# Patient Record
Sex: FEMALE | Race: WHITE | Employment: OTHER | URBAN - METROPOLITAN AREA
[De-identification: names, ages, dates, MRNs, and addresses within clinical notes are randomized per-mention and may not be internally consistent; named-entity substitution may affect disease eponyms.]

---

## 2017-01-03 DIAGNOSIS — F11.90 CHRONIC NARCOTIC USE: Primary | ICD-10-CM

## 2017-01-03 DIAGNOSIS — M51.9 LUMBAR DISC DISEASE: ICD-10-CM

## 2017-01-03 RX ORDER — OXYCODONE AND ACETAMINOPHEN 10; 325 MG/1; MG/1
TABLET ORAL
Qty: 120 TAB | Refills: 0 | Status: SHIPPED | OUTPATIENT
Start: 2017-01-03 | End: 2017-01-27 | Stop reason: SDUPTHER

## 2017-01-03 NOTE — TELEPHONE ENCOUNTER
Requested Prescriptions     Pending Prescriptions Disp Refills    oxyCODONE-acetaminophen (PERCOCET 10)  mg per tablet 120 Tab 0     Sig: Take 1/2-1 tablet every 4-6 hours as needed for pain.      Last OV-9/13/16  Next OV-None scheduled   Med refilled-12/2/16

## 2017-01-03 NOTE — TELEPHONE ENCOUNTER
Advise patient RX is ready. Have ordered a random urine drug screen for when picks up medication, advise front office order in system.

## 2017-01-04 ENCOUNTER — LAB ONLY (OUTPATIENT)
Dept: INTERNAL MEDICINE CLINIC | Age: 62
End: 2017-01-04

## 2017-01-04 DIAGNOSIS — F11.90 CHRONIC NARCOTIC USE: ICD-10-CM

## 2017-01-12 LAB — DRUGS UR: NORMAL

## 2017-01-16 ENCOUNTER — TELEPHONE (OUTPATIENT)
Dept: INTERNAL MEDICINE CLINIC | Age: 62
End: 2017-01-16

## 2017-01-19 ENCOUNTER — HOSPITAL ENCOUNTER (OUTPATIENT)
Dept: CT IMAGING | Age: 62
Discharge: HOME | End: 2017-01-19
Attending: INTERNAL MEDICINE
Payer: COMMERCIAL

## 2017-01-19 DIAGNOSIS — R63.4 ABNORMAL WEIGHT LOSS: ICD-10-CM

## 2017-01-19 DIAGNOSIS — R10.13 PAIN, ABDOMINAL, EPIGASTRIC: ICD-10-CM

## 2017-01-19 LAB — CREAT BLD-MCNC: 0.9 MG/DL (ref 0.6–1.3)

## 2017-01-19 PROCEDURE — 74011636320 HC RX REV CODE- 636/320: Performed by: INTERNAL MEDICINE

## 2017-01-19 PROCEDURE — 82565 ASSAY OF CREATININE: CPT

## 2017-01-19 PROCEDURE — 74011000255 HC RX REV CODE- 255: Performed by: INTERNAL MEDICINE

## 2017-01-19 PROCEDURE — 74177 CT ABD & PELVIS W/CONTRAST: CPT

## 2017-01-19 PROCEDURE — 74011250636 HC RX REV CODE- 250/636: Performed by: INTERNAL MEDICINE

## 2017-01-19 RX ORDER — BARIUM SULFATE 20 MG/ML
900 SUSPENSION ORAL
Status: COMPLETED | OUTPATIENT
Start: 2017-01-19 | End: 2017-01-19

## 2017-01-19 RX ORDER — SODIUM CHLORIDE 0.9 % (FLUSH) 0.9 %
10 SYRINGE (ML) INJECTION
Status: COMPLETED | OUTPATIENT
Start: 2017-01-19 | End: 2017-01-19

## 2017-01-19 RX ORDER — SODIUM CHLORIDE 9 MG/ML
50 INJECTION, SOLUTION INTRAVENOUS
Status: COMPLETED | OUTPATIENT
Start: 2017-01-19 | End: 2017-01-19

## 2017-01-19 RX ADMIN — IOPAMIDOL 100 ML: 755 INJECTION, SOLUTION INTRAVENOUS at 14:31

## 2017-01-19 RX ADMIN — SODIUM CHLORIDE 50 ML/HR: 900 INJECTION, SOLUTION INTRAVENOUS at 14:31

## 2017-01-19 RX ADMIN — BARIUM SULFATE 900 ML: 21 SUSPENSION ORAL at 14:31

## 2017-01-19 RX ADMIN — Medication 10 ML: at 14:31

## 2017-01-24 ENCOUNTER — TELEPHONE (OUTPATIENT)
Dept: INTERNAL MEDICINE CLINIC | Age: 62
End: 2017-01-24

## 2017-01-24 NOTE — TELEPHONE ENCOUNTER
Please notify patient that she did not pass her urine drug test.  She was found to have hydrocodone in her system and that is not prescribed for her and is not a breakdown product of oxycodone. This is a violation of her agreement and I cannot prescribe controlled medications for her any more. She will be able to get one more prescription from me for oxycodone and is advised to wean off the medication. Do not stop abruptly. We can continue to see her as PCP, but cannot prescribe controlled medications.

## 2017-01-27 ENCOUNTER — OFFICE VISIT (OUTPATIENT)
Dept: INTERNAL MEDICINE CLINIC | Age: 62
End: 2017-01-27

## 2017-01-27 VITALS
DIASTOLIC BLOOD PRESSURE: 84 MMHG | WEIGHT: 134.4 LBS | HEIGHT: 65 IN | BODY MASS INDEX: 22.39 KG/M2 | OXYGEN SATURATION: 99 % | RESPIRATION RATE: 19 BRPM | SYSTOLIC BLOOD PRESSURE: 149 MMHG | HEART RATE: 80 BPM | TEMPERATURE: 98.3 F

## 2017-01-27 DIAGNOSIS — M51.9 LUMBAR DISC DISEASE: ICD-10-CM

## 2017-01-27 DIAGNOSIS — J06.9 UPPER RESPIRATORY TRACT INFECTION, UNSPECIFIED TYPE: Primary | ICD-10-CM

## 2017-01-27 RX ORDER — OXYCODONE AND ACETAMINOPHEN 10; 325 MG/1; MG/1
TABLET ORAL
Qty: 120 TAB | Refills: 0 | Status: SHIPPED | OUTPATIENT
Start: 2017-01-27 | End: 2017-10-11

## 2017-01-27 RX ORDER — AZITHROMYCIN 250 MG/1
250 TABLET, FILM COATED ORAL SEE ADMIN INSTRUCTIONS
Qty: 6 TAB | Refills: 0 | Status: SHIPPED | OUTPATIENT
Start: 2017-01-27 | End: 2017-02-01

## 2017-01-27 RX ORDER — DICYCLOMINE HYDROCHLORIDE 10 MG/1
CAPSULE ORAL
Refills: 0 | COMMUNITY
Start: 2017-01-25 | End: 2021-02-08 | Stop reason: CLARIF

## 2017-01-27 NOTE — MR AVS SNAPSHOT
Visit Information Date & Time Provider Department Dept. Phone Encounter #  
 1/27/2017  3:15 PM Finley Bosworth, [address] [address] [phone] Follow-up Instructions Return if symptoms worsen or fail to improve. Your Appointments 1/31/2017  2:40 PM  
ESTABLISHED PATIENT with Alfreda Haro MD  
Arthritis and [address] ([facility]) Appt Note: f/u labs and xrays [address] [facility] [address]  
[phone]  
  
   
 [address] [address] Upcoming Health Maintenance Date Due Hepatitis C Screening 1955 DTaP/Tdap/Td series (1 - Tdap) 2/20/1975 FOBT Q 1 YEAR AGE 50-75 2/20/2004 ZOSTER VACCINE AGE 60> 2/20/2014 INFLUENZA AGE 9 TO ADULT 8/1/2016 BREAST CANCER SCRN MAMMOGRAM 8/17/2017 PAP AKA CERVICAL CYTOLOGY 8/17/2018 Allergies as of 1/27/2017  Review Complete On: 1/27/2017 By: Finley Bosworth, MD  
  
 Severity Noted Reaction Type Reactions Coreg [Carvedilol] High 01/28/2016    Anaphylaxis Ace Inhibitors  01/19/2016    Swelling Arb-angiotensin Receptor Antagonist  07/21/2016   Side Effect Other (comments) Prior ACE inhibitor angioedema, cross reaction risk Codeine  10/13/2012    Nausea Only Keflex [Cephalexin]  10/13/2012    Hives Milk  12/23/2015    Other (comments) Lactose intolerant. Morphine  10/13/2012    Nausea and Vomiting Current Immunizations  Reviewed on 11/15/2015 Name Date Influenza Vaccine (Quad) PF 11/15/2015  8:22 AM  
 Influenza Vaccine Split 10/14/2012  1:24 PM  
 Pneumococcal Polysaccharide (PPSV-23) 8/25/2015 Not reviewed this visit You Were Diagnosed With   
  
 Codes Comments Upper respiratory tract infection, unspecified type    -  Primary ICD-10-CM: J06.9 ICD-9-CM: 465.9 Lumbar disc disease     ICD-10-CM: M51.9 ICD-9-CM: 722.93 Vitals BP Pulse Temp Resp Height(growth percentile) Weight(growth percentile) 149/84 (BP 1 Location: Left arm, BP Patient Position: Sitting) 80 98.3 °F (36.8 °C) (Oral) 19 5' 5\" (1.651 m) 134 lb 6.4 oz (61 kg) SpO2 BMI OB Status Smoking Status 99% 22.37 kg/m2 Postmenopausal Current Every Day Smoker Vitals History BMI and BSA Data Body Mass Index Body Surface Area  
 22.37 kg/m 2 1.67 m 2 Preferred Pharmacy Pharmacy Name Phone [id number] [phone] Your Updated Medication List  
  
   
This list is accurate as of: 1/27/17  4:09 PM.  Always use your most recent med list.  
  
  
  
  
 * albuterol 90 mcg/actuation inhaler Commonly known as:  PROVENTIL HFA, VENTOLIN HFA, PROAIR HFA Take 2 Puffs by inhalation every six (6) hours as needed for Wheezing. * albuterol 2.5 mg /3 mL (0.083 %) nebulizer solution Commonly known as:  PROVENTIL VENTOLIN  
inhale contents of 1 vial in nebulizer every 4 hours if needed  
  
 amLODIPine 2.5 mg tablet Commonly known as:  Elsmadan Bakes Take 1 Tab by mouth daily. aspirin 81 mg chewable tablet Take 1 Tab by mouth daily. atorvastatin 40 mg tablet Commonly known as:  LIPITOR  
take 1 tablet by mouth NIGHTLY  
  
 azithromycin 250 mg tablet Commonly known as:  Valentina Valentino Take 1 Tab by mouth See Admin Instructions for 5 days. carvedilol 12.5 mg tablet Commonly known as:  COREG  
take 1 tablet by mouth twice a day with meals  
  
 desoximetasone 0.25 % topical cream  
Commonly known as:  TOPICORT  
  
 dicyclomine 10 mg capsule Commonly known as:  BENTYL TAKE 1 CAPSULE BY MOUTH BEFORE MEALS AND AT BEDTIME AS NEEDED. DULoxetine 60 mg capsule Commonly known as:  CYMBALTA Take  by mouth daily. EEMT 1.25-2.5 mg per tablet Generic drug:  estrogens (conjugated)-methylTESTOSTERone Take 1 Tab by mouth daily. fluticasone-salmeterol 250-50 mcg/dose diskus inhaler Commonly known as:  ADVAIR DISKUS Take 1 Puff by inhalation two (2) times a day.  
  
 gabapentin 300 mg capsule Commonly known as:  NEURONTIN  
TAKE 1 CAPSULE IN THE MORNING, 1 CAPSULE IN THE AFTERNOON, AD 2 CAPSULES AT BEDTIME  
  
 lidocaine 5 % Commonly known as:  LIDODERM  
2 Patches by TransDERmal route as needed. Nebulizer & Compressor machine 1 Each by Does Not Apply route every four (4) hours as needed. As directed  
  
 nicotine 21 mg/24 hr  
Commonly known as:  NICODERM CQ  
1 Patch by TransDERmal route every twenty-four (24) hours. oxyCODONE-acetaminophen  mg per tablet Commonly known as:  PERCOCET 10 Take 1/2-1 tablet every 4-6 hours as needed for pain. PREMARIN 0.45 mg tablet Generic drug:  estrogens (conjugated) Take  by mouth.  
  
 promethazine 25 mg tablet Commonly known as:  PHENERGAN Take 1 Tab by mouth every six (6) hours as needed for Nausea. * Notice: This list has 2 medication(s) that are the same as other medications prescribed for you. Read the directions carefully, and ask your doctor or other care provider to review them with you. Prescriptions Printed Refills  
 oxyCODONE-acetaminophen (PERCOCET 10)  mg per tablet 0 Sig: Take 1/2-1 tablet every 4-6 hours as needed for pain. Class: Print Prescriptions Sent to Pharmacy Refills  
 azithromycin (ZITHROMAX) 250 mg tablet 0 Sig: Take 1 Tab by mouth See Admin Instructions for 5 days. Class: Normal  
 [facility] [place] [address] Ph #: [phone] Route: Oral  
  
Follow-up Instructions Return if symptoms worsen or fail to improve. Patient Instructions MUCINEX PLAIN FORMULA AND INCREASE WATER INTAKE. Introducing [facility]!    
 Sravani Brewer introduces [employer] patient portal. Now you can access parts of your medical record, email your doctor's office, and request medication refills online. 1. In your internet browser, go to [email or web address] 2. Click on the First Time User? Click Here link in the Sign In box. You will see the New Member Sign Up page. 3. Enter your [employer] Access Code exactly as it appears below. You will not need to use this code after youve completed the sign-up process. If you do not sign up before the expiration date, you must request a new code. · [employer] Access Code: [email or web address] Expires: 2/6/2017  5:15 PM 
 
4. Enter the last four digits of your Social Security Number (xxxx) and Date of Birth (mm/dd/yyyy) as indicated and click Submit. You will be taken to the next sign-up page. 5. Create a [employer] ID. This will be your [employer] login ID and cannot be changed, so think of one that is secure and easy to remember. 6. Create a [employer] password. You can change your password at any time. 7. Enter your Password Reset Question and Answer. This can be used at a later time if you forget your password. 8. Enter your e-mail address. You will receive e-mail notification when new information is available in [address] 9. Click Sign Up. You can now view and download portions of your medical record. 10. Click the Download Summary menu link to download a portable copy of your medical information. If you have questions, please visit the Frequently Asked Questions section of the [employer] website. Remember, [employer] is NOT to be used for urgent needs. For medical emergencies, dial 911. Now available from your iPhone and Android! Please provide this summary of care documentation to your next provider. Your primary care clinician is listed as Gillian Hernandez. If you have any questions after today's visit, please call [phone].

## 2017-01-27 NOTE — PROGRESS NOTES
Reviewed record in preparation for visit and have obtained necessary documentation. Identified pt with two pt identifiers(name and ).       Coordination of Care Questionnaire:  :     1) Have you been to an emergency room, urgent care clinic since your last visit? no   Hospitalized since your last visit? no             2) Have you seen or consulted any other health care providers outside of [facility] since your last visit? no  (Include any pap smears or colon screenings in this section.)

## 2017-01-27 NOTE — PROGRESS NOTES
Shannon Guerrero is a 57 y.o. female who complains of URI symptoms for 3 days. She reports chest congestion and cough, productive of discolored mucous. She is a smoker. Denies sinus or ear pain. No fever. She also comes in to discuss her UDS. The patient has been under controlled substance agreement. She tested positive for hydrocodone and oxycodone. She is not prescribed oxycodone. According to the , she has not filled any hydrocodone rx. She now reports that she found 3 pills in the bottom of her bag and though they were oxycodone tablets and took them. She states that she did not intend to violate her agreement. She is requesting pain medication. Past Medical History   Diagnosis Date    Arthritis     Chronic pain     Heart attack ([address]     [facility] failure [facility] [address]     Hypertension     Squamous cell carcinoma of skin of right elbow 5/2016    Takotsubo cardiomyopathy 11/16/2015    Tobacco abuse 11/16/2015         Review of Systems  Pertinent items are noted in HPI. Objective:     Visit Vitals    /84 (BP 1 Location: Left arm, BP Patient Position: Sitting)    Pulse 80    Temp 98.3 °F (36.8 °C) (Oral)    Resp 19    Ht 5' 5\" (1.651 m)    Wt 134 lb 6.4 oz (61 kg)    SpO2 99%    BMI 22.37 kg/m2     Gen: well appearing female  HEENT:   PERRL,normal conjunctiva. External ear and canals normal, TMs no opacification or erythema,  Swollen nasal turbinates, no sinus pain on palpation,  OP no erythema, no exudates, MMM  Neck:  Supple. Thyroid normal size, nontender, without nodules. No masses or LAD  Resp:  No wheezing, no rhonchi, no rales. CV:  RRR, normal S1S2, no murmur. Assessment/Plan:     1. Lumbar disc disease-patient is advised that the level of hydrocodone in her UDS would not be obtained from 3 tablets and that she has obviously ingested a nonprescribed narcotic. Her controlled substance agreement has been violated. She has been on chronic oxycodone.   She is given one last prescription, so that she can wean to off since I am concerned she will have withdrawal symptoms if she stops abruptly. I will no longer prescribe controlled medications for her.     - oxyCODONE-acetaminophen (PERCOCET 10)  mg per tablet; Take 1/2-1 tablet every 4-6 hours as needed for pain. Dispense: 120 Tab; Refill: 0    2. Upper respiratory tract infection, unspecified type    - azithromycin (ZITHROMAX) 250 mg tablet; Take 1 Tab by mouth See Admin Instructions for 5 days. Dispense: 6 Tab; Refill: 0    Follow-up Disposition:  Return if symptoms worsen or fail to improve.     Jose Rader MD

## 2017-02-13 ENCOUNTER — OFFICE VISIT (OUTPATIENT)
Dept: INTERNAL MEDICINE CLINIC | Age: 62
End: 2017-02-13

## 2017-02-13 VITALS
TEMPERATURE: 97.8 F | DIASTOLIC BLOOD PRESSURE: 74 MMHG | SYSTOLIC BLOOD PRESSURE: 164 MMHG | HEART RATE: 63 BPM | WEIGHT: 136 LBS | HEIGHT: 65 IN | BODY MASS INDEX: 22.66 KG/M2 | OXYGEN SATURATION: 96 % | RESPIRATION RATE: 19 BRPM

## 2017-02-13 DIAGNOSIS — K29.60 OTHER GASTRITIS WITHOUT HEMORRHAGE, UNSPECIFIED CHRONICITY: Primary | ICD-10-CM

## 2017-02-13 DIAGNOSIS — R11.0 NAUSEA: ICD-10-CM

## 2017-02-13 RX ORDER — PROMETHAZINE HYDROCHLORIDE 25 MG/1
25 TABLET ORAL
Qty: 30 TAB | Refills: 0 | Status: SHIPPED | OUTPATIENT
Start: 2017-02-13 | End: 2018-03-14

## 2017-02-13 RX ORDER — OMEPRAZOLE 40 MG/1
40 CAPSULE, DELAYED RELEASE ORAL DAILY
Qty: 30 CAP | Refills: 5 | Status: SHIPPED | OUTPATIENT
Start: 2017-02-13 | End: 2017-07-28 | Stop reason: SDUPTHER

## 2017-02-13 NOTE — PROGRESS NOTES
Nahomi Black is a 57 y.o. female who complains of nausea and vomiting ongoing. She reports that she will awaken with nausea. Saw GI doctor and reports a negative workup. On review of EGD erythema and erosions seen. Was to start nexium, she states that she was unaware of this information and is not taking PPI. Ashly Sanches Taking NSAIDS since narcotics discontinued  . Diagnosed with IBS. Reports increased abdominal gas. Will get diarrhea at times. Sometimes constipation. Past Medical History   Diagnosis Date    Arthritis     Chronic pain     Heart attack ([address]     [facility] failure ([facility]     Hypertension     Squamous cell carcinoma of skin of right elbow 5/2016    Takotsubo cardiomyopathy 11/16/2015    Tobacco abuse 11/16/2015         Review of Systems  A comprehensive review of systems was negative except for that written in the HPI. Objective:     Visit Vitals    /74 (BP 1 Location: Left arm, BP Patient Position: Sitting)    Pulse 63    Temp 97.8 °F (36.6 °C) (Oral)    Resp 19    Ht 5' 5\" (1.651 m)    Wt 136 lb (61.7 kg)    SpO2 96%    BMI 22.63 kg/m2     Gen: well appearing female  Resp:  No wheezing, no rhonchi, no rales. CV:  RRR, normal S1S2, no murmur. GI: soft, nontender, without masses. Assessment/Plan:       ICD-10-CM ICD-9-CM    1. Other gastritis without hemorrhage, unspecified chronicity K29.60 535.40 omeprazole (PRILOSEC) 40 mg capsule   2. Nausea R11.0 787.02 promethazine (PHENERGAN) 25 mg tablet   advised to avoid NSAIDS>     Follow-up Disposition:  Return if symptoms worsen or fail to improve.     Naima Lundberg MD

## 2017-02-13 NOTE — PROGRESS NOTES
Reviewed record in preparation for visit and have obtained necessary documentation. Identified pt with two pt identifiers(name and ).     Chief Complaint   Patient presents with    Vomiting     pt states \" I have been throwing up for over a year\"            Coordination of Care Questionnaire:  :     1) Have you been to an emergency room, urgent care clinic since your last visit? no   Hospitalized since your last visit? no             2) Have you seen or consulted any other health care providers outside of [address] [place] [address] since your last visit? no  (Include any pap smears or colon screenings in this section.)

## 2017-02-13 NOTE — PATIENT INSTRUCTIONS
Start prilosec 40mg once a day and after 1 week you are getting only partial relief, add pepcid complete or zantac 150mg in the evening.

## 2017-02-13 NOTE — MR AVS SNAPSHOT
Visit Information Date & Time Provider Department Dept. Phone Encounter #  
 2/13/2017  3:45 PM Dave Craig, [address] [id number] Follow-up Instructions Return if symptoms worsen or fail to improve. Your Appointments 2/22/2017  1:30 PM  
ESTABLISHED PATIENT with Leandro Fleming MD  
Arthritis and [address] ([facility]) Appt Note: f/u labs and xrays; f/u labs and xrays ok per dr Araceli Olmedo for 30min [address] [facility] [address]  
[phone]  
  
   
 [address] [address] Upcoming Health Maintenance Date Due Hepatitis C Screening 1955 DTaP/Tdap/Td series (1 - Tdap) 2/20/1975 FOBT Q 1 YEAR AGE 50-75 2/20/2004 ZOSTER VACCINE AGE 60> 2/20/2014 INFLUENZA AGE 9 TO ADULT 8/1/2016 BREAST CANCER SCRN MAMMOGRAM 8/17/2017 PAP AKA CERVICAL CYTOLOGY 8/17/2018 Allergies as of 2/13/2017  Review Complete On: 2/13/2017 By: readeo Brochure Severity Noted Reaction Type Reactions Coreg [Carvedilol] High 01/28/2016    Anaphylaxis Ace Inhibitors  01/19/2016    Swelling Arb-angiotensin Receptor Antagonist  07/21/2016   Side Effect Other (comments) Prior ACE inhibitor angioedema, cross reaction risk Codeine  10/13/2012    Nausea Only Keflex [Cephalexin]  10/13/2012    Hives Milk  12/23/2015    Other (comments) Lactose intolerant. Morphine  10/13/2012    Nausea and Vomiting Current Immunizations  Reviewed on 11/15/2015 Name Date Influenza Vaccine (Quad) PF 11/15/2015  8:22 AM  
 Influenza Vaccine Split 10/14/2012  1:24 PM  
 Pneumococcal Polysaccharide (PPSV-23) 8/25/2015 Not reviewed this visit You Were Diagnosed With   
  
 Codes Comments Other gastritis without hemorrhage, unspecified chronicity    -  Primary ICD-10-CM: K29.60 ICD-9-CM: 535.40 Nausea     ICD-10-CM: R11.0 ICD-9-CM: 787.02 Vitals BP Pulse Temp Resp Height(growth percentile) Weight(growth percentile) 164/74 (BP 1 Location: Left arm, BP Patient Position: Sitting) 63 97.8 °F (36.6 °C) (Oral) 19 5' 5\" (1.651 m) 136 lb (61.7 kg) SpO2 BMI OB Status Smoking Status 96% 22.63 kg/m2 Postmenopausal Current Every Day Smoker BMI and BSA Data Body Mass Index Body Surface Area  
 22.63 kg/m 2 1.68 m 2 Preferred Pharmacy Pharmacy Name Phone [phone] [phone] Your Updated Medication List  
  
   
This list is accurate as of: 2/13/17  5:22 PM.  Always use your most recent med list.  
  
  
  
  
 * albuterol 90 mcg/actuation inhaler Commonly known as:  PROVENTIL HFA, VENTOLIN HFA, PROAIR HFA Take 2 Puffs by inhalation every six (6) hours as needed for Wheezing. * albuterol 2.5 mg /3 mL (0.083 %) nebulizer solution Commonly known as:  PROVENTIL VENTOLIN  
inhale contents of 1 vial in nebulizer every 4 hours if needed  
  
 amLODIPine 2.5 mg tablet Commonly known as:  Mckayla Fraction Take 1 Tab by mouth daily. aspirin 81 mg chewable tablet Take 1 Tab by mouth daily. atorvastatin 40 mg tablet Commonly known as:  LIPITOR  
take 1 tablet by mouth NIGHTLY  
  
 carvedilol 12.5 mg tablet Commonly known as:  COREG  
take 1 tablet by mouth twice a day with meals  
  
 desoximetasone 0.25 % topical cream  
Commonly known as:  TOPICORT  
  
 dicyclomine 10 mg capsule Commonly known as:  BENTYL TAKE 1 CAPSULE BY MOUTH BEFORE MEALS AND AT BEDTIME AS NEEDED. DULoxetine 60 mg capsule Commonly known as:  CYMBALTA Take  by mouth daily. EEMT 1.25-2.5 mg per tablet Generic drug:  estrogens (conjugated)-methylTESTOSTERone Take 1 Tab by mouth daily. fluticasone-salmeterol 250-50 mcg/dose diskus inhaler Commonly known as:  ADVAIR DISKUS Take 1 Puff by inhalation two (2) times a day.  
  
 gabapentin 300 mg capsule Commonly known as:  NEURONTIN  
TAKE 1 CAPSULE IN THE MORNING, 1 CAPSULE IN THE AFTERNOON, AD 2 CAPSULES AT BEDTIME  
  
 lidocaine 5 % Commonly known as:  LIDODERM  
2 Patches by TransDERmal route as needed. Nebulizer & Compressor machine 1 Each by Does Not Apply route every four (4) hours as needed. As directed  
  
 nicotine 21 mg/24 hr  
Commonly known as:  NICODERM CQ  
1 Patch by TransDERmal route every twenty-four (24) hours. omeprazole 40 mg capsule Commonly known as:  PRILOSEC Take 1 Cap by mouth daily. On empty stomach  
  
 oxyCODONE-acetaminophen  mg per tablet Commonly known as:  PERCOCET 10 Take 1/2-1 tablet every 4-6 hours as needed for pain. PREMARIN 0.45 mg tablet Generic drug:  estrogens (conjugated) Take  by mouth.  
  
 promethazine 25 mg tablet Commonly known as:  PHENERGAN Take 1 Tab by mouth every six (6) hours as needed for Nausea. * Notice: This list has 2 medication(s) that are the same as other medications prescribed for you. Read the directions carefully, and ask your doctor or other care provider to review them with you. Prescriptions Sent to Pharmacy Refills  
 omeprazole (PRILOSEC) 40 mg capsule 5 Sig: Take 1 Cap by mouth daily. On empty stomach Class: Normal  
 Pharmacy: [address] Ph #: [phone] Route: Oral  
 promethazine (PHENERGAN) 25 mg tablet 0 Sig: Take 1 Tab by mouth every six (6) hours as needed for Nausea. Class: Normal  
 Pharmacy: [address] Ph #: [phone] Route: Oral  
  
Follow-up Instructions Return if symptoms worsen or fail to improve. Patient Instructions Start prilosec 40mg once a day and after 1 week you are getting only partial relief, add pepcid complete or zantac 150mg in the evening. Introducing [facility] & HEALTH SERVICES! Noa Rubio introduces [employer] patient portal. Now you can access parts of your medical record, email your doctor's office, and request medication refills online. 1. In your internet browser, go to [email or web address] 2. Click on the First Time User? Click Here link in the Sign In box. You will see the New Member Sign Up page. 3. Enter your [employer] Access Code exactly as it appears below. You will not need to use this code after youve completed the sign-up process. If you do not sign up before the expiration date, you must request a new code. · [employer] Access Code: [email or web address] Expires: 5/14/2017  5:22 PM 
 
4. Enter the last four digits of your Social Security Number (xxxx) and Date of Birth (mm/dd/yyyy) as indicated and click Submit. You will be taken to the next sign-up page. 5. Create a [employer] ID. This will be your [employer] login ID and cannot be changed, so think of one that is secure and easy to remember. 6. Create a [employer] password. You can change your password at any time. 7. Enter your Password Reset Question and Answer. This can be used at a later time if you forget your password. 8. Enter your e-mail address. You will receive e-mail notification when new information is available in [address] 9. Click Sign Up. You can now view and download portions of your medical record. 10. Click the Download Summary menu link to download a portable copy of your medical information. If you have questions, please visit the Frequently Asked Questions section of the [employer] website. Remember, [employer] is NOT to be used for urgent needs. For medical emergencies, dial 911. Now available from your iPhone and Android! Please provide this summary of care documentation to your next provider. Your primary care clinician is listed as Chai Carney. If you have any questions after today's visit, please call [phone].

## 2017-02-22 ENCOUNTER — OFFICE VISIT (OUTPATIENT)
Dept: RHEUMATOLOGY | Age: 62
End: 2017-02-22

## 2017-02-22 VITALS
HEART RATE: 68 BPM | DIASTOLIC BLOOD PRESSURE: 84 MMHG | HEIGHT: 65 IN | SYSTOLIC BLOOD PRESSURE: 160 MMHG | WEIGHT: 129.8 LBS | TEMPERATURE: 97.4 F | RESPIRATION RATE: 16 BRPM | BODY MASS INDEX: 21.63 KG/M2 | OXYGEN SATURATION: 100 %

## 2017-02-22 DIAGNOSIS — M19.90 INFLAMMATORY ARTHRITIS: Primary | ICD-10-CM

## 2017-02-22 RX ORDER — HYDROCODONE BITARTRATE AND ACETAMINOPHEN 5; 325 MG/1; MG/1
TABLET ORAL
Refills: 0 | COMMUNITY
Start: 2017-02-14 | End: 2017-10-11

## 2017-02-22 NOTE — PROGRESS NOTES
RHEUMATOLOGY PROBLEM LIST AND CHIEF COMPLAINT  1. Possible inflammatory arthritis - morning stiffness, arthritis on the 2nd and 3rd MCPs bilaterally. 2. Osteoarthritis - hands, knees, feet, hips  3. Fibromyalgia    INTERVAL HISTORY  This is a 60 y.o.  female. Today, the patient complains of pain in the joints. Location: hand, hip, knee, foot, lumbar spine  Severity:  7 on a scale of 0-10  Timing: intermittent   Duration: many years  Modifying factors: Steroid injections  Context/Associated signs and symptoms: The patient states that she has pain in her hands on occasion, but she admits to taking pain medication and believes this could be relieving some of her pain. She admits to having stiffness in her hands that lasts for about one hour and improves with movement. She believes her right hand is worse than her left hand. Her x-rays showed joint space narrowing of right 2nd and 3rd MCPs and possible erosions of her left 2nd and 3rd MCPs. She complains most of pain in her hips and back. RHEUMATOLOGY REVIEW OF SYSTEMS   Positives as per history  Negatives as follows:  My [place]:  Denies unexplained persistent fevers or weight change  RESPIRATORY:  No pleuritic pain, exertional dyspnea  CARDIOVASCULAR:  Denies chest pain  GASTRO:   Denies heartburn, abdominal pain, nausea, vomiting, diarrhea  SKIN:    Denies rash   MSK:    No morning stiffness >1 hour, persistent joint swelling    PAST MEDICAL HISTORY  Reviewed with patient, significant changes in medical history - no    FAMILY HISTORY   No autoimmune disease in the family    PHYSICAL EXAM  Blood pressure 160/84, pulse 68, temperature 97.4 °F (36.3 °C), temperature source Oral, resp. rate 16, height 5' 5\" (1.651 m), weight 129 lb 12.8 oz (58.9 kg), SpO2 100 %. GENERAL APPEARANCE: Well-nourished, no acute distress  NECK: No adenopathy  ENT: No oral ulcers  CARDIOVASCULAR: Heart rhythm is regular.  No murmur, rub, gallop  CHEST: Normal vesicular breath sounds. No wheezes, rales, pleural friction rubs  ABDOMINAL: The abdomen is soft and nontender. Bowel sounds are normal  SKIN: No rash, palpable purpura, digital ulcer, abnormal thickening   MUSCULOSKELETAL: Lumbosacral pain  Upper extremities - Heberden's and Hang's nodes bilaterally. Synovial thickening and fullness of 2nd and 3rd MCP's bilaterally. Lower extremities - full range of motion, no tenderness, no swelling, no synovial thickening and no deformity of joints. LABS, RADIOLOGY AND PROCEDURES  Previous labs reviewed -Yes  Previous radiology reviewed -Yes    12/20/2016 Bilateral hand x-rays  IMPRESSION: Erosive disease of the right second and third metacarpal phalangeal  joints as described, concerning for rheumatoid arthritis    12/20/2016 Bilateral foot x-rays  IMPRESSION:   Bilateral hallux valgus deformity, more pronounced on the left  Possible posttraumatic osteoarthritis of the right second metatarsal phalangeal  joint. Incidental small bilateral plantar spurs. 12/20/2016 Bilateral hip x-rays  IMPRESSION: No acute abnormality    ASSESSMENT  1. Possible inflammatory arthritis - morning stiffness, arthritis of 2nd and 3rd MCPs bilaterally with possible erosions on the right - The patient's x-rays showed possible changes that could be consistent with an inflammatory arthritis. She has complained of pain and morning stiffness in her hands, and there is synovial thickening and fullness of the 2nd and 3rd MCPs bilaterally. I suspect that she may have RA or gout, so I will check labs today including a CCP and uric acid. After checking her labs I will consider starting her on low dose steroids to see if her inflammatory symptoms improve. She should return in 2 months for a follow up. 2. Osteoarthritis (hands, knees) - The patient's x-rays showed changes consistent with OA in her hands, feet, and hips. She should continue physical therapy and NSAIDs as needed.    3. Pain syndrome (fibromyalgia) - Continue physical therapy. PLAN  1. Check CBC, CMP, markers of inflammation (ESR, CRP), CCP, uric acid  2. Return in 2 months  3. Will call patient to discuss results      Anish Mcgregor MD  Adult and Pediatric Rheumatology     [facility], [address] [place] [address], Phone [phone], Fax [phone]     Visiting  of Pediatrics    Department of Pediatrics, [facility] of [address] [place] [address], Phone [phone], Fax [phone]    There are no Patient Instructions on file for this visit. cc:  Brian Spangler MD    Written by beatriz Rahman, as dictated by Anish Mcgregor M.D. Total face-to face time was 25 minutes, greater than 50% of which was spent in counseling and coordination of care. The diagnosis, treatment and various other items were discussed in detail: Test results, medication options, possible side effects, lifestyle changes.

## 2017-02-22 NOTE — MR AVS SNAPSHOT
Visit Information Date & Time Provider Department Dept. Phone Encounter #  
 2/22/2017  1:30 PM Macho Wright MD [facility] [id number] Follow-up Instructions Return in about 2 months (around 4/22/2017), or if symptoms worsen or fail to improve. Upcoming Health Maintenance Date Due Hepatitis C Screening 1955 DTaP/Tdap/Td series (1 - Tdap) 2/20/1975 FOBT Q 1 YEAR AGE 50-75 2/20/2004 ZOSTER VACCINE AGE 60> 2/20/2014 INFLUENZA AGE 9 TO ADULT 8/1/2016 BREAST CANCER SCRN MAMMOGRAM 8/17/2017 PAP AKA CERVICAL CYTOLOGY 8/17/2018 Allergies as of 2/22/2017  Review Complete On: 2/18/2017 By: Brian Spangler MD  
  
 Severity Noted Reaction Type Reactions Coreg [Carvedilol] High 01/28/2016    Anaphylaxis Ace Inhibitors  01/19/2016    Swelling Arb-angiotensin Receptor Antagonist  07/21/2016   Side Effect Other (comments) Prior ACE inhibitor angioedema, cross reaction risk Codeine  10/13/2012    Nausea Only Keflex [Cephalexin]  10/13/2012    Hives Milk  12/23/2015    Other (comments) Lactose intolerant. Morphine  10/13/2012    Nausea and Vomiting Current Immunizations  Reviewed on 11/15/2015 Name Date Influenza Vaccine (Quad) PF 11/15/2015  8:22 AM  
 Influenza Vaccine Split 10/14/2012  1:24 PM  
 Pneumococcal Polysaccharide (PPSV-23) 8/25/2015 Not reviewed this visit You Were Diagnosed With   
  
 Codes Comments Inflammatory arthritis    -  Primary ICD-10-CM: M19.90 ICD-9-CM: 714.9 Vitals BP  
  
  
  
  
  
 160/84 (BP 1 Location: Right arm, BP Patient Position: Sitting) Vitals History BMI and BSA Data Body Mass Index Body Surface Area  
 21.6 kg/m 2 1.64 m 2 Preferred Pharmacy Pharmacy Name Phone [id number], [address] [phone] Your Updated Medication List  
  
   
 This list is accurate as of: 2/22/17  2:14 PM.  Always use your most recent med list.  
  
  
  
  
 * albuterol 90 mcg/actuation inhaler Commonly known as:  PROVENTIL HFA, VENTOLIN HFA, PROAIR HFA Take 2 Puffs by inhalation every six (6) hours as needed for Wheezing. * albuterol 2.5 mg /3 mL (0.083 %) nebulizer solution Commonly known as:  PROVENTIL VENTOLIN  
inhale contents of 1 vial in nebulizer every 4 hours if needed  
  
 amLODIPine 2.5 mg tablet Commonly known as:  Schofield Daniel Take 1 Tab by mouth daily. aspirin 81 mg chewable tablet Take 1 Tab by mouth daily. atorvastatin 40 mg tablet Commonly known as:  LIPITOR  
take 1 tablet by mouth NIGHTLY  
  
 carvedilol 12.5 mg tablet Commonly known as:  COREG  
take 1 tablet by mouth twice a day with meals  
  
 desoximetasone 0.25 % topical cream  
Commonly known as:  TOPICORT  
  
 dicyclomine 10 mg capsule Commonly known as:  BENTYL TAKE 1 CAPSULE BY MOUTH BEFORE MEALS AND AT BEDTIME AS NEEDED. DULoxetine 60 mg capsule Commonly known as:  CYMBALTA Take  by mouth daily. EEMT 1.25-2.5 mg per tablet Generic drug:  estrogens (conjugated)-methylTESTOSTERone Take 1 Tab by mouth daily. fluticasone-salmeterol 250-50 mcg/dose diskus inhaler Commonly known as:  ADVAIR DISKUS Take 1 Puff by inhalation two (2) times a day.  
  
 gabapentin 300 mg capsule Commonly known as:  NEURONTIN  
TAKE 1 CAPSULE IN THE MORNING, 1 CAPSULE IN THE AFTERNOON, AD 2 CAPSULES AT BEDTIME HYDROcodone-acetaminophen 5-325 mg per tablet Commonly known as:  Abiola Fothergill  
take 1 tablet by mouth not more than six times a day  
  
 lidocaine 5 % Commonly known as:  LIDODERM  
2 Patches by TransDERmal route as needed. Nebulizer & Compressor machine 1 Each by Does Not Apply route every four (4) hours as needed.  As directed  
  
 nicotine 21 mg/24 hr  
Commonly known as:  Derek Service  
 1 Patch by TransDERmal route every twenty-four (24) hours. omeprazole 40 mg capsule Commonly known as:  PRILOSEC Take 1 Cap by mouth daily. On empty stomach  
  
 oxyCODONE-acetaminophen  mg per tablet Commonly known as:  PERCOCET 10 Take 1/2-1 tablet every 4-6 hours as needed for pain. PREMARIN 0.45 mg tablet Generic drug:  estrogens (conjugated) Take  by mouth.  
  
 promethazine 25 mg tablet Commonly known as:  PHENERGAN Take 1 Tab by mouth every six (6) hours as needed for Nausea. * Notice: This list has 2 medication(s) that are the same as other medications prescribed for you. Read the directions carefully, and ask your doctor or other care provider to review them with you. We Performed the Following C REACTIVE PROTEIN, QT [24367 CPT(R)] CBC+PLATELET+HEM REVIEW [82837 CPT(R)] [address], IGG W840781 CPT(R)] METABOLIC PANEL, COMPREHENSIVE [11862 CPT(R)] SED RATE (ESR) [id number] CPT(R)] URIC ACID [id number] CPT(R)] Follow-up Instructions Return in about 2 months (around 4/22/2017), or if symptoms worsen or fail to improve. To-Do List   
 02/23/2017 2:45 PM  
  Appointment with Aaron Head MD; [facility] [place] XR IP 1 at [address] [facility] ([phone]) 1. Blood thinners and platelet inhibitors must be stopped 3-5 days prior to procedure. Consult your ordering physician prior to stopping them. Awaiting Radiologist to answer 2. Arrive 30 minutes prior to schedued exam time. Introducing [facility]! Tomás Rizvi introduces [employer] patient portal. Now you can access parts of your medical record, email your doctor's office, and request medication refills online. 1. In your internet browser, go to [email or web address] 2. Click on the First Time User? Click Here link in the Sign In box. You will see the New Member Sign Up page. 3. Enter your Q.ME Access Code exactly as it appears below. You will not need to use this code after youve completed the sign-up process. If you do not sign up before the expiration date, you must request a new code. · Q.ME Access Code: [email or web address] Expires: 5/14/2017  5:22 PM 
 
4. Enter the last four digits of your Social Security Number (xxxx) and Date of Birth (mm/dd/yyyy) as indicated and click Submit. You will be taken to the next sign-up page. 5. Create a Q.ME ID. This will be your Q.ME login ID and cannot be changed, so think of one that is secure and easy to remember. 6. Create a Q[employer]ME password. You can change your password at any time. 7. Enter your Password Reset Question and Answer. This can be used at a later time if you forget your password. 8. Enter your e-mail address. You will receive e-mail notification when new information is available in [address] 9. Click Sign Up. You can now view and download portions of your medical record. 10. Click the Download Summary menu link to download a portable copy of your medical information. If you have questions, please visit the Frequently Asked Questions section of the [employer] website. Remember, Q.ME is NOT to be used for urgent needs. For medical emergencies, dial 911. Now available from your iPhone and Android! Please provide this summary of care documentation to your next provider. Your primary care clinician is listed as Gillian Hernandez. If you have any questions after today's visit, please call [phone].

## 2017-02-23 ENCOUNTER — HOSPITAL ENCOUNTER (OUTPATIENT)
Dept: GENERAL RADIOLOGY | Age: 62
Discharge: HOME | End: 2017-02-23
Attending: ORTHOPAEDIC SURGERY
Payer: COMMERCIAL

## 2017-02-23 DIAGNOSIS — M25.559 ACUTE HIP PAIN: ICD-10-CM

## 2017-02-23 PROCEDURE — 74011000250 HC RX REV CODE- 250: Performed by: RADIOLOGY

## 2017-02-23 PROCEDURE — 74011636320 HC RX REV CODE- 636/320: Performed by: RADIOLOGY

## 2017-02-23 PROCEDURE — 20610 DRAIN/INJ JOINT/BURSA W/O US: CPT

## 2017-02-23 PROCEDURE — 74011250636 HC RX REV CODE- 250/636: Performed by: RADIOLOGY

## 2017-02-23 RX ORDER — LIDOCAINE HYDROCHLORIDE 10 MG/ML
1 INJECTION INFILTRATION; PERINEURAL
Status: COMPLETED | OUTPATIENT
Start: 2017-02-23 | End: 2017-02-23

## 2017-02-23 RX ORDER — TRIAMCINOLONE ACETONIDE 40 MG/ML
40 INJECTION, SUSPENSION INTRA-ARTICULAR; INTRAMUSCULAR
Status: COMPLETED | OUTPATIENT
Start: 2017-02-23 | End: 2017-02-23

## 2017-02-23 RX ORDER — BUPIVACAINE HYDROCHLORIDE 5 MG/ML
5 INJECTION, SOLUTION EPIDURAL; INTRACAUDAL
Status: COMPLETED | OUTPATIENT
Start: 2017-02-23 | End: 2017-02-23

## 2017-02-23 RX ADMIN — IOHEXOL 20 ML: 180 INJECTION INTRAVENOUS at 15:04

## 2017-02-23 RX ADMIN — BUPIVACAINE HYDROCHLORIDE 25 MG: 5 INJECTION, SOLUTION EPIDURAL; INTRACAUDAL at 15:04

## 2017-02-23 RX ADMIN — TRIAMCINOLONE ACETONIDE 40 MG: 40 INJECTION, SUSPENSION INTRA-ARTICULAR; INTRAMUSCULAR at 15:05

## 2017-02-23 RX ADMIN — SODIUM BICARBONATE 5 ML: 0.2 INJECTION, SOLUTION INTRAVENOUS at 15:04

## 2017-02-23 RX ADMIN — LIDOCAINE HYDROCHLORIDE 0.1 ML: 10 INJECTION, SOLUTION INFILTRATION; PERINEURAL at 15:04

## 2017-02-24 LAB
ALBUMIN SERPL-MCNC: 4.8 G/DL (ref 3.6–4.8)
ALBUMIN/GLOB SERPL: 1.9 {RATIO} (ref 1.1–2.5)
ALP SERPL-CCNC: 91 IU/L (ref 39–117)
ALT SERPL-CCNC: 24 IU/L (ref 0–32)
AST SERPL-CCNC: 26 IU/L (ref 0–40)
BASOPHILS # BLD MANUAL: 0.1 X10E3/UL (ref 0–0.2)
BASOPHILS NFR BLD MANUAL: 1 %
BILIRUB SERPL-MCNC: 0.5 MG/DL (ref 0–1.2)
BUN SERPL-MCNC: 12 MG/DL (ref 8–27)
BUN/CREAT SERPL: 14 (ref 11–26)
CALCIUM SERPL-MCNC: 10 MG/DL (ref 8.7–10.3)
CCP IGA+IGG SERPL IA-ACNC: 4 UNITS (ref 0–19)
CHLORIDE SERPL-SCNC: 98 MMOL/L (ref 96–106)
CO2 SERPL-SCNC: 25 MMOL/L (ref 18–29)
CREAT SERPL-MCNC: 0.87 MG/DL (ref 0.57–1)
CRP SERPL-MCNC: 2.1 MG/L (ref 0–4.9)
DIFFERENTIAL COMMENT, 115260: ABNORMAL
EOSINOPHIL # BLD MANUAL: 0.2 X10E3/UL (ref 0–0.4)
EOSINOPHIL NFR BLD MANUAL: 4 %
ERYTHROCYTE [DISTWIDTH] IN BLOOD BY AUTOMATED COUNT: 13 % (ref 12.3–15.4)
ERYTHROCYTE [SEDIMENTATION RATE] IN BLOOD BY WESTERGREN METHOD: 2 MM/HR (ref 0–40)
GLOBULIN SER CALC-MCNC: 2.5 G/DL (ref 1.5–4.5)
GLUCOSE SERPL-MCNC: 112 MG/DL (ref 65–99)
HCT VFR BLD AUTO: 44.1 % (ref 34–46.6)
HGB BLD-MCNC: 14.4 G/DL (ref 11.1–15.9)
LYMPHOCYTES # BLD MANUAL: 1.7 X10E3/UL (ref 0.7–3.1)
LYMPHOCYTES NFR BLD MANUAL: 29 %
MCH RBC QN AUTO: 31.9 PG (ref 26.6–33)
MCHC RBC AUTO-ENTMCNC: 32.7 G/DL (ref 31.5–35.7)
MCV RBC AUTO: 98 FL (ref 79–97)
MONOCYTES # BLD MANUAL: 0.5 X10E3/UL (ref 0.1–0.9)
MONOCYTES NFR BLD MANUAL: 9 %
NEUTROPHILS # BLD MANUAL: 3.3 X10E3/UL (ref 1.4–7)
NEUTROPHILS NFR BLD MANUAL: 57 %
PLATELET # BLD AUTO: 352 X10E3/UL (ref 150–379)
PLATELET BLD QL SMEAR: ADEQUATE
POTASSIUM SERPL-SCNC: 4.8 MMOL/L (ref 3.5–5.2)
PROT SERPL-MCNC: 7.3 G/DL (ref 6–8.5)
RBC # BLD AUTO: 4.51 X10E6/UL (ref 3.77–5.28)
RBC MORPH BLD: ABNORMAL
SODIUM SERPL-SCNC: 140 MMOL/L (ref 134–144)
URATE SERPL-MCNC: 5.8 MG/DL (ref 2.5–7.1)
WBC # BLD AUTO: 5.8 X10E3/UL (ref 3.4–10.8)

## 2017-03-15 RX ORDER — ATORVASTATIN CALCIUM 40 MG/1
TABLET, FILM COATED ORAL
Qty: 30 TAB | Refills: 12 | Status: SHIPPED | OUTPATIENT
Start: 2017-03-15 | End: 2018-04-05 | Stop reason: SDUPTHER

## 2017-03-31 RX ORDER — AMLODIPINE BESYLATE 2.5 MG/1
2.5 TABLET ORAL DAILY
Qty: 30 TAB | Refills: 6 | Status: SHIPPED | OUTPATIENT
Start: 2017-03-31 | End: 2017-10-30 | Stop reason: SDUPTHER

## 2017-04-17 ENCOUNTER — TELEPHONE (OUTPATIENT)
Dept: RHEUMATOLOGY | Age: 62
End: 2017-04-17

## 2017-04-17 RX ORDER — CARVEDILOL 12.5 MG/1
TABLET ORAL
Qty: 60 TAB | Refills: 6 | Status: SHIPPED | OUTPATIENT
Start: 2017-04-17 | End: 2017-12-10 | Stop reason: SDUPTHER

## 2017-04-17 NOTE — TELEPHONE ENCOUNTER
Patient is asked if she review her blood work over the phone or if she needs to come in for a F/up on 4/19/2017. Her phone is [phone].

## 2017-04-19 ENCOUNTER — OFFICE VISIT (OUTPATIENT)
Dept: RHEUMATOLOGY | Age: 62
End: 2017-04-19

## 2017-04-19 VITALS
DIASTOLIC BLOOD PRESSURE: 74 MMHG | RESPIRATION RATE: 18 BRPM | SYSTOLIC BLOOD PRESSURE: 160 MMHG | HEART RATE: 80 BPM | OXYGEN SATURATION: 99 % | HEIGHT: 65 IN | TEMPERATURE: 98.4 F | BODY MASS INDEX: 22.49 KG/M2 | WEIGHT: 135 LBS

## 2017-04-19 DIAGNOSIS — M70.62 TROCHANTERIC BURSITIS, LEFT HIP: Primary | ICD-10-CM

## 2017-04-19 DIAGNOSIS — M05.79 SEROPOSITIVE RHEUMATOID ARTHRITIS OF MULTIPLE SITES (HCC): ICD-10-CM

## 2017-04-19 RX ORDER — LANOLIN ALCOHOL/MO/W.PET/CERES
CREAM (GRAM) TOPICAL
COMMUNITY
End: 2017-10-11

## 2017-04-19 RX ORDER — HYDROXYCHLOROQUINE SULFATE 200 MG/1
300 TABLET, FILM COATED ORAL DAILY
Qty: 45 TAB | Refills: 6 | Status: SHIPPED | OUTPATIENT
Start: 2017-04-19 | End: 2018-01-08 | Stop reason: SDUPTHER

## 2017-04-19 RX ORDER — CETIRIZINE HYDROCHLORIDE 10 MG/1
CAPSULE, LIQUID FILLED ORAL
COMMUNITY
End: 2021-02-08 | Stop reason: CLARIF

## 2017-04-19 NOTE — PROGRESS NOTES
RHEUMATOLOGY PROBLEM LIST AND CHIEF COMPLAINT  1. Inflammatory arthritis - morning stiffness, arthritis on the 2nd and 3rd MCPs bilaterally. 2. Osteoarthritis - hands, knees, feet, hips  3. Fibromyalgia    INTERVAL HISTORY  This is a 60 y.o.  female. Today, the patient complains of pain in the joints. Location: hip, lumbar spine  Severity:  NA  Timing: all day   Duration:  a few months  Modifying factors: Hydrocodone  Context/Associated signs and symptoms: The patient states that her arthritis is \"the least of her worries. \" She complains of pain in her hips and lower back. She reports that she had a steroid injection about 1 month ago that is still providing relief. She also reports that she has pain over her left lateral thigh that hurts when she sleeps on her left side. She admits to fatigue and  morning stiffness in her hands that lasts for over an hour. She reports that her oxycodone was switched to hydrocodone. She notes that her dose of hydrocodone was reduced and she has been without hydrocodone for 4 days without a withdrawal.     RHEUMATOLOGY REVIEW OF SYSTEMS   Positives as per history  Negatives as follows:  CONSTITUTlONAL:  Denies unexplained persistent fevers or weight change  RESPIRATORY:  No pleuritic pain, exertional dyspnea  CARDIOVASCULAR:  Denies chest pain  GASTRO:   Denies heartburn, abdominal pain, nausea, vomiting, diarrhea  SKIN:    Denies rash   MSK:    No morning stiffness >1 hour, persistent joint swelling    PAST MEDICAL HISTORY  Reviewed with patient, significant changes in medical history - no    FAMILY HISTORY   No autoimmune disease in the family    PHYSICAL EXAM  Blood pressure 160/74, pulse 80, temperature 98.4 °F (36.9 °C), resp. rate 18, height 5' 5\" (1.651 m), weight 135 lb (61.2 kg), SpO2 99 %. GENERAL APPEARANCE: Well-nourished, no acute distress  NECK: No adenopathy  ENT: No oral ulcers  CARDIOVASCULAR: Heart rhythm is regular.  No murmur, rub, gallop  CHEST: Normal vesicular breath sounds. No wheezes, rales, pleural friction rubs  ABDOMINAL: The abdomen is soft and nontender. Bowel sounds are normal  SKIN: No rash, palpable purpura, digital ulcer, abnormal thickening   MUSCULOSKELETAL: Lumbosacral pain  Upper extremities - Heberden's and Hang's nodes bilaterally. Synovial thickening and fullness of 2nd and 3rd MCP's bilaterally. Lower extremities - full range of motion, no tenderness, no swelling, no synovial thickening and no deformity of joints. LABS, RADIOLOGY AND PROCEDURES  Previous labs reviewed -Yes    ASSESSMENT  1. Inflammatory arthritis - (Established problem -  Progressive disease) - The patient continues to have morning stiffness in her hands and significant fatigue. I explained that these symptoms could improve with treatment, so I will start her on plaquenil 200 mg daily. She should titrate this to 300 mg daily as directed. 2. Osteoarthritis (hands, knees) - The patient should continue physical therapy and NSAIDs as needed. 3. Pain syndrome (fibromyalgia) - Continue physical therapy. 4. Trochanteric bursitis -  The pain and discomfort over the left lateral thigh is most likely coming from inflammation of the bursa. There is pain on the outside of the hip and thigh, pain with lying on the affected side, pain with pressure on the lateral thigh, pain with activity and pain with walking up stairs. These are all classic for this diagnosis. We reviewed rehabilitation exercises with the patient (piriformas stretch, iliotibial band stretch, straight leg stretch, wall squat with ball and gluteal strengthening). There is also relief from bursa injection with corticosteroids and lidocaine, so I will inject her left trochanteric bursa with depo-medrol 80 mg today. 5. New medication - Plaquenil - A written summary, as prepared by the [employer] of Rheumatology was provided.   The patient was given the opportunity to ask questions, and verbalized understanding of the following: The medication may not improve symptoms for 1-2 months, but it may take up to 6 months before full benefits are experienced. It is very well tolerated, and serious side effects are rare. Common side effects were discussed; nausea and diarrhea, which can improve by taking with food. Less common side effects that were discussed; skin rashes, changes in skin pigment, hair changes, anemia and weakness. Visual changes or loss of vision are also rare; we have dosed accordingly and she will have yearly eye exams. PLAN  1. Start plaquenil 200 mg daily, titrate to 300 mg daily  2. Left trochanteric bursa injection    Lotus Whitley MD  Adult and Pediatric Rheumatology     [address] [place] [address], [address] [facility], [address] [place] [address], Phone [phone], Fax [phone]     Visiting  of Pediatrics    Department of Pediatrics, [facility] of [address] [place] [address], [address] [place] [address], Phone [phone], Fax [phone]    Patient Instructions   Plaquenil 200mg (1 tablet) x 1 week then  Plaquenil 300mg (1 1/2 tablets)       cc:  Mendez Gomez MD    Written by beatriz Herring, as dictated by Sander Chandler. Aidee Whitley M.D.     [facility]IS [facility]STEOPOROSIS CENTER [facility]  OFFICE PROCEDURE PROGRESS NOTE        Chart reviewed for the following:   Maisha MEJIA, have reviewed the History, Physical and updated the Allergic reactions for Yasmine Meléndez     TIME OUT performed immediately prior to start of procedure:   Maisha MEJIA, have performed the following reviews on [address] [place] [address] prior to the start of the procedure:            * Patient was identified by name and date of birth   * Agreement on procedure being performed was verified  * Risks and Benefits explained to the patient  * Procedure site verified and marked as necessary  * Patient was positioned for comfort  * Consent was signed and verified     Time: 12:55 pm      Date of procedure: 4/19/2017    Procedure performed by: Aneudy Heller MD    Provider assisted by: none    Patient assisted by: self    How tolerated by patient: tolerated the procedure well with no complications    Post Procedural Pain Scale: 0 - No Hurt    Comments: none      PROCEDURE  After consent was obtained, using sterile technique the left trochanteric bursa was prepped and Depo-medrol 80 mg and 1ml of lidocaine was then injected and the needle withdrawn. The procedure was well tolerated. The patient is asked to continue to rest for 24 hours before resuming regular activities. It may be more painful for the first 1-2 days. Watch for fever, or increased swelling or persistent pain. Call or return to clinic prn if such symptoms occur.

## 2017-04-20 RX ORDER — LIDOCAINE HYDROCHLORIDE 10 MG/ML
1 INJECTION INFILTRATION; PERINEURAL ONCE
Qty: 1 VIAL | Refills: 0
Start: 2017-04-20 | End: 2017-04-20

## 2017-07-25 ENCOUNTER — OFFICE VISIT (OUTPATIENT)
Dept: INTERNAL MEDICINE CLINIC | Age: 62
End: 2017-07-25

## 2017-07-25 VITALS
WEIGHT: 133 LBS | RESPIRATION RATE: 18 BRPM | HEART RATE: 68 BPM | DIASTOLIC BLOOD PRESSURE: 68 MMHG | HEIGHT: 65 IN | OXYGEN SATURATION: 98 % | BODY MASS INDEX: 22.16 KG/M2 | SYSTOLIC BLOOD PRESSURE: 131 MMHG | TEMPERATURE: 99.1 F

## 2017-07-25 DIAGNOSIS — J06.9 ACUTE URI: Primary | ICD-10-CM

## 2017-07-25 RX ORDER — BENZONATATE 100 MG/1
100 CAPSULE ORAL
Qty: 30 CAP | Refills: 1 | Status: SHIPPED | OUTPATIENT
Start: 2017-07-25 | End: 2017-12-02 | Stop reason: SDUPTHER

## 2017-07-25 RX ORDER — AZITHROMYCIN 250 MG/1
TABLET, FILM COATED ORAL
Qty: 6 TAB | Refills: 0 | Status: SHIPPED | OUTPATIENT
Start: 2017-07-25 | End: 2017-08-28 | Stop reason: ALTCHOICE

## 2017-07-25 RX ORDER — DESOXIMETASONE 2.5 MG/G
CREAM TOPICAL
Qty: 15 G | Refills: 0 | Status: SHIPPED | OUTPATIENT
Start: 2017-07-25

## 2017-07-25 NOTE — PROGRESS NOTES
1. Have you been to the ER, urgent care clinic since your last visit? Hospitalized since your last visit?no    2. Have you seen or consulted any other health care providers outside of the [address] [place] [address] since your last visit? Include any pap smears or colon screening.  no

## 2017-07-25 NOTE — MR AVS SNAPSHOT
Visit Information Date & Time Provider Department Dept. Phone Encounter #  
 7/25/2017  2:45 PM Baron Henning, [address] [place] [address],[address] [phone] [id number] Follow-up Instructions Return if symptoms worsen or fail to improve. Follow-up and Disposition History Your Appointments 8/28/2017  2:00 PM  
ESTABLISHED PATIENT with Nelly Stewart MD  
[address] ([address]) Appt Note: 3 month fu; . ..; 3 month fu . ..  
 [address] [facility] [address]  
[phone]  
  
   
 [address] [place] [id number]  
  
    
 10/11/2017  2:30 PM  
PHYSICAL PRE OP with Krishna Buck MD  
[facility] [facility]) Appt Note: CPE  
 [address] [address]  
[address] [facility] [address] [place] [address] Upcoming Health Maintenance Date Due Hepatitis C Screening 1955 DTaP/Tdap/Td series (1 - Tdap) 2/20/1975 FOBT Q 1 YEAR AGE 50-75 2/20/2004 ZOSTER VACCINE AGE 60> 12/20/2013 BREAST CANCER SCRN MAMMOGRAM 8/17/2017 INFLUENZA AGE 9 TO ADULT 8/1/2017 PAP AKA CERVICAL CYTOLOGY 8/17/2018 Allergies as of 7/25/2017  Review Complete On: 7/25/2017 By: Baron Milady MD  
  
 Severity Noted Reaction Type Reactions Coreg [Carvedilol] High 01/28/2016    Anaphylaxis Ace Inhibitors  01/19/2016    Swelling Arb-angiotensin Receptor Antagonist  07/21/2016   Side Effect Other (comments) Prior ACE inhibitor angioedema, cross reaction risk Codeine  10/13/2012    Nausea Only Keflex [Cephalexin]  10/13/2012    Hives Milk  12/23/2015    Other (comments) Lactose intolerant. Morphine  10/13/2012    Nausea and Vomiting Current Immunizations  Reviewed on 11/15/2015 Name Date  Influenza Vaccine (Quad) PF 11/15/2015  8:22 AM  
 Influenza Vaccine Split 10/14/2012  1:24 PM  
 Pneumococcal Polysaccharide (PPSV-23) 8/25/2015 Not reviewed this visit You Were Diagnosed With   
  
 Codes Comments Acute URI    -  Primary ICD-10-CM: J06.9 ICD-9-CM: 465.9 Vitals BP Pulse Temp Resp Height(growth percentile) Weight(growth percentile) 131/68 (BP 1 Location: Left arm, BP Patient Position: Sitting) 68 99.1 °F (37.3 °C) (Oral) 18 5' 5\" (1.651 m) 133 lb (60.3 kg) SpO2 BMI OB Status Smoking Status 98% 22.13 kg/m2 Postmenopausal Current Every Day Smoker Vitals History BMI and BSA Data Body Mass Index Body Surface Area  
 22.13 kg/m 2 1.66 m 2 Preferred Pharmacy Pharmacy Name Phone [phone], [address] [phone] Your Updated Medication List  
  
   
This list is accurate as of: 7/25/17  3:32 PM.  Always use your most recent med list.  
  
  
  
  
 * albuterol 90 mcg/actuation inhaler Commonly known as:  PROVENTIL HFA, VENTOLIN HFA, PROAIR HFA Take 2 Puffs by inhalation every six (6) hours as needed for Wheezing. * albuterol 2.5 mg /3 mL (0.083 %) nebulizer solution Commonly known as:  PROVENTIL VENTOLIN  
inhale contents of 1 vial in nebulizer every 4 hours if needed  
  
 amLODIPine 2.5 mg tablet Commonly known as:  Kadi Sebas Take 1 Tab by mouth daily. aspirin 81 mg chewable tablet Take 1 Tab by mouth daily. atorvastatin 40 mg tablet Commonly known as:  LIPITOR  
take 1 tablet by mouth NIGHTLY  
  
 azithromycin 250 mg tablet Commonly known as:  Saralee Anis Use as directed  
  
 benzonatate 100 mg capsule Commonly known as:  TESSALON Take 1 Cap by mouth three (3) times daily as needed for Cough for up to 7 days. carvedilol 12.5 mg tablet Commonly known as:  COREG  
take 1 tablet by mouth twice a day with meals  
  
 desoximetasone 0.25 % topical cream  
Commonly known as:  TOPICORT  
 Apply  to affected area two (2) times daily as needed for Skin Irritation. dicyclomine 10 mg capsule Commonly known as:  BENTYL TAKE 1 CAPSULE BY MOUTH BEFORE MEALS AND AT BEDTIME AS NEEDED. DULoxetine 60 mg capsule Commonly known as:  CYMBALTA Take  by mouth daily. EEMT 1.25-2.5 mg per tablet Generic drug:  estrogens (conjugated)-methylTESTOSTERone Take 1 Tab by mouth daily. fluticasone-salmeterol 250-50 mcg/dose diskus inhaler Commonly known as:  ADVAIR DISKUS Take 1 Puff by inhalation two (2) times a day.  
  
 gabapentin 300 mg capsule Commonly known as:  NEURONTIN  
TAKE 1 CAPSULE IN THE MORNING, 1 CAPSULE IN THE AFTERNOON, AD 2 CAPSULES AT BEDTIME HYDROcodone-acetaminophen 5-325 mg per tablet Commonly known as:  NORCO  
take 1 tablet by mouth not more than six times a day Iron 325 mg (65 mg iron) tablet Generic drug:  ferrous sulfate Take  by mouth Daily (before breakfast). lidocaine 5 % Commonly known as:  LIDODERM  
2 Patches by TransDERmal route as needed. Nebulizer & Compressor machine 1 Each by Does Not Apply route every four (4) hours as needed. As directed  
  
 nicotine 21 mg/24 hr  
Commonly known as:  NICODERM CQ  
1 Patch by TransDERmal route every twenty-four (24) hours. omeprazole 40 mg capsule Commonly known as:  PRILOSEC Take 1 Cap by mouth daily. On empty stomach  
  
 oxyCODONE-acetaminophen  mg per tablet Commonly known as:  PERCOCET 10 Take 1/2-1 tablet every 4-6 hours as needed for pain. PREMARIN 0.45 mg tablet Generic drug:  estrogens (conjugated) Take  by mouth.  
  
 promethazine 25 mg tablet Commonly known as:  PHENERGAN Take 1 Tab by mouth every six (6) hours as needed for Nausea. ZyrTEC 10 mg Cap Generic drug:  Cetirizine Take  by mouth. * Notice:   This list has 2 medication(s) that are the same as other medications prescribed for you. Read the directions carefully, and ask your doctor or other care provider to review them with you. Prescriptions Sent to Pharmacy Refills  
 desoximetasone (TOPICORT) 0.25 % topical cream 0 Sig: Apply  to affected area two (2) times daily as needed for Skin Irritation. Class: Normal  
 Pharmacy: [address] Ph #: [phone] Route: Topical  
 benzonatate (TESSALON) 100 mg capsule 1 Sig: Take 1 Cap by mouth three (3) times daily as needed for Cough for up to 7 days. Class: Normal  
 Pharmacy: [address] Ph #: [phone] Route: Oral  
 azithromycin (ZITHROMAX) 250 mg tablet 0 Sig: Use as directed Class: Normal  
 Pharmacy: [address] Ph #: [phone] Follow-up Instructions Return if symptoms worsen or fail to improve. Introducing [facility]! Katia Jernigan introduces [employer] patient portal. Now you can access parts of your medical record, email your doctor's office, and request medication refills online. 1. In your internet browser, go to [email or web address] 2. Click on the First Time User? Click Here link in the Sign In box. You will see the New Member Sign Up page. 3. Enter your [employer] Access Code exactly as it appears below. You will not need to use this code after youve completed the sign-up process. If you do not sign up before the expiration date, you must request a new code. · [employer] Access Code: [email or web address] Expires: 10/23/2017  3:32 PM 
 
4. Enter the last four digits of your Social Security Number (xxxx) and Date of Birth (mm/dd/yyyy) as indicated and click Submit. You will be taken to the next sign-up page. 5. Create a [employer] ID.  This will be your [employer] login ID and cannot be changed, so think of one that is secure and easy to remember. 6. Create a [employer] password. You can change your password at any time. 7. Enter your Password Reset Question and Answer. This can be used at a later time if you forget your password. 8. Enter your e-mail address. You will receive e-mail notification when new information is available in [address] 9. Click Sign Up. You can now view and download portions of your medical record. 10. Click the Download Summary menu link to download a portable copy of your medical information. If you have questions, please visit the Frequently Asked Questions section of the [employer] website. Remember, [employer] is NOT to be used for urgent needs. For medical emergencies, dial 911. Now available from your iPhone and Android! Please provide this summary of care documentation to your next provider. Your primary care clinician is listed as Armaan Dutta. If you have any questions after today's visit, please call [phone].

## 2017-07-25 NOTE — PROGRESS NOTES
SUBJECTIVE  Ms. Charles Thibodeaux presents today acutely for     Chief Complaint   Patient presents with    URI     pt here today c.o coughing up yellow mucous and nasal congestion       Coughing up green and yellow sputum. \"I'm really tired all the time. \"    No subjective F / C. Just diagnosed with IBS, so \"I can't tell what's what. \"     OBJECTIVE  Visit Vitals    /68 (BP 1 Location: Left arm, BP Patient Position: Sitting)    Pulse 68    Temp 99.1 °F (37.3 °C) (Oral)    Resp 18    Ht 5' 5\" (1.651 m)    Wt 133 lb (60.3 kg)    SpO2 98%    BMI 22.13 kg/m2     Gen: Pleasant 60 y.o.  female in NAD.   HEENT: PERRLA. EOMI. OP moist and pink.  Neck: Supple.  No LAD.  HEART: RRR, No M/G/R.   LUNGS: CTAB No W/R.   ABDOMEN: S, NT, ND, BS+.   EXTREMITIES: Warm. SKIN: Warm. Dry. No rashes or other lesions noted. ASSESSMENT / PLAN    ICD-10-CM ICD-9-CM    1. Acute URI J06.9 465.9 benzonatate (TESSALON) 100 mg capsule      azithromycin (ZITHROMAX) 250 mg tablet       I have reviewed with the patient details of the assessment and plan and all questions were answered. Relevant patient education was performed. Follow-up Disposition:  Return if symptoms worsen or fail to improve.

## 2017-07-28 DIAGNOSIS — K29.60 OTHER GASTRITIS WITHOUT HEMORRHAGE, UNSPECIFIED CHRONICITY: ICD-10-CM

## 2017-07-28 RX ORDER — OMEPRAZOLE 40 MG/1
CAPSULE, DELAYED RELEASE ORAL
Qty: 30 CAP | Refills: 5 | Status: SHIPPED | OUTPATIENT
Start: 2017-07-28 | End: 2018-01-11 | Stop reason: SDUPTHER

## 2017-08-28 ENCOUNTER — OFFICE VISIT (OUTPATIENT)
Dept: RHEUMATOLOGY | Age: 62
End: 2017-08-28

## 2017-08-28 VITALS
BODY MASS INDEX: 21.99 KG/M2 | TEMPERATURE: 98.6 F | RESPIRATION RATE: 16 BRPM | WEIGHT: 132 LBS | HEART RATE: 67 BPM | DIASTOLIC BLOOD PRESSURE: 69 MMHG | SYSTOLIC BLOOD PRESSURE: 135 MMHG | HEIGHT: 65 IN | OXYGEN SATURATION: 95 %

## 2017-08-28 DIAGNOSIS — M70.62 TROCHANTERIC BURSITIS OF LEFT HIP: Primary | ICD-10-CM

## 2017-08-28 DIAGNOSIS — M19.90 INFLAMMATORY ARTHRITIS: ICD-10-CM

## 2017-08-28 DIAGNOSIS — M19.90 OSTEOARTH NOS-UNSPEC: ICD-10-CM

## 2017-08-28 RX ORDER — HYDROXYCHLOROQUINE SULFATE 200 MG/1
200 TABLET, FILM COATED ORAL DAILY
Refills: 0 | COMMUNITY
Start: 2017-08-20 | End: 2018-03-14

## 2017-08-28 RX ORDER — HYDROCODONE BITARTRATE AND ACETAMINOPHEN 10; 325 MG/1; MG/1
TABLET ORAL
Refills: 0 | COMMUNITY
Start: 2017-08-15 | End: 2021-02-12

## 2017-08-28 NOTE — PROGRESS NOTES
RHEUMATOLOGY PROBLEM LIST AND CHIEF COMPLAINT  1. Inflammatory arthritis - morning stiffness, arthritis on the 2nd and 3rd MCPs bilaterally. 2. Osteoarthritis - hands, knees, feet, hips  3. Fibromyalgia    Therapy History:  Current DMARDs: Plaquenil (4/2017 - current)    INTERVAL HISTORY  This is a 60 y.o.  female. Today, the patient complains of pain in the joints. Location: hip  Severity:  7 on a scale of 0-10  Timing: all day   Duration:  4 months  Modifying factors: Left trochanteric bursitis  Context/Associated signs and symptoms: The patient states that the previous steroid injection helped with her bursitis but did not completely resolve it. She states that it has been hard to perform the exercises due to pain. She is requesting another steroid injection. She states that she is unable to take Plaquenil 300 mg daily due to the taste. She continues on plaquenil 200 mg daily instead. She is unsure if it is helping with her symptoms because she did not have many symptoms in the hands to being with. There have been no adverse effects to the current medication. RHEUMATOLOGY REVIEW OF SYSTEMS   Positives as per history  Negatives as follows:  Cam Bel:  Denies unexplained persistent fevers or weight change  RESPIRATORY:  No pleuritic pain, exertional dyspnea  CARDIOVASCULAR:  Denies chest pain  GASTRO:   Denies heartburn, abdominal pain, nausea, vomiting, diarrhea  SKIN:    Denies rash   MSK:    No morning stiffness >1 hour, persistent joint swelling    PAST MEDICAL HISTORY  Reviewed with patient, significant changes in medical history - no    FAMILY HISTORY   No autoimmune disease in the family    PHYSICAL EXAM  Blood pressure 135/69, pulse 67, temperature 98.6 °F (37 °C), temperature source Oral, resp. rate 16, height 5' 5\" (1.651 m), weight 132 lb (59.9 kg), SpO2 95 %.   GENERAL APPEARANCE: Well-nourished, no acute distress  NECK: No adenopathy  ENT: No oral ulcers  CARDIOVASCULAR: Heart rhythm is regular. No murmur, rub, gallop  CHEST: Normal vesicular breath sounds. No wheezes, rales, pleural friction rubs  ABDOMINAL: The abdomen is soft and nontender. Bowel sounds are normal  SKIN: No rash, palpable purpura, digital ulcer, abnormal thickening   MUSCULOSKELETAL: Lumbosacral pain  Upper extremities - Heberden's and Hang's nodes bilaterally. Synovial thickening and fullness of 2nd and 3rd MCP's bilaterally. Lower extremities - full range of motion, no tenderness, no swelling, no synovial thickening and no deformity of joints except for left lateral thigh pain    LABS, RADIOLOGY AND PROCEDURES  Previous labs reviewed -Yes    ASSESSMENT  1. Inflammatory arthritis - (Established problem -  Progressive disease) - The patient continues to have morning stiffness in her hands and significant fatigue. She should continue with plaquenil 200 mg daily. She should return in 4 months for a follow up. 2. Osteoarthritis (hands, knees) - The patient should continue physical therapy and NSAIDs as needed. 3. Pain syndrome (fibromyalgia) - Continue physical therapy. 4. Trochanteric bursitis -  The pain and discomfort over the left lateral thigh is most likely coming from inflammation of the bursa. There is pain on the outside of the hip and thigh, pain with lying on the affected side, pain with pressure on the lateral thigh, pain with activity and pain with walking up stairs. These are all classic for this diagnosis. We reviewed rehabilitation exercises with the patient (piriformas stretch, iliotibial band stretch, straight leg stretch, wall squat with ball and gluteal strengthening). There is also relief from bursa injection with corticosteroids and lidocaine, so I will inject her left trochanteric bursa with depo-medrol 80 mg today. 5. Drug therapy monitoring for toxicity (plaquenil ) - CBC, BUN, Cr, AST, ALT and albumin every 3 months; eye exams every 6-12 months for retinal toxicity. PLAN  1. Plaquenil 200 mg daily  2. Left trochanteric bursa injection  3. Return in 4 months. Lotus León MD  Adult and Pediatric Rheumatology     [facility], [address] [facility], Phone [phone], Fax [phone]     Visiting  of Pediatrics    Department of Pediatrics, [facility] of [address] [place] [address], [address] [place] [address], Phone [phone], Fax [phone]    There are no Patient Instructions on file for this visit. cc:  Maria Isabel Monroe MD    Written by beatriz Baldwin, as dictated by Luisa Ann. Robert León M.D.    [facility] NOTE        Chart reviewed for the following:   Piero MEJIA, have reviewed the History, Physical and updated the Allergic reactions for Yasmine E [address] [facility], [facility]. performed immediately prior to start of procedure:   Piero MEJIA, have performed the following reviews on [address] [place] [address] prior to the start of the procedure:            * Patient was identified by name and date of birth   * Agreement on procedure being performed was verified  * Risks and Benefits explained to the patient  * Procedure site verified and marked as necessary  * Patient was positioned for comfort  * Consent was signed and verified     Time: 2:30    Date of procedure: 8/28/2017    Procedure performed by: Piero Palma MD    Provider assisted by: none      Patient assisted by: self    How tolerated by patient: tolerated the procedure well with no complications    Post Procedural Pain Scale: 0 - No Hurt    Comments: none     PROCEDURE  After consent was obtained, using sterile technique the left trochanteric bursitis was prepped and Depo-medrol 80 mg and 1ml of lidocaine was then injected and the needle withdrawn. The procedure was well tolerated.   The patient is asked to continue to rest for 24 hours before resuming regular activities. It may be more painful for the first 1-2 days. Watch for fever, or increased swelling or persistent pain. Call or return to clinic prn if such symptoms occur.

## 2017-08-30 RX ORDER — LIDOCAINE HYDROCHLORIDE 10 MG/ML
1 INJECTION INFILTRATION; PERINEURAL ONCE
Qty: 1 VIAL | Refills: 0
Start: 2017-08-30 | End: 2017-08-30

## 2017-10-11 ENCOUNTER — OFFICE VISIT (OUTPATIENT)
Dept: INTERNAL MEDICINE CLINIC | Age: 62
End: 2017-10-11

## 2017-10-11 VITALS
DIASTOLIC BLOOD PRESSURE: 75 MMHG | RESPIRATION RATE: 18 BRPM | TEMPERATURE: 98 F | WEIGHT: 134.2 LBS | SYSTOLIC BLOOD PRESSURE: 145 MMHG | HEIGHT: 65 IN | OXYGEN SATURATION: 98 % | HEART RATE: 82 BPM | BODY MASS INDEX: 22.36 KG/M2

## 2017-10-11 DIAGNOSIS — Z00.00 ROUTINE MEDICAL EXAM: Primary | ICD-10-CM

## 2017-10-11 DIAGNOSIS — Z23 ENCOUNTER FOR IMMUNIZATION: ICD-10-CM

## 2017-10-11 DIAGNOSIS — Z11.59 NEED FOR HEPATITIS C SCREENING TEST: ICD-10-CM

## 2017-10-11 DIAGNOSIS — E55.9 VITAMIN D DEFICIENCY: ICD-10-CM

## 2017-10-11 DIAGNOSIS — R73.03 PREDIABETES: ICD-10-CM

## 2017-10-11 DIAGNOSIS — M51.9 LUMBAR DISC DISEASE: ICD-10-CM

## 2017-10-11 RX ORDER — VARENICLINE TARTRATE 25 MG
KIT ORAL
Qty: 1 DOSE PACK | Refills: 0 | Status: SHIPPED | OUTPATIENT
Start: 2017-10-11 | End: 2018-03-14

## 2017-10-11 RX ORDER — AZITHROMYCIN 250 MG/1
250 TABLET, FILM COATED ORAL SEE ADMIN INSTRUCTIONS
Qty: 6 TAB | Refills: 0 | Status: SHIPPED | OUTPATIENT
Start: 2017-10-11 | End: 2017-10-16

## 2017-10-11 NOTE — PROGRESS NOTES
Marni Banda is a 60 y.o. female who presents for annual exam.     Seen for bronchitis 7/25 by Dr. Jose Brown. Took antibiotic. Initially better. Is coughing, occasional yellow. No inhaler use. No wheezing. No fever. Sees Dr. Pete Pacheco, pain management. Seeing NP. Is under a controlled substance agreement. Sees Dr. Juwan Sanchez for history of NSTEMI with Takotsubo cardiomyopathy. Last seen by cardiology in Sept 2016. Has been in cardiac rehab. No chest pain, no COOMBS, no palpitation. Saw Dr. Arnold Ernst, GI, in October 2012 with rectal bleeding. Colonoscopy showed a rectal ulcer, injected. Has IBS. Has nausea and reflux. Up to date on mammogram, due now, sees gyn. Dr. Jacqueline Cota. Saw derm, Dr. Magalie Dunn, skin cancer right arm-squamous cell. Every 6 months. Reviewed immunizations. Due for prevnar and Tdap. 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 Normal DEXA in 2016. Interested in takng Chantix to quit smoking. 1/2 ppd. Tried nicoderm. 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      Social History Main Topics    Smoking status: Current Every Day Smoker     Packs/day: 0.50    Smokeless tobacco: Never Used      Comment: trying to quit 4-5 cigarettes daily    Alcohol use 5.0 oz/week     10 Glasses of wine per week    Drug use: No    Sexual activity: Not Currently     Partners: Male     Birth control/ protection: None     Other Topics Concern    Not on file     Social History Narrative       Current Outpatient Prescriptions on File Prior to Visit   Medication Sig Dispense Refill    FLUARIX QUAD 4306-9150, PF, syrg injection inject 0.5 milliliter intramuscularly  0    HYDROcodone-acetaminophen (NORCO)  mg tablet TAKE 1 TABLET BY MOUTH 3 TIMES DAILY AS NEEDED FOR PAIN  0    hydroxychloroquine (PLAQUENIL) 200 mg tablet Take 200 mg by mouth daily. 0    omeprazole (PRILOSEC) 40 mg capsule take 1 capsule by mouth once daily ON AN EMPTY STOMACH 30 Cap 5    desoximetasone (TOPICORT) 0.25 % topical cream Apply  to affected area two (2) times daily as needed for Skin Irritation. 15 g 0    Cetirizine (ZYRTEC) 10 mg cap Take  by mouth.  carvedilol (COREG) 12.5 mg tablet take 1 tablet by mouth twice a day with meals 60 Tab 6    amLODIPine (NORVASC) 2.5 mg tablet Take 1 Tab by mouth daily. 30 Tab 6    atorvastatin (LIPITOR) 40 mg tablet take 1 tablet by mouth NIGHTLY 30 Tab 12    promethazine (PHENERGAN) 25 mg tablet Take 1 Tab by mouth every six (6) hours as needed for Nausea. 30 Tab 0    dicyclomine (BENTYL) 10 mg capsule TAKE 1 CAPSULE BY MOUTH BEFORE MEALS AND AT BEDTIME AS NEEDED.  0    gabapentin (NEURONTIN) 300 mg capsule TAKE 1 CAPSULE IN THE MORNING, 1 CAPSULE IN THE AFTERNOON, AD 2 CAPSULES AT BEDTIME 120 Cap 3    lidocaine (LIDODERM) 5 % 2 Patches by TransDERmal route as needed. 0    EEMT 1.25-2.5 mg per tablet Take 1 Tab by mouth daily. 0    estrogens, conjugated, (PREMARIN) 0.45 mg tablet Take  by mouth.  aspirin 81 mg chewable tablet Take 1 Tab by mouth daily. 30 Tab 0    DULoxetine (CYMBALTA) 60 mg capsule Take  by mouth daily. 0    fluticasone-salmeterol (ADVAIR DISKUS) 250-50 mcg/dose diskus inhaler Take 1 Puff by inhalation two (2) times a day.  (Patient taking differently: Take 1 Puff by inhalation as needed.) 1 Inhaler 3    albuterol (PROVENTIL VENTOLIN) 2.5 mg /3 mL (0.083 %) nebulizer solution inhale contents of 1 vial in nebulizer every 4 hours if needed 25 Each 2    albuterol (PROVENTIL HFA, VENTOLIN HFA, PROAIR HFA) 90 mcg/actuation inhaler Take 2 Puffs by inhalation every six (6) hours as needed for Wheezing. 1 Inhaler 0    Nebulizer & Compressor machine 1 Each by Does Not Apply route every four (4) hours as needed. As directed 1 Each 0     No current facility-administered medications on file prior to visit. Review of Systems  Pertinent items are noted in HPI. Objective:     Visit Vitals    /75 (BP 1 Location: Left arm, BP Patient Position: Sitting)    Pulse 82    Temp 98 °F (36.7 °C) (Oral)    Resp 18    Ht 5' 5\" (1.651 m)    Wt 134 lb 3.2 oz (60.9 kg)    SpO2 98%    BMI 22.33 kg/m2     Gen: well appearing female  HEENT:   PERRL,normal conjunctiva. External ear and canals normal, TMs no opacification or erythema,  OP no erythema, no exudates, MMM  Neck:  Supple. Thyroid normal size, nontender, without nodules. No masses or LAD  Resp:  No wheezing, no rhonchi, no rales. CV:  RRR, normal S1S2, no murmur. GI: soft, nontender, without masses. No hepatosplenomegaly. Extrem:  +2 pulses, no edema, warm distally      Assessment/Plan:     1. Encounter for immunization    - diph,Pertuss,Acell,,Tet Vac-PF (ADACEL) 2 Lf-(2.5-5-3-5 mcg)-5Lf/0.5 mL susp; 0.5 mL by IntraMUSCular route once for 1 dose. Dispense: 1 Syringe; Refill: 0  - Pneumococcal conjugate 13 valent, IM (PREVNAR-13)    2. Routine medical exam- Recommend heart healthy diet and regular cardiovascular exercise. - METABOLIC PANEL, COMPREHENSIVE  - CBC W/O DIFF  - LIPID PANEL  - URINALYSIS W/ RFLX MICROSCOPIC  - TSH 3RD GENERATION    3. Lumbar disc disease-followed by pain management. 4. Vitamin D deficiency    - VITAMIN D, 25 HYDROXY    5. Need for hepatitis C screening test    - HCV AB W/RFLX TO KIAN    6.  Prediabetes- Recommend following diabetic diet, exercise and maintain a healthy weight.      - HEMOGLOBIN A1C W/O EAG    Follow-up Disposition:  Return for follow up pending labs and annual.  .    Jozef Madrid MD

## 2017-10-11 NOTE — LETTER
10/19/2017 2:45 PM 
 
Ms. Debi Vasquez [address] [facility] Dear Debi Vasquez: 
 
Please find your most recent results below. Resulted Orders METABOLIC PANEL, COMPREHENSIVE Result Value Ref Range Glucose 93 65 - 99 mg/dL BUN 8 8 - 27 mg/dL Creatinine 0.72 0.57 - 1.00 mg/dL GFR est non-AA 89 >59 mL/min/1.73 GFR est  >59 mL/min/1.73  
 BUN/Creatinine ratio 11 (L) 12 - 28 Sodium 142 134 - 144 mmol/L Potassium 4.5 3.5 - 5.2 mmol/L Chloride 99 96 - 106 mmol/L  
 CO2 25 18 - 29 mmol/L Calcium 9.2 8.7 - 10.3 mg/dL Protein, total 6.7 6.0 - 8.5 g/dL Albumin 4.3 3.6 - 4.8 g/dL GLOBULIN, TOTAL 2.4 1.5 - 4.5 g/dL A-G Ratio 1.8 1.2 - 2.2 Bilirubin, total <0.2 0.0 - 1.2 mg/dL Alk. phosphatase 106 39 - 117 IU/L  
 AST (SGOT) 27 0 - 40 IU/L  
 ALT (SGPT) 21 0 - 32 IU/L Narrative Performed at:  [address]  [id number] : Josseline Delatorre MD, Phone:  [phone] CBC W/O DIFF Result Value Ref Range WBC 7.5 3.4 - 10.8 x10E3/uL  
 RBC 3.78 3.77 - 5.28 x10E6/uL HGB 12.2 11.1 - 15.9 g/dL HCT 36.5 34.0 - 46.6 % MCV 97 79 - 97 fL  
 MCH 32.3 26.6 - 33.0 pg  
 MCHC 33.4 31.5 - 35.7 g/dL  
 RDW 13.0 12.3 - 15.4 % PLATELET 783 605 - 898 x10E3/uL Narrative Performed at:  [address]  [id number] : Josseline Delatorre MD, Phone:  [phone] HCV AB W/RFLX TO KIAN Result Value Ref Range HCV Ab <0.1 0.0 - 0.9 s/co ratio Narrative Performed at:  [address]  [id number] : Josseline Delatorre MD, Phone:  [phone] LIPID PANEL Result Value Ref Range Cholesterol, total 116 100 - 199 mg/dL Triglyceride 55 0 - 149 mg/dL HDL Cholesterol 57 >39 mg/dL VLDL, calculated 11 5 - 40 mg/dL LDL, calculated 48 0 - 99 mg/dL Narrative Performed at:  [address]  [id number] : Madison Palomino MD, Phone:  [phone] VITAMIN D, 25 HYDROXY Result Value Ref Range VITAMIN D, 25-HYDROXY 44.6 30.0 - 100.0 ng/mL Comment:  
   Vitamin D deficiency has been defined by the [facility] practice guideline as a 
level of serum 25-OH vitamin D less than 20 ng/mL (1,2). The Endocrine Society went on to further define vitamin D 
insufficiency as a level between 21 and 29 ng/mL (2). 1. IOM ([place] of [employer]). 2010. Dietary reference 
   intakes for calcium and D. [facility]: The 
   [employer]. 2. Conchita MF, Octavoi PENALOZA, Clarice CERVANTES, et al. 
   Evaluation, treatment, and prevention of vitamin D 
   deficiency: an Endocrine Society clinical practice 
   guideline. JCEM. 2011 Jul; 96(7):1911-30. Narrative Performed at:  [address]  [id number] : Madison Palomino MD, Phone:  [phone] URINALYSIS W/ RFLX MICROSCOPIC Result Value Ref Range Specific Gravity 1.026 1.005 - 1.030  
 pH (UA) 6.5 5.0 - 7.5 Color Yellow Yellow Appearance Cloudy (A) Clear Leukocyte Esterase Trace (A) Negative Protein Trace Negative/Trace Glucose Negative Negative Ketone Negative Negative Blood Negative Negative Bilirubin Negative Negative Urobilinogen 0.2 0.2 - 1.0 mg/dL Nitrites Negative Negative Microscopic Examination See additional order Comment:  
   Microscopic was indicated and was performed. Narrative Performed at:  [address]  [id number] : Madison Palomino MD, Phone:  [phone] TSH 3RD GENERATION Result Value Ref Range TSH 1.080 0.450 - 4.500 uIU/mL Narrative Performed at:  [address]  [id number] : Anu Torres MD, Phone:  [phone] HEMOGLOBIN A1C W/O EAG Result Value Ref Range Hemoglobin A1c 5.6 4.8 - 5.6 % Comment:  
            Pre-diabetes: 5.7 - 6.4 Diabetes: >6.4 Glycemic control for adults with diabetes: <7.0 Narrative Performed at:  [address]  [id number] : Anu Torres MD, Phone:  [phone] MICROSCOPIC EXAMINATION Result Value Ref Range WBC 0-5 0 - 5 /hpf  
 RBC 0-2 0 - 2 /hpf Epithelial cells 0-10 0 - 10 /hpf Casts Present (A) None seen /lpf Cast type Hyaline casts N/A Mucus Present Not Estab. Bacteria Few None seen/Few Narrative Performed at:  [address]  [id number] : Anu Torres MD, Phone:  [phone] INTERPRETATION Result Value Ref Range HCV Interpretation Comment Comment:  
   Negative Not infected with HCV, unless recent infection is suspected or other 
evidence exists to indicate HCV infection. Narrative Performed at:  [address]  [id number] : Anu Torres MD, Phone:  [phone] RECOMMENDATIONS: 
Labs are normal. No longer prediabetic.  Cholesterol controlled.  No change in medications. Follow up in 6 months. Please call me if you have any questions: [phone] Sincerely, 
 
 
Reina Hayward MD

## 2017-10-11 NOTE — PROGRESS NOTES
Chief Complaint   Patient presents with    Complete Physical    Hypertension    Cholesterol Problem     Visit Vitals    /75 (BP 1 Location: Left arm, BP Patient Position: Sitting)    Pulse 82    Temp 98 °F (36.7 °C) (Oral)    Resp 18    Ht 5' 5\" (1.651 m)    Wt 134 lb 3.2 oz (60.9 kg)    SpO2 98%    BMI 22.33 kg/m2     Reviewed record In preparation for visit and have obtained necessary documentation    1. Have you been to the ER, urgent care clinic since your last visit? Hospitalized since your last visit? NO    2. Have you seen or consulted any other health care providers outside of the [address] [place] [address] since your last visit? Include any pap smears or colon screening. NO    Patient does not have advance directive on file and has received paperwork.

## 2017-10-11 NOTE — MR AVS SNAPSHOT
Visit Information Date & Time Provider Department Dept. Phone Encounter #  
 10/11/2017  2:30 PM Tani Celaya, [address] [place] [address],[address] [phone] [id number] Follow-up Instructions Return for follow up pending labs and annual.  . Upcoming Health Maintenance Date Due Hepatitis C Screening 1955 DTaP/Tdap/Td series (1 - Tdap) 2/20/1975 FOBT Q 1 YEAR AGE 50-75 2/20/2004 ZOSTER VACCINE AGE 60> 12/20/2013 BREAST CANCER SCRN MAMMOGRAM 8/17/2017 PAP AKA CERVICAL CYTOLOGY 8/17/2018 Allergies as of 10/11/2017  Review Complete On: 10/11/2017 By: Tani Celaya MD  
  
 Severity Noted Reaction Type Reactions Coreg [Carvedilol] High 01/28/2016    Anaphylaxis Ace Inhibitors  01/19/2016    Swelling Arb-angiotensin Receptor Antagonist  07/21/2016   Side Effect Other (comments) Prior ACE inhibitor angioedema, cross reaction risk Codeine  10/13/2012    Nausea Only Keflex [Cephalexin]  10/13/2012    Hives Milk  12/23/2015    Other (comments) Lactose intolerant. Morphine  10/13/2012    Nausea and Vomiting Current Immunizations  Reviewed on 10/11/2017 Name Date Influenza Vaccine 8/23/2017 Influenza Vaccine (Quad) PF 11/15/2015  8:22 AM  
 Influenza Vaccine Split 10/14/2012  1:24 PM  
 Pneumococcal Conjugate (PCV-13)  Incomplete Pneumococcal Polysaccharide (PPSV-23) 8/25/2015 Reviewed by Tani Celaya MD on 10/11/2017 at  3:41 PM  
You Were Diagnosed With   
  
 Codes Comments Routine medical exam    -  Primary ICD-10-CM: Z00.00 ICD-9-CM: V70.0 Encounter for immunization     ICD-10-CM: D96 ICD-9-CM: V03.89 Lumbar disc disease     ICD-10-CM: M51.9 ICD-9-CM: 722.93 Vitamin D deficiency     ICD-10-CM: E55.9 ICD-9-CM: 268.9 Need for hepatitis C screening test     ICD-10-CM: Z11.59 
ICD-9-CM: V73.89 Prediabetes     ICD-10-CM: R73.03 
ICD-9-CM: 790.29 Vitals BP Pulse Temp Resp Height(growth percentile) Weight(growth percentile) 145/75 (BP 1 Location: Left arm, BP Patient Position: Sitting) 82 98 °F (36.7 °C) (Oral) 18 5' 5\" (1.651 m) 134 lb 3.2 oz (60.9 kg) SpO2 BMI OB Status Smoking Status 98% 22.33 kg/m2 Postmenopausal Current Every Day Smoker BMI and BSA Data Body Mass Index Body Surface Area  
 22.33 kg/m 2 1.67 m 2 Preferred Pharmacy Pharmacy Name Phone [id number] [address] [phone] Your Updated Medication List  
  
   
This list is accurate as of: 10/11/17  3:42 PM.  Always use your most recent med list.  
  
  
  
  
 * albuterol 90 mcg/actuation inhaler Commonly known as:  PROVENTIL HFA, VENTOLIN HFA, PROAIR HFA Take 2 Puffs by inhalation every six (6) hours as needed for Wheezing. * albuterol 2.5 mg /3 mL (0.083 %) nebulizer solution Commonly known as:  PROVENTIL VENTOLIN  
inhale contents of 1 vial in nebulizer every 4 hours if needed  
  
 amLODIPine 2.5 mg tablet Commonly known as:  Wilfredo Ruths Take 1 Tab by mouth daily. aspirin 81 mg chewable tablet Take 1 Tab by mouth daily. atorvastatin 40 mg tablet Commonly known as:  LIPITOR  
take 1 tablet by mouth NIGHTLY  
  
 azithromycin 250 mg tablet Commonly known as:  Leonor Pickup Take 1 Tab by mouth See Admin Instructions for 5 days. carvedilol 12.5 mg tablet Commonly known as:  COREG  
take 1 tablet by mouth twice a day with meals  
  
 desoximetasone 0.25 % topical cream  
Commonly known as:  TOPICORT Apply  to affected area two (2) times daily as needed for Skin Irritation. dicyclomine 10 mg capsule Commonly known as:  BENTYL TAKE 1 CAPSULE BY MOUTH BEFORE MEALS AND AT BEDTIME AS NEEDED. diph,Pertuss(Acell),Tet Vac-PF 2 Lf-(2.5-5-3-5 mcg)-5Lf/0.5 mL susp Commonly known as:  ADACEL  
0.5 mL by IntraMUSCular route once for 1 dose. DULoxetine 60 mg capsule Commonly known as:  CYMBALTA Take  by mouth daily. EEMT 1.25-2.5 mg per tablet Generic drug:  estrogens (conjugated)-methylTESTOSTERone Take 1 Tab by mouth daily. FLUARIX QUAD 0619-6896 (PF) Syrg injection Generic drug:  influenza vaccine 2017-18 (3 yrs+)(PF)  
inject 0.5 milliliter intramuscularly  
  
 fluticasone-salmeterol 250-50 mcg/dose diskus inhaler Commonly known as:  ADVAIR DISKUS Take 1 Puff by inhalation two (2) times a day.  
  
 gabapentin 300 mg capsule Commonly known as:  NEURONTIN  
TAKE 1 CAPSULE IN THE MORNING, 1 CAPSULE IN THE AFTERNOON, AD 2 CAPSULES AT BEDTIME HYDROcodone-acetaminophen  mg tablet Commonly known as:  NORCO  
TAKE 1 TABLET BY MOUTH 3 TIMES DAILY AS NEEDED FOR PAIN  
  
 hydroxychloroquine 200 mg tablet Commonly known as:  PLAQUENIL Take 200 mg by mouth daily. lidocaine 5 % Commonly known as:  LIDODERM  
2 Patches by TransDERmal route as needed. Nebulizer & Compressor machine 1 Each by Does Not Apply route every four (4) hours as needed. As directed  
  
 omeprazole 40 mg capsule Commonly known as:  PRILOSEC  
take 1 capsule by mouth once daily ON AN EMPTY STOMACH  
  
 PREMARIN 0.45 mg tablet Generic drug:  estrogens (conjugated) Take  by mouth.  
  
 promethazine 25 mg tablet Commonly known as:  PHENERGAN Take 1 Tab by mouth every six (6) hours as needed for Nausea. varenicline 0.5 mg (11)- 1 mg (42) Dspk Commonly known as:  CHANTIX STARTER MARIVEL Take as directed per starter pack. ZyrTEC 10 mg Cap Generic drug:  Cetirizine Take  by mouth. * Notice: This list has 2 medication(s) that are the same as other medications prescribed for you. Read the directions carefully, and ask your doctor or other care provider to review them with you. Prescriptions Printed Refills  diph,Pertuss,Acell,,Tet Vac-PF (ADACEL) 2 Lf-(2.5-5-3-5 mcg)-5Lf/0.5 mL susp 0 Si.5 mL by IntraMUSCular route once for 1 dose. Class: Print Route: IntraMUSCular  
 azithromycin (ZITHROMAX) 250 mg tablet 0 Sig: Take 1 Tab by mouth See Admin Instructions for 5 days. Class: Print Route: Oral  
  
Prescriptions Sent to Pharmacy Refills  
 varenicline (CHANTIX STARTER MARIVEL) 0.5 mg (11)- 1 mg (42) DsPk 0 Sig: Take as directed per starter pack. Class: Normal  
 [facility]: [facility] [address] Ph #: [phone] We Performed the Following CBC W/O DIFF [42836 CPT(R)] HCV AB W/RFLX TO KIAN [57727 CPT(R)] HEMOGLOBIN A1C W/O EAG [29987 CPT(R)] LIPID PANEL [77479 CPT(R)] METABOLIC PANEL, COMPREHENSIVE [59986 CPT(R)] PNEUMOCOCCAL CONJ VACCINE 13 VALENT IM [id number] CPT(R)] TSH 3RD GENERATION [23645 CPT(R)] URINALYSIS W/ RFLX MICROSCOPIC [19733 CPT(R)] VITAMIN D, 25 HYDROXY [id number] CPT(R)] Follow-up Instructions Return for follow up pending labs and annual.  .  
  
  
Patient Instructions Pneumococcal 13-Valent Vaccine, Diphtheria Conjugate (By injection) Pneumococcal 13-Valent Vaccine, Diphtheria Conjugate (HRU-ezg-ONZ-al 13-VAY-lent VAX-een, dif-THEER-ee-a NHR-ijy-psvd) Prevents infections, such as pneumonia and meningitis. Brand Name(s): Prevnar 13 There may be other brand names for this medicine. When This Medicine Should Not Be Used: This vaccine is not right for everyone. You should not receive it if you had an allergic reaction to pneumococcal or diphtheria vaccine. How to Use This Medicine:  
Injectable · A nurse or other health provider will give you this medicine. This vaccine is usually given as a shot into a muscle in the thigh or upper arm. · The vaccine schedule is different for different people. ¨ Tell your doctor if your child was born prematurely. Children who were premature may need to follow a different schedule. ¨ Children younger than 10years of age: This vaccine is usually given as 3 or 4 separate shots over several months. Your child's doctor will tell you how many shots are needed and when to come back for the next one. ¨ Children older than 10years of age: This vaccine is given as a single shot. If your child recently received another pneumonia vaccine, this one should be given at least 8 weeks later. ¨ Adults older than 25years of age: This vaccine is given as a single dose. · It is very important for your child to receive all of the shots for the vaccine. · Missed dose: This vaccine must be given on a fixed schedule. If your child misses a dose, call your child's doctor for another appointment. Drugs and Foods to Avoid: Ask your doctor or pharmacist before using any other medicine, including over-the-counter medicines, vitamins, and herbal products. · Some foods and medicines can affect how this vaccine works. Tell your doctor if you are receiving a treatment or medicine that causes a weak immune system. This includes radiation treatment, steroid medicine (including hydrocortisone, methylprednisolone, prednisolone, prednisone), or cancer medicine. Warnings While Using This Medicine: · Tell your doctor if you are pregnant or breastfeeding. · Tell your doctor if you have a weak immune system. You may not be fully protected by this vaccine. Possible Side Effects While Using This Medicine:  
Call your doctor right away if you notice any of these side effects: · Allergic reaction: Itching or hives, swelling in your face or hands, swelling or tingling in your mouth or throat, chest tightness, trouble breathing · High fever If you notice these less serious side effects, talk with your doctor: · Crying, irritability, or fussiness · Joint or muscle pain · Mild skin rash · Pain, burning, redness, or swelling where the shot was given · Poor appetite · Sleep changes If you notice other side effects that you think are caused by this medicine, tell your doctor. Call your doctor for medical advice about side effects. You may report side effects to FDA at 8-309-BMK-6267 © 2017Sonny [employer] Information is for End User's use only and may not be sold, redistributed or otherwise used for commercial purposes. The above information is an  only. It is not intended as medical advice for individual conditions or treatments. Talk to your doctor, nurse or pharmacist before following any medical regimen to see if it is safe and effective for you. Tdap (Tetanus, Diphtheria, Pertussis) Vaccine: What You Need to Know Why get vaccinated? Tetanus, diphtheria, and pertussis are very serious diseases. Tdap vaccine can protect us from these diseases. And Tdap vaccine given to pregnant women can protect  babies against pertussis. Tetanus (lockjaw) is rare in the [facility] today. It causes painful muscle tightening and stiffness, usually all over the body. · It can lead to tightening of muscles in the head and neck so you can't open your mouth, swallow, or sometimes even breathe. Tetanus kills about 1 out of 10 people who are infected even after receiving the best medical care. Diphtheria is also rare in the [place] today. It can cause a thick coating to form in the back of the throat. · It can lead to breathing problems, heart failure, paralysis, and death. Pertussis (whooping cough) causes severe coughing spells, which can cause difficulty breathing, vomiting, and disturbed sleep. · It can also lead to weight loss, incontinence, and rib fractures. Up to 2 in 100 adolescents and 5 in 100 adults with pertussis are hospitalized or have complications, which could include pneumonia or death. These diseases are caused by bacteria. Diphtheria and pertussis are spread from person to person through secretions from coughing or sneezing. Tetanus enters the body through cuts, scratches, or wounds. Before vaccines, as many as 200,000 cases of diphtheria, 200,000 cases of pertussis, and hundreds of cases of tetanus were reported in the [place] each year. Since vaccination began, reports of cases for tetanus and diphtheria have dropped by about 99% and for pertussis by about 80%. Tdap vaccine The Tdap vaccine can protect adolescents and adults from tetanus, diphtheria, and pertussis. One dose of Tdap is routinely given at age 6 or 15. People who did not get Tdap at that age should get it as soon as possible. Tdap is especially important for health care professionals and anyone having close contact with a baby younger than 12 months. Pregnant women should get a dose of Tdap during every pregnancy, to protect the  from pertussis. Infants are most at risk for severe, life-threatening complications from pertussis. Another vaccine, called Td, protects against tetanus and diphtheria, but not pertussis. A Td booster should be given every 10 years. Tdap may be given as one of these boosters if you have never gotten Tdap before. Tdap may also be given after a severe cut or burn to prevent tetanus infection. Your doctor or the person giving you the vaccine can give you more information. Tdap may safely be given at the same time as other vaccines. Some people should not get this vaccine · A person who has ever had a life-threatening allergic reaction after a previous dose of any diphtheria-, tetanus-, or pertussis-containing vaccine, OR has a severe allergy to any part of this vaccine, should not get Tdap vaccine. Tell the person giving the vaccine about any severe allergies. · Anyone who had coma or long repeated seizures within 7 days after a childhood dose of DTP or DTaP, or a previous dose of Tdap, should not get Tdap, unless a cause other than the vaccine was found. They can still get Td. · Talk to your doctor if you: ¨ Have seizures or another nervous system problem. ¨ Had severe pain or swelling after any vaccine containing diphtheria, tetanus, or pertussis. ¨ Ever had a condition called Guillain-Barré Syndrome (GBS). ¨ Aren't feeling well on the day the shot is scheduled. Risks With any medicine, including vaccines, there is a chance of side effects. These are usually mild and go away on their own. Serious reactions are also possible but are rare. Most people who get Tdap vaccine do not have any problems with it. Mild problems following Tdap 
(Did not interfere with activities) · Pain where the shot was given (about 3 in 4 adolescents or 2 in 3 adults) · Redness or swelling where the shot was given (about 1 person in 5) · Mild fever of at least 100.4°F (up to about 1 in 25 adolescents or 1 in 100 adults) · Headache (about 3 or 4 people in 10) · Tiredness (about 1 person in 3 or 4) · Nausea, vomiting, diarrhea, stomachache (up to 1 in 4 adolescents or 1 in 10 adults) · Chills, sore joints (about 1 person in 10) · Body aches (about 1 person in 3 or 4) · Rash, swollen glands (uncommon) Moderate problems following Tdap (Interfered with activities, but did not require medical attention) · Pain where the shot was given (up to 1 in 5 or 6) · Redness or swelling where the shot was given (up to about 1 in 16 adolescents or 1 in 12 adults) · Fever over 102°F (about 1 in 100 adolescents or 1 in 250 adults) · Headache (about 1 in 7 adolescents or 1 in 10 adults) · Nausea, vomiting, diarrhea, stomachache (up to 1 to 3 people in 100) · Swelling of the entire arm where the shot was given (up to about 1 in 500) Severe problems following Tdap 
(Unable to perform usual activities; required medical attention) · Swelling, severe pain, bleeding and redness in the arm where the shot was given (rare) Problems that could happen after any vaccine: · People sometimes faint after a medical procedure, including vaccination. Sitting or lying down for about 15 minutes can help prevent fainting, and injuries caused by a fall. Tell your doctor if you feel dizzy or have vision changes or ringing in the ears. · Some people get severe pain in the shoulder and have difficulty moving the arm where a shot was given. This happens very rarely. · Any medication can cause a severe allergic reaction. Such reactions from a vaccine are very rare, estimated at fewer than 1 in a million doses, and would happen within a few minutes to a few hours after the vaccination. As with any medicine, there is a very remote chance of a vaccine causing a serious injury or death. The safety of vaccines is always being monitored. For more information, visit: www.cdc.gov/vaccinesafety. What if there is a serious problem? What should I look for? · Look for anything that concerns you, such as signs of a severe allergic reaction, very high fever, or unusual behavior. Signs of a severe allergic reaction can include hives, swelling of the face and throat, difficulty breathing, a fast heartbeat, dizziness, and weakness. These would usually start a few minutes to a few hours after the vaccination. What should I do? · If you think it is a severe allergic reaction or other emergency that can't wait, call 9-1-1 or get the person to the nearest hospital. Otherwise, call your doctor. · Afterward, the reaction should be reported to the Vaccine Adverse Event Reporting System (VAERS). Your doctor might file this report, or you can do it yourself through the VAERS web site at www.vaers. hhs.gov, or by calling 9-568.357.5628. VAERS does not give medical advice. The National Vaccine Injury Compensation Program 
The National Vaccine Injury Compensation Program (VICP) is a federal program that was created to compensate people who may have been injured by certain vaccines.  
Persons who believe they may have been injured by a vaccine can learn about the program and about filing a claim by calling 9-209.946.9036 or visiting the [employer] website at www.[facility].gov/vaccinecompensation. There is a time limit to file a claim for compensation. How can I learn more? · Ask your doctor. He or she can give you the vaccine package insert or suggest other sources of information. · Call your local or state health department. · Contact the Centers for Disease Control and Prevention (CDC): 
¨ Call 2-228.292.3643 (1-800-CDC-INFO) or ¨ Visit CDC's website at www.cdc.gov/vaccines Vaccine Information Statement (Interim) Tdap Vaccine 
(2/24/15) 42 Leonel Hernandez [facility] [id number] [facility] and [employer] Centers for Disease Control and Prevention Many Vaccine Information Statements are available in [place] and other languages. See www.immunize.org/vis. Muchas hojas de información sobre vacunas están disponibles en español y en otros idiomas. Visite www.immunize.org/vis. Care instructions adapted under license by [employer] (which disclaims liability or warranty for this information). If you have questions about a medical condition or this instruction, always ask your healthcare professional. [address] any warranty or liability for your use of this information. RECOMMEND THE TDAP WHOOPING COUGH TETANUS SHOT. IF YOU HAVE BREAKTHROUGH REFLUX SYMPTOMS, YOU CAN HAVE PEPCID COMPLETE OR ZANTAC AS NEEDED. Introducing [facility]! [employer] introduces [employer] patient portal. Now you can access parts of your medical record, email your doctor's office, and request medication refills online. 1. In your internet browser, go to [email or web address] 2. Click on the First Time User? Click Here link in the Sign In box. You will see the New Member Sign Up page. 3. Enter your [employer] Access Code exactly as it appears below. You will not need to use this code after youve completed the sign-up process.  If you do not sign up before the expiration date, you must request a new code. · [employer] Access Code: [email or web address] Expires: 10/23/2017  3:32 PM 
 
4. Enter the last four digits of your Social Security Number (xxxx) and Date of Birth (mm/dd/yyyy) as indicated and click Submit. You will be taken to the next sign-up page. 5. Create a [employer] ID. This will be your [employer] login ID and cannot be changed, so think of one that is secure and easy to remember. 6. Create a [employer] password. You can change your password at any time. 7. Enter your Password Reset Question and Answer. This can be used at a later time if you forget your password. 8. Enter your e-mail address. You will receive e-mail notification when new information is available in [address] 9. Click Sign Up. You can now view and download portions of your medical record. 10. Click the Download Summary menu link to download a portable copy of your medical information. If you have questions, please visit the Frequently Asked Questions section of the [employer] website. Remember, [employer] is NOT to be used for urgent needs. For medical emergencies, dial 911. Now available from your iPhone and Android! Please provide this summary of care documentation to your next provider. Your primary care clinician is listed as Andie Wayne. If you have any questions after today's visit, please call [phone].

## 2017-10-11 NOTE — PATIENT INSTRUCTIONS
Pneumococcal 13-Valent Vaccine, Diphtheria Conjugate (By injection)   Pneumococcal 13-Valent Vaccine, Diphtheria Conjugate (VDP-ynp-DCA-al 13-VAY-lent VAX-een, dif-THEER-ee-a CIQ-zau-zmvu)  Prevents infections, such as pneumonia and meningitis. Brand Name(s): Prevnar 13   There may be other brand names for this medicine. When This Medicine Should Not Be Used: This vaccine is not right for everyone. You should not receive it if you had an allergic reaction to pneumococcal or diphtheria vaccine. How to Use This Medicine:   Injectable  · A nurse or other health provider will give you this medicine. This vaccine is usually given as a shot into a muscle in the thigh or upper arm. · The vaccine schedule is different for different people. ¨ Tell your doctor if your child was born prematurely. Children who were premature may need to follow a different schedule. ¨ Children younger than 10years of age: This vaccine is usually given as 3 or 4 separate shots over several months. Your child's doctor will tell you how many shots are needed and when to come back for the next one. ¨ Children older than 10years of age: This vaccine is given as a single shot. If your child recently received another pneumonia vaccine, this one should be given at least 8 weeks later. ¨ Adults older than 25years of age: This vaccine is given as a single dose. · It is very important for your child to receive all of the shots for the vaccine. · Missed dose: This vaccine must be given on a fixed schedule. If your child misses a dose, call your child's doctor for another appointment. Drugs and Foods to Avoid:   Ask your doctor or pharmacist before using any other medicine, including over-the-counter medicines, vitamins, and herbal products. · Some foods and medicines can affect how this vaccine works. Tell your doctor if you are receiving a treatment or medicine that causes a weak immune system.  This includes radiation treatment, steroid medicine (including hydrocortisone, methylprednisolone, prednisolone, prednisone), or cancer medicine. Warnings While Using This Medicine:   · Tell your doctor if you are pregnant or breastfeeding. · Tell your doctor if you have a weak immune system. You may not be fully protected by this vaccine. Possible Side Effects While Using This Medicine:   Call your doctor right away if you notice any of these side effects:  · Allergic reaction: Itching or hives, swelling in your face or hands, swelling or tingling in your mouth or throat, chest tightness, trouble breathing  · High fever  If you notice these less serious side effects, talk with your doctor:   · Crying, irritability, or fussiness  · Joint or muscle pain  · Mild skin rash  · Pain, burning, redness, or swelling where the shot was given  · Poor appetite  · Sleep changes  If you notice other side effects that you think are caused by this medicine, tell your doctor. Call your doctor for medical advice about side effects. You may report side effects to FDA at 9-691-FDA-3490  ©  [facility] [place] Information is for End User's use only and may not be sold, redistributed or otherwise used for commercial purposes. The above information is an  only. It is not intended as medical advice for individual conditions or treatments. Talk to your doctor, nurse or pharmacist before following any medical regimen to see if it is safe and effective for you. Tdap (Tetanus, Diphtheria, Pertussis) Vaccine: What You Need to Know  Why get vaccinated? Tetanus, diphtheria, and pertussis are very serious diseases. Tdap vaccine can protect us from these diseases. And Tdap vaccine given to pregnant women can protect  babies against pertussis. Tetanus (lockjaw) is rare in the [facility] today. It causes painful muscle tightening and stiffness, usually all over the body.   · It can lead to tightening of muscles in the head and neck so you can't open your mouth, swallow, or sometimes even breathe. Tetanus kills about 1 out of 10 people who are infected even after receiving the best medical care. Diphtheria is also rare in the [place] today. It can cause a thick coating to form in the back of the throat. · It can lead to breathing problems, heart failure, paralysis, and death. Pertussis (whooping cough) causes severe coughing spells, which can cause difficulty breathing, vomiting, and disturbed sleep. · It can also lead to weight loss, incontinence, and rib fractures. Up to 2 in 100 adolescents and 5 in 100 adults with pertussis are hospitalized or have complications, which could include pneumonia or death. These diseases are caused by bacteria. Diphtheria and pertussis are spread from person to person through secretions from coughing or sneezing. Tetanus enters the body through cuts, scratches, or wounds. Before vaccines, as many as 200,000 cases of diphtheria, 200,000 cases of pertussis, and hundreds of cases of tetanus were reported in the [place] each year. Since vaccination began, reports of cases for tetanus and diphtheria have dropped by about 99% and for pertussis by about 80%. Tdap vaccine  The Tdap vaccine can protect adolescents and adults from tetanus, diphtheria, and pertussis. One dose of Tdap is routinely given at age 6 or 15. People who did not get Tdap at that age should get it as soon as possible. Tdap is especially important for health care professionals and anyone having close contact with a baby younger than 12 months. Pregnant women should get a dose of Tdap during every pregnancy, to protect the  from pertussis. Infants are most at risk for severe, life-threatening complications from pertussis. Another vaccine, called Td, protects against tetanus and diphtheria, but not pertussis. A Td booster should be given every 10 years.  Tdap may be given as one of these boosters if you have never gotten Tdap before. Tdap may also be given after a severe cut or burn to prevent tetanus infection. Your doctor or the person giving you the vaccine can give you more information. Tdap may safely be given at the same time as other vaccines. Some people should not get this vaccine  · A person who has ever had a life-threatening allergic reaction after a previous dose of any diphtheria-, tetanus-, or pertussis-containing vaccine, OR has a severe allergy to any part of this vaccine, should not get Tdap vaccine. Tell the person giving the vaccine about any severe allergies. · Anyone who had coma or long repeated seizures within 7 days after a childhood dose of DTP or DTaP, or a previous dose of Tdap, should not get Tdap, unless a cause other than the vaccine was found. They can still get Td. · Talk to your doctor if you:  ¨ Have seizures or another nervous system problem. ¨ Had severe pain or swelling after any vaccine containing diphtheria, tetanus, or pertussis. ¨ Ever had a condition called Guillain-Barré Syndrome (GBS). ¨ Aren't feeling well on the day the shot is scheduled. Risks  With any medicine, including vaccines, there is a chance of side effects. These are usually mild and go away on their own. Serious reactions are also possible but are rare. Most people who get Tdap vaccine do not have any problems with it.   Mild problems following Tdap  (Did not interfere with activities)  · Pain where the shot was given (about 3 in 4 adolescents or 2 in 3 adults)  · Redness or swelling where the shot was given (about 1 person in 5)  · Mild fever of at least 100.4°F (up to about 1 in 25 adolescents or 1 in 100 adults)  · Headache (about 3 or 4 people in 10)  · Tiredness (about 1 person in 3 or 4)  · Nausea, vomiting, diarrhea, stomachache (up to 1 in 4 adolescents or 1 in 10 adults)  · Chills, sore joints (about 1 person in 10)  · Body aches (about 1 person in 3 or 4)  · Rash, swollen glands (uncommon)  Moderate problems following Tdap  (Interfered with activities, but did not require medical attention)  · Pain where the shot was given (up to 1 in 5 or 6)  · Redness or swelling where the shot was given (up to about 1 in 16 adolescents or 1 in 12 adults)  · Fever over 102°F (about 1 in 100 adolescents or 1 in 250 adults)  · Headache (about 1 in 7 adolescents or 1 in 10 adults)  · Nausea, vomiting, diarrhea, stomachache (up to 1 to 3 people in 100)  · Swelling of the entire arm where the shot was given (up to about 1 in 500)  Severe problems following Tdap  (Unable to perform usual activities; required medical attention)  · Swelling, severe pain, bleeding and redness in the arm where the shot was given (rare)  Problems that could happen after any vaccine:  · People sometimes faint after a medical procedure, including vaccination. Sitting or lying down for about 15 minutes can help prevent fainting, and injuries caused by a fall. Tell your doctor if you feel dizzy or have vision changes or ringing in the ears. · Some people get severe pain in the shoulder and have difficulty moving the arm where a shot was given. This happens very rarely. · Any medication can cause a severe allergic reaction. Such reactions from a vaccine are very rare, estimated at fewer than 1 in a million doses, and would happen within a few minutes to a few hours after the vaccination. As with any medicine, there is a very remote chance of a vaccine causing a serious injury or death. The safety of vaccines is always being monitored. For more information, visit: www.cdc.gov/vaccinesafety. What if there is a serious problem? What should I look for? · Look for anything that concerns you, such as signs of a severe allergic reaction, very high fever, or unusual behavior. Signs of a severe allergic reaction can include hives, swelling of the face and throat, difficulty breathing, a fast heartbeat, dizziness, and weakness.  These would usually start a few minutes to a few hours after the vaccination. What should I do? · If you think it is a severe allergic reaction or other emergency that can't wait, call 9-1-1 or get the person to the nearest hospital. Otherwise, call your doctor. · Afterward, the reaction should be reported to the Vaccine Adverse Event Reporting System (VAERS). Your doctor might file this report, or you can do it yourself through the VAERS web site at www.vaers. [facility].gov, or by calling 1-433.946.9728. VAERS does not give medical advice. The National Vaccine Injury Compensation Program  The National Vaccine Injury Compensation Program (VICP) is a federal program that was created to compensate people who may have been injured by certain vaccines. Persons who believe they may have been injured by a vaccine can learn about the program and about filing a claim by calling 4-567.866.4504 or visiting the [employer] website at www.[facility].gov/vaccinecompensation. There is a time limit to file a claim for compensation. How can I learn more? · Ask your doctor. He or she can give you the vaccine package insert or suggest other sources of information. · Call your local or state health department. · Contact the Centers for Disease Control and Prevention (CDC):  ¨ Call 0-326.569.2395 (1-800-CDC-INFO) or  ¨ Visit CDC's website at www.cdc.gov/vaccines  Vaccine Information Statement (Interim)  Tdap Vaccine  (2/24/15)  [address] [facility] [id number]  Department of Health and Human Services  Centers for Disease Control and Prevention  Many Vaccine Information Statements are available in American and other languages. See www.immunize.org/vis. Muchas hojas de información sobre vacunas están disponibles en español y en otros idiomas. Visite www.immunize.org/vis. Care instructions adapted under license by [employer] (which disclaims liability or warranty for this information).  If you have questions about a medical condition or this instruction, always ask your healthcare professional. [address] any warranty or liability for your use of this information. RECOMMEND THE TDAP WHOOPING COUGH TETANUS SHOT. IF YOU HAVE BREAKTHROUGH REFLUX SYMPTOMS, YOU CAN HAVE PEPCID COMPLETE OR ZANTAC AS NEEDED.

## 2017-10-12 LAB
25(OH)D3+25(OH)D2 SERPL-MCNC: 44.6 NG/ML (ref 30–100)
ALBUMIN SERPL-MCNC: 4.3 G/DL (ref 3.6–4.8)
ALBUMIN/GLOB SERPL: 1.8 {RATIO} (ref 1.2–2.2)
ALP SERPL-CCNC: 106 IU/L (ref 39–117)
ALT SERPL-CCNC: 21 IU/L (ref 0–32)
APPEARANCE UR: ABNORMAL
AST SERPL-CCNC: 27 IU/L (ref 0–40)
BACTERIA #/AREA URNS HPF: ABNORMAL /[HPF]
BILIRUB SERPL-MCNC: <0.2 MG/DL (ref 0–1.2)
BILIRUB UR QL STRIP: NEGATIVE
BUN SERPL-MCNC: 8 MG/DL (ref 8–27)
BUN/CREAT SERPL: 11 (ref 12–28)
CALCIUM SERPL-MCNC: 9.2 MG/DL (ref 8.7–10.3)
CASTS URNS MICRO: ABNORMAL
CASTS URNS QL MICRO: PRESENT /LPF
CHLORIDE SERPL-SCNC: 99 MMOL/L (ref 96–106)
CHOLEST SERPL-MCNC: 116 MG/DL (ref 100–199)
CO2 SERPL-SCNC: 25 MMOL/L (ref 18–29)
COLOR UR: YELLOW
CREAT SERPL-MCNC: 0.72 MG/DL (ref 0.57–1)
EPI CELLS #/AREA URNS HPF: ABNORMAL /HPF
ERYTHROCYTE [DISTWIDTH] IN BLOOD BY AUTOMATED COUNT: 13 % (ref 12.3–15.4)
GLOBULIN SER CALC-MCNC: 2.4 G/DL (ref 1.5–4.5)
GLUCOSE SERPL-MCNC: 93 MG/DL (ref 65–99)
GLUCOSE UR QL: NEGATIVE
HBA1C MFR BLD: 5.6 % (ref 4.8–5.6)
HCT VFR BLD AUTO: 36.5 % (ref 34–46.6)
HCV AB S/CO SERPL IA: <0.1 S/CO RATIO (ref 0–0.9)
HCV AB SERPL QL IA: NORMAL
HDLC SERPL-MCNC: 57 MG/DL
HGB BLD-MCNC: 12.2 G/DL (ref 11.1–15.9)
HGB UR QL STRIP: NEGATIVE
KETONES UR QL STRIP: NEGATIVE
LDLC SERPL CALC-MCNC: 48 MG/DL (ref 0–99)
LEUKOCYTE ESTERASE UR QL STRIP: ABNORMAL
MCH RBC QN AUTO: 32.3 PG (ref 26.6–33)
MCHC RBC AUTO-ENTMCNC: 33.4 G/DL (ref 31.5–35.7)
MCV RBC AUTO: 97 FL (ref 79–97)
MICRO URNS: ABNORMAL
MUCOUS THREADS URNS QL MICRO: PRESENT
NITRITE UR QL STRIP: NEGATIVE
PH UR STRIP: 6.5 [PH] (ref 5–7.5)
PLATELET # BLD AUTO: 297 X10E3/UL (ref 150–379)
POTASSIUM SERPL-SCNC: 4.5 MMOL/L (ref 3.5–5.2)
PROT SERPL-MCNC: 6.7 G/DL (ref 6–8.5)
PROT UR QL STRIP: ABNORMAL
RBC # BLD AUTO: 3.78 X10E6/UL (ref 3.77–5.28)
RBC #/AREA URNS HPF: ABNORMAL /HPF
SODIUM SERPL-SCNC: 142 MMOL/L (ref 134–144)
SP GR UR: 1.03 (ref 1–1.03)
TRIGL SERPL-MCNC: 55 MG/DL (ref 0–149)
TSH SERPL DL<=0.005 MIU/L-ACNC: 1.08 UIU/ML (ref 0.45–4.5)
UROBILINOGEN UR STRIP-MCNC: 0.2 MG/DL (ref 0.2–1)
VLDLC SERPL CALC-MCNC: 11 MG/DL (ref 5–40)
WBC # BLD AUTO: 7.5 X10E3/UL (ref 3.4–10.8)
WBC #/AREA URNS HPF: ABNORMAL /HPF

## 2017-11-14 ENCOUNTER — TELEPHONE (OUTPATIENT)
Dept: INTERNAL MEDICINE CLINIC | Age: 62
End: 2017-11-14

## 2017-12-02 DIAGNOSIS — J06.9 ACUTE URI: ICD-10-CM

## 2017-12-04 RX ORDER — BENZONATATE 100 MG/1
CAPSULE ORAL
Qty: 30 CAP | Refills: 1 | Status: SHIPPED | OUTPATIENT
Start: 2017-12-04 | End: 2017-12-28

## 2017-12-12 NOTE — TELEPHONE ENCOUNTER
Ml Montero, MIROSLAVA Kennedy LPN        Caller: Unspecified (4 weeks ago)                     Yes and needs follow up. Thanks          Pt has f/u scheduled 1/8/2018 at 2 pm with Dr Clarke Barrera.

## 2017-12-12 NOTE — TELEPHONE ENCOUNTER
----- Message from Felicita Zendejas, ANP sent at 12/12/2017 11:36 AM EST -----  Can you check with patient on her coreg? I refilled it but it is also listed as a serious allergy? I'd like to know that she has been taking it and feeling well. Has appt next month with GENI Cortez.

## 2017-12-12 NOTE — TELEPHONE ENCOUNTER
Spoke with patient  Verified patient with 2 patient identifiers    Pt confirmed has been taking Coreg, says was told in the past she was allergic to Beta Blocker. Pt now question if should continue medication?

## 2017-12-28 ENCOUNTER — HOSPITAL ENCOUNTER (INPATIENT)
Age: 62
LOS: 4 days | Discharge: HOME | DRG: 392 | End: 2018-01-01
Attending: EMERGENCY MEDICINE | Admitting: INTERNAL MEDICINE
Payer: COMMERCIAL

## 2017-12-28 ENCOUNTER — APPOINTMENT (OUTPATIENT)
Dept: CT IMAGING | Age: 62
DRG: 392 | End: 2017-12-28
Attending: EMERGENCY MEDICINE
Payer: COMMERCIAL

## 2017-12-28 DIAGNOSIS — R11.2 INTRACTABLE VOMITING WITH NAUSEA, UNSPECIFIED VOMITING TYPE: Primary | ICD-10-CM

## 2017-12-28 PROBLEM — R11.11 INTRACTABLE VOMITING WITHOUT NAUSEA: Status: ACTIVE | Noted: 2017-12-28

## 2017-12-28 LAB
ALBUMIN SERPL-MCNC: 3.9 G/DL (ref 3.5–5)
ALBUMIN/GLOB SERPL: 1.1 {RATIO} (ref 1.1–2.2)
ALP SERPL-CCNC: 134 U/L (ref 45–117)
ALT SERPL-CCNC: 30 U/L (ref 12–78)
ANION GAP SERPL CALC-SCNC: 10 MMOL/L (ref 5–15)
APPEARANCE UR: CLEAR
AST SERPL-CCNC: 36 U/L (ref 15–37)
BACTERIA URNS QL MICRO: NEGATIVE /HPF
BASOPHILS # BLD: 0 K/UL
BASOPHILS NFR BLD: 0 %
BILIRUB SERPL-MCNC: 0.7 MG/DL (ref 0.2–1)
BILIRUB UR QL: NEGATIVE
BUN SERPL-MCNC: 12 MG/DL (ref 6–20)
BUN/CREAT SERPL: 14 (ref 12–20)
CALCIUM SERPL-MCNC: 8.4 MG/DL (ref 8.5–10.1)
CHLORIDE SERPL-SCNC: 100 MMOL/L (ref 97–108)
CK SERPL-CCNC: 62 U/L (ref 26–192)
CO2 SERPL-SCNC: 26 MMOL/L (ref 21–32)
COLOR UR: ABNORMAL
CREAT SERPL-MCNC: 0.83 MG/DL (ref 0.55–1.02)
DIFFERENTIAL METHOD BLD: ABNORMAL
EOSINOPHIL # BLD: 0.1 K/UL
EOSINOPHIL NFR BLD: 1 %
EPITH CASTS URNS QL MICRO: ABNORMAL /LPF
ERYTHROCYTE [DISTWIDTH] IN BLOOD BY AUTOMATED COUNT: 13.4 % (ref 11.5–14.5)
GLOBULIN SER CALC-MCNC: 3.4 G/DL (ref 2–4)
GLUCOSE SERPL-MCNC: 121 MG/DL (ref 65–100)
GLUCOSE UR STRIP.AUTO-MCNC: NEGATIVE MG/DL
HCT VFR BLD AUTO: 39.1 % (ref 35–47)
HGB BLD-MCNC: 13.2 G/DL (ref 11.5–16)
HGB UR QL STRIP: NEGATIVE
HYALINE CASTS URNS QL MICRO: ABNORMAL /LPF (ref 0–5)
KETONES UR QL STRIP.AUTO: 15 MG/DL
LEUKOCYTE ESTERASE UR QL STRIP.AUTO: NEGATIVE
LIPASE SERPL-CCNC: 119 U/L (ref 73–393)
LYMPHOCYTES # BLD: 0.5 K/UL
LYMPHOCYTES NFR BLD: 4 %
MCH RBC QN AUTO: 31.8 PG (ref 26–34)
MCHC RBC AUTO-ENTMCNC: 33.8 G/DL (ref 30–36.5)
MCV RBC AUTO: 94.2 FL (ref 80–99)
MONOCYTES # BLD: 0.5 K/UL
MONOCYTES NFR BLD: 4 %
NEUTS SEG # BLD: 11.2 K/UL
NEUTS SEG NFR BLD: 91 %
NITRITE UR QL STRIP.AUTO: NEGATIVE
PH UR STRIP: 6.5 [PH] (ref 5–8)
PLATELET # BLD AUTO: 294 K/UL (ref 150–400)
POTASSIUM SERPL-SCNC: 3.5 MMOL/L (ref 3.5–5.1)
PROT SERPL-MCNC: 7.3 G/DL (ref 6.4–8.2)
PROT UR STRIP-MCNC: ABNORMAL MG/DL
RBC # BLD AUTO: 4.15 M/UL (ref 3.8–5.2)
RBC #/AREA URNS HPF: ABNORMAL /HPF (ref 0–5)
RBC MORPH BLD: ABNORMAL
SODIUM SERPL-SCNC: 136 MMOL/L (ref 136–145)
SP GR UR REFRACTOMETRY: 1.01 (ref 1–1.03)
TROPONIN I BLD-MCNC: <0.04 NG/ML (ref 0–0.08)
TROPONIN I SERPL-MCNC: <0.04 NG/ML
UA: UC IF INDICATED,UAUC: ABNORMAL
UROBILINOGEN UR QL STRIP.AUTO: 0.2 EU/DL (ref 0.2–1)
WBC # BLD AUTO: 12.3 K/UL (ref 3.6–11)
WBC URNS QL MICRO: ABNORMAL /HPF (ref 0–4)

## 2017-12-28 PROCEDURE — 83690 ASSAY OF LIPASE: CPT | Performed by: EMERGENCY MEDICINE

## 2017-12-28 PROCEDURE — 99285 EMERGENCY DEPT VISIT HI MDM: CPT

## 2017-12-28 PROCEDURE — 74011250636 HC RX REV CODE- 250/636: Performed by: EMERGENCY MEDICINE

## 2017-12-28 PROCEDURE — 85025 COMPLETE CBC W/AUTO DIFF WBC: CPT | Performed by: EMERGENCY MEDICINE

## 2017-12-28 PROCEDURE — 96375 TX/PRO/DX INJ NEW DRUG ADDON: CPT

## 2017-12-28 PROCEDURE — 74011250637 HC RX REV CODE- 250/637: Performed by: EMERGENCY MEDICINE

## 2017-12-28 PROCEDURE — 74011250636 HC RX REV CODE- 250/636

## 2017-12-28 PROCEDURE — 84484 ASSAY OF TROPONIN QUANT: CPT | Performed by: EMERGENCY MEDICINE

## 2017-12-28 PROCEDURE — 96376 TX/PRO/DX INJ SAME DRUG ADON: CPT

## 2017-12-28 PROCEDURE — 36415 COLL VENOUS BLD VENIPUNCTURE: CPT | Performed by: EMERGENCY MEDICINE

## 2017-12-28 PROCEDURE — 65660000000 HC RM CCU STEPDOWN

## 2017-12-28 PROCEDURE — 93005 ELECTROCARDIOGRAM TRACING: CPT

## 2017-12-28 PROCEDURE — 80053 COMPREHEN METABOLIC PANEL: CPT | Performed by: EMERGENCY MEDICINE

## 2017-12-28 PROCEDURE — 74011000258 HC RX REV CODE- 258: Performed by: EMERGENCY MEDICINE

## 2017-12-28 PROCEDURE — 74011636320 HC RX REV CODE- 636/320: Performed by: EMERGENCY MEDICINE

## 2017-12-28 PROCEDURE — 82550 ASSAY OF CK (CPK): CPT | Performed by: EMERGENCY MEDICINE

## 2017-12-28 PROCEDURE — 81001 URINALYSIS AUTO W/SCOPE: CPT | Performed by: EMERGENCY MEDICINE

## 2017-12-28 PROCEDURE — 96374 THER/PROPH/DIAG INJ IV PUSH: CPT

## 2017-12-28 PROCEDURE — 96361 HYDRATE IV INFUSION ADD-ON: CPT

## 2017-12-28 PROCEDURE — 74177 CT ABD & PELVIS W/CONTRAST: CPT

## 2017-12-28 PROCEDURE — 74011250636 HC RX REV CODE- 250/636: Performed by: INTERNAL MEDICINE

## 2017-12-28 PROCEDURE — 74011000250 HC RX REV CODE- 250: Performed by: EMERGENCY MEDICINE

## 2017-12-28 RX ORDER — ENOXAPARIN SODIUM 100 MG/ML
30 INJECTION SUBCUTANEOUS
Status: DISCONTINUED | OUTPATIENT
Start: 2017-12-29 | End: 2018-01-01 | Stop reason: HOSPADM

## 2017-12-28 RX ORDER — ONDANSETRON 2 MG/ML
INJECTION INTRAMUSCULAR; INTRAVENOUS
Status: COMPLETED
Start: 2017-12-28 | End: 2017-12-28

## 2017-12-28 RX ORDER — SODIUM CHLORIDE 0.9 % (FLUSH) 0.9 %
10 SYRINGE (ML) INJECTION
Status: COMPLETED | OUTPATIENT
Start: 2017-12-28 | End: 2017-12-28

## 2017-12-28 RX ORDER — HYDROMORPHONE HYDROCHLORIDE 2 MG/ML
0.5 INJECTION, SOLUTION INTRAMUSCULAR; INTRAVENOUS; SUBCUTANEOUS
Status: DISCONTINUED | OUTPATIENT
Start: 2017-12-28 | End: 2017-12-31

## 2017-12-28 RX ORDER — HYDROXYCHLOROQUINE SULFATE 200 MG/1
200 TABLET, FILM COATED ORAL DAILY
Status: DISCONTINUED | OUTPATIENT
Start: 2017-12-29 | End: 2018-01-01 | Stop reason: HOSPADM

## 2017-12-28 RX ORDER — AMLODIPINE BESYLATE 2.5 MG/1
2.5 TABLET ORAL DAILY
Status: DISCONTINUED | OUTPATIENT
Start: 2017-12-29 | End: 2018-01-01 | Stop reason: HOSPADM

## 2017-12-28 RX ORDER — ONDANSETRON 2 MG/ML
4 INJECTION INTRAMUSCULAR; INTRAVENOUS
Status: COMPLETED | OUTPATIENT
Start: 2017-12-28 | End: 2017-12-28

## 2017-12-28 RX ORDER — LORAZEPAM 2 MG/ML
1 INJECTION INTRAMUSCULAR
Status: COMPLETED | OUTPATIENT
Start: 2017-12-28 | End: 2017-12-28

## 2017-12-28 RX ORDER — IBUPROFEN 200 MG
1 TABLET ORAL DAILY
Status: DISCONTINUED | OUTPATIENT
Start: 2017-12-28 | End: 2017-12-29

## 2017-12-28 RX ORDER — CARVEDILOL 12.5 MG/1
12.5 TABLET ORAL 2 TIMES DAILY WITH MEALS
Status: DISCONTINUED | OUTPATIENT
Start: 2017-12-29 | End: 2018-01-01 | Stop reason: HOSPADM

## 2017-12-28 RX ORDER — DULOXETIN HYDROCHLORIDE 30 MG/1
60 CAPSULE, DELAYED RELEASE ORAL DAILY
Status: DISCONTINUED | OUTPATIENT
Start: 2017-12-29 | End: 2018-01-01 | Stop reason: HOSPADM

## 2017-12-28 RX ORDER — CARVEDILOL 12.5 MG/1
12.5 TABLET ORAL 2 TIMES DAILY WITH MEALS
Status: DISCONTINUED | OUTPATIENT
Start: 2017-12-28 | End: 2017-12-29

## 2017-12-28 RX ORDER — SODIUM CHLORIDE 9 MG/ML
50 INJECTION, SOLUTION INTRAVENOUS
Status: COMPLETED | OUTPATIENT
Start: 2017-12-28 | End: 2017-12-28

## 2017-12-28 RX ORDER — PROCHLORPERAZINE EDISYLATE 5 MG/ML
10 INJECTION INTRAMUSCULAR; INTRAVENOUS
Status: COMPLETED | OUTPATIENT
Start: 2017-12-28 | End: 2017-12-28

## 2017-12-28 RX ORDER — IBUPROFEN 200 MG
1 TABLET ORAL DAILY
Status: DISCONTINUED | OUTPATIENT
Start: 2017-12-29 | End: 2017-12-28

## 2017-12-28 RX ORDER — HALOPERIDOL 5 MG/ML
5 INJECTION INTRAMUSCULAR
Status: DISCONTINUED | OUTPATIENT
Start: 2017-12-28 | End: 2017-12-28

## 2017-12-28 RX ORDER — CARVEDILOL 12.5 MG/1
12.5 TABLET ORAL 2 TIMES DAILY WITH MEALS
Status: DISCONTINUED | OUTPATIENT
Start: 2017-12-29 | End: 2017-12-28

## 2017-12-28 RX ORDER — LORAZEPAM 2 MG/ML
1 INJECTION INTRAMUSCULAR
Status: DISCONTINUED | OUTPATIENT
Start: 2017-12-28 | End: 2018-01-01 | Stop reason: HOSPADM

## 2017-12-28 RX ORDER — PANTOPRAZOLE SODIUM 40 MG/1
40 TABLET, DELAYED RELEASE ORAL
Status: DISCONTINUED | OUTPATIENT
Start: 2017-12-29 | End: 2018-01-01 | Stop reason: HOSPADM

## 2017-12-28 RX ORDER — GUAIFENESIN 100 MG/5ML
81 LIQUID (ML) ORAL DAILY
Status: DISCONTINUED | OUTPATIENT
Start: 2017-12-29 | End: 2018-01-01 | Stop reason: HOSPADM

## 2017-12-28 RX ORDER — HYDRALAZINE HYDROCHLORIDE 20 MG/ML
10 INJECTION INTRAMUSCULAR; INTRAVENOUS
Status: DISCONTINUED | OUTPATIENT
Start: 2017-12-28 | End: 2018-01-01 | Stop reason: HOSPADM

## 2017-12-28 RX ORDER — ENOXAPARIN SODIUM 100 MG/ML
30 INJECTION SUBCUTANEOUS
Status: DISCONTINUED | OUTPATIENT
Start: 2017-12-29 | End: 2017-12-28 | Stop reason: SDUPTHER

## 2017-12-28 RX ORDER — HYDROCODONE BITARTRATE AND ACETAMINOPHEN 10; 325 MG/1; MG/1
2 TABLET ORAL
Status: DISCONTINUED | OUTPATIENT
Start: 2017-12-28 | End: 2018-01-01 | Stop reason: HOSPADM

## 2017-12-28 RX ORDER — AMLODIPINE BESYLATE 5 MG/1
2.5 TABLET ORAL
Status: COMPLETED | OUTPATIENT
Start: 2017-12-28 | End: 2017-12-28

## 2017-12-28 RX ORDER — ATORVASTATIN CALCIUM 40 MG/1
40 TABLET, FILM COATED ORAL DAILY
Status: DISCONTINUED | OUTPATIENT
Start: 2017-12-29 | End: 2018-01-01 | Stop reason: HOSPADM

## 2017-12-28 RX ADMIN — SODIUM CHLORIDE 1000 ML: 900 INJECTION, SOLUTION INTRAVENOUS at 11:45

## 2017-12-28 RX ADMIN — PROMETHAZINE HYDROCHLORIDE 25 MG: 25 INJECTION, SOLUTION INTRAMUSCULAR; INTRAVENOUS at 12:30

## 2017-12-28 RX ADMIN — PROMETHAZINE HYDROCHLORIDE 25 MG: 25 INJECTION, SOLUTION INTRAMUSCULAR; INTRAVENOUS at 18:05

## 2017-12-28 RX ADMIN — AMLODIPINE BESYLATE 2.5 MG: 5 TABLET ORAL at 20:30

## 2017-12-28 RX ADMIN — ONDANSETRON 4 MG: 2 INJECTION INTRAMUSCULAR; INTRAVENOUS at 11:40

## 2017-12-28 RX ADMIN — LORAZEPAM 1 MG: 2 INJECTION INTRAMUSCULAR; INTRAVENOUS at 20:30

## 2017-12-28 RX ADMIN — PROCHLORPERAZINE EDISYLATE 10 MG: 5 INJECTION INTRAMUSCULAR; INTRAVENOUS at 15:41

## 2017-12-28 RX ADMIN — Medication 10 ML: at 16:36

## 2017-12-28 RX ADMIN — LIDOCAINE HYDROCHLORIDE 40 ML: 20 SOLUTION ORAL; TOPICAL at 14:15

## 2017-12-28 RX ADMIN — LORAZEPAM 1 MG: 2 INJECTION INTRAMUSCULAR; INTRAVENOUS at 22:22

## 2017-12-28 RX ADMIN — IOPAMIDOL 100 ML: 612 INJECTION, SOLUTION INTRAVENOUS at 16:36

## 2017-12-28 RX ADMIN — SODIUM CHLORIDE 50 ML/HR: 900 INJECTION, SOLUTION INTRAVENOUS at 16:36

## 2017-12-28 RX ADMIN — HYDROMORPHONE HYDROCHLORIDE 0.5 MG: 2 INJECTION INTRAMUSCULAR; INTRAVENOUS; SUBCUTANEOUS at 22:22

## 2017-12-28 RX ADMIN — CARVEDILOL 12.5 MG: 12.5 TABLET, FILM COATED ORAL at 21:10

## 2017-12-28 RX ADMIN — ONDANSETRON 4 MG: 2 INJECTION INTRAMUSCULAR; INTRAVENOUS at 14:55

## 2017-12-28 NOTE — ED PROVIDER NOTES
EMERGENCY DEPARTMENT HISTORY AND PHYSICAL EXAM      Date: 12/28/2017  Patient Name: Marco Antonio Perera    History of Presenting Illness     Chief Complaint   Patient presents with    Nausea     per EMS x 2 days    Vomiting     x 2 days, pt given 4mg zofran IV en routa    Diarrhea     x 2 days     History Provided By: Patient    HPI: Marco Antonio Perera, 60 y.o. female with PMHx significant for HTN, MI, IBS, presents via EMS to the ED with cc of sudden onset nausea/vomiting and dizziness that began at 3:00 AM this morning. Pt states that she has been unable to tolerate PO since onset of her sxs and is feeling \"hungry\". Pt also complains of trouble breathing. She notes that she had similar sxs two years ago as the result of an MI. She takes 81mg ASA daily. Pt specifically denies difficulty urinating, dysuria, hematuria, chest pain, blood in stool, or fevers. PCP: Lindsey Rhodes MD    Social Hx: + (1 ppd) Tobacco, + (wine nightly) EtOH, - Illicit Drugs    There are no other complaints, changes, or physical findings at this time. Current Outpatient Prescriptions   Medication Sig Dispense Refill    carvedilol (COREG) 12.5 mg tablet take 1 tablet by mouth twice a day with meals 60 Tab 0    amLODIPine (NORVASC) 2.5 mg tablet take 1 tablet by mouth once daily 30 Tab 1    varenicline (CHANTIX STARTER MARIVEL) 0.5 mg (11)- 1 mg (42) DsPk Take as directed per starter pack. 1 Dose Pack 0    FLUARIX QUAD 8766-6911, PF, syrg injection inject 0.5 milliliter intramuscularly  0    HYDROcodone-acetaminophen (NORCO)  mg tablet TAKE 1 TABLET BY MOUTH 3 TIMES DAILY AS NEEDED FOR PAIN  0    hydroxychloroquine (PLAQUENIL) 200 mg tablet Take 200 mg by mouth daily. 0    omeprazole (PRILOSEC) 40 mg capsule take 1 capsule by mouth once daily ON AN EMPTY STOMACH 30 Cap 5    desoximetasone (TOPICORT) 0.25 % topical cream Apply  to affected area two (2) times daily as needed for Skin Irritation.  15 g 0    Cetirizine (ZYRTEC) 10 mg cap Take  by mouth.  atorvastatin (LIPITOR) 40 mg tablet take 1 tablet by mouth NIGHTLY 30 Tab 12    promethazine (PHENERGAN) 25 mg tablet Take 1 Tab by mouth every six (6) hours as needed for Nausea. 30 Tab 0    dicyclomine (BENTYL) 10 mg capsule TAKE 1 CAPSULE BY MOUTH BEFORE MEALS AND AT BEDTIME AS NEEDED.  0    gabapentin (NEURONTIN) 300 mg capsule TAKE 1 CAPSULE IN THE MORNING, 1 CAPSULE IN THE AFTERNOON, AD 2 CAPSULES AT BEDTIME 120 Cap 3    lidocaine (LIDODERM) 5 % 2 Patches by TransDERmal route as needed. 0    EEMT 1.25-2.5 mg per tablet Take 1 Tab by mouth daily. 0    aspirin 81 mg chewable tablet Take 1 Tab by mouth daily. 30 Tab 0    albuterol (PROVENTIL VENTOLIN) 2.5 mg /3 mL (0.083 %) nebulizer solution inhale contents of 1 vial in nebulizer every 4 hours if needed 25 Each 2    DULoxetine (CYMBALTA) 60 mg capsule Take  by mouth daily.   0     Past History     Past Medical History:  Past Medical History:   Diagnosis Date    Acid reflux     Acid reflux     Arthritis     Chronic pain     Heart attack     Heart failure (HCC)     Hypertension     IBS (irritable bowel syndrome)     Squamous cell carcinoma of skin of right elbow 5/2016    Takotsubo cardiomyopathy 11/16/2015    Tobacco abuse 11/16/2015     Past Surgical History:  Past Surgical History:   Procedure Laterality Date    ABDOMEN SURGERY PROC UNLISTED      adhesion removal    BREAST SURGERY PROCEDURE UNLISTED      lumpectomy    HX GYN      oophorectomy    HX ORTHOPAEDIC      right knee arthroscopy    HX ORTHOPAEDIC      right thumb    HX ORTHOPAEDIC      back surgeries    HX OTHER SURGICAL      8 route canals    [place] COLONOSCOPY FLX DX W/COLLJ SPEC WHEN PFRMD  10/15/2012          Family History:  Family History   Problem Relation Age of Onset    Cancer Father      prostate    Heart Disease Mother     Heart Disease Maternal Grandmother      Social History:  Social History   Substance Use Topics    Smoking status: Current Every Day Smoker     Packs/day: 1.00    Smokeless tobacco: Never Used      Comment: trying to quit 4-5 cigarettes daily    Alcohol use 5.0 oz/week     10 Glasses of wine per week      Comment: 4 times weekly     Allergies: Allergies   Allergen Reactions    Coreg [Carvedilol] Anaphylaxis    Ace Inhibitors Swelling    Arb-Angiotensin Receptor Antagonist Other (comments)     Prior ACE inhibitor angioedema, cross reaction risk    Codeine Nausea Only    Keflex [Cephalexin] Hives    Milk Other (comments)     Lactose intolerant.  Morphine Nausea and Vomiting     Review of Systems   Review of Systems   Constitutional: Positive for appetite change (decreased). Negative for fatigue and fever. HENT: Negative. Eyes: Negative. Respiratory: Positive for shortness of breath. Negative for wheezing. Cardiovascular: Negative for chest pain and leg swelling. Gastrointestinal: Positive for diarrhea, nausea and vomiting. Negative for blood in stool and constipation. Endocrine: Negative. Genitourinary: Negative for difficulty urinating and dysuria. Musculoskeletal: Negative. Skin: Negative for rash. Allergic/Immunologic: Negative. Neurological: Positive for dizziness. Negative for weakness and numbness. Hematological: Negative. Psychiatric/Behavioral: Negative. Physical Exam   Physical Exam   Constitutional: She is oriented to person, place, and time. She appears well-developed and well-nourished. HENT:   Head: Normocephalic and atraumatic. Mouth/Throat: Mucous membranes are normal.   Eyes: EOM are normal. Pupils are equal, round, and reactive to light. Neck: Normal range of motion. No JVD present. No tracheal deviation present. Cardiovascular: Normal rate, regular rhythm, normal heart sounds and intact distal pulses. Exam reveals no gallop and no friction rub. No murmur heard. Pulmonary/Chest: Effort normal and breath sounds normal. No stridor.  She has no wheezes. She has no rales. Lung sounds equal and clear   Abdominal: Soft. Bowel sounds are normal. She exhibits no distension and no mass. There is tenderness (to palpation) in the epigastric area. There is no rebound and no guarding. Musculoskeletal: Normal range of motion. She exhibits no edema or tenderness. No peripheral edema   Neurological: She is alert and oriented to person, place, and time. Skin: Skin is warm and dry. No rash noted. Psychiatric: She has a normal mood and affect.  Her behavior is normal. Judgment and thought content normal.     Diagnostic Study Results     Labs -     Recent Results (from the past 12 hour(s))   EKG, 12 LEAD, INITIAL    Collection Time: 12/28/17 11:27 AM   Result Value Ref Range    Ventricular Rate 84 BPM    Atrial Rate 84 BPM    P-R Interval 166 ms    QRS Duration 92 ms    Q-T Interval 408 ms    QTC Calculation (Bezet) 482 ms    Calculated P Axis 63 degrees    Calculated R Axis 22 degrees    Calculated T Axis 42 degrees    Diagnosis       Normal sinus rhythm  Possible Left atrial enlargement  Septal infarct (cited on or before 05-AUG-2015)  Abnormal ECG  When compared with ECG of 15-NOV-2015 07:03,  Questionable change in initial forces of Anterolateral leads  T wave inversion no longer evident in Inferior leads  T wave inversion no longer evident in Anterolateral leads  QT has shortened     METABOLIC PANEL, COMPREHENSIVE    Collection Time: 12/28/17 11:48 AM   Result Value Ref Range    Sodium 136 136 - 145 mmol/L    Potassium 3.5 3.5 - 5.1 mmol/L    Chloride 100 97 - 108 mmol/L    CO2 26 21 - 32 mmol/L    Anion gap 10 5 - 15 mmol/L    Glucose 121 (H) 65 - 100 mg/dL    BUN 12 6 - 20 MG/DL    Creatinine 0.83 0.55 - 1.02 MG/DL    BUN/Creatinine ratio 14 12 - 20      GFR est AA >60 >60 ml/min/1.73m2    GFR est non-AA >60 >60 ml/min/1.73m2    Calcium 8.4 (L) 8.5 - 10.1 MG/DL    Bilirubin, total 0.7 0.2 - 1.0 MG/DL    ALT (SGPT) 30 12 - 78 U/L    AST (SGOT) 36 15 - 37 U/L    Alk. phosphatase 134 (H) 45 - 117 U/L    Protein, total 7.3 6.4 - 8.2 g/dL    Albumin 3.9 3.5 - 5.0 g/dL    Globulin 3.4 2.0 - 4.0 g/dL    A-G Ratio 1.1 1.1 - 2.2     CBC WITH AUTOMATED DIFF    Collection Time: 12/28/17 11:48 AM   Result Value Ref Range    WBC 12.3 (H) 3.6 - 11.0 K/uL    RBC 4.15 3.80 - 5.20 M/uL    HGB 13.2 11.5 - 16.0 g/dL    HCT 39.1 35.0 - 47.0 %    MCV 94.2 80.0 - 99.0 FL    MCH 31.8 26.0 - 34.0 PG    MCHC 33.8 30.0 - 36.5 g/dL    RDW 13.4 11.5 - 14.5 %    PLATELET 554 258 - 028 K/uL    NEUTROPHILS 91 %    LYMPHOCYTES 4 %    MONOCYTES 4 %    EOSINOPHILS 1 %    BASOPHILS 0 %    ABS. NEUTROPHILS 11.2 K/UL    ABS. LYMPHOCYTES 0.5 K/UL    ABS. MONOCYTES 0.5 K/UL    ABS. EOSINOPHILS 0.1 K/UL    ABS.  BASOPHILS 0.0 K/UL    RBC COMMENTS NORMOCYTIC, NORMOCHROMIC      DF MANUAL     CK W/ REFLX CKMB    Collection Time: 12/28/17 11:48 AM   Result Value Ref Range    CK 62 26 - 192 U/L   TROPONIN I    Collection Time: 12/28/17 11:48 AM   Result Value Ref Range    Troponin-I, Qt. <0.04 <0.05 ng/mL   LIPASE    Collection Time: 12/28/17 12:08 PM   Result Value Ref Range    Lipase 119 73 - 393 U/L   URINALYSIS W/ REFLEX CULTURE    Collection Time: 12/28/17  3:45 PM   Result Value Ref Range    Color YELLOW/STRAW      Appearance CLEAR CLEAR      Specific gravity 1.012 1.003 - 1.030      pH (UA) 6.5 5.0 - 8.0      Protein TRACE (A) NEG mg/dL    Glucose NEGATIVE  NEG mg/dL    Ketone 15 (A) NEG mg/dL    Bilirubin NEGATIVE  NEG      Blood NEGATIVE  NEG      Urobilinogen 0.2 0.2 - 1.0 EU/dL    Nitrites NEGATIVE  NEG      Leukocyte Esterase NEGATIVE  NEG      WBC 0-4 0 - 4 /hpf    RBC 0-5 0 - 5 /hpf    Epithelial cells FEW FEW /lpf    Bacteria NEGATIVE  NEG /hpf    UA:UC IF INDICATED CULTURE NOT INDICATED BY UA RESULT CNI      Hyaline cast 0-2 0 - 5 /lpf   POC TROPONIN-I    Collection Time: 12/28/17  5:00 PM   Result Value Ref Range    Troponin-I (POC) <0.04 0.00 - 0.08 ng/mL     Radiologic Studies -   CT ABD PELV W CONT   Final Result     EXAM: CT ABDOMEN PELVIS WITH CONTRAST  INDICATION:  Abdominal pain with nausea, vomiting and diarrhea for 2 days. COMPARISON: 1/19/2017. CONTRAST: 100 mL of Isovue-370.     TECHNIQUE:   Multislice helical CT was performed from the diaphragm to the symphysis pubis  during uneventful rapid bolus intravenous contrast administration. Oral contrast  was not administered. Contiguous 5 mm axial images were reconstructed and lung  and soft tissue windows were generated. Coronal and sagittal reformations were  generated. CT dose reduction was achieved through use of a standardized protocol  tailored for this examination and automatic exposure control for dose  modulation.     FINDINGS:  LOWER CHEST: The visualized portions of the lung bases are clear. There is  coronary artery calcification. ABDOMEN:  Liver: The liver is normal in size and contour with no focal abnormality. Gallbladder and bile ducts: There is no calcified gallstone or biliary  dilatation. Spleen: No abnormality. Pancreas: No abnormality. Adrenal glands: No abnormality. Kidneys: No abnormality. There is contrast material in the renal collecting  systems and ureters. PELVIS:  Reproductive organs: The uterus is normal.   Bladder: No abnormality. BOWEL AND MESENTERY: The small bowel is normal.  There is no mesenteric mass or  adenopathy. The appendix is not identified. PERITONEUM: There is no ascites or free intraperitoneal air. RETROPERITONEUM: The aorta is atherosclerotic and tapers without aneurysm. There  is no retroperitoneal adenopathy or mass. There is no pelvic mass or adenopathy. BONES AND SOFT TISSUES: There is mild dextroconvex scoliosis and multilevel  degenerative disc disease of the lumbar spine.     IMPRESSION:   1. No acute abdominal or pelvic abnormality. 2. Atherosclerotic abdominal aorta without aneurysm. 3. Lumbar spondylosis.          Medical Decision Making   I am the first provider for this patient. I reviewed the vital signs, available nursing notes, past medical history, past surgical history, family history and social history. Vital Signs-Reviewed the patient's vital signs. Patient Vitals for the past 12 hrs:   Temp Pulse Resp BP SpO2   12/28/17 1704 97.5 °F (36.4 °C) (!) 104 20 (!) 190/98 98 %   12/28/17 1457 - (!) 107 24 (!) 182/113 100 %   12/28/17 1300 - 92 24 (!) 173/95 100 %   12/28/17 1113 98 °F (36.7 °C) 90 16 181/76 100 %     Pulse Oximetry Analysis - 100% on RA    EKG interpretation: 11:27  Rhythm: normal sinus rhythm; and regular . Rate (approx.): 84; Axis: normal; NM interval: normal; QRS interval: normal ; ST/T wave: No ST changes. Written by Brendon Carter ED Scribe, as dictated by Dominga Seals DO. Records Reviewed: Nursing Notes and Old Medical Records    Provider Notes (Medical Decision Making):   Pt presenting with n/v/d. She has mild tenderness in her epigastric area, but abdominal exam is otherwise benign. DDx includes pancreatitis, GERD, PUD, gastritis, atypical presentation of ACS, gastroenteritis. ED Course:   Initial assessment performed. The patients presenting problems have been discussed, and they are in agreement with the care plan formulated and outlined with them. I have encouraged them to ask questions as they arise throughout their visit. 2:20 PM  Pt still complains of nausea. Will order CT abdomen pelvis. SIGN OUT:  4:56 PM  Patient's presentation, labs/imaging and plan of care was reviewed with Aryan Cardona MD as part of sign out. They will await CT and dispo as part of the plan discussed with the patient. Aryan Cardona MD's assistance in completion of this plan is greatly appreciated but it should be noted that I will be the provider of record for this patient. Dominga Seals DO    Attestation:   This note is prepared by Brendon Carter, acting as Scribe for DO Dominga Chacon DO: The scribe's documentation has been prepared under my direction and personally reviewed by me in its entirety. I confirm that the note above accurately reflects all work, treatment, procedures, and medical decision making performed by me.    5:50 PM  Pt still complains of nausea. IV phenergan ordered. Pt cannot PO challenge. Will consult with hospitalist.    CONSULT NOTE:   5:48 Reed Medina M.D spoke with Fely Mac MD,   Specialty: Hospitalist  Discussed pt's hx, disposition, and available diagnostic and imaging results. Reviewed care plans. Consultant will evaluate pt for admission. Written by Lauraine Schirmer, ED Scribe, as dictated by Epi Scherer M.D. Disposition:  Admission Note:  5:50 PM  Patient is being admitted to the hospital by Dr. Juan Clemente. The results of their tests and reasons for their admission have been discussed with them and/or available family. They convey agreement and understanding for the need to be admitted and for their admission diagnosis. Written by Lauraine Schirmer, ED Scribe, as dictated by Epi Scherer M.D. PLAN:  1. Admit to hospitalist     Diagnosis     Clinical Impression:   1. Intractable vomiting with nausea, unspecified vomiting type      Attestation: This note is prepared by Lauraine Schirmer, acting as Scribe for Epi Scherer M.D. Epi Scherer M.D: The scribe's documentation has been prepared under my direction and personally reviewed by me in its entirety. I confirm that the note above accurately reflects all work, treatment, procedures, and medical decision making performed by me.

## 2017-12-28 NOTE — ED NOTES
Pt with continuous vomiting since last night.   She had these same sx two years ago, which resulted in an MI.

## 2017-12-28 NOTE — IP AVS SNAPSHOT
[address] [facility] 
[phone] Patient: Ketan Salter MRN: [id number] JNE:[id number] About your hospitalization You were admitted on:  December 28, 2017 You last received care in the:  [facility] 3 NEUROSCIENCE TELEMETRY You were discharged on:  January 1, 2018 Why you were hospitalized Your primary diagnosis was:  Not on File Your diagnoses also included:  Intractable Vomiting Without Nausea Things You Need To Do (next 8 weeks) Schedule an appointment with Aicha Fernandez MD as soon as possible for a visit in 1 week(s) Phone:  [phone] Where:  [address] [facility], [address] [facility] [address], [facility], [address] [address] Schedule an appointment with Lary Benitez MD as soon as possible for a visit in 1 week(s) Phone:  [phone] Where:  [address] [facility], [address] [facility], [address] [address] Monday Jan 08, 2018 ANNUAL with Shaheed Sawyer MD at  2:00 PM  
Where:  [facility]) Discharge Orders None A check erickson indicates which time of day the medication should be taken. My Medications TAKE these medications as instructed Instructions Each Dose to Equal  
 Morning Noon Evening Bedtime  
 albuterol 2.5 mg /3 mL (0.083 %) nebulizer solution Commonly known as:  PROVENTIL VENTOLIN Your last dose was: Your next dose is:    
   
   
 inhale contents of 1 vial in nebulizer every 4 hours if needed  
     
   
   
   
  
 amLODIPine 2.5 mg tablet Commonly known as:  Debliyaa Joseph Your last dose was: Your next dose is:    
   
   
 take 1 tablet by mouth once daily  
     
   
   
   
  
 aspirin 81 mg chewable tablet Your last dose was: Your next dose is: Take 1 Tab by mouth daily. 81 mg  
    
   
   
   
  
 atorvastatin 40 mg tablet Commonly known as:  LIPITOR Your last dose was: Your next dose is:    
   
   
 take 1 tablet by mouth NIGHTLY  
     
   
   
   
  
 carvedilol 12.5 mg tablet Commonly known as:  Janay Srinivasan Your last dose was: Your next dose is:    
   
   
 take 1 tablet by mouth twice a day with meals  
     
   
   
   
  
 desoximetasone 0.25 % topical cream  
Commonly known as:  TOPICORT Your last dose was: Your next dose is:    
   
   
 Apply  to affected area two (2) times daily as needed for Skin Irritation. dicyclomine 10 mg capsule Commonly known as:  BENTYL Your last dose was: Your next dose is: TAKE 1 CAPSULE BY MOUTH BEFORE MEALS AND AT BEDTIME AS NEEDED. DULoxetine 60 mg capsule Commonly known as:  CYMBALTA Your last dose was: Your next dose is: Take  by mouth daily. EEMT 1.25-2.5 mg per tablet Generic drug:  estrogens (conjugated)-methylTESTOSTERone Your last dose was: Your next dose is: Take 1 Tab by mouth daily. 1 Tab FLUARIX QUAD 7467-7661 (PF) Syrg injection Generic drug:  influenza vaccine 2017-18 (3 yrs+)(PF)  
   
Your last dose was: Your next dose is:    
   
   
 inject 0.5 milliliter intramuscularly  
     
   
   
   
  
 gabapentin 300 mg capsule Commonly known as:  NEURONTIN Your last dose was: Your next dose is: TAKE 1 CAPSULE IN THE MORNING, 1 CAPSULE IN THE AFTERNOON, AD 2 CAPSULES AT BEDTIME HYDROcodone-acetaminophen  mg tablet Commonly known as:  James Lama Your last dose was: Your next dose is: TAKE 1 TABLET BY MOUTH 3 TIMES DAILY AS NEEDED FOR PAIN  
     
   
   
   
  
 hydroxychloroquine 200 mg tablet Commonly known as:  PLAQUENIL Your last dose was: Your next dose is: Take 200 mg by mouth daily. 200 mg  
    
   
   
   
  
 lidocaine 5 % Commonly known as:  Benjiman Stacks Your last dose was: Your next dose is:    
   
   
 2 Patches by TransDERmal route as needed. 2 Patch  
    
   
   
   
  
 omeprazole 40 mg capsule Commonly known as:  PRILOSEC Your last dose was: Your next dose is:    
   
   
 take 1 capsule by mouth once daily ON AN EMPTY STOMACH  
     
   
   
   
  
 promethazine 25 mg tablet Commonly known as:  PHENERGAN Your last dose was: Your next dose is: Take 1 Tab by mouth every six (6) hours as needed for Nausea. 25 mg  
    
   
   
   
  
 varenicline 0.5 mg (11)- 1 mg (42) Dspk Commonly known as:  CHANTIX STARTER MARIVEL Your last dose was: Your next dose is: Take as directed per starter pack. ZyrTEC 10 mg Cap Generic drug:  Cetirizine Your last dose was: Your next dose is: Take  by mouth. Discharge Instructions Diet: GI lite diet for next 3 days and then advance Activity : as tolerated Bmp in 1 week. Introducing [facility]! [facility] introduces [employer] patient portal. Now you can access parts of your medical record, email your doctor's office, and request medication refills online. 1. In your internet browser, go to [email or web address] 2. Click on the First Time User? Click Here link in the Sign In box. You will see the New Member Sign Up page. 3. Enter your [employer] Access Code exactly as it appears below. You will not need to use this code after youve completed the sign-up process. If you do not sign up before the expiration date, you must request a new code. · [employer] Access Code: [email or web address] Expires: 3/28/2018 11:45 AM 
 
 4. Enter the last four digits of your Social Security Number (xxxx) and Date of Birth (mm/dd/yyyy) as indicated and click Submit. You will be taken to the next sign-up page. 5. Create a [employer] ID. This will be your [employer] login ID and cannot be changed, so think of one that is secure and easy to remember. 6. Create a [employer] password. You can change your password at any time. 7. Enter your Password Reset Question and Answer. This can be used at a later time if you forget your password. 8. Enter your e-mail address. You will receive e-mail notification when new information is available in [address] 9. Click Sign Up. You can now view and download portions of your medical record. 10. Click the Download Summary menu link to download a portable copy of your medical information. If you have questions, please visit the Frequently Asked Questions section of the [employer] website. Remember, [employer] is NOT to be used for urgent needs. For medical emergencies, dial 911. Now available from your iPhone and Android! Providers Seen During Your Hospitalization Provider Specialty Primary office phone Cristy Mills DO Emergency Medicine [phone] Renetta Persaud MD Hospitalist [phone] Rey Denny MD Internal Medicine [phone] Your Primary Care Physician (PCP) Primary Care Physician Office Phone Office Fax Kyaw Ruth [phone] You are allergic to the following Allergen Reactions Ace Inhibitors Swelling Arb-Angiotensin Receptor Antagonist Other (comments) Prior ACE inhibitor angioedema, cross reaction risk Codeine Nausea Only Keflex (Cephalexin) Hives Milk Other (comments) Lactose intolerant. Morphine Nausea and Vomiting Recent Documentation Height Weight Breastfeeding? BMI OB Status Smoking Status 1.702 m 59.9 kg No 20.67 kg/m2 Postmenopausal Current Every Day Smoker Emergency Contacts Name Discharge Info Relation Home Work Mobile Tyshawn Meléndez DISCHARGE CAREGIVER [3] Spouse [3] [phone] Patient Belongings The following personal items are in your possession at time of discharge: 
  Dental Appliances: None  Visual Aid: Glasses      Home Medications: None   Jewelry: Bracelet, Ring  Clothing: Pajamas, Slippers, Undergarments    Other Valuables: Cell Phone  Personal Items Sent to Safe: none Please provide this summary of care documentation to your next provider. Signatures-by signing, you are acknowledging that this After Visit Summary has been reviewed with you and you have received a copy. Patient Signature:  ____________________________________________________________ Date:  ____________________________________________________________  
  
[place] Favorite Provider Signature:  ____________________________________________________________ Date:  ____________________________________________________________

## 2017-12-28 NOTE — ED NOTES
Pt has had to have monitoring equipment removed several times so she could use the restroom. Pt will be spot monitored.

## 2017-12-29 LAB
ALBUMIN SERPL-MCNC: 4.3 G/DL (ref 3.5–5)
ALBUMIN/GLOB SERPL: 1 {RATIO} (ref 1.1–2.2)
ALP SERPL-CCNC: 151 U/L (ref 45–117)
ALT SERPL-CCNC: 32 U/L (ref 12–78)
ANION GAP SERPL CALC-SCNC: 8 MMOL/L (ref 5–15)
AST SERPL-CCNC: 38 U/L (ref 15–37)
ATRIAL RATE: 84 BPM
BASOPHILS # BLD: 0 K/UL (ref 0–0.1)
BASOPHILS NFR BLD: 0 % (ref 0–1)
BILIRUB SERPL-MCNC: 0.5 MG/DL (ref 0.2–1)
BUN SERPL-MCNC: 9 MG/DL (ref 6–20)
BUN/CREAT SERPL: 7 (ref 12–20)
CALCIUM SERPL-MCNC: 9.3 MG/DL (ref 8.5–10.1)
CALCULATED P AXIS, ECG09: 63 DEGREES
CALCULATED R AXIS, ECG10: 22 DEGREES
CALCULATED T AXIS, ECG11: 42 DEGREES
CHLORIDE SERPL-SCNC: 98 MMOL/L (ref 97–108)
CO2 SERPL-SCNC: 29 MMOL/L (ref 21–32)
CREAT SERPL-MCNC: 1.33 MG/DL (ref 0.55–1.02)
DIAGNOSIS, 93000: NORMAL
DIFFERENTIAL METHOD BLD: ABNORMAL
EOSINOPHIL # BLD: 0 K/UL (ref 0–0.4)
EOSINOPHIL NFR BLD: 0 % (ref 0–7)
ERYTHROCYTE [DISTWIDTH] IN BLOOD BY AUTOMATED COUNT: 13.6 % (ref 11.5–14.5)
GLOBULIN SER CALC-MCNC: 4.1 G/DL (ref 2–4)
GLUCOSE SERPL-MCNC: 93 MG/DL (ref 65–100)
HCT VFR BLD AUTO: 46.8 % (ref 35–47)
HGB BLD-MCNC: 15.9 G/DL (ref 11.5–16)
LYMPHOCYTES # BLD: 0.6 K/UL (ref 0.8–3.5)
LYMPHOCYTES NFR BLD: 8 % (ref 12–49)
MCH RBC QN AUTO: 31.9 PG (ref 26–34)
MCHC RBC AUTO-ENTMCNC: 34 G/DL (ref 30–36.5)
MCV RBC AUTO: 94 FL (ref 80–99)
MONOCYTES # BLD: 0.6 K/UL (ref 0–1)
MONOCYTES NFR BLD: 8 % (ref 5–13)
NEUTS SEG # BLD: 6.8 K/UL (ref 1.8–8)
NEUTS SEG NFR BLD: 84 % (ref 32–75)
P-R INTERVAL, ECG05: 166 MS
PLATELET # BLD AUTO: 292 K/UL (ref 150–400)
POTASSIUM SERPL-SCNC: 3.1 MMOL/L (ref 3.5–5.1)
PROT SERPL-MCNC: 8.4 G/DL (ref 6.4–8.2)
Q-T INTERVAL, ECG07: 408 MS
QRS DURATION, ECG06: 92 MS
QTC CALCULATION (BEZET), ECG08: 482 MS
RBC # BLD AUTO: 4.98 M/UL (ref 3.8–5.2)
RBC MORPH BLD: ABNORMAL
SODIUM SERPL-SCNC: 135 MMOL/L (ref 136–145)
VENTRICULAR RATE, ECG03: 84 BPM
WBC # BLD AUTO: 8 K/UL (ref 3.6–11)

## 2017-12-29 PROCEDURE — 74011250637 HC RX REV CODE- 250/637: Performed by: INTERNAL MEDICINE

## 2017-12-29 PROCEDURE — 74011250636 HC RX REV CODE- 250/636: Performed by: INTERNAL MEDICINE

## 2017-12-29 PROCEDURE — 85025 COMPLETE CBC W/AUTO DIFF WBC: CPT | Performed by: INTERNAL MEDICINE

## 2017-12-29 PROCEDURE — 65660000000 HC RM CCU STEPDOWN

## 2017-12-29 PROCEDURE — 80053 COMPREHEN METABOLIC PANEL: CPT | Performed by: INTERNAL MEDICINE

## 2017-12-29 PROCEDURE — 74011250637 HC RX REV CODE- 250/637: Performed by: EMERGENCY MEDICINE

## 2017-12-29 PROCEDURE — 36415 COLL VENOUS BLD VENIPUNCTURE: CPT | Performed by: INTERNAL MEDICINE

## 2017-12-29 RX ORDER — METOCLOPRAMIDE HYDROCHLORIDE 5 MG/ML
5 INJECTION INTRAMUSCULAR; INTRAVENOUS
Status: DISCONTINUED | OUTPATIENT
Start: 2017-12-29 | End: 2018-01-01 | Stop reason: HOSPADM

## 2017-12-29 RX ORDER — POTASSIUM CHLORIDE AND SODIUM CHLORIDE 900; 300 MG/100ML; MG/100ML
INJECTION, SOLUTION INTRAVENOUS CONTINUOUS
Status: DISCONTINUED | OUTPATIENT
Start: 2017-12-29 | End: 2017-12-30

## 2017-12-29 RX ORDER — ONDANSETRON 2 MG/ML
4 INJECTION INTRAMUSCULAR; INTRAVENOUS
Status: DISCONTINUED | OUTPATIENT
Start: 2017-12-29 | End: 2018-01-01 | Stop reason: HOSPADM

## 2017-12-29 RX ORDER — IBUPROFEN 200 MG
1 TABLET ORAL DAILY
Status: DISCONTINUED | OUTPATIENT
Start: 2017-12-29 | End: 2018-01-01 | Stop reason: HOSPADM

## 2017-12-29 RX ADMIN — CARVEDILOL 12.5 MG: 12.5 TABLET, FILM COATED ORAL at 17:32

## 2017-12-29 RX ADMIN — HYDRALAZINE HYDROCHLORIDE 10 MG: 20 INJECTION INTRAMUSCULAR; INTRAVENOUS at 04:35

## 2017-12-29 RX ADMIN — CARVEDILOL 12.5 MG: 12.5 TABLET, FILM COATED ORAL at 09:42

## 2017-12-29 RX ADMIN — AMLODIPINE BESYLATE 2.5 MG: 2.5 TABLET ORAL at 09:42

## 2017-12-29 RX ADMIN — ONDANSETRON 4 MG: 2 INJECTION INTRAMUSCULAR; INTRAVENOUS at 17:31

## 2017-12-29 RX ADMIN — ONDANSETRON 4 MG: 2 INJECTION INTRAMUSCULAR; INTRAVENOUS at 09:42

## 2017-12-29 RX ADMIN — ASPIRIN 81 MG 81 MG: 81 TABLET ORAL at 09:42

## 2017-12-29 RX ADMIN — METOCLOPRAMIDE 5 MG: 5 INJECTION, SOLUTION INTRAMUSCULAR; INTRAVENOUS at 12:58

## 2017-12-29 RX ADMIN — LORAZEPAM 1 MG: 2 INJECTION INTRAMUSCULAR; INTRAVENOUS at 14:30

## 2017-12-29 RX ADMIN — LORAZEPAM 1 MG: 2 INJECTION INTRAMUSCULAR; INTRAVENOUS at 22:42

## 2017-12-29 RX ADMIN — HYDROCODONE BITARTRATE AND ACETAMINOPHEN 2 TABLET: 10; 325 TABLET ORAL at 09:42

## 2017-12-29 RX ADMIN — ATORVASTATIN CALCIUM 40 MG: 40 TABLET, FILM COATED ORAL at 09:42

## 2017-12-29 RX ADMIN — PANTOPRAZOLE SODIUM 40 MG: 40 TABLET, DELAYED RELEASE ORAL at 09:42

## 2017-12-29 RX ADMIN — LORAZEPAM 1 MG: 2 INJECTION INTRAMUSCULAR; INTRAVENOUS at 18:44

## 2017-12-29 RX ADMIN — LORAZEPAM 1 MG: 2 INJECTION INTRAMUSCULAR; INTRAVENOUS at 09:54

## 2017-12-29 RX ADMIN — ENOXAPARIN SODIUM 30 MG: 30 INJECTION SUBCUTANEOUS at 06:46

## 2017-12-29 RX ADMIN — HYDROXYCHLOROQUINE SULFATE 200 MG: 200 TABLET, FILM COATED ORAL at 09:42

## 2017-12-29 RX ADMIN — SODIUM CHLORIDE AND POTASSIUM CHLORIDE: 9; 2.98 INJECTION, SOLUTION INTRAVENOUS at 23:18

## 2017-12-29 RX ADMIN — SODIUM CHLORIDE AND POTASSIUM CHLORIDE: 9; 2.98 INJECTION, SOLUTION INTRAVENOUS at 09:54

## 2017-12-29 RX ADMIN — METOCLOPRAMIDE 5 MG: 5 INJECTION, SOLUTION INTRAMUSCULAR; INTRAVENOUS at 20:12

## 2017-12-29 RX ADMIN — HYDRALAZINE HYDROCHLORIDE 10 MG: 20 INJECTION INTRAMUSCULAR; INTRAVENOUS at 15:48

## 2017-12-29 RX ADMIN — DULOXETINE HYDROCHLORIDE 60 MG: 30 CAPSULE, DELAYED RELEASE ORAL at 09:42

## 2017-12-29 NOTE — H&P
Hospitalist Admission Note    NAME: Lolita Iniguez   :  1955   MRN:  [id number]     Date/Time:  2017 9:55 PM    Patient PCP: Martin Crawley MD  ________________________________________________________________________    My assessment of this patient's clinical condition and my plan of care is as follows. Assessment / Plan:  Intractable nausea and vomiting from uncertain cause marisol POA   Suspected acute gastroenteritis   restless and ,unconfortable. patient is exteremely anxious   Admit her to tele because of tachycardia   Suspected withdrawal from narcotics    CT abdomen pelvis done in er negative   Hx of IBS   Sinus tachycardia and elevated BP from above. ativan prn  Mild reactive leukocytosis. hypertension   Continue home medications   Added hydralazin prn for elevated BP     Hyperlipidemia   continiue statins     Chronic back pain , on chronic pain medication percocet 10 mg   Resumed   Continue gabapentin as well.    active tobacco use. nicotine patch applied , patient tried chant ex before. Hx of MI in the past   Denied any chest pain now     Code Status: full   Surrogate Decision Maker:self and  at the bed side     DVT Prophylaxis: lovenox   GI Prophylaxis: already on ppi     Baseline: alert oriented times 3         Subjective:   CHIEF COMPLAINT:persistent nausea and vomiting     HISTORY OF PRESENT ILLNESS:     Liliana King is a 60 y.o.  female who presents with one days history of nausia  and vomiting and persistent. Patient also has diarrhea along with N and V. She denied any abdominal pain. She said that she has these symptoms once in a while and no body rely know what is causing it. She thinks it comes when she is stressed. she said that her grand child just diagnosed to have diabetes and she is worried   patient known to have IBS . patient also added that she is uncomfortable denied any pain pt admitted taking percocet 40 mg daily for a chronic pain and last time she took the dose was yesterday. Denied any urinary symptoms. she takes medication for an acid reflex. We were asked to admit for work up and evaluation of the above problems. Past Medical History:   Diagnosis Date    Acid reflux     Acid reflux     Arthritis     Chronic pain     Heart attack     Heart failure (HCC)     Hypertension     IBS (irritable bowel syndrome)     Squamous cell carcinoma of skin of right elbow 5/2016    Takotsubo cardiomyopathy 11/16/2015    Tobacco abuse 11/16/2015        Past Surgical History:   Procedure Laterality Date    ABDOMEN SURGERY PROC UNLISTED      adhesion removal    BREAST SURGERY PROCEDURE UNLISTED      lumpectomy    HX GYN      oophorectomy    HX ORTHOPAEDIC      right knee arthroscopy    HX ORTHOPAEDIC      right thumb    HX ORTHOPAEDIC      back surgeries    HX OTHER SURGICAL      8 route canals    AK COLONOSCOPY FLX DX W/COLLJ SPEC WHEN PFRMD  10/15/2012            Social History   Substance Use Topics    Smoking status: Current Every Day Smoker     Packs/day: 1.00    Smokeless tobacco: Never Used      Comment: trying to quit 4-5 cigarettes daily    Alcohol use 5.0 oz/week     10 Glasses of wine per week      Comment: 4 times weekly        Family History   Problem Relation Age of Onset    Cancer Father      prostate    Heart Disease Mother     Heart Disease Maternal Grandmother    n/a  Allergies   Allergen Reactions    Ace Inhibitors Swelling    Arb-Angiotensin Receptor Antagonist Other (comments)     Prior ACE inhibitor angioedema, cross reaction risk    Codeine Nausea Only    Keflex [Cephalexin] Hives    Milk Other (comments)     Lactose intolerant.  Morphine Nausea and Vomiting        Prior to Admission medications    Medication Sig Start Date End Date Taking?  Authorizing Provider   carvedilol (COREG) 12.5 mg tablet take 1 tablet by mouth twice a day with meals 12/12/17  Yes MIROSLAVA Mendez   amLODIPine (NORVASC) 2.5 mg tablet take 1 tablet by mouth once daily 10/30/17  Yes MIROSLAVA Mendez   varenicline (CHANTIX STARTER MARIVEL) 0.5 mg (11)- 1 mg (42) DsPk Take as directed per starter pack. 10/11/17  Yes Pat Ansari MD   FLUARIX QUAD 6789-4855, PF, syrg injection inject 0.5 milliliter intramuscularly 8/23/17  Yes Historical Provider   HYDROcodone-acetaminophen (NORCO)  mg tablet TAKE 1 TABLET BY MOUTH 3 TIMES DAILY AS NEEDED FOR PAIN 8/15/17  Yes Historical Provider   hydroxychloroquine (PLAQUENIL) 200 mg tablet Take 200 mg by mouth daily. 8/20/17  Yes Historical Provider   omeprazole (PRILOSEC) 40 mg capsule take 1 capsule by mouth once daily ON AN EMPTY STOMACH 7/28/17  Yes Pat Ansari MD   desoximetasone (TOPICORT) 0.25 % topical cream Apply  to affected area two (2) times daily as needed for Skin Irritation. 7/25/17  Yes Daryn Osei MD   Cetirizine (ZYRTEC) 10 mg cap Take  by mouth. Yes Historical Provider   atorvastatin (LIPITOR) 40 mg tablet take 1 tablet by mouth NIGHTLY 3/15/17  Yes MIROSLAVA Mendez   promethazine (PHENERGAN) 25 mg tablet Take 1 Tab by mouth every six (6) hours as needed for Nausea. 2/13/17  Yes Pat Ansari MD   dicyclomine (BENTYL) 10 mg capsule TAKE 1 CAPSULE BY MOUTH BEFORE MEALS AND AT BEDTIME AS NEEDED. 1/25/17  Yes Historical Provider   gabapentin (NEURONTIN) 300 mg capsule TAKE 1 CAPSULE IN THE MORNING, 1 CAPSULE IN THE AFTERNOON, AD 2 CAPSULES AT BEDTIME 9/8/16  Yes Pat Ansari MD   lidocaine (LIDODERM) 5 % 2 Patches by TransDERmal route as needed. 2/7/16  Yes Historical Provider   EEMT 1.25-2.5 mg per tablet Take 1 Tab by mouth daily. 1/14/16  Yes Historical Provider   aspirin 81 mg chewable tablet Take 1 Tab by mouth daily.  11/18/15  Yes Daquan Nicholas MD   albuterol (PROVENTIL VENTOLIN) 2.5 mg /3 mL (0.083 %) nebulizer solution inhale contents of 1 vial in nebulizer every 4 hours if needed 10/14/15  Yes Pat Ansari MD   DULoxetine (CYMBALTA) 60 mg capsule Take  by mouth daily. 8/11/15  Yes Historical Provider       REVIEW OF SYSTEMS:     I am not able to complete the review of systems because:    The patient is intubated and sedated    The patient has altered mental status due to his acute medical problems    The patient has baseline aphasia from prior stroke(s)    The patient has baseline dementia and is not reliable historian    The patient is in acute medical distress and unable to provide information           Total of 12 systems reviewed as follows:       POSITIVE= underlined text  Negative = text not underlined  General:  fever, chills, sweats, generalized weakness, weight loss/gain,      loss of appetite   Eyes:    blurred vision, eye pain, loss of vision, double vision  ENT:    rhinorrhea, pharyngitis   Respiratory:   cough, sputum production, SOB, COOMBS, wheezing, pleuritic pain   Cardiology:   chest pain, palpitations, orthopnea, PND, edema, syncope   Gastrointestinal:  abdominal pain , N/V, diarrhea, dysphagia, constipation, bleeding   Genitourinary:  frequency, urgency, dysuria, hematuria, incontinence   Muskuloskeletal :  arthralgia, myalgia, back pain  Hematology:  easy bruising, nose or gum bleeding, lymphadenopathy   Dermatological: rash, ulceration, pruritis, color change / jaundice  Endocrine:   hot flashes or polydipsia   Neurological:  headache, dizziness, confusion, focal weakness, paresthesia,     Speech difficulties, memory loss, gait difficulty  Psychological: Feelings of anxiety, depression, agitation    Objective:   VITALS:    Visit Vitals    /81    Pulse (!) 135    Temp 97.5 °F (36.4 °C)    Resp 22    Ht 5' 7\" (1.702 m)    Wt 59.9 kg (132 lb)    SpO2 100%    BMI 20.67 kg/m2       PHYSICAL EXAM:    General:    Alert, cooperative, she looks very restless , uncomfortable      HEENT: Atraumatic, anicteric sclerae, pink conjunctivae     No oral ulcers, mucosa moist, throat clear, dentition fair  Neck:  Supple, symmetrical,  thyroid: non tender  Lungs:   Clear to auscultation bilaterally. No Wheezing or Rhonchi. No rales. Chest wall:  No tenderness  No Accessory muscle use. Heart:   Regular  rhythm,  No  murmur   No edema  Abdomen:   Soft, non-tender. Not distended. Bowel sounds normal  Extremities: No cyanosis. No clubbing,      Skin turgor normal, Capillary refill normal, Radial dial pulse 2+  Skin:     Not pale. Not Jaundiced  No rashes   Psych:  Good insight. Not depressed. Not anxious or agitated. Neurologic: EOMs intact. No facial asymmetry. No aphasia or slurred speech. Symmetrical strength, Sensation grossly intact. Alert and oriented X 4.     _______________________________________________________________________  Care Plan discussed with:    Comments   Patient y    Family  y    RN y    Care Manager                    Consultant:      _______________________________________________________________________  Expected  Disposition:   Home with Family y   HH/PT/OT/RN    SNF/LTC    ROBIN    ________________________________________________________________________  TOTAL TIME:  61  Minutes    Critical Care Provided     Minutes non procedure based      Comments    y Reviewed previous records   >50% of visit spent in counseling and coordination of care y Discussion with patient and/or family and questions answered       ________________________________________________________________________  Signed: Jean [place], MD    Procedures: see electronic medical records for all procedures/Xrays and details which were not copied into this note but were reviewed prior to creation of Plan.     LAB DATA REVIEWED:    Recent Results (from the past 24 hour(s))   EKG, 12 LEAD, INITIAL    Collection Time: 12/28/17 11:27 AM   Result Value Ref Range    Ventricular Rate 84 BPM    Atrial Rate 84 BPM    P-R Interval 166 ms    QRS Duration 92 ms    Q-T Interval 408 ms    QTC Calculation (Bezet) 482 ms    Calculated P Axis 63 degrees    Calculated R Axis 22 degrees    Calculated T Axis 42 degrees    Diagnosis       Normal sinus rhythm  Possible Left atrial enlargement  Septal infarct (cited on or before 05-AUG-2015)  Abnormal ECG  When compared with ECG of 15-NOV-2015 07:03,  Questionable change in initial forces of Anterolateral leads  T wave inversion no longer evident in Inferior leads  T wave inversion no longer evident in Anterolateral leads  QT has shortened     METABOLIC PANEL, COMPREHENSIVE    Collection Time: 12/28/17 11:48 AM   Result Value Ref Range    Sodium 136 136 - 145 mmol/L    Potassium 3.5 3.5 - 5.1 mmol/L    Chloride 100 97 - 108 mmol/L    CO2 26 21 - 32 mmol/L    Anion gap 10 5 - 15 mmol/L    Glucose 121 (H) 65 - 100 mg/dL    BUN 12 6 - 20 MG/DL    Creatinine 0.83 0.55 - 1.02 MG/DL    BUN/Creatinine ratio 14 12 - 20      GFR est AA >60 >60 ml/min/1.73m2    GFR est non-AA >60 >60 ml/min/1.73m2    Calcium 8.4 (L) 8.5 - 10.1 MG/DL    Bilirubin, total 0.7 0.2 - 1.0 MG/DL    ALT (SGPT) 30 12 - 78 U/L    AST (SGOT) 36 15 - 37 U/L    Alk. phosphatase 134 (H) 45 - 117 U/L    Protein, total 7.3 6.4 - 8.2 g/dL    Albumin 3.9 3.5 - 5.0 g/dL    Globulin 3.4 2.0 - 4.0 g/dL    A-G Ratio 1.1 1.1 - 2.2     CBC WITH AUTOMATED DIFF    Collection Time: 12/28/17 11:48 AM   Result Value Ref Range    WBC 12.3 (H) 3.6 - 11.0 K/uL    RBC 4.15 3.80 - 5.20 M/uL    HGB 13.2 11.5 - 16.0 g/dL    HCT 39.1 35.0 - 47.0 %    MCV 94.2 80.0 - 99.0 FL    MCH 31.8 26.0 - 34.0 PG    MCHC 33.8 30.0 - 36.5 g/dL    RDW 13.4 11.5 - 14.5 %    PLATELET 752 532 - 292 K/uL    NEUTROPHILS 91 %    LYMPHOCYTES 4 %    MONOCYTES 4 %    EOSINOPHILS 1 %    BASOPHILS 0 %    ABS. NEUTROPHILS 11.2 K/UL    ABS. LYMPHOCYTES 0.5 K/UL    ABS. MONOCYTES 0.5 K/UL    ABS. EOSINOPHILS 0.1 K/UL    ABS.  BASOPHILS 0.0 K/UL    RBC COMMENTS NORMOCYTIC, NORMOCHROMIC      DF MANUAL     CK W/ REFLX CKMB    Collection Time: 12/28/17 11:48 AM   Result Value Ref Range    CK 62 26 - 192 U/L TROPONIN I    Collection Time: 12/28/17 11:48 AM   Result Value Ref Range    Troponin-I, Qt. <0.04 <0.05 ng/mL   LIPASE    Collection Time: 12/28/17 12:08 PM   Result Value Ref Range    Lipase 119 73 - 393 U/L   URINALYSIS W/ REFLEX CULTURE    Collection Time: 12/28/17  3:45 PM   Result Value Ref Range    Color YELLOW/STRAW      Appearance CLEAR CLEAR      Specific gravity 1.012 1.003 - 1.030      pH (UA) 6.5 5.0 - 8.0      Protein TRACE (A) NEG mg/dL    Glucose NEGATIVE  NEG mg/dL    Ketone 15 (A) NEG mg/dL    Bilirubin NEGATIVE  NEG      Blood NEGATIVE  NEG      Urobilinogen 0.2 0.2 - 1.0 EU/dL    Nitrites NEGATIVE  NEG      Leukocyte Esterase NEGATIVE  NEG      WBC 0-4 0 - 4 /hpf    RBC 0-5 0 - 5 /hpf    Epithelial cells FEW FEW /lpf    Bacteria NEGATIVE  NEG /hpf    UA:UC IF INDICATED CULTURE NOT INDICATED BY UA RESULT CNI      Hyaline cast 0-2 0 - 5 /lpf   POC TROPONIN-I    Collection Time: 12/28/17  5:00 PM   Result Value Ref Range    Troponin-I (POC) <0.04 0.00 - 0.08 ng/mL

## 2017-12-29 NOTE — PROGRESS NOTES
Lovenox 40 mg sc daily ordered  Will change to lovenox 30 mg sc daily for body weight of 59.9 kg    Moreno Staley

## 2017-12-29 NOTE — ROUTINE PROCESS
TRANSFER - IN REPORT:    Verbal report received from Brenda Nugent on [facility]  being received from ED for routine progression of care      Report consisted of patients Situation, Background, Assessment and   Recommendations(SBAR). Information from the following report(s) SBAR, Kardex, ED Summary, MAR, Recent Results and Cardiac Rhythm ST was reviewed with the receiving nurse. Opportunity for questions and clarification was provided. Assessment completed upon patients arrival to unit and care assumed.

## 2017-12-29 NOTE — ROUTINE PROCESS
Bedside and Verbal shift change report given to Milli and Marquis Holt RN (oncoming nurse) by Ethan Locketead nurse).  Report included the following information SBAR, Kardex, Recent Results, Med Rec Status and Cardiac Rhythm SR.      Zone Phone:   [phone]          Significant changes during shift:  None    Patient Information      60 yr old, admitted on 12/28/17, patient of Dr. Mirtha Cruz, admitted from home.      Problem List          Past Medical History:   Diagnosis Date    Acid reflux      Acid reflux      Arthritis      Chronic pain      Heart attack      Heart failure ([address]      Hypertension      IBS (irritable bowel syndrome)      Squamous cell carcinoma of skin of right elbow 5/2016    Takotsubo cardiomyopathy 11/16/2015    Tobacco abuse 11/16/2015               Past Surgical History:   Procedure Laterality Date    ABDOMEN SURGERY PROC UNLISTED         adhesion removal    BREAST SURGERY PROCEDURE UNLISTED         lumpectomy    HX GYN         oophorectomy    HX ORTHOPAEDIC         right knee arthroscopy    HX ORTHOPAEDIC         right thumb    HX ORTHOPAEDIC         back surgeries    HX OTHER SURGICAL         8 route canals    AK COLONOSCOPY FLX DX W/COLLJ SPEC WHEN PFRMD   10/15/2012                        Social History   Substance Use Topics    Smoking status: Current Every Day Smoker       Packs/day: 1.00    Smokeless tobacco: Never Used         Comment: trying to quit 4-5 cigarettes daily    Alcohol use 5.0 oz/week        10 Glasses of wine per week          Comment: 4 times weekly           Core Measures:      CVA: No  CHF: no  PNA: no        Activity Status:      OOB to Chair:  No  Ambulated this shift Yes  Bed Rest No      Supplemental X6: (IR Applicable)      Room Air          LINES AND DRAINS:      PIV      DVT prophylaxis:      DVT prophylaxis Med- Lovenox  DVT prophylaxis SCD or LATRICE- no       Wounds: (If Applicable)      Wounds- No    Location      Patient Safety:      Falls Score Total Score:  1  Safety Level_______  Bed Alarm On? yes  Sitter?  no      Plan for upcoming shift: No Nausea, hydrate, Keep BP <170      Discharge Plan: Pending      Active Consults: None

## 2017-12-29 NOTE — PROGRESS NOTES
Hospitalist Progress Note    NAME: Parris Holland   :  1955   MRN:  [id number]       Assessment / Plan:  Intractable nausea and vomiting   POA   Suspected acute gastroenteritis   Hx of IBS   Suspected withdrawal from narcotics  but less likely as she took narcotics yesterday  CT abdomen pelvis shows no acute process. CMP is normal.  She does have diarrhea so could be gastroenteritis so will check stool studies. Start iv fluids with NS. Start Zofran and Reglan for nausea. BRIANNA likely prerenal due to N/V  Start NS and will check bmp in am.      Hypertensive urgency  On norvasc  On  hydralazin prn for elevated BP      Hyperlipidemia   continiue statins      Chronic back pain , on chronic pain medication percocet 10 mg   On norco and dilaudid for breakthrough pain       Active tobacco use. nicotine patch     Hx of MI in the past   On asa and statin    Hx of RA per pt and on plaquenil     Code Status: full   Surrogate Decision Maker:self and  at the bed side      DVT Prophylaxis: lovenox   GI Prophylaxis: already on ppi      Baseline: alert oriented times 3       Body mass index is 20.67 kg/(m^2). Recommended Disposition: Home w/Family     Subjective:     Chief Complaint / Reason for Physician Visit  Has continued nausea along with vomiting's which are clear. Also reports loose stools which are watery. Not much abdominal pain. Review of Systems:  Symptom Y/N Comments  Symptom Y/N Comments   Fever/Chills n   Chest Pain n    Poor Appetite    Edema     Cough n   Abdominal Pain n    Sputum    Joint Pain     SOB/COOMBS n   Pruritis/Rash     Nausea/vomit y   Tolerating PT/OT     Diarrhea y   Tolerating Diet     Constipation    Other       Could NOT obtain due to:      Objective:     VITALS:   Last 24hrs VS reviewed since prior progress note.  Most recent are:  Patient Vitals for the past 24 hrs:   Temp Pulse Resp BP SpO2   17 1134 98 °F (36.7 °C) 80 22 166/87 97 %   17 0742 98 °F (36.7 °C) 100 20 154/86 97 %   12/29/17 0229 98.2 °F (36.8 °C) 92 18 (!) 194/97 99 %   12/28/17 2238 99.7 °F (37.6 °C) (!) 113 20 (!) 157/93 98 %   12/28/17 2222 - (!) 112 28 139/62 97 %   12/28/17 2112 - (!) 135 22 184/81 100 %   12/28/17 2011 - (!) 123 19 191/84 100 %   12/28/17 2005 - - - 198/79 -   12/28/17 1704 97.5 °F (36.4 °C) (!) 104 20 (!) 190/98 98 %   12/28/17 1457 - (!) 107 24 (!) 182/113 100 %     No intake or output data in the 24 hours ending 12/29/17 1435     PHYSICAL EXAM:  General:  cooperative, no acute distress    EENT:  EOMI. Anicteric sclerae. MMM  Resp:  CTA bilaterally, no wheezing or rales. No accessory muscle use  CV:  Regular  rhythm,  No edema  GI:  Soft, Non distended, Non tender.  +Bowel sounds  Neurologic:  Alert and oriented X 3, normal speech,   Psych:   Good insight. Not anxious nor agitated  Skin:  No rashes.   No jaundice    Reviewed most current lab test results and cultures  YES  Reviewed most current radiology test results   YES  Review and summation of old records today    NO  Reviewed patient's current orders and MAR    YES  PMH/SH reviewed - no change compared to H&P          Current Facility-Administered Medications:     0.9% sodium chloride with KCl 40 mEq/L infusion, , IntraVENous, CONTINUOUS, Donavan Montneegro MD, Last Rate: 100 mL/hr at 12/29/17 0954    ondansetron (ZOFRAN) injection 4 mg, 4 mg, IntraVENous, Q6H PRN, Donavan Montenegro MD, 4 mg at 12/29/17 0942    metoclopramide HCl (REGLAN) injection 5 mg, 5 mg, IntraVENous, Q6H PRN, Donavan Montenegro MD, 5 mg at 12/29/17 1258    nicotine (NICODERM CQ) 14 mg/24 hr patch 1 Patch, 1 Patch, TransDERmal, DAILY, Genesis Arevalo DO, 1 Patch at 12/28/17 2110    amLODIPine (NORVASC) tablet 2.5 mg, 2.5 mg, Oral, DAILY, Raghu Anderson MD, 2.5 mg at 12/29/17 2933    aspirin chewable tablet 81 mg, 81 mg, Oral, DAILY, Raghu Anderson MD, 81 mg at 12/29/17 0965    atorvastatin (LIPITOR) tablet 40 mg, 40 mg, Oral, DAILY, Cindy Nuñez MD, 40 mg at 12/29/17 1420    carvedilol (COREG) tablet 12.5 mg, 12.5 mg, Oral, BID WITH MEALS, Cindy Nuñez MD, 12.5 mg at 12/29/17 1742    DULoxetine (CYMBALTA) capsule 60 mg, 60 mg, Oral, DAILY, Cindy Nuñez MD, 60 mg at 12/29/17 0942    HYDROcodone-acetaminophen (NORCO)  mg tablet 2 Tab, 2 Tab, Oral, Q6H PRN, Cindy Nuñez MD, 2 Tab at 12/29/17 1345    hydroxychloroquine (PLAQUENIL) tablet 200 mg, 200 mg, Oral, DAILY, Cindy Nuñez MD, 200 mg at 12/29/17 0942    pantoprazole (PROTONIX) tablet 40 mg, 40 mg, Oral, ACB, Cindy Nuñez MD, 40 mg at 12/29/17 2299    hydrALAZINE (APRESOLINE) 20 mg/mL injection 10 mg, 10 mg, IntraVENous, Q2H PRN, Cindy Nuñez MD, 10 mg at 12/29/17 0435    LORazepam (ATIVAN) injection 1 mg, 1 mg, IntraVENous, Q4H PRN, Cindy Nuñez MD, 1 mg at 12/29/17 1430    HYDROmorphone (DILAUDID) injection 0.5 mg, 0.5 mg, IntraVENous, Q4H PRN, Cindy Nuñez MD, 0.5 mg at 12/28/17 2222    enoxaparin (LOVENOX) injection 30 mg, 30 mg, SubCUTAneous, 7am, Cindy Nuñez MD, 30 mg at 12/29/17 3726  ________________________________________________________________________  Care Plan discussed with:    Comments   Patient x    Family      RN x    Care Manager     Consultant                        Multidiciplinary team rounds were held today with , nursing, pharmacist and clinical coordinator. Patient's plan of care was discussed; medications were reviewed and discharge planning was addressed.      ________________________________________________________________________  Total NON critical care TIME:  35   Minutes    Total CRITICAL CARE TIME Spent:   Minutes non procedure based      Comments   >50% of visit spent in counseling and coordination of care     ________________________________________________________________________  Brian Quintero MD     Procedures: see electronic medical records for all procedures/Xrays and details which were not copied into this note but were reviewed prior to creation of Plan. LABS:  I reviewed today's most current labs and imaging studies.   Pertinent labs include:  Recent Labs      12/29/17   0245  12/28/17   1148   WBC  8.0  12.3*   HGB  15.9  13.2   HCT  46.8  39.1   PLT  292  294     Recent Labs      12/29/17   0245  12/28/17   1148   NA  135*  136   K  3.1*  3.5   CL  98  100   CO2  29  26   GLU  93  121*   BUN  9  12   CREA  1.33*  0.83   CA  9.3  8.4*   ALB  4.3  3.9   TBILI  0.5  0.7   SGOT  38*  36   ALT  32  30       Signed: Ravi Douglas MD

## 2017-12-29 NOTE — ED NOTES
Bedside shift change report given to Geovanna MEDELLIN RN (oncoming nurse) by Breann Gil RN (offgoing nurse). Report included the following information SBAR, ED Summary, MAR and Recent Results.

## 2017-12-29 NOTE — PROGRESS NOTES
Problem: Falls - Risk of  Goal: *Absence of Falls  Document Madelin Fall Risk and appropriate interventions in the flowsheet.    Outcome: Progressing Towards Goal  Fall Risk Interventions:            Medication Interventions: Bed/chair exit alarm, Teach patient to arise slowly, Patient to call before getting OOB

## 2017-12-30 LAB
AMPHET UR QL SCN: NEGATIVE
ANION GAP SERPL CALC-SCNC: 8 MMOL/L (ref 5–15)
BARBITURATES UR QL SCN: NEGATIVE
BENZODIAZ UR QL: NEGATIVE
BUN SERPL-MCNC: 8 MG/DL (ref 6–20)
BUN/CREAT SERPL: 11 (ref 12–20)
CALCIUM SERPL-MCNC: 8.7 MG/DL (ref 8.5–10.1)
CANNABINOIDS UR QL SCN: NEGATIVE
CHLORIDE SERPL-SCNC: 99 MMOL/L (ref 97–108)
CO2 SERPL-SCNC: 26 MMOL/L (ref 21–32)
COCAINE UR QL SCN: NEGATIVE
CREAT SERPL-MCNC: 0.7 MG/DL (ref 0.55–1.02)
DRUG SCRN COMMENT,DRGCM: ABNORMAL
ERYTHROCYTE [DISTWIDTH] IN BLOOD BY AUTOMATED COUNT: 13.6 % (ref 11.5–14.5)
GLUCOSE SERPL-MCNC: 131 MG/DL (ref 65–100)
HCT VFR BLD AUTO: 40.4 % (ref 35–47)
HGB BLD-MCNC: 13.8 G/DL (ref 11.5–16)
MCH RBC QN AUTO: 31.9 PG (ref 26–34)
MCHC RBC AUTO-ENTMCNC: 34.2 G/DL (ref 30–36.5)
MCV RBC AUTO: 93.5 FL (ref 80–99)
METHADONE UR QL: NEGATIVE
OPIATES UR QL: POSITIVE
PCP UR QL: NEGATIVE
PLATELET # BLD AUTO: 264 K/UL (ref 150–400)
POTASSIUM SERPL-SCNC: 3.7 MMOL/L (ref 3.5–5.1)
RBC # BLD AUTO: 4.32 M/UL (ref 3.8–5.2)
SODIUM SERPL-SCNC: 133 MMOL/L (ref 136–145)
WBC # BLD AUTO: 8.3 K/UL (ref 3.6–11)

## 2017-12-30 PROCEDURE — 80048 BASIC METABOLIC PNL TOTAL CA: CPT | Performed by: INTERNAL MEDICINE

## 2017-12-30 PROCEDURE — 85027 COMPLETE CBC AUTOMATED: CPT | Performed by: INTERNAL MEDICINE

## 2017-12-30 PROCEDURE — 65660000000 HC RM CCU STEPDOWN

## 2017-12-30 PROCEDURE — 74011250637 HC RX REV CODE- 250/637: Performed by: EMERGENCY MEDICINE

## 2017-12-30 PROCEDURE — 36415 COLL VENOUS BLD VENIPUNCTURE: CPT | Performed by: INTERNAL MEDICINE

## 2017-12-30 PROCEDURE — 74011250637 HC RX REV CODE- 250/637: Performed by: INTERNAL MEDICINE

## 2017-12-30 PROCEDURE — 80307 DRUG TEST PRSMV CHEM ANLYZR: CPT | Performed by: INTERNAL MEDICINE

## 2017-12-30 PROCEDURE — 74011250636 HC RX REV CODE- 250/636: Performed by: INTERNAL MEDICINE

## 2017-12-30 RX ORDER — DIPHENHYDRAMINE HCL 12.5MG/5ML
25 LIQUID (ML) ORAL
Status: DISCONTINUED | OUTPATIENT
Start: 2017-12-30 | End: 2018-01-01 | Stop reason: HOSPADM

## 2017-12-30 RX ORDER — SODIUM CHLORIDE 9 MG/ML
100 INJECTION, SOLUTION INTRAVENOUS CONTINUOUS
Status: DISCONTINUED | OUTPATIENT
Start: 2017-12-30 | End: 2017-12-31

## 2017-12-30 RX ADMIN — METOCLOPRAMIDE 5 MG: 5 INJECTION, SOLUTION INTRAMUSCULAR; INTRAVENOUS at 04:49

## 2017-12-30 RX ADMIN — LORAZEPAM 1 MG: 2 INJECTION INTRAMUSCULAR; INTRAVENOUS at 06:53

## 2017-12-30 RX ADMIN — SODIUM CHLORIDE 100 ML/HR: 900 INJECTION, SOLUTION INTRAVENOUS at 09:12

## 2017-12-30 RX ADMIN — ONDANSETRON 4 MG: 2 INJECTION INTRAMUSCULAR; INTRAVENOUS at 22:00

## 2017-12-30 RX ADMIN — HYDROXYCHLOROQUINE SULFATE 200 MG: 200 TABLET, FILM COATED ORAL at 09:17

## 2017-12-30 RX ADMIN — ASPIRIN 81 MG 81 MG: 81 TABLET ORAL at 09:17

## 2017-12-30 RX ADMIN — CARVEDILOL 12.5 MG: 12.5 TABLET, FILM COATED ORAL at 09:17

## 2017-12-30 RX ADMIN — ATORVASTATIN CALCIUM 40 MG: 40 TABLET, FILM COATED ORAL at 09:17

## 2017-12-30 RX ADMIN — SODIUM CHLORIDE 500 ML: 900 INJECTION, SOLUTION INTRAVENOUS at 04:44

## 2017-12-30 RX ADMIN — HYDROCODONE BITARTRATE AND ACETAMINOPHEN 2 TABLET: 10; 325 TABLET ORAL at 09:20

## 2017-12-30 RX ADMIN — METOCLOPRAMIDE 5 MG: 5 INJECTION, SOLUTION INTRAMUSCULAR; INTRAVENOUS at 19:08

## 2017-12-30 RX ADMIN — SODIUM CHLORIDE 100 ML/HR: 900 INJECTION, SOLUTION INTRAVENOUS at 19:37

## 2017-12-30 RX ADMIN — ONDANSETRON 4 MG: 2 INJECTION INTRAMUSCULAR; INTRAVENOUS at 09:17

## 2017-12-30 RX ADMIN — DULOXETINE HYDROCHLORIDE 60 MG: 30 CAPSULE, DELAYED RELEASE ORAL at 09:17

## 2017-12-30 RX ADMIN — AMLODIPINE BESYLATE 2.5 MG: 2.5 TABLET ORAL at 09:17

## 2017-12-30 RX ADMIN — DIPHENHYDRAMINE HYDROCHLORIDE 25 MG: 12.5 LIQUID ORAL at 18:22

## 2017-12-30 RX ADMIN — PANTOPRAZOLE SODIUM 40 MG: 40 TABLET, DELAYED RELEASE ORAL at 06:55

## 2017-12-30 RX ADMIN — LORAZEPAM 1 MG: 2 INJECTION INTRAMUSCULAR; INTRAVENOUS at 20:54

## 2017-12-30 RX ADMIN — HYDROCODONE BITARTRATE AND ACETAMINOPHEN 2 TABLET: 10; 325 TABLET ORAL at 22:50

## 2017-12-30 RX ADMIN — CARVEDILOL 12.5 MG: 12.5 TABLET, FILM COATED ORAL at 17:03

## 2017-12-30 RX ADMIN — ONDANSETRON 4 MG: 2 INJECTION INTRAMUSCULAR; INTRAVENOUS at 15:43

## 2017-12-30 RX ADMIN — LORAZEPAM 1 MG: 2 INJECTION INTRAMUSCULAR; INTRAVENOUS at 02:14

## 2017-12-30 RX ADMIN — ENOXAPARIN SODIUM 30 MG: 30 INJECTION SUBCUTANEOUS at 06:54

## 2017-12-30 RX ADMIN — ONDANSETRON 4 MG: 2 INJECTION INTRAMUSCULAR; INTRAVENOUS at 00:46

## 2017-12-30 NOTE — ROUTINE PROCESS
Bedside and Verbal shift change report given to [address] nurse) by Chris Bowman and Milli DICKEY (offgoing nurse).  Report included the following information SBAR, Kardex, Recent Results, Med Rec Status and Cardiac Rhythm SR.      Zone Phone:   [phone]          Significant changes during shift:  Cardiac monitoring     Patient Information      60 yr old, admitted on 12/28/17, patient of Dr. Jordon Rice, admitted from home.      Problem List          Past Medical History:   Diagnosis Date    Acid reflux       Acid reflux       Arthritis       Chronic pain       Heart attack       Heart failure ([address]       Hypertension       IBS (irritable bowel syndrome)       Squamous cell carcinoma of skin of right elbow 5/2016    Takotsubo cardiomyopathy 11/16/2015    Tobacco abuse 11/16/2015                    Past Surgical History:   Procedure Laterality Date    ABDOMEN SURGERY PROC UNLISTED            adhesion removal    BREAST SURGERY PROCEDURE UNLISTED            lumpectomy    HX GYN            oophorectomy    HX ORTHOPAEDIC            right knee arthroscopy    HX ORTHOPAEDIC            right thumb    HX ORTHOPAEDIC            back surgeries    HX OTHER SURGICAL            8 route canals    GA COLONOSCOPY FLX DX W/COLLJ SPEC WHEN PFRMD    10/15/2012                                 Social History   Substance Use Topics    Smoking status: Current Every Day Smoker         Packs/day: 1.00    Smokeless tobacco: Never Used            Comment: trying to quit 4-5 cigarettes daily    Alcohol use 5.0 oz/week           10 Glasses of wine per week              Comment: 4 times weekly             Core Measures:      CVA: No  CHF: no  PNA: no        Activity Status:      OOB to Chair:  No  Ambulated this shift Yes  Bed Rest No      Supplemental V8: (GH Applicable)      Room Air          LINES AND DRAINS:      PIV      DVT prophylaxis:      DVT prophylaxis Med- Lovenox  DVT prophylaxis SCD or LATRICE- no       Wounds: (If Applicable)      Wounds- No    Location      Patient Safety:      Falls Score Total Score:  1  Safety Level_______  Bed Alarm On? yes  Sitter?  no      Plan for upcoming shift: No Nausea, hydrate, Keep BP <170      Discharge Plan: Pending      Active Consults: None

## 2017-12-30 NOTE — PROGRESS NOTES
Pt experienced tachycardia 120s-130s. MD paged. Bolus of 500ml NS over 30 min ordered via telephone with readback. Pt's HR in lower 100s after bolus.

## 2017-12-30 NOTE — ROUTINE PROCESS
Patient had several episodes of dry heaving during the shift and as needed nausea medication was administered. Patient stated that she was medication to be administered to make her sleep so she can forget about feeling nauseous.

## 2017-12-30 NOTE — PROGRESS NOTES
Hospitalist Progress Note    NAME: Abelardo Kim   :  1955   MRN:  [id number]       Assessment / Plan:  Intractable nausea and vomiting   POA   Suspected acute gastroenteritis   Hx of IBS   Suspected withdrawal from narcotics  but less likely as she took narcotics yesterday  CT abdomen pelvis shows no acute process. CMP is normal.  She does have diarrhea so could be gastroenteritis vs related to IBS. Pending  stool studies. Cont iv fluids and symptomatic mgmt. Will need f/u with Dr Eri Urena upon discharge. BRIANNA likely prerenal due to N/V  Cr is improved and back to base line. Sinus tachycardia may be related to dehydration  -on iv fluids and received fluid bolus this am. Recent TSH was normal. Check urine drug screen.      Hypertensive urgency  On norvasc  On  hydralazin prn for elevated BP      Hyperlipidemia   continiue statins      Chronic back pain on narcotics at home  On norco and dilaudid for breakthrough pain. Will wean dilaudid as tolerated. Active tobacco use. nicotine patch     Hx of MI in the past   Hx of Takustubo cardiomyopathy  On asa and statin    Hx of RA per pt and on plaquenil     Code Status: full   Surrogate Decision Maker:self and  at the bed side      DVT Prophylaxis: lovenox   GI Prophylaxis: already on ppi      Baseline: alert oriented times 3       Body mass index is 20.67 kg/(m^2). Recommended Disposition: Home w/Family     Subjective:     Chief Complaint / Reason for Physician Visit  She feels better today. Still nausea and only one episode of vomiting. Still having loose stools. No abdominal pain. Reports insomnia.     Review of Systems:  Symptom Y/N Comments  Symptom Y/N Comments   Fever/Chills n   Chest Pain n    Poor Appetite    Edema     Cough n   Abdominal Pain n    Sputum    Joint Pain     SOB/COOMBS n   Pruritis/Rash     Nausea/vomit y   Tolerating PT/OT     Diarrhea y   Tolerating Diet     Constipation    Other       Could NOT obtain due to: Objective:     VITALS:   Last 24hrs VS reviewed since prior progress note. Most recent are:  Patient Vitals for the past 24 hrs:   Temp Pulse Resp BP SpO2   12/30/17 1138 98.4 °F (36.9 °C) 87 20 122/66 96 %   12/30/17 0755 99.9 °F (37.7 °C) (!) 118 20 169/77 -   12/30/17 0346 98.9 °F (37.2 °C) (!) 120 20 141/90 98 %   12/29/17 2251 98.5 °F (36.9 °C) 96 20 177/85 98 %   12/29/17 1944 98.5 °F (36.9 °C) 89 20 159/70 99 %   12/29/17 1709 - 90 - 166/72 -   12/29/17 1548 - 91 - (!) 182/93 -   12/29/17 1534 98.9 °F (37.2 °C) 91 18 (!) 182/93 99 %       Intake/Output Summary (Last 24 hours) at 12/30/17 1254  Last data filed at 12/30/17 0354   Gross per 24 hour   Intake                0 ml   Output              801 ml   Net             -801 ml        PHYSICAL EXAM:  General:  cooperative, no acute distress    EENT:  EOMI. Anicteric sclerae. MMM  Resp:  CTA bilaterally, no wheezing or rales. No accessory muscle use  CV:  Regular  rhythm,  No edema  GI:  Soft, Non distended, Non tender.  +Bowel sounds  Neurologic:  Alert and oriented X 3, normal speech,   Psych:   Good insight. Not anxious nor agitated  Skin:  No rashes.   No jaundice    Reviewed most current lab test results and cultures  YES  Reviewed most current radiology test results   YES  Review and summation of old records today    NO  Reviewed patient's current orders and MAR    YES  PMH/ reviewed - no change compared to H&P          Current Facility-Administered Medications:     0.9% sodium chloride infusion, 100 mL/hr, IntraVENous, CONTINUOUS, Tremayne Gil MD, Last Rate: 100 mL/hr at 12/30/17 0912, 100 mL/hr at 12/30/17 0912    diphenhydrAMINE (BENADRYL) 12.5 mg/5 mL syrup 25 mg, 25 mg, Oral, Q6H PRN, Tremayne Gil MD    [facility] injection 4 mg, 4 mg, IntraVENous, Q6H PRN, Tremayne Gil MD, 4 mg at 12/30/17 0917    metoclopramide HCl (REGLAN) injection 5 mg, 5 mg, IntraVENous, Q6H PRN, Tremayne Gil MD, 5 mg at 12/30/17 1322    nicotine (NICODERM CQ) 14 mg/24 hr patch 1 Patch, 1 Patch, TransDERmal, DAILY, Ardamarely Barillas, DO, 1 Patch at 12/29/17 2240    amLODIPine (NORVASC) tablet 2.5 mg, 2.5 mg, Oral, DAILY, Scotty Mendoza MD, 2.5 mg at 12/30/17 2305    aspirin chewable tablet 81 mg, 81 mg, Oral, DAILY, Scotty Mendoza MD, 81 mg at 12/30/17 4787    atorvastatin (LIPITOR) tablet 40 mg, 40 mg, Oral, DAILY, Scotty Mendoza MD, 40 mg at 12/30/17 3873    carvedilol (COREG) tablet 12.5 mg, 12.5 mg, Oral, BID WITH MEALS, Scotty Mendoza MD, 12.5 mg at 12/30/17 5774    DULoxetine (CYMBALTA) capsule 60 mg, 60 mg, Oral, DAILY, Scotty Mendoza MD, 60 mg at 12/30/17 0917    HYDROcodone-acetaminophen (NORCO)  mg tablet 2 Tab, 2 Tab, Oral, Q6H PRN, Scotty Mendoza MD, 2 Tab at 12/30/17 0920    hydroxychloroquine (PLAQUENIL) tablet 200 mg, 200 mg, Oral, DAILY, Scotty Mendoza MD, 200 mg at 12/30/17 1011    pantoprazole (PROTONIX) tablet 40 mg, 40 mg, Oral, ACB, Scotty Mendoza MD, 40 mg at 12/30/17 4836    hydrALAZINE (APRESOLINE) 20 mg/mL injection 10 mg, 10 mg, IntraVENous, Q2H PRN, Scotty Mendoza MD, 10 mg at 12/29/17 1548    LORazepam (ATIVAN) injection 1 mg, 1 mg, IntraVENous, Q4H PRN, Scotty Mendoza MD, 1 mg at 12/30/17 7623    HYDROmorphone (DILAUDID) injection 0.5 mg, 0.5 mg, IntraVENous, Q4H PRN, Scotty Mendoza MD, 0.5 mg at 12/28/17 2222    enoxaparin (LOVENOX) injection 30 mg, 30 mg, SubCUTAneous, 7am, Scotty Mendoza MD, 30 mg at 12/30/17 1566  ________________________________________________________________________  Care Plan discussed with:    Comments   Patient x    Family  x    RN x    Care Manager     Consultant                        Multidiciplinary team rounds were held today with , nursing, pharmacist and clinical coordinator.   Patient's plan of care was discussed; medications were reviewed and discharge planning was addressed. ________________________________________________________________________  Total NON critical care TIME:  25   Minutes    Total CRITICAL CARE TIME Spent:   Minutes non procedure based      Comments   >50% of visit spent in counseling and coordination of care     ________________________________________________________________________  Wilder Nuñez MD     Procedures: see electronic medical records for all procedures/Xrays and details which were not copied into this note but were reviewed prior to creation of Plan. LABS:  I reviewed today's most current labs and imaging studies.   Pertinent labs include:  Recent Labs      12/30/17   0035  12/29/17   0245  12/28/17   1148   WBC  8.3  8.0  12.3*   HGB  13.8  15.9  13.2   HCT  40.4  46.8  39.1   PLT  264  292  294     Recent Labs      12/30/17   0035  12/29/17   0245  12/28/17   1148   NA  133*  135*  136   K  3.7  3.1*  3.5   CL  99  98  100   CO2  26  29  26   GLU  131*  93  121*   BUN  8  9  12   CREA  0.70  1.33*  0.83   CA  8.7  9.3  8.4*   ALB   --   4.3  3.9   TBILI   --   0.5  0.7   SGOT   --   38*  36   ALT   --   32  30       Signed: Wilder Nuñez MD

## 2017-12-30 NOTE — PROGRESS NOTES
Pt ate saltine crackers and drank ginger ale. Did not vomit afterwards, but continues to feel nauseous and ask for nausea medications.

## 2017-12-30 NOTE — ROUTINE PROCESS
Bedside and Verbal shift change report given to Kota Wagner , [address] [facility] nurse) by Ron Mullen and Neena Crawford RN (offgoing nurse).  Report included the following information SBAR, Kardex, Recent Results, Med Rec Status and Cardiac Rhythm SR.      [facility] Phone:   [phone]          Significant changes during shift:  Patient had zero episodes of vomiting      Patient Information  Grayson Tubbs old, admitted on 12/28/17, patient of Dr. Kolton Manjarrez, admitted from home.      Problem List               Past Medical History:   Diagnosis Date    Acid reflux       Acid reflux       Arthritis       Chronic pain       Heart attack       Heart failure ([address]       Hypertension       IBS (irritable bowel syndrome)       Squamous cell carcinoma of skin of right elbow 5/2016    Takotsubo cardiomyopathy 11/16/2015    Tobacco abuse 11/16/2015                         Past Surgical History:   Procedure Laterality Date    ABDOMEN SURGERY PROC UNLISTED            adhesion removal    BREAST SURGERY PROCEDURE UNLISTED            lumpectomy    HX GYN            oophorectomy    HX ORTHOPAEDIC            right knee arthroscopy    HX ORTHOPAEDIC            right thumb    HX ORTHOPAEDIC            back surgeries    HX OTHER SURGICAL            8 route canals    SD COLONOSCOPY FLX DX W/COLLJ SPEC WHEN PFRMD    10/15/2012                                       Social History   Substance Use Topics    Smoking status: Current Every Day Smoker         Packs/day: 1.00    Smokeless tobacco: Never Used            Comment: trying to quit 4-5 cigarettes daily    Alcohol use 5.0 oz/week            10 Glasses of wine per week               Comment: 4 times weekly               Core Measures:      CVA: No  CHF: no  PNA: no        Activity Status:      OOB to Chair:  yes  Ambulated this shift Yes  Bed Rest No      Supplemental G6: (OI Applicable)      Room Air          LINES AND DRAINS:      PIV      DVT prophylaxis:      DVT prophylaxis Med- Lovenox  DVT prophylaxis SCD or LATRICE- no       Wounds: (If Applicable)      Wounds- No    Location      Patient Safety:      Falls Score Total Score:  1  Safety Level_______  Bed Alarm On? yes  Sitter?  no      Plan for upcoming shift: No Nausea, fluids, Keep BP <170      Discharge Plan: Pending      Active Consults: None

## 2017-12-30 NOTE — ROUTINE PROCESS
Bedside and Verbal shift change report given to Shiela Gutiérrez and Gill Arellano, RN (oncoming nurse) by Pavan wilhelm. Report included the following information SBAR, Kardex, Recent Results, Med Rec Status and Cardiac Rhythm SR.      [facility] Phone:   [phone]          Significant changes during shift:  Pt ate saltine crackers and drank ginger ale without vomiting, but still complaining of nausea.  Had tachycardia 120s-130s 500ml ns bolus given, hr in lower 100s     Patient Information      60 yr old, admitted on 12/28/17, patient of Dr. Mirtha Cruz, admitted from home.      Problem List          Past Medical History:   Diagnosis Date    Acid reflux       Acid reflux       Arthritis       Chronic pain       Heart attack       [address] failure ([facility] [address]       Hypertension       IBS (irritable bowel syndrome)       Squamous cell carcinoma of skin of right elbow 5/2016    Takotsubo cardiomyopathy 11/16/2015    Tobacco abuse 11/16/2015                    Past Surgical History:   Procedure Laterality Date    ABDOMEN SURGERY PROC UNLISTED            adhesion removal    BREAST SURGERY PROCEDURE UNLISTED            lumpectomy    HX GYN            oophorectomy    HX ORTHOPAEDIC            right knee arthroscopy    HX ORTHOPAEDIC            right thumb    HX ORTHOPAEDIC            back surgeries    HX OTHER SURGICAL            8 route canals    SD COLONOSCOPY FLX DX W/COLLJ SPEC WHEN PFRMD    10/15/2012                                 Social History   Substance Use Topics    Smoking status: Current Every Day Smoker         Packs/day: 1.00    Smokeless tobacco: Never Used            Comment: trying to quit 4-5 cigarettes daily    Alcohol use 5.0 oz/week           10 Glasses of wine per week              Comment: 4 times weekly             Core Measures:      CVA: No  CHF: no  PNA: no        Activity Status:      OOB to Chair:  No  Ambulated this shift Yes  Bed Rest No      Supplemental B1: (IO Applicable)      Room Air          LINES AND DRAINS:      PIV      DVT prophylaxis:      DVT prophylaxis Med- Lovenox  DVT prophylaxis SCD or LATRICE- no       Wounds: (If Applicable)      Wounds- No    Location      Patient Safety:      Falls Score Total Score:  1  Safety Level_______  Bed Alarm On? yes  Sitter?  no      Plan for upcoming shift: No Nausea, fluids, Keep BP <170      Discharge Plan: Pending      Active Consults: None

## 2017-12-31 PROCEDURE — 74011250637 HC RX REV CODE- 250/637: Performed by: INTERNAL MEDICINE

## 2017-12-31 PROCEDURE — 65660000000 HC RM CCU STEPDOWN

## 2017-12-31 PROCEDURE — 74011250636 HC RX REV CODE- 250/636: Performed by: INTERNAL MEDICINE

## 2017-12-31 PROCEDURE — 74011250637 HC RX REV CODE- 250/637: Performed by: EMERGENCY MEDICINE

## 2017-12-31 RX ORDER — SODIUM CHLORIDE 9 MG/ML
3 INJECTION INTRAMUSCULAR; INTRAVENOUS; SUBCUTANEOUS EVERY 8 HOURS
Status: DISCONTINUED | OUTPATIENT
Start: 2017-12-31 | End: 2017-12-31

## 2017-12-31 RX ORDER — SODIUM CHLORIDE 0.9 % (FLUSH) 0.9 %
5 SYRINGE (ML) INJECTION AS NEEDED
Status: DISCONTINUED | OUTPATIENT
Start: 2017-12-31 | End: 2018-01-01 | Stop reason: HOSPADM

## 2017-12-31 RX ADMIN — SODIUM CHLORIDE 100 ML/HR: 900 INJECTION, SOLUTION INTRAVENOUS at 05:13

## 2017-12-31 RX ADMIN — HYDROXYCHLOROQUINE SULFATE 200 MG: 200 TABLET, FILM COATED ORAL at 09:49

## 2017-12-31 RX ADMIN — LORAZEPAM 1 MG: 2 INJECTION INTRAMUSCULAR; INTRAVENOUS at 03:28

## 2017-12-31 RX ADMIN — ATORVASTATIN CALCIUM 40 MG: 40 TABLET, FILM COATED ORAL at 09:49

## 2017-12-31 RX ADMIN — ONDANSETRON 4 MG: 2 INJECTION INTRAMUSCULAR; INTRAVENOUS at 04:37

## 2017-12-31 RX ADMIN — ASPIRIN 81 MG 81 MG: 81 TABLET ORAL at 09:49

## 2017-12-31 RX ADMIN — PANTOPRAZOLE SODIUM 40 MG: 40 TABLET, DELAYED RELEASE ORAL at 08:50

## 2017-12-31 RX ADMIN — HYDROCODONE BITARTRATE AND ACETAMINOPHEN 2 TABLET: 10; 325 TABLET ORAL at 20:02

## 2017-12-31 RX ADMIN — METOCLOPRAMIDE 5 MG: 5 INJECTION, SOLUTION INTRAMUSCULAR; INTRAVENOUS at 01:56

## 2017-12-31 RX ADMIN — CARVEDILOL 12.5 MG: 12.5 TABLET, FILM COATED ORAL at 09:49

## 2017-12-31 RX ADMIN — ONDANSETRON 4 MG: 2 INJECTION INTRAMUSCULAR; INTRAVENOUS at 15:51

## 2017-12-31 RX ADMIN — HYDROCODONE BITARTRATE AND ACETAMINOPHEN 2 TABLET: 10; 325 TABLET ORAL at 14:14

## 2017-12-31 RX ADMIN — CARVEDILOL 12.5 MG: 12.5 TABLET, FILM COATED ORAL at 18:14

## 2017-12-31 RX ADMIN — HYDRALAZINE HYDROCHLORIDE 10 MG: 20 INJECTION INTRAMUSCULAR; INTRAVENOUS at 03:50

## 2017-12-31 RX ADMIN — HYDROCODONE BITARTRATE AND ACETAMINOPHEN 2 TABLET: 10; 325 TABLET ORAL at 05:13

## 2017-12-31 RX ADMIN — DULOXETINE HYDROCHLORIDE 60 MG: 30 CAPSULE, DELAYED RELEASE ORAL at 09:49

## 2017-12-31 RX ADMIN — LORAZEPAM 1 MG: 2 INJECTION INTRAMUSCULAR; INTRAVENOUS at 21:34

## 2017-12-31 RX ADMIN — HYDROCODONE BITARTRATE AND ACETAMINOPHEN 2 TABLET: 10; 325 TABLET ORAL at 09:01

## 2017-12-31 RX ADMIN — LORAZEPAM 1 MG: 2 INJECTION INTRAMUSCULAR; INTRAVENOUS at 08:50

## 2017-12-31 RX ADMIN — AMLODIPINE BESYLATE 2.5 MG: 2.5 TABLET ORAL at 09:49

## 2017-12-31 RX ADMIN — ENOXAPARIN SODIUM 30 MG: 30 INJECTION SUBCUTANEOUS at 08:50

## 2017-12-31 NOTE — PROGRESS NOTES
Hospitalist Progress Note    NAME: Qian Aleman   :  1955   MRN:  [id number]       Assessment / Plan:  Intractable nausea and vomiting   POA   Suspected acute gastroenteritis   Hx of IBS   Suspected withdrawal from narcotics  but less likely as she took narcotics yesterday  CT abdomen pelvis shows no acute process. CMP is normal.  Diarrhea is resolved so stool studies could not be sent. Symptoms improving and will cont GI lite diet. Cont zofran. Will need f/u with Dr Brian Rocha upon discharge due to IBS history. HTN urgency  liley due to iv fluids and head ache could be related to HTN urgency  Will stop iv fluids, cont norvasc and coreg. BRIANNA likely prerenal due to N/V  Cr is improved and back to base line. Sinus tachycardia may be related to dehydration  -on iv fluids and received fluid bolus this am. Recent TSH was normal.  urine drug screen positive for opiates only.      Hypertensive urgency  On norvasc  On  hydralazin prn for elevated BP      Hyperlipidemia   continiue statins      Chronic back pain on narcotics at home  On norco and will stop diaudid    Active tobacco use. nicotine patch     Hx of MI in the past   Hx of Takustubo cardiomyopathy  On asa and statin    Hx of RA per pt and on plaquenil     Code Status: full   Surrogate Decision Maker:self and  at the bed side      DVT Prophylaxis: lovenox   GI Prophylaxis: already on ppi      Baseline: alert oriented times 3       Body mass index is 20.67 kg/(m^2). Recommended Disposition: Home w/Family     Subjective:     Chief Complaint / Reason for Physician Visit  Reports dry heaving but no vomiting per RN. No diarrhea. Able to  tolerate diet.   reports headache    Review of Systems:  Symptom Y/N Comments  Symptom Y/N Comments   Fever/Chills n   Chest Pain n    Poor Appetite    Edema     Cough n   Abdominal Pain n    Sputum    Joint Pain     SOB/COOMBS n   Pruritis/Rash     Nausea/vomit y   Tolerating PT/OT     Diarrhea n Tolerating Diet     Constipation    Other       Could NOT obtain due to:      Objective:     VITALS:   Last 24hrs VS reviewed since prior progress note. Most recent are:  Patient Vitals for the past 24 hrs:   Temp Pulse Resp BP SpO2   12/31/17 1211 98.2 °F (36.8 °C) 94 18 90/60 100 %   12/31/17 0752 98.3 °F (36.8 °C) 99 18 (!) 175/91 100 %   12/31/17 0326 98.2 °F (36.8 °C) 99 18 (!) 187/92 100 %   12/30/17 2310 98.9 °F (37.2 °C) 87 18 160/72 96 %   12/30/17 1914 98.6 °F (37 °C) 75 18 149/69 100 %   12/30/17 1515 97.6 °F (36.4 °C) 86 18 117/62 100 %     No intake or output data in the 24 hours ending 12/31/17 1250     PHYSICAL EXAM:  General:  cooperative, no acute distress    EENT:  EOMI. Anicteric sclerae. MMM  Resp:  CTA bilaterally, no wheezing or rales. No accessory muscle use  CV:  Regular  rhythm,  No edema  GI:  Soft, Non distended, Non tender.  +Bowel sounds  Neurologic:  Alert and oriented X 3, normal speech,   Psych:   Good insight. Not anxious nor agitated  Skin:  No rashes.   No jaundice    Reviewed most current lab test results and cultures  YES  Reviewed most current radiology test results   YES  Review and summation of old records today    NO  Reviewed patient's current orders and MAR    YES  PMH/SH reviewed - no change compared to H&P          Current Facility-Administered Medications:     diphenhydrAMINE (BENADRYL) 12.5 mg/5 mL syrup 25 mg, 25 mg, Oral, Q6H PRN, Segundo Pappas MD, 25 mg at 12/30/17 1822    [facility]) injection 4 mg, 4 mg, IntraVENous, Q6H PRN, Segundo Pappas MD, 4 mg at 12/31/17 0437    metoclopramide HCl (REGLAN) injection 5 mg, 5 mg, IntraVENous, Q6H PRN, Segundo Pappas MD, 5 mg at 12/31/17 0156    nicotine (NICODERM CQ) 14 mg/24 hr patch 1 Patch, 1 Patch, TransDERmal, DAILY, Mallory Haywood DO, 1 Patch at 12/30/17 2100    amLODIPine (NORVASC) tablet 2.5 mg, 2.5 mg, Oral, DAILY, Damian Foss MD, 2.5 mg at 12/31/17 0949    aspirin chewable tablet 81 mg, 81 mg, Oral, DAILY, Steph Baldwin MD, 81 mg at 12/31/17 0949    atorvastatin (LIPITOR) tablet 40 mg, 40 mg, Oral, DAILY, Steph Baldwin MD, 40 mg at 12/31/17 0949    carvedilol (COREG) tablet 12.5 mg, 12.5 mg, Oral, BID WITH MEALS, Steph Baldwin MD, 12.5 mg at 12/31/17 0949    DULoxetine (CYMBALTA) capsule 60 mg, 60 mg, Oral, DAILY, Steph Baldwin MD, 60 mg at 12/31/17 0949    HYDROcodone-acetaminophen (NORCO)  mg tablet 2 Tab, 2 Tab, Oral, Q6H PRN, Steph Baldwin MD, 2 Tab at 12/31/17 0901    hydroxychloroquine (PLAQUENIL) tablet 200 mg, 200 mg, Oral, DAILY, Steph Baldwin MD, 200 mg at 12/31/17 0949    pantoprazole (PROTONIX) tablet 40 mg, 40 mg, Oral, ACB, Steph Baldwin MD, 40 mg at 12/31/17 0850    hydrALAZINE (APRESOLINE) 20 mg/mL injection 10 mg, 10 mg, IntraVENous, Q2H PRN, Steph Baldwin MD, 10 mg at 12/31/17 0350    LORazepam (ATIVAN) injection 1 mg, 1 mg, IntraVENous, Q4H PRN, Steph Baldwin MD, 1 mg at 12/31/17 0850    enoxaparin (LOVENOX) injection 30 mg, 30 mg, SubCUTAneous, 7am, Steph Baldwin MD, 30 mg at 12/31/17 5372  ________________________________________________________________________  Care Plan discussed with:    Comments   Patient x    Family      RN x    Care Manager     Consultant                        Multidiciplinary team rounds were held today with , nursing, pharmacist and clinical coordinator. Patient's plan of care was discussed; medications were reviewed and discharge planning was addressed.      ________________________________________________________________________  Total NON critical care TIME:  25   Minutes    Total CRITICAL CARE TIME Spent:   Minutes non procedure based      Comments   >50% of visit spent in counseling and coordination of care     ________________________________________________________________________  Wilder Nuñez MD     Procedures: see electronic medical records for all procedures/Xrays and details which were not copied into this note but were reviewed prior to creation of Plan. LABS:  I reviewed today's most current labs and imaging studies.   Pertinent labs include:  Recent Labs      12/30/17 0035 12/29/17 0245   WBC  8.3  8.0   HGB  13.8  15.9   HCT  40.4  46.8   PLT  264  292     Recent Labs      12/30/17 0035 12/29/17 0245   NA  133*  135*   K  3.7  3.1*   CL  99  98   CO2  26  29   GLU  131*  93   BUN  8  9   CREA  0.70  1.33*   CA  8.7  9.3   ALB   --   4.3   TBILI   --   0.5   SGOT   --   38*   ALT   --   32       Signed: Byron Euceda MD

## 2017-12-31 NOTE — PROGRESS NOTES
Bedside and Verbal shift change report given to NOBLE Garcia (oncoming nurse) by Elisabeth Dior nurseVale. Report included the following information SBAR, Kardex, Recent Results, Med Rec Status and Cardiac Rhythm SR.      [facility] Phone:   [phone]          Significant changes during shift:  constat agitation throughout the night.  Multiple rounds of nausea medicine and pain medicine given and requested      Patient Information      62 yr old, admitted on 12/28/17, patient of Dr. Adryan Awad, admitted from home.      Problem List                  Past Medical History:   Diagnosis Date    Acid reflux       Acid reflux       Arthritis       Chronic pain       Heart attack       Heart failure ([address]       Hypertension       IBS (irritable bowel syndrome)       Squamous cell carcinoma of skin of right elbow 5/2016    Takotsubo cardiomyopathy 11/16/2015    Tobacco abuse 11/16/2015                         Past Surgical History:   Procedure Laterality Date    ABDOMEN SURGERY PROC UNLISTED            adhesion removal    BREAST SURGERY PROCEDURE UNLISTED            lumpectomy    HX GYN            oophorectomy    HX ORTHOPAEDIC            right knee arthroscopy    HX ORTHOPAEDIC            right thumb    HX ORTHOPAEDIC            back surgeries    HX OTHER SURGICAL            8 route canals    CT COLONOSCOPY FLX DX W/COLLJ SPEC WHEN PFRMD    10/15/2012                                       Social History   Substance Use Topics    Smoking status: Current Every Day Smoker         Packs/day: 1.00    Smokeless tobacco: Never Used            Comment: trying to quit 4-5 cigarettes daily    Alcohol use 5.0 oz/week            10 Glasses of wine per week               Comment: 4 times weekly               Core Measures:      CVA: No  CHF: no  PNA: no        Activity Status:      OOB to Chair:  yes  Ambulated this shift Yes  Bed Rest No      Supplemental T8: (LD Applicable)      Room Air          LINES AND DRAINS:      PIV      DVT prophylaxis:      DVT prophylaxis Med- Lovenox  DVT prophylaxis SCD or LATRICE- no       Wounds: (If Applicable)      Wounds- No    Location      Patient Safety:      Falls Score Total Score:  1  Safety Level_______  Bed Alarm On? yes  Sitter?  no      Plan for upcoming shift: No Nausea, fluids, Keep BP <170      Discharge Plan: Pending      Active Consults: None

## 2018-01-01 VITALS
WEIGHT: 132 LBS | DIASTOLIC BLOOD PRESSURE: 45 MMHG | HEIGHT: 67 IN | TEMPERATURE: 99 F | SYSTOLIC BLOOD PRESSURE: 108 MMHG | OXYGEN SATURATION: 100 % | RESPIRATION RATE: 18 BRPM | BODY MASS INDEX: 20.72 KG/M2 | HEART RATE: 86 BPM

## 2018-01-01 PROCEDURE — 74011250637 HC RX REV CODE- 250/637: Performed by: INTERNAL MEDICINE

## 2018-01-01 PROCEDURE — 74011250636 HC RX REV CODE- 250/636: Performed by: INTERNAL MEDICINE

## 2018-01-01 RX ADMIN — HYDROXYCHLOROQUINE SULFATE 200 MG: 200 TABLET, FILM COATED ORAL at 08:38

## 2018-01-01 RX ADMIN — ATORVASTATIN CALCIUM 40 MG: 40 TABLET, FILM COATED ORAL at 08:39

## 2018-01-01 RX ADMIN — HYDROCODONE BITARTRATE AND ACETAMINOPHEN 2 TABLET: 10; 325 TABLET ORAL at 06:47

## 2018-01-01 RX ADMIN — ASPIRIN 81 MG 81 MG: 81 TABLET ORAL at 08:39

## 2018-01-01 RX ADMIN — AMLODIPINE BESYLATE 2.5 MG: 2.5 TABLET ORAL at 08:38

## 2018-01-01 RX ADMIN — ENOXAPARIN SODIUM 30 MG: 30 INJECTION SUBCUTANEOUS at 06:41

## 2018-01-01 RX ADMIN — DULOXETINE HYDROCHLORIDE 60 MG: 30 CAPSULE, DELAYED RELEASE ORAL at 08:46

## 2018-01-01 RX ADMIN — PANTOPRAZOLE SODIUM 40 MG: 40 TABLET, DELAYED RELEASE ORAL at 06:40

## 2018-01-01 RX ADMIN — LORAZEPAM 1 MG: 2 INJECTION INTRAMUSCULAR; INTRAVENOUS at 01:03

## 2018-01-01 RX ADMIN — CARVEDILOL 12.5 MG: 12.5 TABLET, FILM COATED ORAL at 08:39

## 2018-01-01 RX ADMIN — HYDROCODONE BITARTRATE AND ACETAMINOPHEN 2 TABLET: 10; 325 TABLET ORAL at 12:53

## 2018-01-01 NOTE — PROGRESS NOTES
Bedside and Verbal shift change report given to Shanice Louis RN (oncoming nurse) by [facility] nurse).  Report included the following information SBAR, Kardex, Recent Results, Med Rec Status and Cardiac Rhythm SR.      Zone Phone:   [phone]      Significant changes during shift:   None   Patient Information  Justine Allison old, admitted on 12/28/17, patient of Dr. Shanthi Dietrich, admitted from home.      Problem List                  Past Medical History:   Diagnosis Date    Acid reflux       Acid reflux       Arthritis       Chronic pain       Heart attack       Heart failure ([address]       Hypertension       IBS (irritable bowel syndrome)       Squamous cell carcinoma of skin of right elbow 5/2016    Takotsubo cardiomyopathy 11/16/2015    Tobacco abuse 11/16/2015                         Past Surgical History:   Procedure Laterality Date    ABDOMEN SURGERY PROC UNLISTED            adhesion removal    BREAST SURGERY PROCEDURE UNLISTED            lumpectomy    HX GYN            oophorectomy    HX ORTHOPAEDIC            right knee arthroscopy    HX ORTHOPAEDIC            right thumb    HX ORTHOPAEDIC            back surgeries    HX OTHER SURGICAL            8 route canals    [place] COLONOSCOPY FLX DX W/COLLJ SPEC WHEN PFRMD    10/15/2012                                       Social History   Substance Use Topics    Smoking status: Current Every Day Smoker         Packs/day: 1.00    Smokeless tobacco: Never Used            Comment: trying to quit 4-5 cigarettes daily    Alcohol use 5.0 oz/week            10 Glasses of wine per week               Comment: 4 times weekly               Core Measures:      CVA: No  CHF: no  PNA: no        Activity Status:      OOB to Chair:  yes  Ambulated this shift Yes  Bed Rest No      Supplemental R6: (ZK Applicable)      Room Air          LINES AND DRAINS:      PIV      DVT prophylaxis:      DVT prophylaxis Med- Lovenox  DVT prophylaxis SCD or LATRICE- no       Wounds: (If Applicable)      Wounds- No    Location      Patient Safety:      Falls Score Total Score:  1  Safety Level_______  Bed Alarm On? yes  Sitter?  no      Plan for upcoming shift:  Pain medications, nausea medications    Discharge Plan: yes TBD    Active Consults: None

## 2018-01-01 NOTE — PROGRESS NOTES
Patient discharge instructions provided to patient and family member. All questions answered. Instructions include AVS and follow up orders.

## 2018-01-01 NOTE — DISCHARGE SUMMARY
Hospitalist Discharge Summary     Patient ID:  Abram Pretty  [id number]  88 y.o.  1955    PCP on record: David Orellana MD    Admit date: 12/28/2017  Discharge date and time: 1/1/2018      DISCHARGE DIAGNOSIS:    Intractable nausea and vomiting  Due to Suspected acute gastroenteritis resolved   Hx of IBS   HTN urgency resolved   BRIANNA likely prerenal due to N/V resolved   Sinus tachycardia may be related to dehydration improved   Hyperlipidemia continiue statins   Chronic back pain on narcotics at home  Active tobacco use. Hx of MI in the past   Hx of Takustubo cardiomyopathy On asa and statin  Hx of RA per pt and on plaquenil          CONSULTATIONS:  None    Excerpted HPI from H&P of Greyson Kim MD:  Paul Downs is a 60 y.o.  female who presents with one days history of nausia  and vomiting and persistent. Patient also has diarrhea along with N and V. She denied any abdominal pain. She said that she has these symptoms once in a while and no body rely know what is causing it. She thinks it comes when she is stressed. she said that her grand child just diagnosed to have diabetes and she is worried   patient known to have IBS . patient also added that she is uncomfortable denied any pain pt admitted taking percocet 40 mg daily for a chronic pain and last time she took the dose was yesterday. Denied any urinary symptoms. she takes medication for an acid reflex. ______________________________________________________________________  DISCHARGE SUMMARY/HOSPITAL COURSE:  for full details see H&P, daily progress notes, labs, consult notes. Pt was treaeted for following medical problems. Intractable nausea and vomiting   POA   Suspected acute gastroenteritis   Hx of IBS   Suspected withdrawal from narcotics  but less likely as she took narcotics before coming to the hospital. CT abdomen pelvis shows no acute process.  CMP is normal. Diarrhea is resolved so stool studies could not be sent. Symptoms improving and will cont GI lite diet. Cont zofran. Will need f/u with Dr Avila Gallegos upon discharge due to IBS history and for medication adjustment. She was treated with iv fluids, Zofran with improvement of her symptoms. Today her vitals are stable, lab work is at [facility] and she is being discharged for out pt follow up.      HTN urgency  liley due to iv fluids and head ache could be related to HTN urgency  Will stop iv fluids, cont norvasc and coreg. Her blood pressure improved and it  is back to her base line today     BRIANNA likely prerenal due to N/V  Cr is improved and back to base line.     Sinus tachycardia may be related to dehydration  -on iv fluids and received fluid bolus this am. Recent TSH was normal.  urine drug screen positive for opiates only. Her HR improved with iv fluids and was back to normal.          Hyperlipidemia   continiue statins            Active tobacco use. nicotine patch      Hx of MI in the past   Hx of Takustubo cardiomyopathy  On asa and statin     Hx of RA per pt and on plaquenil            _______________________________________________________________________  Patient seen and examined by me on discharge day. Pertinent Findings:  Gen:    Not in distress  Chest: Clear lungs  CVS:   Regular rhythm.   No edema  Abd:  Soft, not distended, not tender  Neuro:  Alert, awake  _______________________________________________________________________  DISCHARGE MEDICATIONS:   Discharge Medication List as of 1/1/2018  1:08 PM      CONTINUE these medications which have NOT CHANGED    Details   carvedilol (COREG) 12.5 mg tablet take 1 tablet by mouth twice a day with meals, Normal, Disp-60 Tab, R-0      amLODIPine (NORVASC) 2.5 mg tablet take 1 tablet by mouth once daily, Normal, Disp-30 Tab, R-1      HYDROcodone-acetaminophen (NORCO)  mg tablet TAKE 1 TABLET BY MOUTH 3 TIMES DAILY AS NEEDED FOR PAIN, Historical Med, R-0      omeprazole (PRILOSEC) 40 mg capsule take 1 capsule by mouth once daily ON AN EMPTY STOMACH, Normal, Disp-30 Cap, R-5      desoximetasone (TOPICORT) 0.25 % topical cream Apply  to affected area two (2) times daily as needed for Skin Irritation. , Normal, Disp-15 g, R-0      Cetirizine (ZYRTEC) 10 mg cap Take  by mouth., Historical Med      atorvastatin (LIPITOR) 40 mg tablet take 1 tablet by mouth NIGHTLY, Normal, Disp-30 Tab, R-12      promethazine (PHENERGAN) 25 mg tablet Take 1 Tab by mouth every six (6) hours as needed for Nausea., Normal, Disp-30 Tab, R-0      dicyclomine (BENTYL) 10 mg capsule TAKE 1 CAPSULE BY MOUTH BEFORE MEALS AND AT BEDTIME AS NEEDED., Historical Med, R-0      gabapentin (NEURONTIN) 300 mg capsule TAKE 1 CAPSULE IN THE MORNING, 1 CAPSULE IN THE AFTERNOON, AD 2 CAPSULES AT BEDTIME, Normal, Disp-120 Cap, R-3      lidocaine (LIDODERM) 5 % 2 Patches by TransDERmal route as needed., Historical Med, R-0      EEMT 1.25-2.5 mg per tablet Take 1 Tab by mouth daily. , Historical Med, R-0, REINALDO      aspirin 81 mg chewable tablet Take 1 Tab by mouth daily. , Print, Disp-30 Tab, R-0      DULoxetine (CYMBALTA) 60 mg capsule Take  by mouth daily. , Historical Med, R-0      varenicline (CHANTIX STARTER MARIVEL) 0.5 mg (11)- 1 mg (42) DsPk Take as directed per starter pack., Normal, Disp-1 Dose Pack, R-0      FLUARIX QUAD 1278-1567, PF, syrg injection inject 0.5 milliliter intramuscularly, Historical Med, R-0, REINALDO      hydroxychloroquine (PLAQUENIL) 200 mg tablet Take 200 mg by mouth daily. , Historical Med, R-0      albuterol (PROVENTIL VENTOLIN) 2.5 mg /3 mL (0.083 %) nebulizer solution inhale contents of 1 vial in nebulizer every 4 hours if needed, Normal, Disp-25 Each, R-2             My Recommended Diet, Activity, Wound Care, and follow-up labs are listed in the patient's Discharge Insturctions which I have personally completed and reviewed.     _______________________________________________________________________  DISPOSITION:    Home with Family: y   Home with HH/PT/OT/RN:    SNF/LTC:    ROBIN:    OTHER:        Condition at Discharge:  Stable  _______________________________________________________________________  Follow up with:   PCP : Giuseppe Mobley MD  Follow-up Information     Follow up With Details Comments [id number] Jan Dhillon MD Schedule an appointment as soon as possible for a visit in 1 week  [facility]  [address] [facility]      Kris Cortes MD Schedule an appointment as soon as possible for a visit in 1 week  [address]  [phone]                Total time in minutes spent coordinating this discharge (includes going over instructions, follow-up, prescriptions, and preparing report for sign off to her PCP) :  35  minutes    Signed:  Marnie Livingstno MD

## 2018-01-01 NOTE — ROUTINE PROCESS
Bedside and Verbal shift change report given to Fede Shukla RN (oncoming nurse) by Crispin DICKEY (offgoing nurse).  Report included the following information SBAR, Kardex, Recent Results, Med Rec Status and Cardiac Rhythm SR.      Zone Phone:   [phone]      Significant changes during shift:   None   Patient Information  Flores Arnold old, admitted on 12/28/17, patient of Dr. Dasha Antonio, admitted from home.      Problem List                     Past Medical History:   Diagnosis Date    Acid reflux       Acid reflux       Arthritis       Chronic pain       Heart attack       Heart failure ([address]       Hypertension       IBS (irritable bowel syndrome)       Squamous cell carcinoma of skin of right elbow 5/2016    Takotsubo cardiomyopathy 11/16/2015    Tobacco abuse 11/16/2015                         Past Surgical History:   Procedure Laterality Date    ABDOMEN SURGERY PROC UNLISTED            adhesion removal    BREAST SURGERY PROCEDURE UNLISTED            lumpectomy    HX GYN            oophorectomy    HX ORTHOPAEDIC            right knee arthroscopy    HX ORTHOPAEDIC            right thumb    HX ORTHOPAEDIC            back surgeries    HX OTHER SURGICAL            8 route canals    [place] COLONOSCOPY FLX DX W/COLLJ SPEC WHEN PFRMD    10/15/2012                                       Social History   Substance Use Topics    Smoking status: Current Every Day Smoker         Packs/day: 1.00    Smokeless tobacco: Never Used            Comment: trying to quit 4-5 cigarettes daily    Alcohol use 5.0 oz/week            10 Glasses of wine per week               Comment: 4 times weekly               Core Measures:      CVA: No  CHF: no  PNA: no        Activity Status:      OOB to Chair:  yes  Ambulated this shift Yes  Bed Rest No      Supplemental C5: (QV Applicable)      Room Air          LINES AND DRAINS:      PIV      DVT prophylaxis:      DVT prophylaxis Med- Lovenox  DVT prophylaxis SCD or LATRICE- no       Wounds: (If Applicable)      Wounds- No    Location      Patient Safety:      Falls Score Total Score:  1  Safety Level_______  Bed Alarm On? yes  Sitter?  no      Plan for upcoming shift:  Pain medications, nausea medications    Discharge Plan: yes TBD    Active Consults: None

## 2018-01-01 NOTE — PROGRESS NOTES
Problem: Falls - Risk of  Goal: *Absence of Falls  Document Madelin Fall Risk and appropriate interventions in the flowsheet. Outcome: Progressing Towards Goal  Fall Risk Interventions:            Medication Interventions: Bed/chair exit alarm         History of Falls Interventions: Door open when patient unattended     Pt up ad primo to BR. Denies dizziness, lightheadedness when up. No complaints of any nausea at present.

## 2018-01-01 NOTE — PROGRESS NOTES
Pt is a 59 y/o  female who was admitted with a diagnosis of intractable nausea and vomiting. CM met with pt re: discharge planning. CM introduced self, yakove. Pt verbalized understanding. Pt verified demographic information on chart. Pt's PCP is Dr. Anup Matute who the pt reports she saw a few months ago. Pt lives with her  and reports she is independent in ADL/IADL needs, to include driving. Pt's family to assist with transportation at discharge. Pt has utilized home health prior to admission, but cannot recall agency utilized. She has not utilized SNF services. Pt does not have access to DME. Pt reports she has no discharge needs at this time. CM to send pt information to CM Specialist re: follow up appointments. CM to continue to offer support, discharge planning as needed. Care Management Interventions  PCP Verified by CM: Yes  Last Visit to PCP: 11/08/17  Palliative Care Criteria Met (RRAT>21 & CHF Dx)?: No  Mode of Transport at Discharge: Other (see comment) (Pt  available to transport at discharge)  Transition of Care Consult (CM Consult): Discharge Planning (Pt is independent in ADL/IADL needs. She has utilized home health, but cannot recall agency used.  Pt has not utilized SNF)  Discharge Durable Medical Equipment: No (Pt does not have access to DME)  Physical Therapy Consult: No  Occupational Therapy Consult: No  Speech Therapy Consult: No  Current Support Network: Lives with Spouse, Own Home  Confirm Follow Up Transport: Family (Pt does drive, but has supportive family to assist with transportation as needed)  Plan discussed with Pt/Family/Caregiver: Yes  Discharge Location  Discharge Placement: [address] [place] [address]  [address]  [phone]

## 2018-01-03 ENCOUNTER — OFFICE VISIT (OUTPATIENT)
Dept: INTERNAL MEDICINE CLINIC | Age: 63
End: 2018-01-03

## 2018-01-03 VITALS
DIASTOLIC BLOOD PRESSURE: 75 MMHG | SYSTOLIC BLOOD PRESSURE: 119 MMHG | OXYGEN SATURATION: 99 % | HEIGHT: 67 IN | TEMPERATURE: 97.8 F | RESPIRATION RATE: 18 BRPM | WEIGHT: 124.7 LBS | BODY MASS INDEX: 19.57 KG/M2 | HEART RATE: 91 BPM

## 2018-01-03 DIAGNOSIS — R11.0 NAUSEA: Primary | ICD-10-CM

## 2018-01-03 DIAGNOSIS — K58.8 OTHER IRRITABLE BOWEL SYNDROME: ICD-10-CM

## 2018-01-03 RX ORDER — ONDANSETRON 4 MG/1
4 TABLET, ORALLY DISINTEGRATING ORAL
Qty: 30 TAB | Refills: 1 | Status: SHIPPED | OUTPATIENT
Start: 2018-01-03

## 2018-01-03 RX ORDER — BENZONATATE 100 MG/1
CAPSULE ORAL
Refills: 0 | COMMUNITY
Start: 2017-12-04 | End: 2018-05-23 | Stop reason: ALTCHOICE

## 2018-01-03 NOTE — PROGRESS NOTES
Chief Complaint   Patient presents with   [facility] Follow Up     12/28/17 Intractable nausea and vomiting     1. Have you been to the ER, urgent care clinic since your last visit? Hospitalized since your last visit? Yes When: 12/28/17 [facility] Reason for visit: intractable nausea and vomiting    2. Have you seen or consulted any other health care providers outside of the [address] [place] [address] since your last visit? Include any pap smears or colon screening.  No

## 2018-01-03 NOTE — MR AVS SNAPSHOT
Visit Information Date & Time Provider Department Dept. Phone Encounter #  
 1/3/2018  9:00 AM Gio Zamora, [address] [place] [address],[address] [phone] [id number] Follow-up Instructions Return if symptoms worsen or fail to improve. Your Appointments 1/8/2018  2:00 PM  
ANNUAL with Kayren Bosworth, MD  
[facility] [address]) Appt Note: Per Dr. Treasure Bishop; r/s from 12/1/17  Dr. Ang Domingo [address]  
[phone] [address] Upcoming Health Maintenance Date Due DTaP/Tdap/Td series (1 - Tdap) 2/20/1975 FOBT Q 1 YEAR AGE 50-75 2/20/2004 ZOSTER VACCINE AGE 60> 12/20/2013 BREAST CANCER SCRN MAMMOGRAM 8/17/2017 PAP AKA CERVICAL CYTOLOGY 8/17/2018 Allergies as of 1/3/2018  Review Complete On: 1/3/2018 By: Gio Zamora MD  
  
 Severity Noted Reaction Type Reactions Ace Inhibitors  01/19/2016    Swelling Arb-angiotensin Receptor Antagonist  07/21/2016   Side Effect Other (comments) Prior ACE inhibitor angioedema, cross reaction risk Codeine  10/13/2012    Nausea Only Keflex [Cephalexin]  10/13/2012    Hives Milk  12/23/2015    Other (comments) Lactose intolerant. Morphine  10/13/2012    Nausea and Vomiting Current Immunizations  Reviewed on 12/28/2017 Name Date Influenza Vaccine 11/7/2017, 8/23/2017 Influenza Vaccine (Quad) PF 11/15/2015  8:22 AM  
 Influenza Vaccine Split 10/14/2012  1:24 PM  
 Pneumococcal Conjugate (PCV-13) 10/11/2017 Pneumococcal Polysaccharide (PPSV-23) 8/25/2015 Not reviewed this visit You Were Diagnosed With   
  
 Codes Comments Nausea    -  Primary ICD-10-CM: R11.0 ICD-9-CM: 787.02 Other irritable bowel syndrome     ICD-10-CM: K58.8 ICD-9-CM: 423.0 Vitals BP Pulse Temp Resp Height(growth percentile) Weight(growth percentile) 119/75 (BP 1 Location: Right arm, BP Patient Position: Sitting) 91 97.8 °F (36.6 °C) (Oral) 18 5' 7\" (1.702 m) 124 lb 11.2 oz (56.6 kg) SpO2 BMI OB Status Smoking Status 99% 19.53 kg/m2 Postmenopausal Current Every Day Smoker Vitals History BMI and BSA Data Body Mass Index Body Surface Area  
 19.53 kg/m 2 1.64 m 2 Preferred Pharmacy Pharmacy Name Phone [id number] [phone] Your Updated Medication List  
  
   
This list is accurate as of: 1/3/18 10:00 AM.  Always use your most recent med list.  
  
  
  
  
 albuterol 2.5 mg /3 mL (0.083 %) nebulizer solution Commonly known as:  PROVENTIL VENTOLIN  
inhale contents of 1 vial in nebulizer every 4 hours if needed  
  
 amLODIPine 2.5 mg tablet Commonly known as:  NORVASC  
take 1 tablet by mouth once daily  
  
 aspirin 81 mg chewable tablet Take 1 Tab by mouth daily. atorvastatin 40 mg tablet Commonly known as:  LIPITOR  
take 1 tablet by mouth NIGHTLY  
  
 benzonatate 100 mg capsule Commonly known as:  TESSALON  
TAKE 1 CAPSULE BY MOUTH THREE TIMES A DAY AS NEEDED FOR COUGH  
  
 carvedilol 12.5 mg tablet Commonly known as:  COREG  
take 1 tablet by mouth twice a day with meals  
  
 desoximetasone 0.25 % topical cream  
Commonly known as:  TOPICORT Apply  to affected area two (2) times daily as needed for Skin Irritation. dicyclomine 10 mg capsule Commonly known as:  BENTYL TAKE 1 CAPSULE BY MOUTH BEFORE MEALS AND AT BEDTIME AS NEEDED. DULoxetine 60 mg capsule Commonly known as:  CYMBALTA Take  by mouth daily. EEMT 1.25-2.5 mg per tablet Generic drug:  estrogens (conjugated)-methylTESTOSTERone Take 1 Tab by mouth daily. FLUARIX QUAD 2508-6270 () Syrg injection Generic drug:  influenza vaccine 2017-18 (3 yrs+)(PF)  
inject 0.5 milliliter intramuscularly  
  
 gabapentin 300 mg capsule Commonly known as:  NEURONTIN  
TAKE 1 CAPSULE IN THE MORNING, 1 CAPSULE IN THE AFTERNOON, AD 2 CAPSULES AT BEDTIME HYDROcodone-acetaminophen  mg tablet Commonly known as:  NORCO  
TAKE 1 TABLET BY MOUTH 3 TIMES DAILY AS NEEDED FOR PAIN  
  
 hydroxychloroquine 200 mg tablet Commonly known as:  PLAQUENIL Take 200 mg by mouth daily. lidocaine 5 % Commonly known as:  LIDODERM  
2 Patches by TransDERmal route as needed. omeprazole 40 mg capsule Commonly known as:  PRILOSEC  
take 1 capsule by mouth once daily ON AN EMPTY STOMACH  
  
 ondansetron 4 mg disintegrating tablet Commonly known as:  ZOFRAN ODT Take 1 Tab by mouth every eight (8) hours as needed for Nausea. promethazine 25 mg tablet Commonly known as:  PHENERGAN Take 1 Tab by mouth every six (6) hours as needed for Nausea. varenicline 0.5 mg (11)- 1 mg (42) Dspk Commonly known as:  CHANTIX STARTER MARIVEL Take as directed per starter pack. ZyrTEC 10 mg Cap Generic drug:  Cetirizine Take  by mouth. Prescriptions Sent to Pharmacy Refills  
 ondansetron (ZOFRAN ODT) 4 mg disintegrating tablet 1 Sig: Take 1 Tab by mouth every eight (8) hours as needed for Nausea. Class: Normal  
 [facility]: [facility] [place] [address]  #: [phone] Route: Oral  
  
Follow-up Instructions Return if symptoms worsen or fail to improve. Introducing [facility]! [employer] introduces [employer] patient portal. Now you can access parts of your medical record, email your doctor's office, and request medication refills online. 1. In your internet browser, go to [email or web address] Care 2. Click on the First Time User? Click Here link in the Sign In box. You will see the New Member Sign Up page. 3. Enter your [employer] Access Code exactly as it appears below.  You will not need to use this code after youve completed the sign-up process. If you do not sign up before the expiration date, you must request a new code. · [employer] Access Code: [email or web address] Expires: 3/28/2018 11:45 AM 
 
4. Enter the last four digits of your Social Security Number (xxxx) and Date of Birth (mm/dd/yyyy) as indicated and click Submit. You will be taken to the next sign-up page. 5. Create a [employer] ID. This will be your [employer] login ID and cannot be changed, so think of one that is secure and easy to remember. 6. Create a [employer] password. You can change your password at any time. 7. Enter your Password Reset Question and Answer. This can be used at a later time if you forget your password. 8. Enter your e-mail address. You will receive e-mail notification when new information is available in [address] 9. Click Sign Up. You can now view and download portions of your medical record. 10. Click the Download Summary menu link to download a portable copy of your medical information. If you have questions, please visit the Frequently Asked Questions section of the [employer] website. Remember, [employer] is NOT to be used for urgent needs. For medical emergencies, dial 911. Now available from your iPhone and Android! Please provide this summary of care documentation to your next provider. Your primary care clinician is listed as Julieta Crimes. If you have any questions after today's visit, please call [phone].

## 2018-01-03 NOTE — PROGRESS NOTES
Ángel Haque is a 60 y.o. female who presents for follow up after hospitalization for gastroenteritis. Nausea, no emesis since hospitalization. No fever. No abdominal pain. Poor appetite but is eating. No BM since hospitalization. To see DR. Vandana Geronimo, today. Unremarkable CT. WBC returned to normal.  Was dehydrated. Labs normal at discharge. She reports that she was having digestive problems prior to hospitalization. History of IBS. On bentyl four times day regularly and omeprazole 40mg once a day. She reports that the nausea persists. Taking phenergan, not helpful. Prior EGD this year with Dr Ema Muller. 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      Social History Main Topics    Smoking status: Current Every Day Smoker     Packs/day: 1.00    Smokeless tobacco: Never Used      Comment: trying to quit 4-5 cigarettes daily    Alcohol use 5.0 oz/week     10 Glasses of wine per week      Comment: 4 times weekly    Drug use: No    Sexual activity: Yes     Partners: Male     Birth control/ protection: None     Other Topics Concern    Not on file     Social History Narrative       Current Outpatient Prescriptions on File Prior to Visit   Medication Sig Dispense Refill    carvedilol (COREG) 12.5 mg tablet take 1 tablet by mouth twice a day with meals 60 Tab 0    amLODIPine (NORVASC) 2.5 mg tablet take 1 tablet by mouth once daily 30 Tab 1    varenicline (CHANTIX STARTER MARIVEL) 0.5 mg (11)- 1 mg (42) DsPk Take as directed per starter pack. 1 Dose Pack 0    FLUARIX QUAD 6829-8526, PF, syrg injection inject 0.5 milliliter intramuscularly  0    HYDROcodone-acetaminophen (NORCO)  mg tablet TAKE 1 TABLET BY MOUTH 3 TIMES DAILY AS NEEDED FOR PAIN  0    hydroxychloroquine (PLAQUENIL) 200 mg tablet Take 200 mg by mouth daily. 0    omeprazole (PRILOSEC) 40 mg capsule take 1 capsule by mouth once daily ON AN EMPTY STOMACH 30 Cap 5    desoximetasone (TOPICORT) 0.25 % topical cream Apply  to affected area two (2) times daily as needed for Skin Irritation. 15 g 0    Cetirizine (ZYRTEC) 10 mg cap Take  by mouth.  atorvastatin (LIPITOR) 40 mg tablet take 1 tablet by mouth NIGHTLY 30 Tab 12    dicyclomine (BENTYL) 10 mg capsule TAKE 1 CAPSULE BY MOUTH BEFORE MEALS AND AT BEDTIME AS NEEDED.  0    gabapentin (NEURONTIN) 300 mg capsule TAKE 1 CAPSULE IN THE MORNING, 1 CAPSULE IN THE AFTERNOON, AD 2 CAPSULES AT BEDTIME 120 Cap 3    lidocaine (LIDODERM) 5 % 2 Patches by TransDERmal route as needed. 0    EEMT 1.25-2.5 mg per tablet Take 1 Tab by mouth daily. 0    aspirin 81 mg chewable tablet Take 1 Tab by mouth daily. 30 Tab 0    albuterol (PROVENTIL VENTOLIN) 2.5 mg /3 mL (0.083 %) nebulizer solution inhale contents of 1 vial in nebulizer every 4 hours if needed 25 Each 2    DULoxetine (CYMBALTA) 60 mg capsule Take  by mouth daily. 0    promethazine (PHENERGAN) 25 mg tablet Take 1 Tab by mouth every six (6) hours as needed for Nausea. 30 Tab 0     No current facility-administered medications on file prior to visit. Review of Systems  Pertinent items are noted in HPI.     Objective:     Visit Vitals    /75 (BP 1 Location: Right arm, BP Patient Position: Sitting)    Pulse 91    Temp 97.8 °F (36.6 °C) (Oral)    Resp 18    Ht 5' 7\" (1.702 m)    Wt 124 lb 11.2 oz (56.6 kg)  SpO2 99%    BMI 19.53 kg/m2     Gen: well appearing female  HEENT:   PERRL,normal conjunctiva. OP no erythema, no exudates, MMM  Neck: No masses or LAD  Resp:  No wheezing, no rhonchi, no rales. CV:  RRR, normal S1S2, no murmur. GI: soft, tender to palpation, no rebound, without masses. No hepatosplenomegaly. Assessment/Plan:       ICD-10-CM ICD-9-CM    1. Nausea R11.0 787.02 ondansetron (ZOFRAN ODT) 4 mg disintegrating tablet   2. Other irritable bowel syndrome K58.8 564.1        Follow-up Disposition:  Return if symptoms worsen or fail to improve.     Murali Patel MD

## 2018-01-08 ENCOUNTER — OFFICE VISIT (OUTPATIENT)
Dept: CARDIOLOGY CLINIC | Age: 63
End: 2018-01-08

## 2018-01-08 VITALS
WEIGHT: 132.6 LBS | RESPIRATION RATE: 18 BRPM | OXYGEN SATURATION: 96 % | HEART RATE: 73 BPM | BODY MASS INDEX: 20.81 KG/M2 | HEIGHT: 67 IN | DIASTOLIC BLOOD PRESSURE: 70 MMHG | SYSTOLIC BLOOD PRESSURE: 126 MMHG

## 2018-01-08 DIAGNOSIS — I51.81 TAKOTSUBO CARDIOMYOPATHY: ICD-10-CM

## 2018-01-08 DIAGNOSIS — I10 ESSENTIAL HYPERTENSION: Primary | ICD-10-CM

## 2018-01-08 RX ORDER — AMLODIPINE BESYLATE 2.5 MG/1
TABLET ORAL
Qty: 30 TAB | Refills: 1 | Status: SHIPPED | OUTPATIENT
Start: 2018-01-08 | End: 2018-03-07 | Stop reason: SDUPTHER

## 2018-01-08 NOTE — PROGRESS NOTES
1. Have you been to the ER, urgent care clinic since your last visit? Hospitalized since your last visit? YES, [facility]. 2. Have you seen or consulted any other health care providers outside of the [address] [place] [address] since your last visit? Include any pap smears or colon screening. NO    ANNUAL C/O ROOM SPINNING OCCASIONALLY, SOB.

## 2018-01-08 NOTE — PROGRESS NOTES
Lucas Mullen MD          NAME:  Unknown Mary   :   1955   MRN:   [id number]   PCP:  Farida Hoffman MD           Subjective: The patient is a 60y.o. year old female  who returns for a routine follow-up. Since the last visit, patient reports no change in exercise tolerance, chest pain, edema, medication intolerance, palpitations, shortness of breath, PND/orthopnea wheezing, sputum, syncope, dizziness or light headedness. Doing well. Past Medical History:   Diagnosis Date    Acid reflux     Acid reflux     Arthritis     Chronic pain     Heart attack     Heart failure (HCC)     Hypertension     IBS (irritable bowel syndrome)     Squamous cell carcinoma of skin of right elbow 2016    Takotsubo cardiomyopathy 2015    Tobacco abuse 2015        ICD-10-CM ICD-9-CM    1. Essential hypertension I10 401.9 AMB POC EKG ROUTINE W/ 12 LEADS, INTER & REP   2. Takotsubo cardiomyopathy I51.81 429.83       Social History   Substance Use Topics    Smoking status: Current Every Day Smoker     Packs/day: 1.00    Smokeless tobacco: Never Used      Comment: trying to quit 4-5 cigarettes daily    Alcohol use 5.0 oz/week     10 Glasses of wine per week      Comment: 4 times weekly      Family History   Problem Relation Age of Onset    Cancer Father      prostate    Heart Disease Mother     Heart Disease Maternal Grandmother         Review of Systems  Cardiovascular: Negative except as noted in HPI      Objective:       Vitals:    18 1403 18 1414   BP: 120/70 126/70   Pulse: 73    Resp: 18    SpO2: 96%    Weight: 132 lb 9.6 oz (60.1 kg)    Height: 5' 7\" (1.702 m)     Body mass index is 20.77 kg/(m^2). General PE  Mental Status - Alert. General Appearance - Not in acute distress. Chest and Lung Exam   Inspection: Accessory muscles - No use of accessory muscles in breathing.   Auscultation:   Breath sounds: - Normal.    Cardiovascular   Inspection: Jugular vein - Bilateral - Inspection Normal.  Palpation/Percussion:   Apical Impulse: - Normal.  Auscultation: Rhythm - Regular. Heart Sounds - S1 WNL and S2 WNL. No S3 or S4. Murmurs & Other Heart Sounds: Auscultation of the heart reveals - No Murmurs. Peripheral Vascular   Upper Extremity: Inspection - Bilateral - No Cyanotic nailbeds or Digital clubbing. Lower Extremity:   Palpation: Edema - Bilateral - No edema. Data Review:     EKG -  EKG: normal EKG, normal sinus rhythm, unchanged from previous tracings. LABS- @brieflabs@      Allergies reviewed  Allergies   Allergen Reactions    Ace Inhibitors Swelling    Arb-Angiotensin Receptor Antagonist Other (comments)     Prior ACE inhibitor angioedema, cross reaction risk    Codeine Nausea Only    Keflex [Cephalexin] Hives    Milk Other (comments)     Lactose intolerant.  Morphine Nausea and Vomiting       Medications reviewed  Current Outpatient Prescriptions   Medication Sig    hydroxychloroquine (PLAQUENIL) 200 mg tablet TAKE 1 AND 1/2 TABLET BY MOUTH DAILY    amLODIPine (NORVASC) 2.5 mg tablet take 1 tablet by mouth once daily    benzonatate (TESSALON) 100 mg capsule TAKE 1 CAPSULE BY MOUTH THREE TIMES A DAY AS NEEDED FOR COUGH    ondansetron (ZOFRAN ODT) 4 mg disintegrating tablet Take 1 Tab by mouth every eight (8) hours as needed for Nausea.  carvedilol (COREG) 12.5 mg tablet take 1 tablet by mouth twice a day with meals    FLUARIX QUAD 1276-1480, PF, syrg injection inject 0.5 milliliter intramuscularly    HYDROcodone-acetaminophen (NORCO)  mg tablet TAKE 1 TABLET BY MOUTH 3 TIMES DAILY AS NEEDED FOR PAIN    hydroxychloroquine (PLAQUENIL) 200 mg tablet Take 200 mg by mouth daily.  omeprazole (PRILOSEC) 40 mg capsule take 1 capsule by mouth once daily ON AN EMPTY STOMACH    desoximetasone (TOPICORT) 0.25 % topical cream Apply  to affected area two (2) times daily as needed for Skin Irritation.  Cetirizine (ZYRTEC) 10 mg cap Take  by mouth.     atorvastatin (LIPITOR) 40 mg tablet take 1 tablet by mouth NIGHTLY    promethazine (PHENERGAN) 25 mg tablet Take 1 Tab by mouth every six (6) hours as needed for Nausea.  dicyclomine (BENTYL) 10 mg capsule TAKE 1 CAPSULE BY MOUTH BEFORE MEALS AND AT BEDTIME AS NEEDED.  gabapentin (NEURONTIN) 300 mg capsule TAKE 1 CAPSULE IN THE MORNING, 1 CAPSULE IN THE AFTERNOON, AD 2 CAPSULES AT BEDTIME    lidocaine (LIDODERM) 5 % 2 Patches by TransDERmal route as needed.  EEMT 1.25-2.5 mg per tablet Take 1 Tab by mouth daily.  aspirin 81 mg chewable tablet Take 1 Tab by mouth daily.  DULoxetine (CYMBALTA) 60 mg capsule Take  by mouth daily.  varenicline (CHANTIX STARTER MARIVEL) 0.5 mg (11)- 1 mg (42) DsPk Take as directed per starter pack.  albuterol (PROVENTIL VENTOLIN) 2.5 mg /3 mL (0.083 %) nebulizer solution inhale contents of 1 vial in nebulizer every 4 hours if needed     No current facility-administered medications for this visit. Assessment:       ICD-10-CM ICD-9-CM    1. Essential hypertension I10 401.9 AMB POC EKG ROUTINE W/ 12 LEADS, INTER & REP   2. Takotsubo cardiomyopathy I51.81 429.83         Orders Placed This Encounter    AMB POC EKG ROUTINE W/ 12 LEADS, INTER & REP     Order Specific Question:   Reason for Exam:     Answer:   ROUTINE       Plan:     Recently in hosp with GI issues and elevated BP. No CP. BP back to baseline.   F/U 6 mo    Kerry Concepcion MD

## 2018-01-11 DIAGNOSIS — K29.60 OTHER GASTRITIS WITHOUT HEMORRHAGE, UNSPECIFIED CHRONICITY: ICD-10-CM

## 2018-01-12 RX ORDER — OMEPRAZOLE 40 MG/1
CAPSULE, DELAYED RELEASE ORAL
Qty: 30 CAP | Refills: 5 | Status: SHIPPED | OUTPATIENT
Start: 2018-01-12 | End: 2021-02-08 | Stop reason: CLARIF

## 2018-03-08 RX ORDER — AMLODIPINE BESYLATE 2.5 MG/1
TABLET ORAL
Qty: 30 TAB | Refills: 1 | Status: SHIPPED | OUTPATIENT
Start: 2018-03-08 | End: 2018-05-24 | Stop reason: SDUPTHER

## 2018-03-12 ENCOUNTER — TELEPHONE (OUTPATIENT)
Dept: INTERNAL MEDICINE CLINIC | Age: 63
End: 2018-03-12

## 2018-03-12 NOTE — TELEPHONE ENCOUNTER
Patient states she needs a call back in reference to Surgical Clearance form dropped off last Monday, 3/5/18 for her upcoming Cataract Surg. This Thursday, 3/15/18. No Surgical Clearance appt has been done. Please call to advise.  Thank you

## 2018-03-13 NOTE — TELEPHONE ENCOUNTER
Called patient. Two patient identifiers verified. Patient states pre op form that was completed and faxed yesterday is not being accepted by her ophthalmologist. Needs appointment within 30 days. Scheduled patient with Dr. Ni Colon on 3/14/18 at [phone] PM.  Patient satisfied.

## 2018-03-13 NOTE — TELEPHONE ENCOUNTER
Patient states she needs a call back to discuss getting a Surgical clearance appt for her Cataract surgery this Thursday, 3/15/18 that no one advised her she needed to get done. Please call.  Thank you

## 2018-03-14 ENCOUNTER — OFFICE VISIT (OUTPATIENT)
Dept: INTERNAL MEDICINE CLINIC | Age: 63
End: 2018-03-14

## 2018-03-14 VITALS
DIASTOLIC BLOOD PRESSURE: 66 MMHG | WEIGHT: 133.4 LBS | TEMPERATURE: 98 F | HEART RATE: 76 BPM | SYSTOLIC BLOOD PRESSURE: 112 MMHG | OXYGEN SATURATION: 99 % | BODY MASS INDEX: 20.94 KG/M2 | HEIGHT: 67 IN | RESPIRATION RATE: 18 BRPM

## 2018-03-14 DIAGNOSIS — Z01.818 PREOPERATIVE EXAMINATION: ICD-10-CM

## 2018-03-14 DIAGNOSIS — I10 ESSENTIAL HYPERTENSION: ICD-10-CM

## 2018-03-14 DIAGNOSIS — H25.9 AGE-RELATED CATARACT OF BOTH EYES, UNSPECIFIED AGE-RELATED CATARACT TYPE: Primary | ICD-10-CM

## 2018-03-14 DIAGNOSIS — E78.00 PURE HYPERCHOLESTEROLEMIA: ICD-10-CM

## 2018-03-14 DIAGNOSIS — M51.9 LUMBAR DISC DISEASE: ICD-10-CM

## 2018-03-14 DIAGNOSIS — I21.4 NSTEMI (NON-ST ELEVATED MYOCARDIAL INFARCTION) (HCC): ICD-10-CM

## 2018-03-14 RX ORDER — HYDROXYCHLOROQUINE SULFATE 200 MG/1
200 TABLET, FILM COATED ORAL DAILY
Qty: 30 TAB | Refills: 0
Start: 2018-03-14 | End: 2018-04-30 | Stop reason: ALTCHOICE

## 2018-03-14 NOTE — PROGRESS NOTES
Reviewed record in preparation for visit and have obtained necessary documentation. Identified pt with two pt identifiers(name and ). Chief Complaint   Patient presents with    Pre-op Exam     Left eye surgery 03/15/2018       Health Maintenance Due   Topic Date Due    DTaP/Tdap/Td  (1 - Tdap) 1975    Stool testing for trace blood  2004    Shingles Vaccine  2013    Breast Cancer Screening  2017       Ms. Nathalie Elliott has a reminder for a \"due or due soon\" health maintenance. I have asked that she discuss this further with her primary care provider for follow-up on this health maintenance. Coordination of Care Questionnaire:  :     1) Have you been to an emergency room, urgent care clinic since your last visit? no   Hospitalized since your last visit? no             2) Have you seen or consulted any other health care providers outside of [address] [place] [address] since your last visit? no  (Include any pap smears or colon screenings in this section.)    3) In the event something were to happen to you and you were unable to speak on your behalf, do you have an Advance Directive/ Living Will in place stating your wishes? YES    Do you have an Advance Directive on file? no    4) Are you interested in receiving information on Advance Directives? YES    Patient is accompanied by self I have received verbal consent from [address] [place] [address] to discuss any/all medical information while they are present in the room.

## 2018-03-14 NOTE — ACP (ADVANCE CARE PLANNING)
Writer discussed with patient about an Advanced Medical Directive. Provided patient blank Advanced Medical Directive and 'Your Right to Decide' Booklet. Writer reviewed Advanced Medical Directive paperwork with patient with a brief description of how to complete the form. Requested that if document completed, to please provide a copy of completed Advanced Medical Directive to PCP office. Patient verbalized understanding.

## 2018-03-14 NOTE — MR AVS SNAPSHOT
[address] [facility] 83. 
[phone] Patient: Da Angelo MRN:  [id number] Visit Information Date & Time Provider Department Dept. Phone Encounter #  
 3/14/2018  3:45 PM Scarlett Montero, [address] [place] [address],[address] [phone] [id number] Follow-up Instructions Return in about 6 months (around 9/14/2018) for annual and fasting labs. Jerad Searsmeseret Upcoming Health Maintenance Date Due DTaP/Tdap/Td series (1 - Tdap) 2/20/1975 FOBT Q 1 YEAR AGE 50-75 2/20/2004 ZOSTER VACCINE AGE 60> 12/20/2013 BREAST CANCER SCRN MAMMOGRAM 8/17/2017 PAP AKA CERVICAL CYTOLOGY 8/17/2018 Allergies as of 3/14/2018  Review Complete On: 3/14/2018 By: Scarlett Montero MD  
  
 Severity Noted Reaction Type Reactions Ace Inhibitors  01/19/2016    Swelling Arb-angiotensin Receptor Antagonist  07/21/2016   Side Effect Other (comments) Prior ACE inhibitor angioedema, cross reaction risk Codeine  10/13/2012    Nausea Only Keflex [Cephalexin]  10/13/2012    Hives Milk  12/23/2015    Other (comments) Lactose intolerant. Morphine  10/13/2012    Nausea and Vomiting Current Immunizations  Reviewed on 12/28/2017 Name Date Influenza Vaccine 11/7/2017, 8/23/2017 Influenza Vaccine (Quad) PF 11/15/2015  8:22 AM  
 Influenza Vaccine Split 10/14/2012  1:24 PM  
 Pneumococcal Conjugate (PCV-13) 10/11/2017 Pneumococcal Polysaccharide (PPSV-23) 8/25/2015 Not reviewed this visit You Were Diagnosed With   
  
 Codes Comments Age-related cataract of both eyes, unspecified age-related cataract type    -  Primary ICD-10-CM: H25.9 ICD-9-CM: 366.10 Essential hypertension     ICD-10-CM: I10 
ICD-9-CM: 401.9 NSTEMI (non-ST elevated myocardial infarction) ([facility]     ICD-10-CM: I21.4 ICD-9-CM: 410.70 Lumbar disc disease     ICD-10-CM: M51.9 ICD-9-CM: 722.93   
 Preoperative examination     ICD-10-CM: Z01.818 ICD-9-CM: V72.84 Pure hypercholesterolemia     ICD-10-CM: E78.00 ICD-9-CM: 272.0 Vitals BP Pulse Temp Resp Height(growth percentile) Weight(growth percentile) 112/66 (BP 1 Location: Left arm, BP Patient Position: Sitting) 76 98 °F (36.7 °C) 18 5' 7\" (1.702 m) 133 lb 6.4 oz (60.5 kg) SpO2 BMI OB Status Smoking Status 99% 20.89 kg/m2 Postmenopausal Current Every Day Smoker Vitals History BMI and BSA Data Body Mass Index Body Surface Area  
 20.89 kg/m 2 1.69 m 2 Preferred Pharmacy Pharmacy Name Phone [phone] [phone] Your Updated Medication List  
  
   
This list is accurate as of 3/14/18  5:05 PM.  Always use your most recent med list.  
  
  
  
  
 albuterol 2.5 mg /3 mL (0.083 %) nebulizer solution Commonly known as:  PROVENTIL VENTOLIN  
inhale contents of 1 vial in nebulizer every 4 hours if needed  
  
 amLODIPine 2.5 mg tablet Commonly known as:  NORVASC  
take 1 tablet by mouth once daily  
  
 aspirin 81 mg chewable tablet Take 1 Tab by mouth daily. atorvastatin 40 mg tablet Commonly known as:  LIPITOR  
take 1 tablet by mouth NIGHTLY  
  
 benzonatate 100 mg capsule Commonly known as:  TESSALON  
TAKE 1 CAPSULE BY MOUTH THREE TIMES A DAY AS NEEDED FOR COUGH  
  
 carvedilol 12.5 mg tablet Commonly known as:  COREG  
TAKE 1 TABLET BY MOUTH TWICE A DAY WITH MEALS  
  
 desoximetasone 0.25 % topical cream  
Commonly known as:  TOPICORT Apply  to affected area two (2) times daily as needed for Skin Irritation. dicyclomine 10 mg capsule Commonly known as:  BENTYL TAKE 1 CAPSULE BY MOUTH BEFORE MEALS AND AT BEDTIME AS NEEDED. DULoxetine 60 mg capsule Commonly known as:  CYMBALTA Take  by mouth daily. FLUARIX QUAD 0798-2416 (PF) Syrg injection Generic drug:  influenza vaccine 2017-18 (6 mos+)(PF)  
inject 0.5 milliliter intramuscularly  
  
 gabapentin 300 mg capsule Commonly known as:  NEURONTIN  
TAKE 1 CAPSULE IN THE MORNING, 1 CAPSULE IN THE AFTERNOON, AD 2 CAPSULES AT BEDTIME HYDROcodone-acetaminophen  mg tablet Commonly known as:  NORCO  
TAKE 1 TABLET BY MOUTH 3 TIMES DAILY AS NEEDED FOR PAIN  
  
 hydroxychloroquine 200 mg tablet Commonly known as:  PLAQUENIL Take 200 mg by mouth daily. lidocaine 5 % Commonly known as:  LIDODERM  
2 Patches by TransDERmal route as needed. omeprazole 40 mg capsule Commonly known as:  PRILOSEC  
take 1 capsule by mouth once daily ON AN EMPTY STOMACH  
  
 ondansetron 4 mg disintegrating tablet Commonly known as:  ZOFRAN ODT Take 1 Tab by mouth every eight (8) hours as needed for Nausea. ZyrTEC 10 mg Cap Generic drug:  Cetirizine Take  by mouth. Follow-up Instructions Return in about 6 months (around 9/14/2018) for annual and fasting labs. .  
  
  
Introducing [facility]! Paulina Damian introduces [employer] patient portal. Now you can access parts of your medical record, email your doctor's office, and request medication refills online. 1. In your internet browser, go to [email or web address] 2. Click on the First Time User? Click Here link in the Sign In box. You will see the New Member Sign Up page. 3. Enter your [employer] Access Code exactly as it appears below. You will not need to use this code after youve completed the sign-up process. If you do not sign up before the expiration date, you must request a new code. · [employer] Access Code: [email or web address] Expires: 3/28/2018 12:45 PM 
 
4. Enter the last four digits of your Social Security Number (xxxx) and Date of Birth (mm/dd/yyyy) as indicated and click Submit. You will be taken to the next sign-up page. 5. Create a [employer] ID. This will be your [employer] login ID and cannot be changed, so think of one that is secure and easy to remember. 6. Create a [employer] password. You can change your password at any time. 7. Enter your Password Reset Question and Answer. This can be used at a later time if you forget your password. 8. Enter your e-mail address. You will receive e-mail notification when new information is available in [address] 9. Click Sign Up. You can now view and download portions of your medical record. 10. Click the Download Summary menu link to download a portable copy of your medical information. If you have questions, please visit the Frequently Asked Questions section of the [employer] website. Remember, [employer] is NOT to be used for urgent needs. For medical emergencies, dial 911. Now available from your iPhone and Android! Please provide this summary of care documentation to your next provider. Your primary care clinician is listed as Alisson Hires. If you have any questions after today's visit, please call [phone].

## 2018-03-14 NOTE — PROGRESS NOTES
Yadira Barkley is a 58 y.o. female who presents for preoperative clearance for upcoming cataract surgery on 3/15 and 3/22. Dr. Shane Ruth. Has had multiple surgical procedures, no anesthesia problems. Active routinely, no symptoms with exertion. Sees Dr. Ebony Valverde for history of NSTEMI with Takotsubo cardiomyopathy. Last seen by cardiology in January 2018. Sees Dr. Marielena Godinez, pain management. Seeing NP. Is under a controlled substance agreement. Her pain symptoms controlled on medications.      On statin, no myalgias. Following low fat diet. On estratest DS for libido and hot flashes. Symptoms controlled. Will get nausea and vomiting at times. Saw GI. Weight stable. Has IBS alternating diarrhea and constipation. 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      Social History Main Topics    Smoking status: Current Every Day Smoker     Packs/day: 1.00    Smokeless tobacco: Never Used      Comment: trying to quit 4-5 cigarettes daily    Alcohol use 5.0 oz/week     10 Glasses of wine per week      Comment: 4 times weekly    Drug use: No    Sexual activity: Yes     Partners: Male     Birth control/ protection: None     Other Topics Concern    Not on file     Social History Narrative       Current Outpatient Prescriptions on File Prior to Visit   Medication Sig Dispense Refill  amLODIPine (NORVASC) 2.5 mg tablet take 1 tablet by mouth once daily 30 Tab 1    carvedilol (COREG) 12.5 mg tablet TAKE 1 TABLET BY MOUTH TWICE A DAY WITH MEALS 60 Tab 12    omeprazole (PRILOSEC) 40 mg capsule take 1 capsule by mouth once daily ON AN EMPTY STOMACH 30 Cap 5    ondansetron (ZOFRAN ODT) 4 mg disintegrating tablet Take 1 Tab by mouth every eight (8) hours as needed for Nausea. 30 Tab 1    FLUARIX QUAD 9969-1710, PF, syrg injection inject 0.5 milliliter intramuscularly  0    HYDROcodone-acetaminophen (NORCO)  mg tablet TAKE 1 TABLET BY MOUTH 3 TIMES DAILY AS NEEDED FOR PAIN  0    desoximetasone (TOPICORT) 0.25 % topical cream Apply  to affected area two (2) times daily as needed for Skin Irritation. 15 g 0    Cetirizine (ZYRTEC) 10 mg cap Take  by mouth.  atorvastatin (LIPITOR) 40 mg tablet take 1 tablet by mouth NIGHTLY 30 Tab 12    dicyclomine (BENTYL) 10 mg capsule TAKE 1 CAPSULE BY MOUTH BEFORE MEALS AND AT BEDTIME AS NEEDED.  0    gabapentin (NEURONTIN) 300 mg capsule TAKE 1 CAPSULE IN THE MORNING, 1 CAPSULE IN THE AFTERNOON, AD 2 CAPSULES AT BEDTIME (Patient taking differently: TAKE 1 CAPSULE IN THE MORNING, 1 CAPSULE IN THE AFTERNOON, AND 1 CAPSULE AT BEDTIME) 120 Cap 3    lidocaine (LIDODERM) 5 % 2 Patches by TransDERmal route as needed. 0    aspirin 81 mg chewable tablet Take 1 Tab by mouth daily. 30 Tab 0    DULoxetine (CYMBALTA) 60 mg capsule Take  by mouth daily. 0    benzonatate (TESSALON) 100 mg capsule TAKE 1 CAPSULE BY MOUTH THREE TIMES A DAY AS NEEDED FOR COUGH  0    albuterol (PROVENTIL VENTOLIN) 2.5 mg /3 mL (0.083 %) nebulizer solution inhale contents of 1 vial in nebulizer every 4 hours if needed 25 Each 2     No current facility-administered medications on file prior to visit. Review of Systems  Pertinent items are noted in HPI.     Objective:     Visit Vitals    /66 (BP 1 Location: Left arm, BP Patient Position: Sitting)    Pulse 76    Temp 98 °F (36.7 °C)    Resp 18    Ht 5' 7\" (1.702 m)    Wt 133 lb 6.4 oz (60.5 kg)    SpO2 99%    BMI 20.89 kg/m2     Gen: well appearing female  HEENT:   PERRL,normal conjunctiva. External ear and canals normal, TMs no opacification or erythema,  OP no erythema, no exudates, MMM  Neck: No masses or LAD  Resp:  No wheezing, no rhonchi, no rales. CV:  RRR, normal S1S2, no murmur. GI: soft, nontender, without masses. No hepatosplenomegaly. Extrem:  +2 pulses, no edema, warm distally      Assessment/Plan:     1. Age-related cataract of both eyes, unspecified age-related cataract type    2. Preoperative examination- medically cleared for procedure indicated. 3. Essential hypertension- Recommend following a lower sodium diet and stay active. Blood pressure goal is less than 130/85 on average. Advised compliance with blood pressure medication and regular follow up. 4. NSTEMI (non-ST elevated myocardial infarction) (HCC)-no recurrent symptoms. Followed by cardiology annually. 5. Lumbar disc disease- followed by pain management. Stable on medications. 6. Pure hypercholesterolemia- Recommend AHA diet. Continue statin therapy. Follow-up Disposition:  Return in about 6 months (around 9/14/2018) for annual and fasting labs.  Linda Mckeon MD

## 2018-04-02 ENCOUNTER — TELEPHONE (OUTPATIENT)
Dept: RHEUMATOLOGY | Age: 63
End: 2018-04-02

## 2018-04-02 NOTE — TELEPHONE ENCOUNTER
----- Message from Hunter Cordoba sent at 3/30/2018  5:16 PM EDT -----  Regarding: Dr. Carina Cota  Pt requesting a refill of medication used for arthritis in fingers and toes. Pt requesting for medication to be send to [facility] on [address] in [place]. Best contact number [phone].

## 2018-04-02 NOTE — TELEPHONE ENCOUNTER
Spoke to pt informed pt that she will need to make a follow up appt to receive the refill of plaquenil, pt verbally acknowledged understanding, pt asked to make a follow up appt, pt call was transferred to [facility] to schedule her appt

## 2018-04-06 ENCOUNTER — TELEPHONE (OUTPATIENT)
Dept: RHEUMATOLOGY | Age: 63
End: 2018-04-06

## 2018-04-06 NOTE — TELEPHONE ENCOUNTER
----- Message from Yuliana Standard sent at 4/5/2018  4:30 PM EDT -----  Regarding: /Refill  Pt is requesting a medication refill on \"Placrhenil\" [facility] [phone]. Best contact umber is [phone].

## 2018-04-06 NOTE — TELEPHONE ENCOUNTER
Spoke to pt informed pt that she will have to make a follow up appt and be seen before Dr Alysha Goodwin refills the plaquenil, pt scheduled appt to be seen on April 30th

## 2018-04-09 RX ORDER — ATORVASTATIN CALCIUM 40 MG/1
TABLET, FILM COATED ORAL
Qty: 30 TAB | Refills: 12 | Status: SHIPPED | OUTPATIENT
Start: 2018-04-09 | End: 2019-07-10 | Stop reason: SDUPTHER

## 2018-04-30 ENCOUNTER — OFFICE VISIT (OUTPATIENT)
Dept: RHEUMATOLOGY | Age: 63
End: 2018-04-30

## 2018-04-30 VITALS
RESPIRATION RATE: 16 BRPM | BODY MASS INDEX: 20.71 KG/M2 | TEMPERATURE: 98.1 F | HEART RATE: 85 BPM | SYSTOLIC BLOOD PRESSURE: 128 MMHG | DIASTOLIC BLOOD PRESSURE: 80 MMHG | WEIGHT: 132.2 LBS | OXYGEN SATURATION: 96 %

## 2018-04-30 DIAGNOSIS — M70.62 TROCHANTERIC BURSITIS, LEFT HIP: ICD-10-CM

## 2018-04-30 DIAGNOSIS — M19.90 INFLAMMATORY ARTHRITIS: Primary | ICD-10-CM

## 2018-04-30 RX ORDER — ESTERIFIED ESTROGEN AND METHYLTESTOSTERONE 1.25; 2.5 MG/1; MG/1
1 TABLET ORAL DAILY
Refills: 0 | COMMUNITY
Start: 2018-04-19 | End: 2021-02-08 | Stop reason: CLARIF

## 2018-04-30 RX ORDER — HYDROXYCHLOROQUINE SULFATE 200 MG/1
200 TABLET, FILM COATED ORAL DAILY
Qty: 30 TAB | Refills: 6 | Status: SHIPPED | OUTPATIENT
Start: 2018-04-30 | End: 2018-12-22 | Stop reason: SDUPTHER

## 2018-04-30 NOTE — PROGRESS NOTES
RHEUMATOLOGY PROBLEM LIST AND CHIEF COMPLAINT  1. Inflammatory arthritis - morning stiffness, arthritis on the 2nd and 3rd MCPs bilaterally. 2. Osteoarthritis - hands, knees, feet, hips  3. Fibromyalgia    Therapy History:  Current DMARDs: Plaquenil (4/2017 - current)    INTERVAL HISTORY  This is a 58 y.o.  female. Today, the patient complains of pain in the joints. Location: knee, hip  Severity:  5 on a scale of 0-10  Timing: all day   Duration:  6 months  Context/Associated signs and symptoms: the patient complains of left trochanteric bursa pain, left hip pain, and pain and morning stiffness of her hands. She ran out of [employer] and has not taken it for the past month. She has had two cataract surgeries recently. She denies other new medications or medical issues. RHEUMATOLOGY REVIEW OF SYSTEMS   Positives as per history  Negatives as follows:  Ben Rowleymanfred:  Denies unexplained persistent fevers or weight change  RESPIRATORY:  No pleuritic pain, exertional dyspnea  CARDIOVASCULAR:  Denies chest pain  GASTRO:   Denies heartburn, abdominal pain, nausea, vomiting, diarrhea  SKIN:    Denies rash   MSK:    No morning stiffness >1 hour, persistent joint swelling    PAST MEDICAL HISTORY  Reviewed with patient, significant changes in medical history - no    FAMILY HISTORY   No autoimmune disease in the family    PHYSICAL EXAM  Blood pressure 128/80, pulse 85, temperature 98.1 °F (36.7 °C), temperature source Oral, resp. rate 16, weight 132 lb 3.2 oz (60 kg), SpO2 96 %. GENERAL APPEARANCE: Well-nourished, no acute distress  NECK: No adenopathy  ENT: No oral ulcers  CARDIOVASCULAR: Heart rhythm is regular. No murmur, rub, gallop  CHEST: Normal vesicular breath sounds. No wheezes, rales, pleural friction rubs  ABDOMINAL: The abdomen is soft and nontender.  Bowel sounds are normal  SKIN: No rash, palpable purpura, digital ulcer, abnormal thickening   MUSCULOSKELETAL:   Upper extremities - Heberden's and Hang's nodes bilaterally. Right wrist fullness. Lower extremities - full range of motion, no tenderness, no swelling, no synovial thickening and no deformity of joints except for left lateral thigh pain    LABS, RADIOLOGY AND PROCEDURES  Previous labs reviewed -Yes    There are labs, radiology studies and physician notes from an outside institution that will be obtained once the patient signs a medical release today; these will be reviewed in detail and may change the assessment and plan. ASSESSMENT  1. Inflammatory arthritis - (Established problem -  Progressive disease) - The patient has not taken Plaquenil 200 mg daily for the past month and is experiencing inflammatory arthralgia; we will restart this medication. She should return in 4 months for a follow up. 2. Osteoarthritis (hands, knees) - The patient should continue physical therapy and NSAIDs as needed. 3. Pain syndrome (fibromyalgia) - Continue physical therapy. 4. Trochanteric bursitis -  Previous steroid injections have provided relief. We will inject Medrol 80 mg into left trochanteric bursa. 5. Drug therapy monitoring for toxicity (plaquenil ) - CBC, BUN, Cr, AST, ALT and albumin every 3 months; eye exams every 6-12 months for retinal toxicity. PLAN  1. Restart Plaquenil 200 mg daily  2. Left trochanteric bursa injection  3. Return in 4 months. Lotus Smith MD  Adult and Pediatric Rheumatology     [facility] [address], Phone [phone], Fax [phone]     Visiting  of Pediatrics    Department of Pediatrics, [facility] of [address] [place] [address], [address] [place] [address], Phone [phone], Fax [phone]    There are no Patient Instructions on file for this visit. cc:  Morelia Mares MD    Written by beatriz Neal, as dictated by Era Mcbride.  Yesy Smith M.D.    [facility]  OFFICE PROCEDURE PROGRESS NOTE        Chart reviewed for the following:   Malissa MEJIA, have reviewed the History, Physical and updated the Allergic reactions for Yasmine Meléndez     TIME OUT performed immediately prior to start of procedure:   Malissa MEJIA, have performed the following reviews on [address] [place] [address] prior to the start of the procedure:            * Patient was identified by name and date of birth   * Agreement on procedure being performed was verified  * Risks and Benefits explained to the patient  * Procedure site verified and marked as necessary  * Patient was positioned for comfort  * Consent was signed and verified     Time: 2:10      Date of procedure: 4/30/2018    Procedure performed by: Malissa Adam MD    Provider assisted by: none     Patient assisted by: self    How tolerated by patient: tolerated the procedure well with no complications    Post Procedural Pain Scale: 0 - No Hurt    Comments: none    PROCEDURE  After consent was obtained, using sterile technique the left trochanteric bursa was prepped and Depo-medrol 80 mg and 1ml of lidocaine was then injected and the needle withdrawn. The procedure was well tolerated. The patient is asked to continue to rest for 24 hours before resuming regular activities. It may be more painful for the first 1-2 days. Watch for fever, or increased swelling or persistent pain. Call or return to clinic prn if such symptoms occur.

## 2018-04-30 NOTE — MR AVS SNAPSHOT
[address] 
[phone] Patient: Funmi Valerio MRN:  [id number] Visit Information Date & Time Provider Department Dept. Phone Encounter #  
 4/30/2018  1:20 PM Vincent Collado MD [address] [phone] [id number] Follow-up Instructions Return in about 6 months (around 10/30/2018). Upcoming Health Maintenance Date Due DTaP/Tdap/Td series (1 - Tdap) 2/20/1975 FOBT Q 1 YEAR AGE 50-75 2/20/2004 ZOSTER VACCINE AGE 60> 12/20/2013 BREAST CANCER SCRN MAMMOGRAM 8/17/2017 PAP AKA CERVICAL CYTOLOGY 8/17/2018 Influenza Age 5 to Adult 8/1/2018 Allergies as of 4/30/2018  Review Complete On: 4/30/2018 By: Toyin Black LPN Severity Noted Reaction Type Reactions Ace Inhibitors  01/19/2016    Swelling Arb-angiotensin Receptor Antagonist  07/21/2016   Side Effect Other (comments) Prior ACE inhibitor angioedema, cross reaction risk Codeine  10/13/2012    Nausea Only Keflex [Cephalexin]  10/13/2012    Hives Milk  12/23/2015    Other (comments) Lactose intolerant. Morphine  10/13/2012    Nausea and Vomiting Current Immunizations  Reviewed on 12/28/2017 Name Date Influenza Vaccine 11/7/2017, 8/23/2017 Influenza Vaccine (Quad) PF 11/15/2015  8:22 AM  
 Influenza Vaccine Split 10/14/2012  1:24 PM  
 Pneumococcal Conjugate (PCV-13) 10/11/2017 Pneumococcal Polysaccharide (PPSV-23) 8/25/2015 Not reviewed this visit You Were Diagnosed With   
  
 Codes Comments Inflammatory arthritis    -  Primary ICD-10-CM: M19.90 ICD-9-CM: 714.9 Vitals BP Pulse Temp Resp Weight(growth percentile) SpO2  
 128/80 (BP 1 Location: Left arm, BP Patient Position: Sitting) 85 98.1 °F (36.7 °C) (Oral) 16 132 lb 3.2 oz (60 kg) 96% BMI OB Status Smoking Status 20.71 kg/m2 Postmenopausal Current Every Day Smoker BMI and BSA Data Body Mass Index Body Surface Area 20.71 kg/m 2 1.68 m 2 Preferred Pharmacy Pharmacy Name Phone [id number], [address] [phone] Your Updated Medication List  
  
   
This list is accurate as of 4/30/18  2:05 PM.  Always use your most recent med list.  
  
  
  
  
 albuterol 2.5 mg /3 mL (0.083 %) nebulizer solution Commonly known as:  PROVENTIL VENTOLIN  
inhale contents of 1 vial in nebulizer every 4 hours if needed  
  
 amLODIPine 2.5 mg tablet Commonly known as:  NORVASC  
take 1 tablet by mouth once daily  
  
 aspirin 81 mg chewable tablet Take 1 Tab by mouth daily. atorvastatin 40 mg tablet Commonly known as:  LIPITOR  
take 1 tablet by mouth at bedtime  
  
 benzonatate 100 mg capsule Commonly known as:  TESSALON  
TAKE 1 CAPSULE BY MOUTH THREE TIMES A DAY AS NEEDED FOR COUGH  
  
 carvedilol 12.5 mg tablet Commonly known as:  COREG  
TAKE 1 TABLET BY MOUTH TWICE A DAY WITH MEALS  
  
 desoximetasone 0.25 % topical cream  
Commonly known as:  TOPICORT Apply  to affected area two (2) times daily as needed for Skin Irritation. dicyclomine 10 mg capsule Commonly known as:  BENTYL TAKE 1 CAPSULE BY MOUTH BEFORE MEALS AND AT BEDTIME AS NEEDED. DULoxetine 60 mg capsule Commonly known as:  CYMBALTA Take  by mouth daily. estrogens (conjugated)-methylTESTOSTERone 1.25-2.5 mg per tablet Commonly known as:  ESTRATEST  
  
 FLUARIX QUAD [id number] (PF) Syrg injection Generic drug:  influenza vaccine 2017-18 (6 mos+)(PF)  
inject 0.5 milliliter intramuscularly  
  
 gabapentin 300 mg capsule Commonly known as:  NEURONTIN  
TAKE 1 CAPSULE IN THE MORNING, 1 CAPSULE IN THE AFTERNOON, AD 2 CAPSULES AT BEDTIME HYDROcodone-acetaminophen  mg tablet Commonly known as:  Jesus Albertomonica Suh  
 TAKE 1 TABLET BY MOUTH 3 TIMES DAILY AS NEEDED FOR PAIN  
  
 hydroxychloroquine 200 mg tablet Commonly known as:  PLAQUENIL Take 1 Tab by mouth daily. lidocaine 5 % Commonly known as:  LIDODERM  
2 Patches by TransDERmal route as needed. omeprazole 40 mg capsule Commonly known as:  PRILOSEC  
take 1 capsule by mouth once daily ON AN EMPTY STOMACH  
  
 ondansetron 4 mg disintegrating tablet Commonly known as:  ZOFRAN ODT Take 1 Tab by mouth every eight (8) hours as needed for Nausea. ZyrTEC 10 mg Cap Generic drug:  Cetirizine Take  by mouth. Prescriptions Sent to Pharmacy Refills  
 hydroxychloroquine (PLAQUENIL) 200 mg tablet 6 Sig: Take 1 Tab by mouth daily. Class: Normal  
 Pharmacy: [address] Ph #: [phone] Route: Oral  
  
Follow-up Instructions Return in about 6 months (around 10/30/2018). Introducing [facility] & HEALTH SERVICES! [facility] introduces [employer] patient portal. Now you can access parts of your medical record, email your doctor's office, and request medication refills online. 1. In your internet browser, go to [email or web address] 2. Click on the First Time User? Click Here link in the Sign In box. You will see the New Member Sign Up page. 3. Enter your [employer] Access Code exactly as it appears below. You will not need to use this code after youve completed the sign-up process. If you do not sign up before the expiration date, you must request a new code. · [employer] Access Code: [email or web address] Expires: 7/29/2018  2:04 PM 
 
4. Enter the last four digits of your Social Security Number (xxxx) and Date of Birth (mm/dd/yyyy) as indicated and click Submit. You will be taken to the next sign-up page. 5. Create a [email or web address].tvt ID. This will be your [email or web address].tvt login ID and cannot be changed, so think of one that is secure and easy to remember. 6. Create a [employer] password. You can change your password at any time. 7. Enter your Password Reset Question and Answer. This can be used at a later time if you forget your password. 8. Enter your e-mail address. You will receive e-mail notification when new information is available in [address] 9. Click Sign Up. You can now view and download portions of your medical record. 10. Click the Download Summary menu link to download a portable copy of your medical information. If you have questions, please visit the Frequently Asked Questions section of the [employer] website. Remember, [employer] is NOT to be used for urgent needs. For medical emergencies, dial 911. Now available from your iPhone and Android! Please provide this summary of care documentation to your next provider. Your primary care clinician is listed as Salbador Brennan. If you have any questions after today's visit, please call [phone].

## 2018-05-23 ENCOUNTER — OFFICE VISIT (OUTPATIENT)
Dept: INTERNAL MEDICINE CLINIC | Age: 63
End: 2018-05-23

## 2018-05-23 VITALS
RESPIRATION RATE: 18 BRPM | TEMPERATURE: 98 F | HEIGHT: 64 IN | DIASTOLIC BLOOD PRESSURE: 80 MMHG | BODY MASS INDEX: 22.53 KG/M2 | SYSTOLIC BLOOD PRESSURE: 155 MMHG | OXYGEN SATURATION: 98 % | WEIGHT: 132 LBS | HEART RATE: 84 BPM

## 2018-05-23 DIAGNOSIS — J32.9 CHRONIC SINUSITIS WITH RECURRENT BRONCHITIS: Primary | ICD-10-CM

## 2018-05-23 DIAGNOSIS — Z72.0 TOBACCO ABUSE: ICD-10-CM

## 2018-05-23 DIAGNOSIS — J40 CHRONIC SINUSITIS WITH RECURRENT BRONCHITIS: Primary | ICD-10-CM

## 2018-05-23 RX ORDER — AZITHROMYCIN 250 MG/1
250 TABLET, FILM COATED ORAL SEE ADMIN INSTRUCTIONS
Qty: 6 TAB | Refills: 0 | Status: SHIPPED | OUTPATIENT
Start: 2018-05-23 | End: 2018-05-28

## 2018-05-23 RX ORDER — BENZONATATE 200 MG/1
200 CAPSULE ORAL
Qty: 21 CAP | Refills: 2 | Status: SHIPPED | OUTPATIENT
Start: 2018-05-23 | End: 2018-05-30

## 2018-05-23 NOTE — MR AVS SNAPSHOT
[address] [facility] [address] [facility] 
[phone] Patient: Regino Rosen MRN:  [id number] Visit Information Date & Time Provider Department Dept. Phone Encounter #  
 5/23/2018 10:00 AM Marc Sims, [address] [place] [address],[address] [phone] [id number] Follow-up Instructions Return if symptoms worsen or fail to improve. Your Appointments 10/1/2018  1:00 PM  
ESTABLISHED PATIENT with Amisha Elder MD  
[address] ([facility] Appt Note: 6 mo fu  
 [facility] [address]  
[phone]  
  
   
 [facility] Felicita Danny 7 [address] Upcoming Health Maintenance Date Due DTaP/Tdap/Td series (1 - Tdap) 2/20/1975 FOBT Q 1 YEAR AGE 50-75 2/20/2004 ZOSTER VACCINE AGE 60> 12/20/2013 BREAST CANCER SCRN MAMMOGRAM 8/17/2017 PAP AKA CERVICAL CYTOLOGY 8/17/2018 Influenza Age 5 to Adult 8/1/2018 Allergies as of 5/23/2018  Review Complete On: 5/23/2018 By: Devan Way LPN Severity Noted Reaction Type Reactions Ace Inhibitors  01/19/2016    Swelling Arb-angiotensin Receptor Antagonist  07/21/2016   Side Effect Other (comments) Prior ACE inhibitor angioedema, cross reaction risk Codeine  10/13/2012    Nausea Only Keflex [Cephalexin]  10/13/2012    Hives Milk  12/23/2015    Other (comments) Lactose intolerant. Morphine  10/13/2012    Nausea and Vomiting Current Immunizations  Reviewed on 12/28/2017 Name Date Influenza Vaccine 11/7/2017, 8/23/2017 Influenza Vaccine (Quad) PF 11/15/2015  8:22 AM  
 Influenza Vaccine Split 10/14/2012  1:24 PM  
 Pneumococcal Conjugate (PCV-13) 10/11/2017 Pneumococcal Polysaccharide (PPSV-23) 8/25/2015 Not reviewed this visit You Were Diagnosed With   
  
 Codes Comments Chronic sinusitis with recurrent bronchitis    -  Primary ICD-10-CM: J32.9, J40 ICD-9-CM: 473.9, 490 Tobacco abuse     ICD-10-CM: Z72.0 ICD-9-CM: 305.1 Vitals BP Pulse Temp Resp Height(growth percentile) Weight(growth percentile) 155/80 (BP 1 Location: Left arm, BP Patient Position: Sitting) 84 98 °F (36.7 °C) (Oral) 18 5' 4\" (1.626 m) 132 lb (59.9 kg) SpO2 BMI OB Status Smoking Status 98% 22.66 kg/m2 Postmenopausal Current Every Day Smoker Vitals History BMI and BSA Data Body Mass Index Body Surface Area  
 22.66 kg/m 2 1.64 m 2 Preferred Pharmacy Pharmacy Name Phone [phone] [phone] Your Updated Medication List  
  
   
This list is accurate as of 5/23/18 10:51 AM.  Always use your most recent med list.  
  
  
  
  
 albuterol 2.5 mg /3 mL (0.083 %) nebulizer solution Commonly known as:  PROVENTIL VENTOLIN  
inhale contents of 1 vial in nebulizer every 4 hours if needed  
  
 amLODIPine 2.5 mg tablet Commonly known as:  NORVASC  
take 1 tablet by mouth once daily  
  
 aspirin 81 mg chewable tablet Take 1 Tab by mouth daily. atorvastatin 40 mg tablet Commonly known as:  LIPITOR  
take 1 tablet by mouth at bedtime  
  
 azithromycin 250 mg tablet Commonly known as:  Bennie [place] Take 1 Tab by mouth See Admin Instructions for 5 days. benzonatate 200 mg capsule Commonly known as:  TESSALON Take 1 Cap by mouth three (3) times daily as needed for Cough for up to 7 days. carvedilol 12.5 mg tablet Commonly known as:  COREG  
TAKE 1 TABLET BY MOUTH TWICE A DAY WITH MEALS  
  
 desoximetasone 0.25 % topical cream  
Commonly known as:  TOPICORT Apply  to affected area two (2) times daily as needed for Skin Irritation. dicyclomine 10 mg capsule Commonly known as:  BENTYL TAKE 1 CAPSULE BY MOUTH BEFORE MEALS AND AT BEDTIME AS NEEDED. DULoxetine 60 mg capsule Commonly known as:  CYMBALTA Take  by mouth daily. estrogens (conjugated)-methylTESTOSTERone 1.25-2.5 mg per tablet Commonly known as:  ESTRATEST  
  
 gabapentin 300 mg capsule Commonly known as:  NEURONTIN  
TAKE 1 CAPSULE IN THE MORNING, 1 CAPSULE IN THE AFTERNOON, AD 2 CAPSULES AT BEDTIME HYDROcodone-acetaminophen  mg tablet Commonly known as:  NORCO  
TAKE 1 TABLET BY MOUTH 3 TIMES DAILY AS NEEDED FOR PAIN  
  
 hydroxychloroquine 200 mg tablet Commonly known as:  PLAQUENIL Take 1 Tab by mouth daily. lidocaine 5 % Commonly known as:  LIDODERM  
2 Patches by TransDERmal route as needed. omeprazole 40 mg capsule Commonly known as:  PRILOSEC  
take 1 capsule by mouth once daily ON AN EMPTY STOMACH  
  
 ondansetron 4 mg disintegrating tablet Commonly known as:  ZOFRAN ODT Take 1 Tab by mouth every eight (8) hours as needed for Nausea. ZyrTEC 10 mg Cap Generic drug:  Cetirizine Take  by mouth. Prescriptions Sent to Pharmacy Refills  
 azithromycin (ZITHROMAX) 250 mg tablet 0 Sig: Take 1 Tab by mouth See Admin Instructions for 5 days. Class: Normal  
 Pharmacy: [address] Ph #: [phone] Route: Oral  
 benzonatate (TESSALON) 200 mg capsule 2 Sig: Take 1 Cap by mouth three (3) times daily as needed for Cough for up to 7 days. Class: Normal  
 Pharmacy: [address] Ph #: [phone] Route: Oral  
  
We Performed the Following REFERRAL TO PULMONARY DISEASE [BAB86 Custom] Follow-up Instructions Return if symptoms worsen or fail to improve. Referral Information Referral ID Referred By Referred To  
  
 [id number] [facility]i [facility] [address] [facility] [address] [place], [address] [facility] Visits Status Start Date End Date 1 New Request 5/23/18 5/23/19 If your referral has a status of pending review or denied, additional information will be sent to support the outcome of this decision. Introducing [facility] SERVICES! Jeannie Pappas introduces [employer] patient portal. Now you can access parts of your medical record, email your doctor's office, and request medication refills online. 1. In your internet browser, go to [email or web address] 2. Click on the First Time User? Click Here link in the Sign In box. You will see the New Member Sign Up page. 3. Enter your [employer] Access Code exactly as it appears below. You will not need to use this code after youve completed the sign-up process. If you do not sign up before the expiration date, you must request a new code. · [employer] Access Code: [email or web address] Expires: 7/29/2018  2:04 PM 
 
4. Enter the last four digits of your Social Security Number (xxxx) and Date of Birth (mm/dd/yyyy) as indicated and click Submit. You will be taken to the next sign-up page. 5. Create a [employer] ID. This will be your [employer] login ID and cannot be changed, so think of one that is secure and easy to remember. 6. Create a [employer] password. You can change your password at any time. 7. Enter your Password Reset Question and Answer. This can be used at a later time if you forget your password. 8. Enter your e-mail address. You will receive e-mail notification when new information is available in [address] 9. Click Sign Up. You can now view and download portions of your medical record. 10. Click the Download Summary menu link to download a portable copy of your medical information. If you have questions, please visit the Frequently Asked Questions section of the [employer] website. Remember, [employer] is NOT to be used for urgent needs. For medical emergencies, dial 911. Now available from your iPhone and Android! Please provide this summary of care documentation to your next provider. Your primary care clinician is listed as Bertin Ortiz. If you have any questions after today's visit, please call [phone].

## 2018-05-23 NOTE — PROGRESS NOTES
Reviewed record in preparation for visit and have obtained necessary documentation. Identified pt with two pt identifiers(name and ). Chief Complaint   Patient presents with    ED Follow-up     [facility] [place], [place] on 2018 for Dx: Acute bronchitis & Cough       Health Maintenance Due   Topic Date Due    DTaP/Tdap/Td  (1 - Tdap) 1975    Stool testing for trace blood  2004    Shingles Vaccine  2013    Breast Cancer Screening  2017    Cervical Cancer Screening  2018       Ms. Ngoc Tsai has a reminder for a \"due or due soon\" health maintenance. I have asked that she discuss this further with her primary care provider for follow-up on this health maintenance. Coordination of Care Questionnaire:  :     1) Have you been to an emergency room, urgent care clinic since your last visit? Yes, [place], [place] on 2018 for Dx: Acute bronchitis & Cough    Hospitalized since your last visit? no             2) Have you seen or consulted any other health care providers outside of [address] [place] [address] since your last visit? yes  (Include any pap smears or colon screenings in this section.)    3) In the event something were to happen to you and you were unable to speak on your behalf, do you have an Advance Directive/ Living Will in place stating your wishes? No     Do you have an Advance Directive on file? No     4) Are you interested in receiving information on Advance Directives? No     Patient is accompanied by self I have received verbal consent from [address] [place] [address] to discuss any/all medical information while they are present in the room.

## 2018-05-23 NOTE — PROGRESS NOTES
SUBJECTIVE:   Ms. Issa Pinto is a 58 y.o. female who is here acutely for bronchitis. She went to [facility] in early May and was given Augmentin. She returned to [facility] on 2018 and was prescribed doxycycline, albuterol, and medrol (finished yesterday). Pt reports since taking medrol, the cough has been less frequent and less yellow, but still persistent. She uses albuterol intermittently. Pt had a chest x-ray that was normal. Pt continues to have a productive cough (yellow sputum). Pt denies SOB. She reports she has been smoking for 40+ years. Pt has not established with a pulmonologist. Pt reports her mother  of COPD. Pt denies cough outside of bronchitis. Pt previously had a sore throat for a couple weeks. Pt denies previous diagnosis of mononucleosis. She reports she also had/has sinus congestion. Pt takes a Zyrtec, Prilosec, and plaquenil daily. She has used a nebulizer in the past.     Pt's PCP is Dr. Han Rivera. At this time, she is otherwise doing well and has brought no other complaints to my attention today. PMH:   Past Medical History:   Diagnosis Date    Acid reflux     Acid reflux     Arthritis     Chronic pain     Heart attack ([address]     Heart failure ([address]     Hypertension     IBS (irritable bowel syndrome)     Squamous cell carcinoma of skin of right elbow 2016    Takotsubo cardiomyopathy 2015    Tobacco abuse 2015     PSH:  has a past surgical history that includes hx gyn; hx orthopaedic; hx orthopaedic; hx orthopaedic; pr abdomen surgery proc unlisted; pr breast surgery procedure unlisted; pr colonoscopy flx dx w/collj spec when pfrmd (10/15/2012); and hx other surgical.    All: is allergic to ace inhibitors; arb-angiotensin receptor antagonist; codeine; keflex [cephalexin]; milk; and morphine.    MEDS:   Current Outpatient Prescriptions   Medication Sig    azithromycin (ZITHROMAX) 250 mg tablet Take 1 Tab by mouth See Admin Instructions for 5 days.  benzonatate (TESSALON) 200 mg capsule Take 1 Cap by mouth three (3) times daily as needed for Cough for up to 7 days.  estrogens, conjugated,-methylTESTOSTERone (ESTRATEST) 1.25-2.5 mg per tablet     hydroxychloroquine (PLAQUENIL) 200 mg tablet Take 1 Tab by mouth daily.  atorvastatin (LIPITOR) 40 mg tablet take 1 tablet by mouth at bedtime    amLODIPine (NORVASC) 2.5 mg tablet take 1 tablet by mouth once daily    carvedilol (COREG) 12.5 mg tablet TAKE 1 TABLET BY MOUTH TWICE A DAY WITH MEALS    omeprazole (PRILOSEC) 40 mg capsule take 1 capsule by mouth once daily ON AN EMPTY STOMACH    ondansetron (ZOFRAN ODT) 4 mg disintegrating tablet Take 1 Tab by mouth every eight (8) hours as needed for Nausea.  HYDROcodone-acetaminophen (NORCO)  mg tablet TAKE 1 TABLET BY MOUTH 3 TIMES DAILY AS NEEDED FOR PAIN    desoximetasone (TOPICORT) 0.25 % topical cream Apply  to affected area two (2) times daily as needed for Skin Irritation.  Cetirizine (ZYRTEC) 10 mg cap Take  by mouth.  dicyclomine (BENTYL) 10 mg capsule TAKE 1 CAPSULE BY MOUTH BEFORE MEALS AND AT BEDTIME AS NEEDED.  gabapentin (NEURONTIN) 300 mg capsule TAKE 1 CAPSULE IN THE MORNING, 1 CAPSULE IN THE AFTERNOON, AD 2 CAPSULES AT BEDTIME (Patient taking differently: TAKE 1 CAPSULE IN THE MORNING, 1 CAPSULE IN THE AFTERNOON, AND 1 CAPSULE AT BEDTIME)    lidocaine (LIDODERM) 5 % 2 Patches by TransDERmal route as needed.  aspirin 81 mg chewable tablet Take 1 Tab by mouth daily.  albuterol (PROVENTIL VENTOLIN) 2.5 mg /3 mL (0.083 %) nebulizer solution inhale contents of 1 vial in nebulizer every 4 hours if needed    DULoxetine (CYMBALTA) 60 mg capsule Take  by mouth daily. No current facility-administered medications for this visit. FH: family history includes Cancer in her father; Heart Disease in her maternal grandmother and mother. SH:  reports that she has been smoking.   She has been smoking about 1.00 pack per day. She has never used smokeless tobacco. She reports that she drinks about 5.0 oz of alcohol per week  She reports that she does not use illicit drugs. Review of Systems - History obtained from the patient  General ROS: negative  Psychological ROS: negative  Ophthalmic ROS: negative  ENT ROS: sore throat, sinus congestion   Respiratory ROS: productive cough (yellow sputum) no shortness of breath, or wheezing  Cardiovascular ROS: no chest pain or dyspnea on exertion  Gastrointestinal ROS: no abdominal pain, change in bowel habits, or black or bloody stools  Genito-Urinary ROS: negative  Musculoskeletal ROS: negative  Neurological ROS: negative  Dermatological ROS: negative    OBJECTIVE:   Vitals:   Visit Vitals    /80 (BP 1 Location: Left arm, BP Patient Position: Sitting)    Pulse 84    Temp 98 °F (36.7 °C) (Oral)    Resp 18    Ht 5' 4\" (1.626 m)    Wt 132 lb (59.9 kg)    SpO2 98%    BMI 22.66 kg/m2      Gen: Pleasant 58 y.o.  female in NAD. HEENT: NC/AT. Bilateral turbinates swollen (R>L)  HEART: RRR, No M/G/R.   LUNGS: CTAB No W/R. EXTREMITIES: Warm. No C/C/E.   NEURO: Alert and oriented x 3. Cranial nerves grossly intact. No focal sensory or motor deficits noted. SKIN: Warm. Dry. No rashes or other lesions noted. ASSESSMENT/ PLAN:     Diagnoses and all orders for this visit:    1. Chronic sinusitis with recurrent bronchitis  -     [facility]  -     azithromycin (ZITHROMAX) 250 mg tablet; Take 1 Tab by mouth See Admin Instructions for 5 days. -     benzonatate (TESSALON) 200 mg capsule; Take 1 Cap by mouth three (3) times daily as needed for Cough for up to 7 days. 2. Tobacco abuse  -     [facility]      1. Chronic sinusitis with recurrent bronchitis   I prescribed a z-madhu and tessalon. 2. Tobacco Abuse  Due to long term tobacco use, I advised pt to follow up with pulmonology.      Follow-up Disposition:  Return if symptoms worsen or fail to improve. I have reviewed the patient's medications and risks/side effects/benefits were discussed. Diagnosis(-es) explained to patient and questions answered. Literature provided where appropriate.      Written by Tri Sheikh, as dictated by Luis Alejandro MD.

## 2018-05-25 RX ORDER — AMLODIPINE BESYLATE 2.5 MG/1
TABLET ORAL
Qty: 30 TAB | Refills: 1 | Status: SHIPPED | OUTPATIENT
Start: 2018-05-25 | End: 2018-08-06 | Stop reason: SDUPTHER

## 2018-07-13 ENCOUNTER — HOSPITAL ENCOUNTER (OUTPATIENT)
Dept: NUCLEAR MEDICINE | Age: 63
Discharge: HOME | End: 2018-07-13
Attending: INTERNAL MEDICINE
Payer: COMMERCIAL

## 2018-07-13 DIAGNOSIS — R10.9 ABDOMINAL PAIN: ICD-10-CM

## 2018-07-13 DIAGNOSIS — R19.7 DIARRHEA: ICD-10-CM

## 2018-07-13 DIAGNOSIS — R11.0 NAUSEA: ICD-10-CM

## 2018-07-13 DIAGNOSIS — R13.10 DYSPHAGIA: ICD-10-CM

## 2018-07-13 PROCEDURE — 78264 GASTRIC EMPTYING IMG STUDY: CPT

## 2018-08-10 ENCOUNTER — OFFICE VISIT (OUTPATIENT)
Dept: CARDIOLOGY CLINIC | Age: 63
End: 2018-08-10

## 2018-08-10 VITALS
BODY MASS INDEX: 22.26 KG/M2 | HEART RATE: 68 BPM | OXYGEN SATURATION: 97 % | SYSTOLIC BLOOD PRESSURE: 122 MMHG | DIASTOLIC BLOOD PRESSURE: 60 MMHG | HEIGHT: 64 IN | WEIGHT: 130.4 LBS

## 2018-08-10 DIAGNOSIS — I10 ESSENTIAL HYPERTENSION: ICD-10-CM

## 2018-08-10 DIAGNOSIS — I51.81 TAKOTSUBO CARDIOMYOPATHY: Primary | ICD-10-CM

## 2018-08-10 DIAGNOSIS — I21.4 NSTEMI (NON-ST ELEVATED MYOCARDIAL INFARCTION) (HCC): ICD-10-CM

## 2018-08-10 RX ORDER — PROMETHAZINE HYDROCHLORIDE 25 MG/1
25 TABLET ORAL
COMMUNITY
End: 2019-04-11

## 2018-08-10 NOTE — PROGRESS NOTES
Chief Complaint   Patient presents with    Ankle swelling     PT. C/O ANKLE SWELLING IN BOTH HANDS    Other     PT. STATED  HAVING RT. HAND SWELLING      1. Have you been to the ER, urgent care clinic since your last visit? [facility] AND VOMITING. 2. Have you seen or consulted any other health care providers outside of the [address] [place] [address] since your last visit? Include any pap smears or colon screening.  NO

## 2018-08-10 NOTE — MR AVS SNAPSHOT
[address] [facility] 
[phone] Patient: Elise Hoyos MRN:  [id number] Visit Information Date & Time Provider Department Dept. Phone Encounter #  
 8/10/2018  2:00 PM Ambrosiomeche Mccann, [address] [facility] [phone] Your Appointments 8/29/2018  1:30 PM  
ECHO CARDIOGRAMS 2D with [address]  [facility] [address]) Appt Note: 2D ECHO COMPLETE ADULT (TTE) W OR WO CONTR [[id number]] (Order [id number]) 8/10/18 kb  
 [address] [facility]  
[phone] [address] [facility]  
  
    
 10/1/2018  1:00 PM  
ESTABLISHED PATIENT with Newton Nava MD  
[address] ([address]) Appt Note: 6 mo fu  
 [facility] [address]  
[phone]  
  
   
 [facility] [address] Upcoming Health Maintenance Date Due DTaP/Tdap/Td series (1 - Tdap) 2/20/1975 FOBT Q 1 YEAR AGE 50-75 2/20/2004 ZOSTER VACCINE AGE 60> 12/20/2013 BREAST CANCER SCRN MAMMOGRAM 8/17/2017 Influenza Age 5 to Adult 8/1/2018 PAP AKA CERVICAL CYTOLOGY 8/17/2018 Allergies as of 8/10/2018  Review Complete On: 8/10/2018 By: Trudi Avila NP Severity Noted Reaction Type Reactions Ace Inhibitors  01/19/2016    Swelling Arb-angiotensin Receptor Antagonist  07/21/2016   Side Effect Other (comments) Prior ACE inhibitor angioedema, cross reaction risk Codeine  10/13/2012    Nausea Only Keflex [Cephalexin]  10/13/2012    Hives Milk  12/23/2015    Other (comments) Lactose intolerant. Morphine  10/13/2012    Nausea and Vomiting Current Immunizations  Reviewed on 12/28/2017 Name Date Influenza Vaccine 11/7/2017, 8/23/2017  Influenza Vaccine (Quad) PF 11/15/2015  8:22 AM  
 Influenza Vaccine Split 10/14/2012  1:24 PM  
 Pneumococcal Conjugate (PCV-13) 10/11/2017 Pneumococcal Polysaccharide (PPSV-23) 8/25/2015 Not reviewed this visit You Were Diagnosed With   
  
 Codes Comments Takotsubo cardiomyopathy    -  Primary ICD-10-CM: I51.81 
ICD-9-CM: 429.83 Essential hypertension     ICD-10-CM: I10 
ICD-9-CM: 401.9 NSTEMI (non-ST elevated myocardial infarction) ([facility]     ICD-10-CM: I21.4 ICD-9-CM: 410.70 Vitals BP Pulse Height(growth percentile) Weight(growth percentile) SpO2 BMI  
 122/60 (BP 1 Location: Right arm, BP Patient Position: Sitting) 68 5' 4\" (1.626 m) 130 lb 6.4 oz (59.1 kg) 97% 22.38 kg/m2 OB Status Smoking Status Postmenopausal Current Every Day Smoker Vitals History BMI and BSA Data Body Mass Index Body Surface Area  
 22.38 kg/m 2 1.63 m 2 Preferred Pharmacy Pharmacy Name Phone [facility] [address], [address] [phone] Your Updated Medication List  
  
   
This list is accurate as of 8/10/18  3:34 PM.  Always use your most recent med list.  
  
  
  
  
 albuterol 2.5 mg /3 mL (0.083 %) nebulizer solution Commonly known as:  PROVENTIL VENTOLIN  
inhale contents of 1 vial in nebulizer every 4 hours if needed  
  
 amLODIPine 2.5 mg tablet Commonly known as:  NORVASC  
take 1 tablet by mouth once daily  
  
 aspirin 81 mg chewable tablet Take 1 Tab by mouth daily. atorvastatin 40 mg tablet Commonly known as:  LIPITOR  
take 1 tablet by mouth at bedtime  
  
 carvedilol 12.5 mg tablet Commonly known as:  COREG  
TAKE 1 TABLET BY MOUTH TWICE A DAY WITH MEALS  
  
 desoximetasone 0.25 % topical cream  
Commonly known as:  TOPICORT Apply  to affected area two (2) times daily as needed for Skin Irritation. dicyclomine 10 mg capsule Commonly known as:  BENTYL TAKE 1 CAPSULE BY MOUTH BEFORE MEALS AND AT BEDTIME AS NEEDED. DULoxetine 60 mg capsule Commonly known as:  CYMBALTA Take  by mouth daily. estrogens (conjugated)-methylTESTOSTERone 1.25-2.5 mg per tablet Commonly known as:  ESTRATEST  
1 Tab daily. gabapentin 300 mg capsule Commonly known as:  NEURONTIN  
TAKE 1 CAPSULE IN THE MORNING, 1 CAPSULE IN THE AFTERNOON, AD 2 CAPSULES AT BEDTIME HYDROcodone-acetaminophen  mg tablet Commonly known as:  NORCO  
TAKE 1 TABLET BY MOUTH 3 TIMES DAILY AS NEEDED FOR PAIN  
  
 hydroxychloroquine 200 mg tablet Commonly known as:  PLAQUENIL Take 1 Tab by mouth daily. lidocaine 5 % Commonly known as:  LIDODERM  
2 Patches by TransDERmal route as needed. omeprazole 40 mg capsule Commonly known as:  PRILOSEC  
take 1 capsule by mouth once daily ON AN EMPTY STOMACH  
  
 ondansetron 4 mg disintegrating tablet Commonly known as:  ZOFRAN ODT Take 1 Tab by mouth every eight (8) hours as needed for Nausea. promethazine 25 mg tablet Commonly known as:  PHENERGAN Take 25 mg by mouth every six (6) hours as needed for Nausea. ZyrTEC 10 mg Cap Generic drug:  Cetirizine Take  by mouth. We Performed the Following 2D ECHO COMPLETE ADULT (TTE) W OR WO CONTR [32359 CPT(R)] AMB POC EKG ROUTINE W/ 12 LEADS, INTER & REP [44493 CPT(R)] Introducing [facility] [place] [facility] SERVICES! [employer] introduces [employer] patient portal. Now you can access parts of your medical record, email your doctor's office, and request medication refills online. 1. In your internet browser, go to [email or web address]. [email or web address]/[email or web address] [facility] 2. Click on the First Time User? Click Here link in the Sign In box. You will see the New Member Sign Up page. 3. Enter your [employer] Access Code exactly as it appears below. You will not need to use this code after youve completed the sign-up process. If you do not sign up before the expiration date, you must request a new code. · [facility] Access Code: [email or web address] Expires: 11/8/2018  2:42 PM 
 
4. Enter the last four digits of your Social Security Number (xxxx) and Date of Birth (mm/dd/yyyy) as indicated and click Submit. You will be taken to the next sign-up page. 5. Create a [facility] ID. This will be your [facility] login ID and cannot be changed, so think of one that is secure and easy to remember. 6. Create a [facility] password. You can change your password at any time. 7. Enter your Password Reset Question and Answer. This can be used at a later time if you forget your password. 8. Enter your e-mail address. You will receive e-mail notification when new information is available in [address] 9. Click Sign Up. You can now view and download portions of your medical record. 10. Click the Download Summary menu link to download a portable copy of your medical information. If you have questions, please visit the Frequently Asked Questions section of the [facility] website. Remember, [facility] is NOT to be used for urgent needs. For medical emergencies, dial 911. Now available from your iPhone and Android! Please provide this summary of care documentation to your next provider. Your primary care clinician is listed as Yelena Collins. If you have any questions after today's visit, please call [phone].

## 2018-08-10 NOTE — PROGRESS NOTES
Guerita Luna DNP, ANP-BC  Subjective/HPI:     Aleksandra Murrell is a 58 y.o. female is here for routine f/u. The patient denies chest pain, orthopnea, PND,palpitations, syncope, presyncope or fatigue. Patient reports after an extended long visit vacation with 7 hours of traveling and increasing activity she was having diffuse musculoskeletal joint pain consistent with arthritis and some swelling in her ankles and her right hand which have resolved. She reports some intermittent dyspnea on exertion. History of normal coronary arteries during non-STEMI takosubo      PCP Provider  Ravi Cano MD  Past Medical History:   Diagnosis Date    Acid reflux     Acid reflux     Arthritis     Chronic pain     Heart attack ([address]     [facility]     Hypertension     IBS (irritable bowel syndrome)     Squamous cell carcinoma of skin of right elbow 5/2016    Takotsubo cardiomyopathy 11/16/2015    Tobacco abuse 11/16/2015      Past Surgical History:   Procedure Laterality Date    ABDOMEN SURGERY PROC UNLISTED      adhesion removal    BREAST SURGERY PROCEDURE UNLISTED      lumpectomy    HX GYN      oophorectomy    HX ORTHOPAEDIC      right knee arthroscopy    HX ORTHOPAEDIC      right thumb    HX ORTHOPAEDIC      back surgeries    HX OTHER SURGICAL      8 route canals    CA COLONOSCOPY FLX DX W/COLLJ SPEC WHEN PFRMD  10/15/2012          Allergies   Allergen Reactions    Ace Inhibitors Swelling    Arb-Angiotensin Receptor Antagonist Other (comments)     Prior ACE inhibitor angioedema, cross reaction risk    Codeine Nausea Only    Keflex [Cephalexin] Hives    Milk Other (comments)     Lactose intolerant.     Morphine Nausea and Vomiting      Family History   Problem Relation Age of Onset    Cancer Father      prostate    Heart Disease Mother     Heart Disease Maternal Grandmother       Current Outpatient Prescriptions   Medication Sig    promethazine (PHENERGAN) 25 mg tablet Take 25 mg by mouth every six (6) hours as needed for Nausea.  amLODIPine (NORVASC) 2.5 mg tablet take 1 tablet by mouth once daily    estrogens, conjugated,-methylTESTOSTERone (ESTRATEST) 1.25-2.5 mg per tablet 1 Tab daily.  hydroxychloroquine (PLAQUENIL) 200 mg tablet Take 1 Tab by mouth daily.  atorvastatin (LIPITOR) 40 mg tablet take 1 tablet by mouth at bedtime    carvedilol (COREG) 12.5 mg tablet TAKE 1 TABLET BY MOUTH TWICE A DAY WITH MEALS    omeprazole (PRILOSEC) 40 mg capsule take 1 capsule by mouth once daily ON AN EMPTY STOMACH    ondansetron (ZOFRAN ODT) 4 mg disintegrating tablet Take 1 Tab by mouth every eight (8) hours as needed for Nausea.  HYDROcodone-acetaminophen (NORCO)  mg tablet TAKE 1 TABLET BY MOUTH 3 TIMES DAILY AS NEEDED FOR PAIN    desoximetasone (TOPICORT) 0.25 % topical cream Apply  to affected area two (2) times daily as needed for Skin Irritation.  Cetirizine (ZYRTEC) 10 mg cap Take  by mouth.  dicyclomine (BENTYL) 10 mg capsule TAKE 1 CAPSULE BY MOUTH BEFORE MEALS AND AT BEDTIME AS NEEDED.  gabapentin (NEURONTIN) 300 mg capsule TAKE 1 CAPSULE IN THE MORNING, 1 CAPSULE IN THE AFTERNOON, AD 2 CAPSULES AT BEDTIME (Patient taking differently: TAKE 1 CAPSULE IN THE MORNING, 1 CAPSULE IN THE AFTERNOON, AND 1 CAPSULE AT BEDTIME)    lidocaine (LIDODERM) 5 % 2 Patches by TransDERmal route as needed.  aspirin 81 mg chewable tablet Take 1 Tab by mouth daily.  albuterol (PROVENTIL VENTOLIN) 2.5 mg /3 mL (0.083 %) nebulizer solution inhale contents of 1 vial in nebulizer every 4 hours if needed    DULoxetine (CYMBALTA) 60 mg capsule Take  by mouth daily. No current facility-administered medications for this visit.        Vitals:    08/10/18 1427 08/10/18 1449   BP: 124/62 122/60   Pulse: 68    SpO2: 97%    Weight: 130 lb 6.4 oz (59.1 kg)    Height: 5' 4\" (1.626 m)      Social History     Social History    Marital status:      Spouse name: N/A    Number of children: N/A    Years of education: N/A     Occupational History    Not on file. Social History Main Topics    Smoking status: Current Every Day Smoker     Packs/day: 1.00    Smokeless tobacco: Never Used      Comment: trying to quit 4-5 cigarettes daily    Alcohol use 5.0 oz/week     10 Glasses of wine per week      Comment: 4 times weekly    Drug use: No    Sexual activity: Yes     Partners: Male     Birth control/ protection: None     Other Topics Concern    Not on file     Social History Narrative       I have reviewed the nurses notes, vitals, problem list, allergy list, medical history, family, social history and medications. Review of Symptoms:    General: Pt denies excessive weight gain or loss. Pt is able to conduct ADL's  HEENT: Denies blurred vision, headaches, epistaxis and difficulty swallowing. Respiratory: Denies shortness of breath, + COOMBS, denies wheezing or stridor. Cardiovascular: Denies precordial pain, palpitations, edema or PND  Gastrointestinal: Denies poor appetite, indigestion, abdominal pain or blood in stool  Musculoskeletal: Denies pain or swelling from muscles or joints  Neurologic: Denies tremor, paresthesias, or sensory motor disturbance  Skin: Denies rash, itching or texture change. Physical Exam:      General: Well developed, in no acute distress, cooperative and alert  HEENT: No carotid bruits, no JVD, trach is midline. Neck Supple, PEERL, EOM intact. Heart:  Normal S1/S2 negative S3 or S4. Regular, no murmur, gallop or rub.   Respiratory: Clear bilaterally x 4, no wheezing or rales  Abdomen:   Soft, non-tender, no masses, bowel sounds are active.   Extremities:  No edema, normal cap refill, no cyanosis, atraumatic. Neuro: A&Ox3, speech clear, gait stable. Skin: Skin color is normal. No rashes or lesions.  Non diaphoretic  Vascular: 2+ pulses symmetric in all extremities    Cardiographics    ECG: nsr   Results for orders placed or performed during the hospital encounter of 12/28/17   EKG, 12 LEAD, INITIAL   Result Value Ref Range    Ventricular Rate 84 BPM    Atrial Rate 84 BPM    P-R Interval 166 ms    QRS Duration 92 ms    Q-T Interval 408 ms    QTC Calculation (Bezet) 482 ms    Calculated P Axis 63 degrees    Calculated R Axis 22 degrees    Calculated T Axis 42 degrees    Diagnosis       Normal sinus rhythm  Possible Left atrial enlargement  Septal infarct (cited on or before 05-AUG-2015)  When compared with ECG of 15-NOV-2015 07:03,  T wave inversion no longer evident in Inferior leads  T wave inversion no longer evident in Anterolateral leads  QT has shortened  Confirmed by Markel Donovan ([id number]) on 12/29/2017 8:02:18 AM           Cardiology Labs:  Lab Results   Component Value Date/Time    Cholesterol, total 116 10/11/2017 03:53 PM    HDL Cholesterol 57 10/11/2017 03:53 PM    LDL, calculated 48 10/11/2017 03:53 PM    Triglyceride 55 10/11/2017 03:53 PM       Lab Results   Component Value Date/Time    Sodium 133 (L) 12/30/2017 12:35 AM    Potassium 3.7 12/30/2017 12:35 AM    Chloride 99 12/30/2017 12:35 AM    CO2 26 12/30/2017 12:35 AM    Anion gap 8 12/30/2017 12:35 AM    Glucose 131 (H) 12/30/2017 12:35 AM    BUN 8 12/30/2017 12:35 AM    Creatinine 0.70 12/30/2017 12:35 AM    BUN/Creatinine ratio 11 (L) 12/30/2017 12:35 AM    GFR est AA >60 12/30/2017 12:35 AM    GFR est non-AA >60 12/30/2017 12:35 AM    Calcium 8.7 12/30/2017 12:35 AM    Bilirubin, total 0.5 12/29/2017 02:45 AM    AST (SGOT) 38 (H) 12/29/2017 02:45 AM    Alk. phosphatase 151 (H) 12/29/2017 02:45 AM    Protein, total 8.4 (H) 12/29/2017 02:45 AM    Albumin 4.3 12/29/2017 02:45 AM    Globulin 4.1 (H) 12/29/2017 02:45 AM    A-G Ratio 1.0 (L) 12/29/2017 02:45 AM    ALT (SGPT) 32 12/29/2017 02:45 AM           Assessment:     Assessment:     Diagnoses and all orders for this visit:    1. Takotsubo cardiomyopathy  -     2D ECHO COMPLETE ADULT (TTE) W OR WO CONTR    2.  Essential hypertension  - AMB POC EKG ROUTINE W/ 12 LEADS, INTER & REP  -     2D ECHO COMPLETE ADULT (TTE) W OR WO CONTR    3. NSTEMI (non-ST elevated myocardial infarction) ([address]  -     2D ECHO COMPLETE ADULT (TTE) W OR WO CONTR        ICD-10-CM ICD-9-CM    1. Takotsubo cardiomyopathy I51.81 429.83 2D ECHO COMPLETE ADULT (TTE) W OR WO CONTR   2. Essential hypertension I10 401.9 AMB POC EKG ROUTINE W/ 12 LEADS, INTER & REP      2D ECHO COMPLETE ADULT (TTE) W OR WO CONTR   3. NSTEMI (non-ST elevated myocardial infarction) ([facility]) I21.4 410.70 2D ECHO COMPLETE ADULT (TTE) W OR WO CONTR     Orders Placed This Encounter    AMB POC EKG ROUTINE W/ 12 LEADS, INTER & REP     Order Specific Question:   Reason for Exam:     Answer:   ROUTINE    2D ECHO COMPLETE ADULT (TTE) W OR WO CONTR     Order Specific Question:   Reason for Exam:     Answer:   NICM     Order Specific Question:   Contrast Enhancement (Bubble Study, Definity, Optison) may be used if criteria listed in established evidence-based protocol has been identified. Answer: Yes    promethazine (PHENERGAN) 25 mg tablet     Sig: Take 25 mg by mouth every six (6) hours as needed for Nausea. Plan:     Patient presents with reported mild dyspnea on exertion, will evaluate ejection fraction with echocardiogram, recommended smoking cessation. Follow-up in 1 year unless abnormal echocardiogram.    Toney Holguin NP    This note was created using voice recognition software. Despite editing, there may be syntax errors.

## 2018-08-29 ENCOUNTER — CLINICAL SUPPORT (OUTPATIENT)
Dept: CARDIOLOGY CLINIC | Age: 63
End: 2018-08-29

## 2018-08-29 DIAGNOSIS — I21.4 NSTEMI (NON-ST ELEVATED MYOCARDIAL INFARCTION) (HCC): ICD-10-CM

## 2018-08-29 DIAGNOSIS — I51.81 TAKOTSUBO CARDIOMYOPATHY: Primary | ICD-10-CM

## 2018-09-06 NOTE — PROGRESS NOTES
Abad: Please call patient echocardiogram is normal, her ejection fraction is now perfectly normal, even better than 2016 report. Continue current medications follow-up in 1 year.

## 2018-09-06 NOTE — PROGRESS NOTES
Spoke with patient  Verified patient with 2 patient identifiers  Informed per Roge Herrera NP echocardiogram is normal, her ejection fraction is now perfectly normal, even better than 2016 report. Continue current medications follow-up in 1 year. Patient verbalized understanding.

## 2018-10-01 ENCOUNTER — OFFICE VISIT (OUTPATIENT)
Dept: RHEUMATOLOGY | Age: 63
End: 2018-10-01

## 2018-10-01 VITALS
SYSTOLIC BLOOD PRESSURE: 163 MMHG | HEART RATE: 93 BPM | OXYGEN SATURATION: 96 % | TEMPERATURE: 98.2 F | BODY MASS INDEX: 21.9 KG/M2 | RESPIRATION RATE: 16 BRPM | DIASTOLIC BLOOD PRESSURE: 81 MMHG | WEIGHT: 127.6 LBS

## 2018-10-01 DIAGNOSIS — M05.79 SEROPOSITIVE RHEUMATOID ARTHRITIS OF MULTIPLE SITES (HCC): Primary | ICD-10-CM

## 2018-10-01 DIAGNOSIS — M70.62 TROCHANTERIC BURSITIS OF LEFT HIP: ICD-10-CM

## 2018-10-01 NOTE — MR AVS SNAPSHOT
[address] [address] 
[phone] Patient: Ya Herrera MRN:  [id number] Visit Information Date & Time Provider Department Dept. Phone Encounter #  
 10/1/2018  1:00 PM Michelene Blizzard, MD [facility] [phone] [id number] Follow-up Instructions Return in about 3 months (around 1/1/2019). Upcoming Health Maintenance Date Due Shingrix Vaccine Age 50> (1 of 2) 2/20/2004 FOBT Q 1 YEAR AGE 50-75 2/20/2004 BREAST CANCER SCRN MAMMOGRAM 8/17/2017 Influenza Age 5 to Adult 8/1/2018 PAP AKA CERVICAL CYTOLOGY 8/17/2018 DTaP/Tdap/Td series (2 - Td) 11/7/2027 Allergies as of 10/1/2018  Review Complete On: 10/1/2018 By: Yogi Leal LPN Severity Noted Reaction Type Reactions Ace Inhibitors  01/19/2016    Swelling Arb-angiotensin Receptor Antagonist  07/21/2016   Side Effect Other (comments) Prior ACE inhibitor angioedema, cross reaction risk Codeine  10/13/2012    Nausea Only Keflex [Cephalexin]  10/13/2012    Hives Milk  12/23/2015    Other (comments) Lactose intolerant. Morphine  10/13/2012    Nausea and Vomiting Current Immunizations  Reviewed on 9/5/2018 Name Date Influenza Vaccine 11/7/2017, 8/23/2017 Influenza Vaccine (Quad) PF 11/15/2015  8:22 AM  
 Influenza Vaccine Split 10/14/2012  1:24 PM  
 Pneumococcal Conjugate (PCV-13) 10/11/2017 Pneumococcal Polysaccharide (PPSV-23) 8/25/2015 Tdap 11/7/2017 Not reviewed this visit You Were Diagnosed With   
  
 Codes Comments Seropositive rheumatoid arthritis of multiple sites [facility])    -  Primary ICD-10-CM: M05.79 ICD-9-CM: 714.0 Trochanteric bursitis of left hip     ICD-10-CM: M70.62 ICD-9-CM: 726.5 Vitals  BP Pulse Temp Resp Weight(growth percentile) SpO2  
 163/81 (BP 1 Location: Left arm, BP Patient Position: Sitting) 93 98.2 °F (36.8 °C) (Oral) 16 127 lb 9.6 oz (57.9 kg) 96% BMI OB Status Smoking Status 21.9 kg/m2 Postmenopausal Current Every Day Smoker BMI and BSA Data Body Mass Index Body Surface Area  
 21.9 kg/m 2 1.62 m 2 Preferred Pharmacy Pharmacy Name Phone [phone], [address] [phone] Your Updated Medication List  
  
   
This list is accurate as of 10/1/18  2:00 PM.  Always use your most recent med list.  
  
  
  
  
 albuterol 2.5 mg /3 mL (0.083 %) nebulizer solution Commonly known as:  PROVENTIL VENTOLIN  
inhale contents of 1 vial in nebulizer every 4 hours if needed  
  
 amLODIPine 2.5 mg tablet Commonly known as:  NORVASC  
take 1 tablet by mouth once daily  
  
 aspirin 81 mg chewable tablet Take 1 Tab by mouth daily. atorvastatin 40 mg tablet Commonly known as:  LIPITOR  
take 1 tablet by mouth at bedtime  
  
 carvedilol 12.5 mg tablet Commonly known as:  COREG  
TAKE 1 TABLET BY MOUTH TWICE A DAY WITH MEALS  
  
 desoximetasone 0.25 % topical cream  
Commonly known as:  TOPICORT Apply  to affected area two (2) times daily as needed for Skin Irritation. dicyclomine 10 mg capsule Commonly known as:  BENTYL TAKE 1 CAPSULE BY MOUTH BEFORE MEALS AND AT BEDTIME AS NEEDED. DULoxetine 60 mg capsule Commonly known as:  CYMBALTA Take  by mouth daily. estrogens (conjugated)-methylTESTOSTERone 1.25-2.5 mg per tablet Commonly known as:  ESTRATEST  
1 Tab daily. gabapentin 300 mg capsule Commonly known as:  NEURONTIN  
TAKE 1 CAPSULE IN THE MORNING, 1 CAPSULE IN THE AFTERNOON, AD 2 CAPSULES AT BEDTIME HYDROcodone-acetaminophen  mg tablet Commonly known as:  NORCO  
TAKE 1 TABLET BY MOUTH 3 TIMES DAILY AS NEEDED FOR PAIN  
  
 hydroxychloroquine 200 mg tablet Commonly known as:  PLAQUENIL Take 1 Tab by mouth daily. lidocaine 5 % Commonly known as:  Will Soliman  
 2 Patches by TransDERmal route as needed. omeprazole 40 mg capsule Commonly known as:  PRILOSEC  
take 1 capsule by mouth once daily ON AN EMPTY STOMACH  
  
 ondansetron 4 mg disintegrating tablet Commonly known as:  ZOFRAN ODT Take 1 Tab by mouth every eight (8) hours as needed for Nausea. promethazine 25 mg tablet Commonly known as:  PHENERGAN Take 25 mg by mouth every six (6) hours as needed for Nausea. ZyrTEC 10 mg Cap Generic drug:  Cetirizine Take  by mouth. We Performed the Following C REACTIVE PROTEIN, QT [51610 CPT(R)] CBC+PLATELET+HEM REVIEW [87989 CPT(R)] CHRONIC HEPATITIS PANEL [DSW2124 Custom] METABOLIC PANEL, COMPREHENSIVE [57870 CPT(R)] QUANTIFERON-TB GOLD PLUS [id number] Custom] SED RATE (ESR) J4618807 CPT(R)] Follow-up Instructions Return in about 3 months (around 1/1/2019). Introducing [facility]! [employer] introduces [place] [employer] patient portal. Now you can access parts of your medical record, email your doctor's office, and request medication refills online. 1. In your internet browser, go to [email or web address] 2. Click on the First Time User? Click Here link in the Sign In box. You will see the New Member Sign Up page. 3. Enter your [place] [employer] Access Code exactly as it appears below. You will not need to use this code after youve completed the sign-up process. If you do not sign up before the expiration date, you must request a new code. · [place] [employer] Access Code: [email or web address] Expires: 11/8/2018  2:42 PM 
 
4. Enter the last four digits of your Social Security Number (xxxx) and Date of Birth (mm/dd/yyyy) as indicated and click Submit. You will be taken to the next sign-up page. 5. Create a [place] [employer] ID. This will be your [place] [employer] login ID and cannot be changed, so think of one that is secure and easy to remember. 6. Create a Results Scorecard password. You can change your password at any time. 7. Enter your Password Reset Question and Answer. This can be used at a later time if you forget your password. 8. Enter your e-mail address. You will receive e-mail notification when new information is available in [address] 9. Click Sign Up. You can now view and download portions of your medical record. 10. Click the Download Summary menu link to download a portable copy of your medical information. If you have questions, please visit the Frequently Asked Questions section of the [employer] website. Remember, [employer] is NOT to be used for urgent needs. For medical emergencies, dial 911. Now available from your iPhone and Android! Please provide this summary of care documentation to your next provider. Your primary care clinician is listed as Anne Simmons. If you have any questions after today's visit, please call [phone].

## 2018-10-01 NOTE — PROGRESS NOTES
RHEUMATOLOGY PROBLEM LIST AND CHIEF COMPLAINT  1. Inflammatory arthritis - morning stiffness, arthritis on the 2nd and 3rd MCPs bilaterally. 2. Osteoarthritis - hands, knees, feet, hips  3. Fibromyalgia    Therapy History:  Current DMARDs: Plaquenil (4/2017 - 3/2018, ran out in April, 5/2018-current)    INTERVAL HISTORY  This is a 58 y.o.  female. Today, the patient complains of pain in the joints. Location: generalized  Severity:  8 on a scale of 0-10  Timing: all day   Duration:  Several years  Context/Associated signs and symptoms: The patient complains of severe left trochanteric bursa pain, which interferes with her daily activities and sleep. She requests another injection for her bursa, since her last injection improved symptoms. She continues to experience pain and morning stiffness in her hip joint, as well as soreness after periods of inactivity. She also complains of back and knee pain that worsens with activity. She admits to drinking alcohol regularly, and does not want to stop this activity so MTX is not an option. RHEUMATOLOGY REVIEW OF SYSTEMS   Positives as per history  Negatives as follows:  Min Pearl:  Denies unexplained persistent fevers or weight change  RESPIRATORY:  No pleuritic pain, exertional dyspnea  CARDIOVASCULAR:  Denies chest pain  GASTRO:   Denies heartburn, abdominal pain, nausea, vomiting, diarrhea  SKIN:    Denies rash   MSK:    No morning stiffness >1 hour, persistent joint swelling    PAST MEDICAL HISTORY  Reviewed with patient, significant changes in medical history - no    FAMILY HISTORY   No autoimmune disease in the family    PHYSICAL EXAM  Blood pressure 163/81, pulse 93, temperature 98.2 °F (36.8 °C), temperature source Oral, resp. rate 16, weight 127 lb 9.6 oz (57.9 kg), SpO2 96 %. GENERAL APPEARANCE: Well-nourished, no acute distress  NECK: No adenopathy  ENT: No oral ulcers  CARDIOVASCULAR: Heart rhythm is regular.  No murmur, rub, gallop  CHEST: Normal vesicular breath sounds. No wheezes, rales, pleural friction rubs  ABDOMINAL: The abdomen is soft and nontender. Bowel sounds are normal  SKIN: No rash, palpable purpura, digital ulcer, abnormal thickening   MUSCULOSKELETAL:   Upper extremities - Heberden's and Hang's nodes bilaterally. Lower extremities - R knee moderate effusion, warmth, tenderness. Left lateral thigh pain    LABS, RADIOLOGY AND PROCEDURES  Previous labs reviewed -Yes  Previous radiology reviewed -Yes  Previous procedures reviewed -Yes  Previous medical records reviewed/summarized -Yes      ASSESSMENT  1. Rheumatoid arthritis - (Established problem -  Progressive disease) - The patient continues to experience arthralgia and stiffness from RA. I believe escalation of treatment is necessary at this time. She does not want to stop drinking so MTX is not a starting option. We will look to get Syed mckeon approved. For now, she should continue with Plaquenil 200 mg daily. I will check labs today. She should return in 3 months for a follow up. 2. Osteoarthritis (hands, knees) - The patient should continue physical therapy and NSAIDs as needed. 3. Pain syndrome (fibromyalgia) - Continue physical therapy. 4. Trochanteric bursitis -  Previous steroid injections have provided relief. We will inject Medrol 80 mg into left trochanteric bursa. 5. Drug therapy monitoring for toxicity (plaquenil ) - CBC, BUN, Cr, AST, ALT and albumin every 3 months; eye exams every 6-12 months for retinal toxicity. 6. New medication - xeljanz - A written summary, as prepared by the [employer] of Rheumatology was provided. The patient was given the opportunity to ask questions, and verbalized understanding of the following: Tofacitinib can lower the ability of your immune system to fight infections. If you develop symptoms of an infection while using this medication, you should stop it and contact your doctor.  The most common side effects of tofacitinib are upper respiratory tract infections, headache, diarrhea, and nasopharyngitis. All patients should be tested for tuberculosis before starting on tofacitinib. Patients should also be screened for hepatitis B and C prior to starting tofacitinib, since this medication may increase risk of reactivation of these infections. Tofacitinib has been associated with increased cholesterol levels in some patients, and should be periodically monitored. If your cholesterol level becomes too high, it is possible you may need to start taking a medication to lower it. A rare complication seen with tofacitinib use in clinical trials was bowel perforation, or a hole in the bowel wall. If you have a history or diverticulitis or develop abdominal pain or bloody bowel movements while taking tofacitinib, you should notify your doctor immediately. Lymphoma and other malignancies have been observed in patients treated with tofacitinib. Beryl Sear Virus-associated post-transplant lymphoproliferative disorder has been observed at an increased rate in renal transplant patients treated with tofacitinib and concomitant immunosuppressive medications. PLAN  1. Plaquenil 200 mg daily  2. Start Xeljanz 11 mg daily once approved  3. Check CBC, CMP, markers of inflammation (ESR, CRP)  4. Screen for hepatitis and tuberculosis in preparation of starting methotrexate and anti-TNF agent in the future  5. Left trochanteric bursa injection  6. Return in 3 months. Lotus Goodwin MD  Adult and Pediatric Rheumatology     [facility] [address], [address] [facility], Phone [phone], Fax [phone]   E-mail: [email or web address]    Visiting  of Pediatrics    Department of Pediatrics, [facility] of [address] [place] [address], [address] [place] [address], Phone [phone], Fax [phone]  E-mail: [email or web address]    There are no Patient Instructions on file for this visit.    cc:  Laz Sharp MD    Written by beatriz Matamoros, as dictated by Kd Mosqueda. Verla Holstein, M.D. Yolande Gaul reviewed for the following:   Faby MEJIA, have reviewed the History, Physical and updated the Allergic reactions for Yasmine Meléndez     TIME OUT performed immediately prior to start of procedure:   Faby MEJIA, have performed the following reviews on Lynette Bryan prior to the start of the procedure:            * Patient was identified by name and date of birth   * Agreement on procedure being performed was verified  * Risks and Benefits explained to the patient  * Procedure site verified and marked as necessary  * Patient was positioned for comfort  * Consent was signed and verified     Time: 1:45  Date of procedure: 10/1/2018    Procedure performed by: Francesco Zavaleta MD    Provider assisted by: self    Patient assisted by: self    How tolerated by patient: tolerated the procedure well with no complications    Post Procedural Pain Scale: 0 - No Hurt    Comments: none    PROCEDURE  After consent was obtained, using sterile technique the left trochanteric bursa was prepped and Depo-Medrol 80 mg and 1ml of lidocaine was then injected and the needle withdrawn. The procedure was well tolerated. The patient is asked to continue to rest for 24 hours before resuming regular activities. It may be more painful for the first 1-2 days. Watch for fever, or increased swelling or persistent pain. Call or return to clinic prn if such symptoms occur.

## 2018-10-02 RX ORDER — TRIAMCINOLONE ACETONIDE 40 MG/ML
80 INJECTION, SUSPENSION INTRA-ARTICULAR; INTRAMUSCULAR ONCE
Qty: 1 ML | Refills: 0
Start: 2018-10-02 | End: 2018-10-02

## 2018-10-03 LAB
ALBUMIN SERPL-MCNC: 4.4 G/DL (ref 3.6–4.8)
ALBUMIN/GLOB SERPL: 2.1 {RATIO} (ref 1.2–2.2)
ALP SERPL-CCNC: 132 IU/L (ref 39–117)
ALT SERPL-CCNC: 11 IU/L (ref 0–32)
AST SERPL-CCNC: 21 IU/L (ref 0–40)
BASOPHILS # BLD MANUAL: 0 X10E3/UL (ref 0–0.2)
BASOPHILS NFR BLD MANUAL: 0 %
BILIRUB SERPL-MCNC: 0.4 MG/DL (ref 0–1.2)
BUN SERPL-MCNC: 8 MG/DL (ref 8–27)
BUN/CREAT SERPL: 8 (ref 12–28)
CALCIUM SERPL-MCNC: 9.4 MG/DL (ref 8.7–10.3)
CHLORIDE SERPL-SCNC: 98 MMOL/L (ref 96–106)
CO2 SERPL-SCNC: 32 MMOL/L (ref 20–29)
COMMENT, 144067: NORMAL
CREAT SERPL-MCNC: 0.97 MG/DL (ref 0.57–1)
CRP SERPL-MCNC: 3.6 MG/L (ref 0–4.9)
DIFFERENTIAL COMMENT, 115260: ABNORMAL
EOSINOPHIL # BLD MANUAL: 0.1 X10E3/UL (ref 0–0.4)
EOSINOPHIL NFR BLD MANUAL: 2 %
ERYTHROCYTE [DISTWIDTH] IN BLOOD BY AUTOMATED COUNT: 14 % (ref 12.3–15.4)
ERYTHROCYTE [SEDIMENTATION RATE] IN BLOOD BY WESTERGREN METHOD: 4 MM/HR (ref 0–40)
GLOBULIN SER CALC-MCNC: 2.1 G/DL (ref 1.5–4.5)
GLUCOSE SERPL-MCNC: 84 MG/DL (ref 65–99)
HBV CORE AB SERPL QL IA: NEGATIVE
HBV CORE IGM SERPL QL IA: NEGATIVE
HBV E AB SERPL QL IA: NEGATIVE
HBV E AG SERPL QL IA: NEGATIVE
HBV SURFACE AB SER QL: NON REACTIVE
HBV SURFACE AG SERPL QL IA: NEGATIVE
HCT VFR BLD AUTO: 38.8 % (ref 34–46.6)
HCV AB S/CO SERPL IA: <0.1 S/CO RATIO (ref 0–0.9)
HGB BLD-MCNC: 12.7 G/DL (ref 11.1–15.9)
LYMPHOCYTES # BLD MANUAL: 1.7 X10E3/UL (ref 0.7–3.1)
LYMPHOCYTES NFR BLD MANUAL: 27 %
MCH RBC QN AUTO: 32.2 PG (ref 26.6–33)
MCHC RBC AUTO-ENTMCNC: 32.7 G/DL (ref 31.5–35.7)
MCV RBC AUTO: 98 FL (ref 79–97)
MONOCYTES # BLD MANUAL: 0.6 X10E3/UL (ref 0.1–0.9)
MONOCYTES NFR BLD MANUAL: 9 %
NEUTROPHILS # BLD MANUAL: 3.8 X10E3/UL (ref 1.4–7)
NEUTROPHILS NFR BLD MANUAL: 62 %
PLATELET # BLD AUTO: 323 X10E3/UL (ref 150–379)
PLATELET BLD QL SMEAR: ADEQUATE
POTASSIUM SERPL-SCNC: 4.3 MMOL/L (ref 3.5–5.2)
PROT SERPL-MCNC: 6.5 G/DL (ref 6–8.5)
RBC # BLD AUTO: 3.95 X10E6/UL (ref 3.77–5.28)
RBC MORPH BLD: ABNORMAL
SODIUM SERPL-SCNC: 141 MMOL/L (ref 134–144)
WBC # BLD AUTO: 6.2 X10E3/UL (ref 3.4–10.8)

## 2018-10-09 LAB
GAMMA INTERFERON BACKGROUND BLD IA-ACNC: 0.02 IU/ML
M TB IFN-G BLD-IMP: NEGATIVE
M TB IFN-G CD4+ T-CELLS BLD-ACNC: 0.02 IU/ML
M TB IFN-G CD4+ T-CELLS BLD-ACNC: 0.03 IU/ML
MITOGEN IGNF BLD-ACNC: >10 IU/ML
QUANTIFERON INCUBATION, QF1T: NORMAL
SERVICE CMNT-IMP: NORMAL

## 2018-11-13 ENCOUNTER — OFFICE VISIT (OUTPATIENT)
Dept: INTERNAL MEDICINE CLINIC | Age: 63
End: 2018-11-13

## 2018-11-13 VITALS
BODY MASS INDEX: 21.27 KG/M2 | OXYGEN SATURATION: 100 % | HEART RATE: 95 BPM | TEMPERATURE: 98 F | HEIGHT: 64 IN | RESPIRATION RATE: 18 BRPM | WEIGHT: 124.6 LBS | DIASTOLIC BLOOD PRESSURE: 88 MMHG | SYSTOLIC BLOOD PRESSURE: 157 MMHG

## 2018-11-13 DIAGNOSIS — J02.9 PHARYNGITIS, UNSPECIFIED ETIOLOGY: Primary | ICD-10-CM

## 2018-11-13 DIAGNOSIS — J01.90 SUBACUTE SINUSITIS, UNSPECIFIED LOCATION: ICD-10-CM

## 2018-11-13 RX ORDER — AMOXICILLIN AND CLAVULANATE POTASSIUM 875; 125 MG/1; MG/1
TABLET, FILM COATED ORAL
Refills: 0 | COMMUNITY
Start: 2018-10-29 | End: 2018-11-13

## 2018-11-13 RX ORDER — OMEPRAZOLE 20 MG/1
CAPSULE, DELAYED RELEASE ORAL
Refills: 0 | COMMUNITY
Start: 2018-10-09 | End: 2018-11-13

## 2018-11-13 RX ORDER — BENZONATATE 100 MG/1
CAPSULE ORAL
Refills: 0 | COMMUNITY
Start: 2018-10-29 | End: 2018-11-13 | Stop reason: SDUPTHER

## 2018-11-13 RX ORDER — BENZONATATE 200 MG/1
CAPSULE ORAL
Qty: 30 CAP | Refills: 1 | Status: SHIPPED | OUTPATIENT
Start: 2018-11-13 | End: 2019-07-10 | Stop reason: ALTCHOICE

## 2018-11-13 RX ORDER — METHYLPREDNISOLONE 4 MG/1
4 TABLET ORAL
Qty: 1 DOSE PACK | Refills: 0 | Status: SHIPPED | OUTPATIENT
Start: 2018-11-13 | End: 2019-04-17

## 2018-11-13 RX ORDER — LEVOFLOXACIN 500 MG/1
500 TABLET, FILM COATED ORAL DAILY
Qty: 10 TAB | Refills: 0 | Status: SHIPPED | OUTPATIENT
Start: 2018-11-13 | End: 2019-04-17

## 2018-11-13 RX ORDER — LIDOCAINE HYDROCHLORIDE 20 MG/ML
SOLUTION ORAL; TOPICAL
Refills: 0 | COMMUNITY
Start: 2018-10-29 | End: 2019-07-10

## 2018-11-13 NOTE — PROGRESS NOTES
Mateo Medrano is a 58 y.o. female who complains of URI symptoms for 2 weeks. The patient reports sore throat, nasal congestion, ear congestion and cough productive of yellow mucous. Feeling SOB. Denies fever, chills, ear pain, sinus pain. No flu shot yet. Past Medical History:   Diagnosis Date    Acid reflux     Acid reflux     Arthritis     Chronic pain     Heart attack ([address]     Heart failure (HCC)     Hypertension     IBS (irritable bowel syndrome)     Squamous cell carcinoma of skin of right elbow 5/2016    Takotsubo cardiomyopathy 11/16/2015    Tobacco abuse 11/16/2015       Review of Systems  Pertinent items are noted in HPI. Objective:     Visit Vitals  /88 (BP 1 Location: Left arm, BP Patient Position: Sitting)   Pulse 95   Temp 98 °F (36.7 °C) (Oral)   Resp 18   Ht 5' 4\" (1.626 m)   Wt 124 lb 9.6 oz (56.5 kg)   SpO2 100%   BMI 21.39 kg/m²     Gen: Mildly ill appearing female  HEENT:   PERRL,normal conjunctiva. External ear and canals normal, TMs no opacification or erythema,  Swollen nasal turbinates, no sinus pain on palpation,  OP no erythema, no exudates, MMM  Neck:   No masses or LAD  Resp:  No wheezing, no rhonchi, no rales. CV:  RRR, normal S1S2, no murmur. Assessment/Plan:       ICD-10-CM ICD-9-CM    1. Pharyngitis, unspecified etiology J02.9 462 methylPREDNISolone (MEDROL DOSEPACK) 4 mg tablet      levoFLOXacin (LEVAQUIN) 500 mg tablet   2. Subacute sinusitis, unspecified location J01.90 461.9 methylPREDNISolone (MEDROL DOSEPACK) 4 mg tablet      levoFLOXacin (LEVAQUIN) 500 mg tablet       Follow-up Disposition:  Return if symptoms worsen or fail to improve.     Fred Kauffman MD

## 2018-11-16 ENCOUNTER — TELEPHONE (OUTPATIENT)
Dept: INTERNAL MEDICINE CLINIC | Age: 63
End: 2018-11-16

## 2018-11-16 NOTE — TELEPHONE ENCOUNTER
Pt requesting a return call concerning headaches, swollen throat an white spot all over throat. Antibiotics are not working.      Copy/Paste from [facility]

## 2018-11-16 NOTE — TELEPHONE ENCOUNTER
Pt advises that she is not noticing any changes since starting antibiotics on Tuesday. Pt is requesting a new Rx to help with the discomfort. Pt uses [employer] on file.      Copy/paste from [facility]

## 2018-11-19 NOTE — TELEPHONE ENCOUNTER
Spoke with patient. Two pt identifiers confirmed. Patient states that she was seen at Urgent Care over the weekend and given some more medication. Advised patient to call the office if still no improvement after the next few days. Pt verbalized understanding of information discussed w/ no further questions at this time.

## 2018-11-27 ENCOUNTER — DOCUMENTATION ONLY (OUTPATIENT)
Dept: RHEUMATOLOGY | Age: 63
End: 2018-11-27

## 2018-11-28 ENCOUNTER — TELEPHONE (OUTPATIENT)
Dept: INTERNAL MEDICINE CLINIC | Age: 63
End: 2018-11-28

## 2018-11-28 NOTE — TELEPHONE ENCOUNTER
Pt advises that she is experiencing swelling in her R foot and leg. Pt advises that it has been swollen for two days, no other symptoms. Pt says she has had a heart attack in the past. I did not see any open appointments for several days.        Message received & copied from [facility]

## 2018-12-03 ENCOUNTER — OFFICE VISIT (OUTPATIENT)
Dept: INTERNAL MEDICINE CLINIC | Age: 63
End: 2018-12-03

## 2018-12-03 ENCOUNTER — HOSPITAL ENCOUNTER (OUTPATIENT)
Dept: ULTRASOUND IMAGING | Age: 63
Discharge: HOME | End: 2018-12-03
Attending: INTERNAL MEDICINE
Payer: COMMERCIAL

## 2018-12-03 VITALS
BODY MASS INDEX: 22.02 KG/M2 | WEIGHT: 129 LBS | SYSTOLIC BLOOD PRESSURE: 131 MMHG | TEMPERATURE: 97.8 F | HEIGHT: 64 IN | DIASTOLIC BLOOD PRESSURE: 76 MMHG | OXYGEN SATURATION: 99 % | RESPIRATION RATE: 18 BRPM | HEART RATE: 99 BPM

## 2018-12-03 DIAGNOSIS — M79.89 PAIN AND SWELLING OF RIGHT LOWER LEG: Primary | ICD-10-CM

## 2018-12-03 DIAGNOSIS — M79.661 PAIN AND SWELLING OF RIGHT LOWER LEG: ICD-10-CM

## 2018-12-03 DIAGNOSIS — M79.661 PAIN AND SWELLING OF RIGHT LOWER LEG: Primary | ICD-10-CM

## 2018-12-03 DIAGNOSIS — M79.89 PAIN AND SWELLING OF RIGHT LOWER LEG: ICD-10-CM

## 2018-12-03 PROCEDURE — 93971 EXTREMITY STUDY: CPT

## 2018-12-03 NOTE — PROGRESS NOTES
CC: Ankle Injury HPI: 
 
She is a 58 y.o. female who presents for evaluation of She las seen on  
 
ROS: 
Constitutional: negative for fevers, chills, anorexia and weight loss Eyes:   negative for visual disturbance,  irritation ENT:   negative for tinnitus,sore throat,nasal congestion,ear pain, sinus pain. Respiratory:  negative for cough, hemoptysis, dyspnea,wheezing CV:   negative for chest pain, palpitations, lower extremity edema GI:   negative for nausea, vomiting, diarrhea, abdominal pain,melena Genitourinary: negative for frequency, dysuria, hematuria Musculoskel: negative for myalgias, arthralgias, back pain, muscle weakness, joint pain Neurological:  negative for headaches, dizziness, focal weakness, numbness Psych:             Negative for depression and anxiety Past Medical History:  
Diagnosis Date  Acid reflux  Acid reflux  Arthritis  Chronic pain  [address] [address]  Heart failure ([facility] [facility]  Hypertension  IBS (irritable bowel syndrome)  Squamous cell carcinoma of skin of right elbow 5/2016  Takotsubo cardiomyopathy 11/16/2015  Tobacco abuse 11/16/2015 Current Outpatient Medications on File Prior to Visit Medication Sig Dispense Refill  LIDOCAINE VISCOUS 2 % solution GARGLE AND HOLD 10MLS IN BACK OF THROAT AND THEN SPIT IN SINK AS . ..  (REFER TO PRESCRIPTION NOTES). 0  
 methylPREDNISolone (MEDROL DOSEPACK) 4 mg tablet Take 1 Tab by mouth Specific Days and Specific Times. 1 Dose Pack 0  
 levoFLOXacin (LEVAQUIN) 500 mg tablet Take 1 Tab by mouth daily. 10 Tab 0  
 benzonatate (TESSALON) 200 mg capsule take 1 capsule by mouth three times a day as needed for cough 30 Cap 1  promethazine (PHENERGAN) 25 mg tablet Take 25 mg by mouth every six (6) hours as needed for Nausea.     
 amLODIPine (NORVASC) 2.5 mg tablet take 1 tablet by mouth once daily 30 Tab 12  
 estrogens, conjugated,-methylTESTOSTERone (ESTRATEST) 1.25-2.5 mg per tablet 1 Tab daily. 0  
 hydroxychloroquine (PLAQUENIL) 200 mg tablet Take 1 Tab by mouth daily. 30 Tab 6  
 atorvastatin (LIPITOR) 40 mg tablet take 1 tablet by mouth at bedtime 30 Tab 12  carvedilol (COREG) 12.5 mg tablet TAKE 1 TABLET BY MOUTH TWICE A DAY WITH MEALS 60 Tab 12  
 omeprazole (PRILOSEC) 40 mg capsule take 1 capsule by mouth once daily ON AN EMPTY STOMACH 30 Cap 5  
 ondansetron (ZOFRAN ODT) 4 mg disintegrating tablet Take 1 Tab by mouth every eight (8) hours as needed for Nausea. 30 Tab 1  
 HYDROcodone-acetaminophen (NORCO)  mg tablet TAKE 1 TABLET BY MOUTH 3 TIMES DAILY AS NEEDED FOR PAIN  0  
 desoximetasone (TOPICORT) 0.25 % topical cream Apply  to affected area two (2) times daily as needed for Skin Irritation. 15 g 0  
 Cetirizine (ZYRTEC) 10 mg cap Take  by mouth.  dicyclomine (BENTYL) 10 mg capsule TAKE 1 CAPSULE BY MOUTH BEFORE MEALS AND AT BEDTIME AS NEEDED.  0  
 gabapentin (NEURONTIN) 300 mg capsule TAKE 1 CAPSULE IN THE MORNING, 1 CAPSULE IN THE AFTERNOON, AD 2 CAPSULES AT BEDTIME (Patient taking differently: TAKE 1 CAPSULE IN THE MORNING, 1 CAPSULE IN THE AFTERNOON, AND 1 CAPSULE AT BEDTIME) 120 Cap 3  
 lidocaine (LIDODERM) 5 % 2 Patches by TransDERmal route as needed. 0  
 aspirin 81 mg chewable tablet Take 1 Tab by mouth daily. 30 Tab 0  
 albuterol (PROVENTIL VENTOLIN) 2.5 mg /3 mL (0.083 %) nebulizer solution inhale contents of 1 vial in nebulizer every 4 hours if needed 25 Each 2  
 DULoxetine (CYMBALTA) 60 mg capsule Take  by mouth daily. 0 No current facility-administered medications on file prior to visit. Past Surgical History:  
Procedure Laterality Date  ABDOMEN SURGERY PROC UNLISTED    
 adhesion removal  
 BREAST SURGERY PROCEDURE UNLISTED    
 lumpectomy  HX GYN    
 oophorectomy  HX ORTHOPAEDIC    
 right knee arthroscopy  HX ORTHOPAEDIC    
 right thumb  HX ORTHOPAEDIC    
 back surgeries  HX OTHER SURGICAL 8 route canals  [place] COLONOSCOPY FLX DX W/COLLJ SPEC WHEN PFRMD  10/15/2012 Family History Problem Relation Age of Onset  Cancer Father   
     prostate  Heart Disease Mother  Heart Disease Maternal Grandmother Reviewed and no changes Social History Socioeconomic History  Marital status:  Spouse name: Not on file  Number of children: Not on file  Years of education: Not on file  Highest education level: Not on file Social Needs  Financial resource strain: Not on file  Food insecurity - worry: Not on file  Food insecurity - inability: Not on file  Transportation needs - medical: Not on file  Transportation needs - non-medical: Not on file Occupational History  Not on file Tobacco Use  Smoking status: Current Every Day Smoker Packs/day: 1.00  Smokeless tobacco: Never Used  Tobacco comment: trying to quit 4-5 cigarettes daily Substance and Sexual Activity  Alcohol use: Yes Alcohol/week: 5.0 oz Types: 10 Glasses of wine per week Comment: 4 times weekly  Drug use: No  
 Sexual activity: Yes  
  Partners: Male Birth control/protection: None Other Topics Concern  Not on file Social History Narrative  Not on file Visit Vitals /76 (BP 1 Location: Right arm, BP Patient Position: Sitting) Pulse 99 Temp 97.8 °F (36.6 °C) (Oral) Resp 18 Ht 5' 4\" (1.626 m) Wt 129 lb (58.5 kg) SpO2 99% BMI 22.14 kg/m² Physical Examination:  
General - Well appearing female HEENT - PERRL, TM no erythema/opacification, normal nasal turbinates, oropharynx no erythema or exudate, MMM Neck - supple, no bruits, no TMG, no LAD Pulm - clear to auscultation bilaterally Cardio - RRR, normal S1 S2, no murmur gallops or rubs Abd - soft, nontender, no masses, no HSM Extrem - no edema, +2 distal pulses Psych - normal affect, appropriate mood Lab Results Component Value Date/Time WBC 6.2 10/01/2018 02:09 PM  
 HGB 12.7 10/01/2018 02:09 PM  
 HCT 38.8 10/01/2018 02:09 PM  
 PLATELET 551 81/50/4734 02:09 PM  
 MCV 98 (H) 10/01/2018 02:09 PM  
 
Lab Results Component Value Date/Time Sodium 141 10/01/2018 02:09 PM  
 Potassium 4.3 10/01/2018 02:09 PM  
 Chloride 98 10/01/2018 02:09 PM  
 CO2 32 (H) 10/01/2018 02:09 PM  
 Anion gap 8 12/30/2017 12:35 AM  
 Glucose 84 10/01/2018 02:09 PM  
 BUN 8 10/01/2018 02:09 PM  
 Creatinine 0.97 10/01/2018 02:09 PM  
 BUN/Creatinine ratio 8 (L) 10/01/2018 02:09 PM  
 GFR est AA 71 10/01/2018 02:09 PM  
 GFR est non-AA 62 10/01/2018 02:09 PM  
 Calcium 9.4 10/01/2018 02:09 PM  
 
Lab Results Component Value Date/Time Cholesterol, total 116 10/11/2017 03:53 PM  
 HDL Cholesterol 57 10/11/2017 03:53 PM  
 LDL, calculated 48 10/11/2017 03:53 PM  
 VLDL, calculated 11 10/11/2017 03:53 PM  
 Triglyceride 55 10/11/2017 03:53 PM  
 
Lab Results Component Value Date/Time TSH 1.080 10/11/2017 03:53 PM  
 
No results found for: PSA, Beryl Comes, PSAR3, [id number], [id number], PSALT Lab Results Component Value Date/Time Hemoglobin A1c 5.6 10/11/2017 03:53 PM  
 
Lab Results Component Value Date/Time VITAMIN D, 25-HYDROXY 44.6 10/11/2017 03:53 PM  
   
Lab Results Component Value Date/Time ALT (SGPT) 11 10/01/2018 02:09 PM  
 AST (SGOT) 21 10/01/2018 02:09 PM  
 Alk. phosphatase 132 (H) 10/01/2018 02:09 PM  
 Bilirubin, total 0.4 10/01/2018 02:09 PM  
 
  
  
Assessment/Plan: 
 
There are no diagnoses linked to this encounter. Follow-up Disposition: Not on File Kwan Walker MD

## 2018-12-03 NOTE — PROGRESS NOTES
Reviewed record in preparation for visit and have obtained necessary documentation. Identified pt with two pt identifiers(name and ). Chief Complaint   Patient presents with    Ankle Injury       Health Maintenance Due   Topic Date Due    Shingles Vaccine (1 of 2) 2004    Stool testing for trace blood  2004    Breast Cancer Screening  2017    Cervical Cancer Screening  2018       Ms. Hermelinda Burkitt has a reminder for a \"due or due soon\" health maintenance. I have asked that she discuss this further with her primary care provider for follow-up on this health maintenance. Coordination of Care Questionnaire:  :     1) Have you been to an emergency room, urgent care clinic since your last visit? no   Hospitalized since your last visit? no             2) Have you seen or consulted any other health care providers outside of [address] [place] [address] since your last visit? no  (Include any pap smears or colon screenings in this section.)    3) In the event something were to happen to you and you were unable to speak on your behalf, do you have an Advance Directive/ Living Will in place stating your wishes? NO    Do you have an Advance Directive on file? no    4) Are you interested in receiving information on Advance Directives? NO    Patient is accompanied by self I have received verbal consent from [address] [place] [address] to discuss any/all medical information while they are present in the room.

## 2018-12-04 NOTE — PROGRESS NOTES
No DVT therefore symptoms most likely represent tendinitis. I recommend that patient ice her ankle for 15 minutes 4-5 times a day and should take Aleve for 1 week.

## 2018-12-04 NOTE — PROGRESS NOTES
Left message for patient that her recent venous duplex showed No DVT therefore symptoms most likely represent tendinitis. I recommend that patient ice her ankle for 15 minutes 4-5 times a day and should take Aleve for 1 week.    Patient advised to contact the office with any questions

## 2018-12-17 NOTE — PROGRESS NOTES
CC: Ankle pain      HPI:    She is a 58 y.o. female who presents for evaluation of right ankle pain and swelling/ woke up 4-5 days ago with pain  Pain on lateral malleolus. Patient denies trauma. Denies twisting ankle  Swelling present and difficult to put shoes on   Longest car tip 3 hours  Cutting back on smoking, using patch  On estrogen        ROS:  10 systems reviewed and negative other than HPI    Past Medical History:   Diagnosis Date    Acid reflux     Acid reflux     Arthritis     Chronic pain     Heart attack ([address]     Heart failure (HCC)     Hypertension     IBS (irritable bowel syndrome)     Squamous cell carcinoma of skin of right elbow 5/2016    Takotsubo cardiomyopathy 11/16/2015    Tobacco abuse 11/16/2015       Current Outpatient Medications on File Prior to Visit   Medication Sig Dispense Refill    LIDOCAINE VISCOUS 2 % solution GARGLE AND HOLD 10MLS IN BACK OF THROAT AND THEN SPIT IN SINK AS . ..  (REFER TO PRESCRIPTION NOTES). 0    methylPREDNISolone (MEDROL DOSEPACK) 4 mg tablet Take 1 Tab by mouth Specific Days and Specific Times. 1 Dose Pack 0    levoFLOXacin (LEVAQUIN) 500 mg tablet Take 1 Tab by mouth daily. 10 Tab 0    benzonatate (TESSALON) 200 mg capsule take 1 capsule by mouth three times a day as needed for cough 30 Cap 1    promethazine (PHENERGAN) 25 mg tablet Take 25 mg by mouth every six (6) hours as needed for Nausea.  amLODIPine (NORVASC) 2.5 mg tablet take 1 tablet by mouth once daily 30 Tab 12    estrogens, conjugated,-methylTESTOSTERone (ESTRATEST) 1.25-2.5 mg per tablet 1 Tab daily. 0    hydroxychloroquine (PLAQUENIL) 200 mg tablet Take 1 Tab by mouth daily.  30 Tab 6    atorvastatin (LIPITOR) 40 mg tablet take 1 tablet by mouth at bedtime 30 Tab 12    carvedilol (COREG) 12.5 mg tablet TAKE 1 TABLET BY MOUTH TWICE A DAY WITH MEALS 60 Tab 12    omeprazole (PRILOSEC) 40 mg capsule take 1 capsule by mouth once daily ON AN EMPTY STOMACH 30 Cap 5    ondansetron (ZOFRAN ODT) 4 mg disintegrating tablet Take 1 Tab by mouth every eight (8) hours as needed for Nausea. 30 Tab 1    HYDROcodone-acetaminophen (NORCO)  mg tablet TAKE 1 TABLET BY MOUTH 3 TIMES DAILY AS NEEDED FOR PAIN  0    desoximetasone (TOPICORT) 0.25 % topical cream Apply  to affected area two (2) times daily as needed for Skin Irritation. 15 g 0    Cetirizine (ZYRTEC) 10 mg cap Take  by mouth.  dicyclomine (BENTYL) 10 mg capsule TAKE 1 CAPSULE BY MOUTH BEFORE MEALS AND AT BEDTIME AS NEEDED.  0    gabapentin (NEURONTIN) 300 mg capsule TAKE 1 CAPSULE IN THE MORNING, 1 CAPSULE IN THE AFTERNOON, AD 2 CAPSULES AT BEDTIME (Patient taking differently: TAKE 1 CAPSULE IN THE MORNING, 1 CAPSULE IN THE AFTERNOON, AND 1 CAPSULE AT BEDTIME) 120 Cap 3    lidocaine (LIDODERM) 5 % 2 Patches by TransDERmal route as needed. 0    aspirin 81 mg chewable tablet Take 1 Tab by mouth daily. 30 Tab 0    albuterol (PROVENTIL VENTOLIN) 2.5 mg /3 mL (0.083 %) nebulizer solution inhale contents of 1 vial in nebulizer every 4 hours if needed 25 Each 2    DULoxetine (CYMBALTA) 60 mg capsule Take  by mouth daily. 0     No current facility-administered medications on file prior to visit.         Past Surgical History:   Procedure Laterality Date    ABDOMEN SURGERY PROC UNLISTED      adhesion removal    BREAST SURGERY PROCEDURE UNLISTED      lumpectomy    HX GYN      oophorectomy    HX ORTHOPAEDIC      right knee arthroscopy    HX ORTHOPAEDIC      right thumb    HX ORTHOPAEDIC      back surgeries    HX OTHER SURGICAL      8 route canals    CT COLONOSCOPY FLX DX W/COLLJ SPEC WHEN PFRMD  10/15/2012            Family History   Problem Relation Age of Onset    Cancer Father         prostate    Heart Disease Mother     Heart Disease Maternal Grandmother      Reviewed and no changes     Social History     Socioeconomic History    Marital status:      Spouse name: Not on file    Number of children: Not on file    Years of education: Not on file    Highest education level: Not on file   Social Needs    Financial resource strain: Not on file    Food insecurity - worry: Not on file    Food insecurity - inability: Not on file    Transportation needs - medical: Not on file   [employer] needs - non-medical: Not on file   Occupational History    Not on file   Tobacco Use    Smoking status: Current Every Day Smoker     Packs/day: 1.00    Smokeless tobacco: Never Used    Tobacco comment: trying to quit 4-5 cigarettes daily   Substance and Sexual Activity    Alcohol use:  Yes     Alcohol/week: 5.0 oz     Types: 10 Glasses of wine per week     Comment: 4 times weekly    Drug use: No    Sexual activity: Yes     Partners: Male     Birth control/protection: None   Other Topics Concern    Not on file   Social History Narrative    Not on file            Visit Vitals  /76 (BP 1 Location: Right arm, BP Patient Position: Sitting)   Pulse 99   Temp 97.8 °F (36.6 °C) (Oral)   Resp 18   Ht 5' 4\" (1.626 m)   Wt 129 lb (58.5 kg)   SpO2 99%   BMI 22.14 kg/m²       Physical Examination:   General - Well appearing female  HEENT - PERRL, TM no erythema/opacification, normal nasal turbinates, oropharynx no erythema or exudate, MMM  Neck - supple, no bruits, no TMG, no LAD  Pulm - clear to auscultation bilaterally  Cardio - RRR, normal S1 S2, no murmur gallops or rubs  Abd - soft, nontender, no masses, no HSM  Extrem -right ankle with swelling on distal aspect, pulses are palpable    Psych - normal affect, appropriate mood    Lab Results   Component Value Date/Time    WBC 6.2 10/01/2018 02:09 PM    HGB 12.7 10/01/2018 02:09 PM    HCT 38.8 10/01/2018 02:09 PM    PLATELET 114 46/39/7433 02:09 PM    MCV 98 (H) 10/01/2018 02:09 PM     Lab Results   Component Value Date/Time    Sodium 141 10/01/2018 02:09 PM    Potassium 4.3 10/01/2018 02:09 PM    Chloride 98 10/01/2018 02:09 PM    CO2 32 (H) 10/01/2018 02:09 PM Anion gap 8 12/30/2017 12:35 AM    Glucose 84 10/01/2018 02:09 PM    BUN 8 10/01/2018 02:09 PM    Creatinine 0.97 10/01/2018 02:09 PM    BUN/Creatinine ratio 8 (L) 10/01/2018 02:09 PM    GFR est AA 71 10/01/2018 02:09 PM    GFR est non-AA 62 10/01/2018 02:09 PM    Calcium 9.4 10/01/2018 02:09 PM     Lab Results   Component Value Date/Time    Cholesterol, total 116 10/11/2017 03:53 PM    HDL Cholesterol 57 10/11/2017 03:53 PM    LDL, calculated 48 10/11/2017 03:53 PM    VLDL, calculated 11 10/11/2017 03:53 PM    Triglyceride 55 10/11/2017 03:53 PM     Lab Results   Component Value Date/Time    TSH 1.080 10/11/2017 03:53 PM     No results found for: PSA, PSA2, PSAR1, Adline Roller, [id number], [id number], PSALT  Lab Results   Component Value Date/Time    Hemoglobin A1c 5.6 10/11/2017 03:53 PM     Lab Results   Component Value Date/Time    VITAMIN D, 25-HYDROXY 44.6 10/11/2017 03:53 PM       Lab Results   Component Value Date/Time    ALT (SGPT) 11 10/01/2018 02:09 PM    AST (SGOT) 21 10/01/2018 02:09 PM    Alk. phosphatase 132 (H) 10/01/2018 02:09 PM    Bilirubin, total 0.4 10/01/2018 02:09 PM           Assessment/Plan:    1. Pain and swelling of right lower leg  - given hx of smoking and recent long car trip, on estrogen, recommend US of leg to rule out DVT. No known trauma  - DUPLEX LOWER EXT VENOUS RIGHT;  Future  - elevate ankle and ice it       Follow-up Disposition: Not on Jayda Castellon MD

## 2018-12-24 RX ORDER — HYDROXYCHLOROQUINE SULFATE 200 MG/1
TABLET, FILM COATED ORAL
Qty: 30 TAB | Refills: 6 | Status: SHIPPED | OUTPATIENT
Start: 2018-12-24 | End: 2019-09-13 | Stop reason: SDUPTHER

## 2019-01-09 ENCOUNTER — OFFICE VISIT (OUTPATIENT)
Dept: RHEUMATOLOGY | Age: 64
End: 2019-01-09

## 2019-01-09 VITALS
HEART RATE: 80 BPM | SYSTOLIC BLOOD PRESSURE: 144 MMHG | DIASTOLIC BLOOD PRESSURE: 65 MMHG | OXYGEN SATURATION: 98 % | WEIGHT: 126 LBS | BODY MASS INDEX: 21.63 KG/M2 | RESPIRATION RATE: 16 BRPM | TEMPERATURE: 98.2 F

## 2019-01-09 DIAGNOSIS — M70.62 TROCHANTERIC BURSITIS OF LEFT HIP: ICD-10-CM

## 2019-01-09 DIAGNOSIS — M70.62 TROCHANTERIC BURSITIS, LEFT HIP: ICD-10-CM

## 2019-01-09 DIAGNOSIS — M19.90 INFLAMMATORY ARTHRITIS: ICD-10-CM

## 2019-01-09 DIAGNOSIS — M05.79 SEROPOSITIVE RHEUMATOID ARTHRITIS OF MULTIPLE SITES (HCC): Primary | ICD-10-CM

## 2019-01-09 NOTE — PROGRESS NOTES
RHEUMATOLOGY PROBLEM LIST AND CHIEF COMPLAINT  1. Inflammatory arthritis - morning stiffness, arthritis on the 2nd and 3rd MCPs bilaterally. 2. Osteoarthritis - hands, knees, feet, hips  3. Fibromyalgia    Therapy History:  Current DMARDs: Plaquenil (4/2017 - 3/2018, ran out in April, 5/2018-current), Humira (ordered 1/2018)    INTERVAL HISTORY  This is a 58 y.o.  female. Today, the patient complains of pain in the joints. Location: back  Severity:  6 on a scale of 0-10  Timing: all day   Duration:  3 months  Context/Associated signs and symptoms: She feels the same today as she did last visit. Arleth Bess was not approved bc insurance wants her to try Humira or Enbrel. Her left trochanteric bursa injection worked for several months, up until 3 weeks ago. She continues with Plaquenil 200 mg daily.      RHEUMATOLOGY REVIEW OF SYSTEMS   Positives as per HPI  Negatives as follows:  Jamal Phillip:  Denies unexplained persistent fevers, weight change, chronic fatigue  HEAD/EYES:   Denies eye redness, blurry vision or sudden loss of vision, dry eyes, HA, temporal artery pain  ENT:    Denies oral/nasal ulcers, recurrent sinus infections, dry mouth  RESPIRATORY:  No pleuritic pain, history of pleural effusions, hemoptysis, exertional dyspnea  CARDIOVASCULAR:  Denies chest pain, history of pericardial effusions  GASTRO:   Denies heartburn, esophageal dysmotility, abdominal pain, nausea, vomiting, diarrhea, blood in the stool  HEMATOLOGIC:  No easy bruising, purpura, swollen lymph nodes  SKIN:    Denies alopecia, ulcers, nodules, sun sensitivity, unexplained persistent rash   VASCULAR:   Denies edema, cyanosis, raynaud phenomenon  NEUROLOGIC:  Denies specific muscle weakness, paresthesias   PSYCHIATRIC:  No sleep disturbance / snoring, depression, anxiety  MSK:    No SI joint pain    PAST MEDICAL HISTORY  Reviewed with patient, significant changes in medical history - no    FAMILY HISTORY   No autoimmune disease in the family    PHYSICAL EXAM  Blood pressure 144/65, pulse 80, temperature 98.2 °F (36.8 °C), temperature source Oral, resp. rate 16, weight 126 lb (57.2 kg), SpO2 98 %. GENERAL APPEARANCE: Well-nourished, no acute distress  NECK: No adenopathy  ENT: No oral ulcers  CARDIOVASCULAR: Heart rhythm is regular. No murmur, rub, gallop  CHEST: Normal vesicular breath sounds. No wheezes, rales, pleural friction rubs  ABDOMINAL: The abdomen is soft and nontender. Bowel sounds are normal  SKIN: No rash, palpable purpura, digital ulcer, abnormal thickening   MUSCULOSKELETAL:   Upper extremities - Heberden's and Hang's nodes bilaterally. Lower extremities - R knee moderate effusion, warmth, tenderness. Left lateral thigh pain    LABS, RADIOLOGY AND PROCEDURES  Previous labs reviewed -Yes  Previous radiology reviewed -Yes  Previous procedures reviewed -Yes  Previous medical records reviewed/summarized -Yes      ASSESSMENT  1. Rheumatoid arthritis - (Established problem -  Progressive disease) - The patient is unchanged on exam since her last visit. We have to start her on Humira before trying Jesus Alberto Paddy, per her insurance. We will continue with Plaquenil 200 mg daily. She should return in 4 months for a follow up. 2. Osteoarthritis (hands, knees) - The patient should continue physical therapy and NSAIDs as needed. 3. Pain syndrome (fibromyalgia) - Continue physical therapy. 4. Trochanteric bursitis -  Previous steroid injections have provided relief. We will inject Medrol 80 mg into left trochanteric bursa today. 5. Drug therapy monitoring for toxicity (plaquenil ) - CBC, BUN, Cr, AST, ALT and albumin every 3 months; eye exams every 6-12 months for retinal toxicity. 6. New medication - Anti-TNF therapy (humira) - A written summary, as prepared by the [employer] of Rheumatology was provided. The patient was given the opportunity to ask questions, and verbalized understanding of the following:  The most common side effect seen with the injectable drugs (adalimumab) are skin reactions, commonly referred to as \"injection site reactions. \"  This can last up to 1 week. The most significant side effects of anti-TNF therapy are increased risk for all types of infections, including TB and fungal infections. Some of these infections may be severe. The patient will be tested for TB and hepatitis prior to starting therapy. The medication should be stopped if there is high fever or if the patient is being treated with antibiotics for an infection. Long term anti-TNF agents may increase the risk of cancers such as lymphoma and skin cancer. There are rare neurologic complications from the use of these medications. People who have a history of multiple sclerosis should not use them. People with significant heart failure should not be on anti-TNF therapy. Using these medications may make the vaccination less effective and live vaccines should not be given. PLAN  1. Plaquenil 200 mg daily  2. Start Humira 40 mg q2 weeks  3. Left trochanteric bursa injection  4. Return in 4 months. Lotus De Leon MD  Adult and Pediatric Rheumatology     [address] [place] [address], Phone [phone], Fax [phone]   E-mail: [email or web address]    Visiting  of Pediatrics    Department of Pediatrics, [facility] of [address] [place] [address], [address] [place] [address], Phone [phone], Fax [phone]  E-mail: [email or web address]    There are no Patient Instructions on file for this visit. cc:  Ebenezer Lofton MD    Written by beatriz Fernandez, as dictated by Juju De Leon M.D.    [facility] May reviewed for the following:   I, Delmy Torres, have reviewed the History, Physical and updated the Allergic reactions for Yasmine Meléndez     TIME OUT performed immediately prior to start of procedure:   IDee, have performed the following reviews on Yasmine Meléndez prior to the start of the procedure:            * Patient was identified by name and date of birth   * Agreement on procedure being performed was verified  * Risks and Benefits explained to the patient  * Procedure site verified and marked as necessary  * Patient was positioned for comfort  * Consent was signed and verified     Time: 12:30      Date of procedure: 1/9/2019    Procedure performed by: Vincent Collado MD    Provider assisted by: none    Patient assisted by: self    How tolerated by patient: tolerated the procedure well with no complications    Post Procedural Pain Scale: 0 - No Hurt    Comments: none    PROCEDURE  After consent was obtained, using sterile technique the left trochanteric bursa was prepped and Kenalog 80 mg and 1ml of lidocaine was then injected and the needle withdrawn. The procedure was well tolerated. The patient is asked to continue to rest for 24 hours before resuming regular activities. It may be more painful for the first 1-2 days. Watch for fever, or increased swelling or persistent pain. Call or return to clinic prn if such symptoms occur.

## 2019-01-16 RX ORDER — TRIAMCINOLONE ACETONIDE 40 MG/ML
80 INJECTION, SUSPENSION INTRA-ARTICULAR; INTRAMUSCULAR ONCE
Qty: 1 ML | Refills: 0
Start: 2019-01-16 | End: 2019-01-16

## 2019-01-16 RX ORDER — LIDOCAINE HYDROCHLORIDE 10 MG/ML
1 INJECTION, SOLUTION EPIDURAL; INFILTRATION; INTRACAUDAL; PERINEURAL ONCE
Qty: 1 ML | Refills: 0
Start: 2019-01-16 | End: 2019-01-16

## 2019-01-21 ENCOUNTER — DOCUMENTATION ONLY (OUTPATIENT)
Dept: RHEUMATOLOGY | Age: 64
End: 2019-01-21

## 2019-01-29 ENCOUNTER — DOCUMENTATION ONLY (OUTPATIENT)
Dept: RHEUMATOLOGY | Age: 64
End: 2019-01-29

## 2019-02-01 RX ORDER — AMLODIPINE BESYLATE 2.5 MG/1
TABLET ORAL
Qty: 30 TAB | Refills: 1 | Status: SHIPPED | OUTPATIENT
Start: 2019-02-01

## 2019-04-11 ENCOUNTER — APPOINTMENT (OUTPATIENT)
Dept: CT IMAGING | Age: 64
End: 2019-04-11
Attending: STUDENT IN AN ORGANIZED HEALTH CARE EDUCATION/TRAINING PROGRAM
Payer: COMMERCIAL

## 2019-04-11 ENCOUNTER — HOSPITAL ENCOUNTER (EMERGENCY)
Age: 64
Discharge: HOME | End: 2019-04-11
Attending: EMERGENCY MEDICINE
Payer: COMMERCIAL

## 2019-04-11 VITALS
RESPIRATION RATE: 21 BRPM | SYSTOLIC BLOOD PRESSURE: 171 MMHG | WEIGHT: 120 LBS | OXYGEN SATURATION: 98 % | HEART RATE: 84 BPM | BODY MASS INDEX: 19.29 KG/M2 | DIASTOLIC BLOOD PRESSURE: 95 MMHG | HEIGHT: 66 IN | TEMPERATURE: 98.5 F

## 2019-04-11 DIAGNOSIS — D72.829 LEUKOCYTOSIS, UNSPECIFIED TYPE: ICD-10-CM

## 2019-04-11 DIAGNOSIS — F11.93 OPIATE WITHDRAWAL (HCC): ICD-10-CM

## 2019-04-11 DIAGNOSIS — R10.84 ABDOMINAL PAIN, GENERALIZED: Primary | ICD-10-CM

## 2019-04-11 LAB
ALBUMIN SERPL-MCNC: 3.9 G/DL (ref 3.5–5)
ALBUMIN/GLOB SERPL: 1.1 {RATIO} (ref 1.1–2.2)
ALP SERPL-CCNC: 138 U/L (ref 45–117)
ALT SERPL-CCNC: 28 U/L (ref 12–78)
ANION GAP SERPL CALC-SCNC: 12 MMOL/L (ref 5–15)
APPEARANCE UR: CLEAR
AST SERPL-CCNC: 56 U/L (ref 15–37)
ATRIAL RATE: 75 BPM
BACTERIA URNS QL MICRO: NEGATIVE /HPF
BASOPHILS # BLD: 0.1 K/UL (ref 0–0.1)
BASOPHILS NFR BLD: 0 % (ref 0–1)
BILIRUB SERPL-MCNC: 0.3 MG/DL (ref 0.2–1)
BILIRUB UR QL: NEGATIVE
BUN SERPL-MCNC: 10 MG/DL (ref 6–20)
BUN/CREAT SERPL: 13 (ref 12–20)
CALCIUM SERPL-MCNC: 8.7 MG/DL (ref 8.5–10.1)
CALCULATED P AXIS, ECG09: 69 DEGREES
CALCULATED R AXIS, ECG10: 35 DEGREES
CALCULATED T AXIS, ECG11: 48 DEGREES
CHLORIDE SERPL-SCNC: 102 MMOL/L (ref 97–108)
CO2 SERPL-SCNC: 25 MMOL/L (ref 21–32)
COLOR UR: NORMAL
CREAT SERPL-MCNC: 0.8 MG/DL (ref 0.55–1.02)
DIAGNOSIS, 93000: NORMAL
DIFFERENTIAL METHOD BLD: ABNORMAL
EOSINOPHIL # BLD: 0 K/UL (ref 0–0.4)
EOSINOPHIL NFR BLD: 0 % (ref 0–7)
EPITH CASTS URNS QL MICRO: NORMAL /LPF
ERYTHROCYTE [DISTWIDTH] IN BLOOD BY AUTOMATED COUNT: 13.4 % (ref 11.5–14.5)
GLOBULIN SER CALC-MCNC: 3.7 G/DL (ref 2–4)
GLUCOSE SERPL-MCNC: 112 MG/DL (ref 65–100)
GLUCOSE UR STRIP.AUTO-MCNC: NEGATIVE MG/DL
HCT VFR BLD AUTO: 40.1 % (ref 35–47)
HGB BLD-MCNC: 13.6 G/DL (ref 11.5–16)
HGB UR QL STRIP: NEGATIVE
HYALINE CASTS URNS QL MICRO: NORMAL /LPF (ref 0–5)
IMM GRANULOCYTES # BLD AUTO: 0.1 K/UL (ref 0–0.04)
IMM GRANULOCYTES NFR BLD AUTO: 0 % (ref 0–0.5)
KETONES UR QL STRIP.AUTO: NEGATIVE MG/DL
LEUKOCYTE ESTERASE UR QL STRIP.AUTO: NEGATIVE
LIPASE SERPL-CCNC: 104 U/L (ref 73–393)
LYMPHOCYTES # BLD: 0.9 K/UL (ref 0.8–3.5)
LYMPHOCYTES NFR BLD: 5 % (ref 12–49)
MAGNESIUM SERPL-MCNC: 1.4 MG/DL (ref 1.6–2.4)
MCH RBC QN AUTO: 33.5 PG (ref 26–34)
MCHC RBC AUTO-ENTMCNC: 33.9 G/DL (ref 30–36.5)
MCV RBC AUTO: 98.8 FL (ref 80–99)
MONOCYTES # BLD: 0.9 K/UL (ref 0–1)
MONOCYTES NFR BLD: 5 % (ref 5–13)
NEUTS SEG # BLD: 14.4 K/UL (ref 1.8–8)
NEUTS SEG NFR BLD: 90 % (ref 32–75)
NITRITE UR QL STRIP.AUTO: NEGATIVE
NRBC # BLD: 0 K/UL (ref 0–0.01)
NRBC BLD-RTO: 0 PER 100 WBC
P-R INTERVAL, ECG05: 174 MS
PH UR STRIP: 7 [PH] (ref 5–8)
PLATELET # BLD AUTO: 346 K/UL (ref 150–400)
PMV BLD AUTO: 9.5 FL (ref 8.9–12.9)
POTASSIUM SERPL-SCNC: 3.7 MMOL/L (ref 3.5–5.1)
PROT SERPL-MCNC: 7.6 G/DL (ref 6.4–8.2)
PROT UR STRIP-MCNC: NEGATIVE MG/DL
Q-T INTERVAL, ECG07: 436 MS
QRS DURATION, ECG06: 90 MS
QTC CALCULATION (BEZET), ECG08: 486 MS
RBC # BLD AUTO: 4.06 M/UL (ref 3.8–5.2)
RBC #/AREA URNS HPF: NORMAL /HPF (ref 0–5)
SODIUM SERPL-SCNC: 139 MMOL/L (ref 136–145)
SP GR UR REFRACTOMETRY: 1.01 (ref 1–1.03)
TROPONIN I SERPL-MCNC: <0.05 NG/ML
UA: UC IF INDICATED,UAUC: NORMAL
UROBILINOGEN UR QL STRIP.AUTO: 0.2 EU/DL (ref 0.2–1)
VENTRICULAR RATE, ECG03: 75 BPM
WBC # BLD AUTO: 16.4 K/UL (ref 3.6–11)
WBC URNS QL MICRO: NORMAL /HPF (ref 0–4)

## 2019-04-11 PROCEDURE — 96361 HYDRATE IV INFUSION ADD-ON: CPT

## 2019-04-11 PROCEDURE — 96374 THER/PROPH/DIAG INJ IV PUSH: CPT

## 2019-04-11 PROCEDURE — 85025 COMPLETE CBC W/AUTO DIFF WBC: CPT

## 2019-04-11 PROCEDURE — 80053 COMPREHEN METABOLIC PANEL: CPT

## 2019-04-11 PROCEDURE — 74011250636 HC RX REV CODE- 250/636: Performed by: EMERGENCY MEDICINE

## 2019-04-11 PROCEDURE — 74177 CT ABD & PELVIS W/CONTRAST: CPT

## 2019-04-11 PROCEDURE — 93005 ELECTROCARDIOGRAM TRACING: CPT

## 2019-04-11 PROCEDURE — 74011250636 HC RX REV CODE- 250/636: Performed by: STUDENT IN AN ORGANIZED HEALTH CARE EDUCATION/TRAINING PROGRAM

## 2019-04-11 PROCEDURE — 36415 COLL VENOUS BLD VENIPUNCTURE: CPT

## 2019-04-11 PROCEDURE — 74011000258 HC RX REV CODE- 258: Performed by: STUDENT IN AN ORGANIZED HEALTH CARE EDUCATION/TRAINING PROGRAM

## 2019-04-11 PROCEDURE — 83735 ASSAY OF MAGNESIUM: CPT

## 2019-04-11 PROCEDURE — 84484 ASSAY OF TROPONIN QUANT: CPT

## 2019-04-11 PROCEDURE — 96375 TX/PRO/DX INJ NEW DRUG ADDON: CPT

## 2019-04-11 PROCEDURE — 83690 ASSAY OF LIPASE: CPT

## 2019-04-11 PROCEDURE — 99284 EMERGENCY DEPT VISIT MOD MDM: CPT

## 2019-04-11 PROCEDURE — 96376 TX/PRO/DX INJ SAME DRUG ADON: CPT

## 2019-04-11 PROCEDURE — 81001 URINALYSIS AUTO W/SCOPE: CPT

## 2019-04-11 PROCEDURE — 74011636320 HC RX REV CODE- 636/320: Performed by: EMERGENCY MEDICINE

## 2019-04-11 RX ORDER — FENTANYL CITRATE 50 UG/ML
25 INJECTION, SOLUTION INTRAMUSCULAR; INTRAVENOUS ONCE
Status: COMPLETED | OUTPATIENT
Start: 2019-04-11 | End: 2019-04-11

## 2019-04-11 RX ORDER — PROMETHAZINE HYDROCHLORIDE 25 MG/1
25 TABLET ORAL
Qty: 12 TAB | Refills: 0 | OUTPATIENT
Start: 2019-04-11 | End: 2020-11-02

## 2019-04-11 RX ORDER — ONDANSETRON 2 MG/ML
4 INJECTION INTRAMUSCULAR; INTRAVENOUS
Status: COMPLETED | OUTPATIENT
Start: 2019-04-11 | End: 2019-04-11

## 2019-04-11 RX ORDER — SODIUM CHLORIDE 0.9 % (FLUSH) 0.9 %
10 SYRINGE (ML) INJECTION
Status: COMPLETED | OUTPATIENT
Start: 2019-04-11 | End: 2019-04-11

## 2019-04-11 RX ORDER — ONDANSETRON 2 MG/ML
INJECTION INTRAMUSCULAR; INTRAVENOUS
Status: DISCONTINUED
Start: 2019-04-11 | End: 2019-04-11 | Stop reason: HOSPADM

## 2019-04-11 RX ORDER — LORAZEPAM 2 MG/ML
0.5 INJECTION INTRAMUSCULAR
Status: COMPLETED | OUTPATIENT
Start: 2019-04-11 | End: 2019-04-11

## 2019-04-11 RX ORDER — DIPHENHYDRAMINE HYDROCHLORIDE 50 MG/ML
25 INJECTION, SOLUTION INTRAMUSCULAR; INTRAVENOUS
Status: DISCONTINUED | OUTPATIENT
Start: 2019-04-11 | End: 2019-04-11 | Stop reason: HOSPADM

## 2019-04-11 RX ADMIN — ONDANSETRON 4 MG: 2 INJECTION INTRAMUSCULAR; INTRAVENOUS at 01:54

## 2019-04-11 RX ADMIN — PROMETHAZINE HYDROCHLORIDE 25 MG: 25 INJECTION INTRAMUSCULAR; INTRAVENOUS at 06:36

## 2019-04-11 RX ADMIN — Medication 10 ML: at 07:15

## 2019-04-11 RX ADMIN — FENTANYL CITRATE 25 MCG: 50 INJECTION, SOLUTION INTRAMUSCULAR; INTRAVENOUS at 05:19

## 2019-04-11 RX ADMIN — LORAZEPAM 0.5 MG: 2 INJECTION INTRAMUSCULAR; INTRAVENOUS at 04:54

## 2019-04-11 RX ADMIN — IOPAMIDOL 100 ML: 755 INJECTION, SOLUTION INTRAVENOUS at 07:15

## 2019-04-11 RX ADMIN — ONDANSETRON 4 MG: 2 INJECTION INTRAMUSCULAR; INTRAVENOUS at 04:51

## 2019-04-11 RX ADMIN — SODIUM CHLORIDE 1000 ML: 900 INJECTION, SOLUTION INTRAVENOUS at 05:14

## 2019-04-11 NOTE — ED NOTES
Bedside shift change report given to Elva Kaur RN (oncoming nurse) by Bartolo Mckeon RN (offgoing nurse). Report included the following information SBAR, Kardex, ED Summary, Procedure Summary, MAR and Recent Results.

## 2019-04-11 NOTE — ED NOTES
PT and male visitor made aware an IV would be inserted and will obtain blood work, pt then puts finger down her throat to vomit. Continuously moves in wheelchair. Attempted IV stick to the right AC, once site was obtained pt jerked arm popping tourniquet and removing IV site,  Obtained site in Left AC. Flushes well no blood return, unable to obtain blood work due to pt not being able to stay still to obtain blood work.

## 2019-04-11 NOTE — ED PROVIDER NOTES
Ms. Maryann Morales is a 71 YOF with a medical history notable for gastroesophageal reflux, chronic pain on hydrocodone, irritable bowel syndrome who presents with a chief complaint of acute onset nausea and vomiting. Per patient and significant other, she's had +10 episodes of nausea/vomiting with associated diarrhea, nonbilious/nonbloody. Patient denies any acute abdominal pain, chest pain, or shortness of breath. States she had a similar episode like this 1 year ago. Endorses chills, but denies any fevers or sweats. No reported sick contacts. Of note, patient reiterates that she takes hydrocodone daily and ran out the day prior to her symptom onset. Notes that she has not been able to tolerate PO all day. 2 ppd smoker and regular alcohol use.            Past Medical History:   Diagnosis Date    Acid reflux     Acid reflux     Arthritis     Chronic pain     Heart attack ([address]     Heart failure (HCC)     Hypertension     IBS (irritable bowel syndrome)     Squamous cell carcinoma of skin of right elbow 5/2016    Takotsubo cardiomyopathy 11/16/2015    Tobacco abuse 11/16/2015       Past Surgical History:   Procedure Laterality Date    ABDOMEN SURGERY PROC UNLISTED      adhesion removal    BREAST SURGERY PROCEDURE UNLISTED      lumpectomy    HX GYN      oophorectomy    HX ORTHOPAEDIC      right knee arthroscopy    HX ORTHOPAEDIC      right thumb    HX ORTHOPAEDIC      back surgeries    HX OTHER SURGICAL      8 route canals    CO COLONOSCOPY FLX DX W/COLLJ SPEC WHEN PFRMD  10/15/2012              Family History:   Problem Relation Age of Onset    Cancer Father         prostate    Heart Disease Mother     Heart Disease Maternal Grandmother        Social History     Socioeconomic History    Marital status:      Spouse name: Not on file    Number of children: Not on file    Years of education: Not on file    Highest education level: Not on file   Occupational History    Not on file   Social Needs  Financial resource strain: Not on [employer] insecurity:     Worry: Not on file     Inability: Not on file   [employer] needs:     Medical: Not on file     Non-medical: Not on file   Tobacco Use    Smoking status: Current Every Day Smoker     Packs/day: 1.00    Smokeless tobacco: Never Used    Tobacco comment: trying to quit 4-5 cigarettes daily   Substance and Sexual Activity    Alcohol use: Yes     Alcohol/week: 5.0 oz     Types: 10 Glasses of wine per week     Comment: 4 times weekly    Drug use: No    Sexual activity: Yes     Partners: Male     Birth control/protection: None   Lifestyle    Physical activity:     Days per week: Not on file     Minutes per session: Not on file    Stress: Not on file   Relationships    Social connections:     Talks on phone: Not on file     Gets together: Not on file     Attends Cheondo[employer] service: Not on file     Active member of club or organization: Not on file     Attends meetings of clubs or organizations: Not on file     Relationship status: Not on file    Intimate partner violence:     Fear of current or ex partner: Not on file     Emotionally abused: Not on file     Physically abused: Not on file     Forced sexual activity: Not on file   Other Topics Concern    Not on file   Social History Narrative    Not on file         ALLERGIES: Ace inhibitors; Arb-angiotensin receptor antagonist; Codeine; Keflex [cephalexin]; Milk; and Morphine    Review of Systems   Constitutional: Positive for activity change. Negative for fever. HENT: Negative for sneezing and voice change. Eyes: Negative for pain and visual disturbance. Respiratory: Negative for chest tightness and shortness of breath. Cardiovascular: Negative for chest pain and palpitations. Gastrointestinal: Positive for diarrhea, nausea and vomiting. Negative for blood in stool and constipation. Genitourinary: Negative for dysuria and frequency. Musculoskeletal: Positive for back pain. Negative for joint swelling. Vitals:    04/11/19 0011 04/11/19 0318   BP: 150/55 (!) 161/93   Pulse: 94 80   Resp: 26 21   Temp: 97.5 °F (36.4 °C) 98.5 °F (36.9 °C)   SpO2: 100% 100%   Weight: 54.4 kg (120 lb)    Height: 5' 6\" (1.676 m)             Physical Exam   Constitutional: She is oriented to person, place, and time. She appears well-developed. She appears distressed. HENT:   Head: Normocephalic and atraumatic. Eyes: Conjunctivae and EOM are normal.   Cardiovascular: Normal rate, regular rhythm and intact distal pulses. Pulmonary/Chest: Effort normal. No respiratory distress. Abdominal: Soft. Bowel sounds are normal. She exhibits no distension. There is no guarding. Musculoskeletal: Normal range of motion. She exhibits no deformity. Neurological: She is alert and oriented to person, place, and time. Skin: Skin is warm and dry. MDM  Number of Diagnoses or Management Options  Diagnosis management comments: Ms. Jayde Levy is a 71 YOF who presents with the above states history, ROS, and physical exam. COWS estimated to be 11 based off of exam. Differential diagnosis includes opioid withdrawal, acute IBS flare, gastroenteritis (less likely), or pancreatitis (less likely). No current clinical concern for bowel obstruction or hepatobiliary pathology. Will obtain CBC, BMP, troponin, hepatic panel, lipase, UA, and EKG. Additionally, will provide patient with 1L normal saline bolus and 4 mg of IV zofran for dehydration and nausea, respectively. Pending reassessment. 04/11/2019 @ 05:25:  Patient and significant other endorsing improvement of symptoms, yet still endorsing mild nausea. Will re-dose with 25mg of IV Phenergan. 04/11/2019 @ 06:01:  Notable leukocytosis on CBC without clear etiology. Will obtain CT scan w/ oral and IV contrast of the abdomen/pelvis to exclude intra-abdominal infection/inflammation. Portia Morrow.  María Jernigan M.D.  [facility] Emergency Medicine, PGY-2  Work phone: [phone]

## 2019-04-11 NOTE — DISCHARGE INSTRUCTIONS
Patient Education        Abdominal Pain: Care Instructions  Your Care Instructions    Abdominal pain has many possible causes. Some aren't serious and get better on their own in a few days. Others need more testing and treatment. If your pain continues or gets worse, you need to be rechecked and may need more tests to find out what is wrong. You may need surgery to correct the problem. Don't ignore new symptoms, such as fever, nausea and vomiting, urination problems, pain that gets worse, and dizziness. These may be signs of a more serious problem. Your doctor may have recommended a follow-up visit in the next 8 to 12 hours. If you are not getting better, you may need more tests or treatment. The doctor has checked you carefully, but problems can develop later. If you notice any problems or new symptoms, get medical treatment right away. Follow-up care is a key part of your treatment and safety. Be sure to make and go to all appointments, and call your doctor if you are having problems. It's also a good idea to know your test results and keep a list of the medicines you take. How can you care for yourself at home? · Rest until you feel better. · To prevent dehydration, drink plenty of fluids, enough so that your urine is light yellow or clear like water. Choose water and other caffeine-free clear liquids until you feel better. If you have kidney, heart, or liver disease and have to limit fluids, talk with your doctor before you increase the amount of fluids you drink. · If your stomach is upset, eat mild foods, such as rice, dry toast or crackers, bananas, and applesauce. Try eating several small meals instead of two or three large ones. · Wait until 48 hours after all symptoms have gone away before you have spicy foods, alcohol, and drinks that contain caffeine. · Do not eat foods that are high in fat. · Avoid anti-inflammatory medicines such as aspirin, ibuprofen (Advil, Motrin), and naproxen (Aleve). These can cause stomach upset. Talk to your doctor if you take daily aspirin for another health problem. When should you call for help? Call 911 anytime you think you may need emergency care. For example, call if:    · You passed out (lost consciousness).     · You pass maroon or very bloody stools.     · You vomit blood or what looks like coffee grounds.     · You have new, severe belly pain.    Call your doctor now or seek immediate medical care if:    · Your pain gets worse, especially if it becomes focused in one area of your belly.     · You have a new or higher fever.     · Your stools are black and look like tar, or they have streaks of blood.     · You have unexpected vaginal bleeding.     · You have symptoms of a urinary tract infection. These may include:  ? Pain when you urinate. ? Urinating more often than usual.  ? Blood in your urine.     · You are dizzy or lightheaded, or you feel like you may faint.    Watch closely for changes in your health, and be sure to contact your doctor if:    · You are not getting better after 1 day (24 hours). Where can you learn more? Go to http://saulMYagonism.comcharo.info/. Enter M777 in the search box to learn more about \"Abdominal Pain: Care Instructions. \"  Current as of: September 23, 2018  Content Version: 11.9  © 2037-9730 [employer]. Care instructions adapted under license by [employer] (which disclaims liability or warranty for this information). If you have questions about a medical condition or this instruction, always ask your healthcare professional. [place] [address] any warranty or liability for your use of this information. Patient Education        Nausea and Vomiting: Care Instructions  Your Care Instructions    When you are nauseated, you may feel weak and sweaty and notice a lot of saliva in your mouth. Nausea often leads to vomiting.  Most of the time you do not need to worry about nausea and vomiting, but they can be signs of other illnesses. Two common causes of nausea and vomiting are stomach flu and food poisoning. Nausea and vomiting from viral stomach flu will usually start to improve within 24 hours. Nausea and vomiting from food poisoning may last from 12 to 48 hours. The doctor has checked you carefully, but problems can develop later. If you notice any problems or new symptoms, get medical treatment right away. Follow-up care is a key part of your treatment and safety. Be sure to make and go to all appointments, and call your doctor if you are having problems. It's also a good idea to know your test results and keep a list of the medicines you take. How can you care for yourself at home? · To prevent dehydration, drink plenty of fluids, enough so that your urine is light yellow or clear like water. Choose water and other caffeine-free clear liquids until you feel better. If you have kidney, heart, or liver disease and have to limit fluids, talk with your doctor before you increase the amount of fluids you drink. · Rest in bed until you feel better. · When you are able to eat, try clear soups, mild foods, and liquids until all symptoms are gone for 12 to 48 hours. Other good choices include dry toast, crackers, cooked cereal, and gelatin dessert, such as Jell-O. When should you call for help? Call 911 anytime you think you may need emergency care. For example, call if:    · You passed out (lost consciousness).    Call your doctor now or seek immediate medical care if:    · You have symptoms of dehydration, such as:  ? Dry eyes and a dry mouth. ? Passing only a little dark urine. ?  Feeling thirstier than usual.     · You have new or worsening belly pain.     · You have a new or higher fever.     · You vomit blood or what looks like coffee grounds.    Watch closely for changes in your health, and be sure to contact your doctor if:    · You have ongoing nausea and vomiting.     · Your vomiting is getting worse.     · Your vomiting lasts longer than 2 days.     · You are not getting better as expected. Where can you learn more? Go to http://saul-charo.info/. Enter 25 135126 in the search box to learn more about \"Nausea and Vomiting: Care Instructions. \"  Current as of: September 23, 2018  Content Version: 11.9  © 9890-1430 [employer]. Care instructions adapted under license by [employer] (which disclaims liability or warranty for this information). If you have questions about a medical condition or this instruction, always ask your healthcare professional. [place] [address] any warranty or liability for your use of this information.

## 2019-04-11 NOTE — ED NOTES
Dr. Hugo Ahn reviewed discharge instructions with the patient. The patient verbalized understanding. All questions and concerns were addressed. The patient declined a wheelchair and is discharged ambulatory in the care of family members with instructions and prescriptions in hand. Pt is alert and oriented x 4. Respirations are clear and unlabored.

## 2019-04-17 ENCOUNTER — OFFICE VISIT (OUTPATIENT)
Dept: URGENT CARE | Age: 64
End: 2019-04-17

## 2019-04-17 VITALS
HEART RATE: 90 BPM | RESPIRATION RATE: 18 BRPM | HEIGHT: 66 IN | OXYGEN SATURATION: 95 % | TEMPERATURE: 97.2 F | BODY MASS INDEX: 20.09 KG/M2 | WEIGHT: 125 LBS | SYSTOLIC BLOOD PRESSURE: 118 MMHG | DIASTOLIC BLOOD PRESSURE: 72 MMHG

## 2019-04-17 DIAGNOSIS — J44.1 CHRONIC OBSTRUCTIVE PULMONARY DISEASE WITH ACUTE EXACERBATION (HCC): Primary | ICD-10-CM

## 2019-04-17 RX ORDER — PROMETHAZINE HYDROCHLORIDE AND DEXTROMETHORPHAN HYDROBROMIDE 6.25; 15 MG/5ML; MG/5ML
5 SYRUP ORAL
Qty: 60 ML | Refills: 0 | Status: SHIPPED | OUTPATIENT
Start: 2019-04-17 | End: 2019-07-10 | Stop reason: ALTCHOICE

## 2019-04-17 RX ORDER — ALBUTEROL SULFATE 90 UG/1
2 AEROSOL, METERED RESPIRATORY (INHALATION)
Qty: 1 INHALER | Refills: 0 | Status: SHIPPED | OUTPATIENT
Start: 2019-04-17

## 2019-04-17 RX ORDER — IPRATROPIUM BROMIDE AND ALBUTEROL SULFATE 2.5; .5 MG/3ML; MG/3ML
3 SOLUTION RESPIRATORY (INHALATION)
Qty: 1 NEBULE | Refills: 0 | Status: SHIPPED | COMMUNITY
Start: 2019-04-17 | End: 2019-04-17 | Stop reason: CLARIF

## 2019-04-17 RX ORDER — IPRATROPIUM BROMIDE AND ALBUTEROL SULFATE 2.5; .5 MG/3ML; MG/3ML
3 SOLUTION RESPIRATORY (INHALATION)
Status: COMPLETED | OUTPATIENT
Start: 2019-04-17 | End: 2019-04-17

## 2019-04-17 RX ORDER — DOXYCYCLINE 100 MG/1
100 CAPSULE ORAL 2 TIMES DAILY
Qty: 20 CAP | Refills: 0 | Status: SHIPPED | OUTPATIENT
Start: 2019-04-17 | End: 2019-10-02 | Stop reason: ALTCHOICE

## 2019-04-17 RX ORDER — NYSTATIN 100000 [USP'U]/ML
500000 SUSPENSION ORAL 4 TIMES DAILY
Qty: 140 ML | Refills: 0 | Status: SHIPPED | OUTPATIENT
Start: 2019-04-17 | End: 2019-04-24

## 2019-04-17 RX ORDER — FLUTICASONE PROPIONATE 50 MCG
2 SPRAY, SUSPENSION (ML) NASAL DAILY
Qty: 1 BOTTLE | Refills: 0 | Status: SHIPPED | OUTPATIENT
Start: 2019-04-17 | End: 2019-07-10 | Stop reason: ALTCHOICE

## 2019-04-17 RX ORDER — IPRATROPIUM BROMIDE AND ALBUTEROL SULFATE 2.5; .5 MG/3ML; MG/3ML
3 SOLUTION RESPIRATORY (INHALATION)
Status: DISCONTINUED | OUTPATIENT
Start: 2019-04-17 | End: 2019-04-17 | Stop reason: CLARIF

## 2019-04-17 RX ORDER — PREDNISONE 20 MG/1
TABLET ORAL
Qty: 12 TAB | Refills: 0 | Status: SHIPPED | OUTPATIENT
Start: 2019-04-17 | End: 2019-05-23

## 2019-04-17 RX ADMIN — IPRATROPIUM BROMIDE AND ALBUTEROL SULFATE 3 ML: 2.5; .5 SOLUTION RESPIRATORY (INHALATION) at 20:00

## 2019-04-17 NOTE — PATIENT INSTRUCTIONS
Chronic Obstructive Pulmonary Disease (COPD) Flare-Ups: Care Instructions  Your Care Instructions    Chronic obstructive pulmonary disease (COPD) is a lung disease that makes it hard to breathe. It is caused by damage to the lungs over many years, usually from smoking. COPD is often a mix of two diseases:  · Chronic bronchitis: The airways that carry air to the lungs (bronchial tubes) get inflamed and make a lot of mucus. This can narrow or block the airways. · Emphysema: In a healthy person, the tiny air sacs in the lungs are like balloons. As you breathe in and out, they get bigger and smaller to move air through your lungs. But with emphysema, these air sacs are damaged and lose their stretch. Less air gets in and out of the lungs. Many people with COPD have attacks called flare-ups or exacerbations. This is when your usual symptoms quickly get worse and stay worse. The doctor has checked you carefully. But problems can develop later. If you notice any problems or new symptoms, get medical treatment right away. Follow-up care is a key part of your treatment and safety. Be sure to make and go to all appointments, and call your doctor if you are having problems. It's also a good idea to know your test results and keep a list of the medicines you take. How can you care for yourself at home? · Be safe with medicines. Take your medicines exactly as prescribed. Call your doctor if you think you are having a problem with your medicine. You may be taking medicines such as:  ? Bronchodilators. These help open your airways and make breathing easier. ? Corticosteroids. These reduce airway inflammation. They may be given as pills, in a vein, or in an inhaled form. You may go home with pills in addition to an inhaler that you already use. · A spacer may help you get more inhaled medicine to your lungs. Ask your doctor or pharmacist if a spacer is right for you. If it is, ask how to use it properly.   · If your doctor prescribed antibiotics, take them as directed. Do not stop taking them just because you feel better. You need to take the full course of antibiotics. · If your doctor prescribed oxygen, use the flow rate your doctor has recommended. Do not change it without talking to your doctor first.  · Do not smoke. Smoking makes COPD worse. If you need help quitting, talk to your doctor about stop-smoking programs and medicines. These can increase your chances of quitting for good. When should you call for help? Call 911 anytime you think you may need emergency care. For example, call if:    · You have severe trouble breathing.    Call your doctor now or seek immediate medical care if:    · You have new or worse trouble breathing.     · Your coughing or wheezing gets worse.     · You cough up dark brown or bloody mucus (sputum).     · You have a new or higher fever.    Watch closely for changes in your health, and be sure to contact your doctor if:    · You notice more mucus or a change in the color of your mucus.     · You need to use your antibiotic or steroid pills.     · You do not get better as expected. Where can you learn more? Go to http://saul-charo.info/. Enter F429 in the search box to learn more about \"Chronic Obstructive Pulmonary Disease (COPD) Flare-Ups: Care Instructions. \"  Current as of: September 5, 2018  Content Version: 11.9  © 2701-5068 [employer]. Care instructions adapted under license by [employer] (which disclaims liability or warranty for this information). If you have questions about a medical condition or this instruction, always ask your healthcare professional. [place] [address] any warranty or liability for your use of this information.

## 2019-04-17 NOTE — PROGRESS NOTES
Cough   This is a new problem. The current episode started more than 1 week ago. The problem occurs constantly. The problem has not changed since onset. The cough is non-productive. There has been no fever. Associated symptoms include shortness of breath and wheezing. Pertinent negatives include no chest pain and no chills. She has tried nothing for the symptoms. She is not a smoker. Her past medical history is significant for bronchitis and asthma.         Past Medical History:   Diagnosis Date    Acid reflux     Acid reflux     Arthritis     Chronic pain     Heart attack ([address]     Heart failure (HCC)     Hypertension     IBS (irritable bowel syndrome)     Squamous cell carcinoma of skin of right elbow 5/2016    Takotsubo cardiomyopathy 11/16/2015    Tobacco abuse 11/16/2015        Past Surgical History:   Procedure Laterality Date    ABDOMEN SURGERY PROC UNLISTED      adhesion removal    BREAST SURGERY PROCEDURE UNLISTED      lumpectomy    HX GYN      oophorectomy    HX ORTHOPAEDIC      right knee arthroscopy    HX ORTHOPAEDIC      right thumb    HX ORTHOPAEDIC      back surgeries    HX OTHER SURGICAL      8 route canals    TX COLONOSCOPY FLX DX W/COLLJ SPEC WHEN PFRMD  10/15/2012              Family History   Problem Relation Age of Onset    Cancer Father         prostate    Heart Disease Mother     Heart Disease Maternal Grandmother         Social History     Socioeconomic History    Marital status:      Spouse name: Not on file    Number of children: Not on file    Years of education: Not on file    Highest education level: Not on file   Occupational History    Not on file   Social Needs    Financial resource strain: Not on file    Food insecurity:     Worry: Not on file     Inability: Not on file    Transportation needs:     Medical: Not on file     Non-medical: Not on file   Tobacco Use    Smoking status: Current Every Day Smoker     Packs/day: 1.00    Smokeless tobacco: Never Used    Tobacco comment: trying to quit 4-5 cigarettes daily   Substance and Sexual Activity    Alcohol use: Yes     Alcohol/week: 5.0 oz     Types: 10 Glasses of wine per week     Comment: 4 times weekly    Drug use: No    Sexual activity: Yes     Partners: Male     Birth control/protection: None   Lifestyle    Physical activity:     Days per week: Not on file     Minutes per session: Not on file    Stress: Not on file   Relationships    Social connections:     Talks on phone: Not on file     Gets together: Not on file     Attends Buddhism service: Not on file     Active member of club or organization: Not on file     Attends meetings of clubs or organizations: Not on file     Relationship status: Not on file    Intimate partner violence:     Fear of current or ex partner: Not on file     Emotionally abused: Not on file     Physically abused: Not on file     Forced sexual activity: Not on file   Other Topics Concern    Not on file   Social History Narrative    Not on file                ALLERGIES: Ace inhibitors; Arb-angiotensin receptor antagonist; Codeine; Keflex [cephalexin]; Milk; and Morphine    Review of Systems   Constitutional: Negative for chills. Respiratory: Positive for cough, shortness of breath and wheezing. Cardiovascular: Negative for chest pain. All other systems reviewed and are negative. Vitals:    04/17/19 1819   BP: 118/72   Pulse: 90   Resp: 18   Temp: 97.2 °F (36.2 °C)   SpO2: 95%   Weight: 125 lb (56.7 kg)   Height: 5' 6\" (1.676 m)       Physical Exam   Constitutional: No distress. HENT:   Right Ear: Tympanic membrane and ear canal normal.   Left Ear: Tympanic membrane and ear canal normal.   Nose: Nose normal.   Mouth/Throat: No oropharyngeal exudate, posterior oropharyngeal edema or posterior oropharyngeal erythema. Eyes: Conjunctivae are normal. Right eye exhibits no discharge. Left eye exhibits no discharge. Neck: Neck supple.    Pulmonary/Chest: Effort normal. No respiratory distress. She has decreased breath sounds. She has wheezes. She has rhonchi. She has no rales. Lymphadenopathy:     She has no cervical adenopathy. Skin: No rash noted. Nursing note and vitals reviewed. MDM    Procedures      ICD-10-CM ICD-9-CM    1. Chronic obstructive pulmonary disease with acute exacerbation (HCC) J44.1 491.21 XR CHEST PA LAT     Medications Ordered Today   Medications    DISCONTD: albuterol-ipratropium (DUO-NEB) 2.5 MG-0.5 MG/3 ML     Order Specific Question:   MODE OF DELIVERY     Answer:   Nebulizer    predniSONE (DELTASONE) 20 mg tablet     Sig: 3 tab once x 2 day, 2 tab once x 2 d and 1 tab once x 2 days     Dispense:  12 Tab     Refill:  0    albuterol (PROVENTIL HFA, VENTOLIN HFA, PROAIR HFA) 90 mcg/actuation inhaler     Sig: Take 2 Puffs by inhalation every six (6) hours as needed for Wheezing. Dispense:  1 Inhaler     Refill:  0    doxycycline (MONODOX) 100 mg capsule     Sig: Take 1 Cap by mouth two (2) times a day. Dispense:  20 Cap     Refill:  0    fluticasone propionate (FLONASE) 50 mcg/actuation nasal spray     Si Sprays by Both Nostrils route daily. Dispense:  1 Bottle     Refill:  0    promethazine-dextromethorphan (PROMETHAZINE-DM) 6.25-15 mg/5 mL syrup     Sig: Take 5 mL by mouth nightly as needed for Cough. Dispense:  60 mL     Refill:  0    nystatin (MYCOSTATIN) 100,000 unit/mL suspension     Sig: Take 5 mL by mouth four (4) times daily for 7 days. swish and spit     Dispense:  140 mL     Refill:  0    albuterol-ipratropium (DUO-NEB) 2.5 MG-0.5 MG/3 ML     Order Specific Question:   MODE OF DELIVERY     Answer:   Nebulizer    albuterol-ipratropium (DUO-NEB) 2.5 mg-0.5 mg/3 ml nebu     Sig: 3 mL by Nebulization route now for 1 dose.      Dispense:  1 Nebule     Refill:  0     Results for orders placed or performed in visit on 19   XR CHEST PA LAT    Narrative    EXAM: XR CHEST PA LAT    INDICATION: congestive cough with wheezing    COMPARISON: 2015. FINDINGS: PA and lateral radiographs of the chest demonstrate new vague  opacities at both lung bases. The cardiac and mediastinal contours and pulmonary  vascularity are normal. The bones and soft tissues are within normal limits. Impression    IMPRESSION: Subsegmental atelectasis versus early developing bibasilar  pneumonia. The patients condition was discussed with the patient and they understand. The patient is to follow up with primary care doctor. If signs and symptoms become worse the pt is to go to the ER. The patient is to take medications as prescribed.

## 2019-05-01 RX ORDER — ATORVASTATIN CALCIUM 40 MG/1
TABLET, FILM COATED ORAL
Qty: 30 TAB | Refills: 12 | OUTPATIENT
Start: 2019-05-01

## 2019-05-03 NOTE — TELEPHONE ENCOUNTER
Spoke with patient  Verified patient with 2 patient identifiers    Informed need labs prior to refilling Atorvastatin. Patient verbalized understanding,states PCP has done labs will call that office for refill.

## 2019-05-22 ENCOUNTER — TELEPHONE (OUTPATIENT)
Dept: INTERNAL MEDICINE CLINIC | Age: 64
End: 2019-05-22

## 2019-05-22 NOTE — TELEPHONE ENCOUNTER
Spoke with patient. Two pt identifiers confirmed. Patient states that she is having bilateral ankle edema. Patient states that she is willing to see first available provider. Patient offered an appointment with Dr. Semaj Tompkins on 05/23/19. Appointment accepted. Pt verbalized understanding of information discussed w/ no further questions at this time.    Patient advised if anything changes or if unable to keep this appointment to call the office

## 2019-05-22 NOTE — TELEPHONE ENCOUNTER
----- Message from Lizzeth Ariza sent at 5/22/2019 10:12 AM EDT -----  Regarding: DR Rodas Sox / TELEPHONE  Pt is requesting an appt today due to ankle swelling.    Best Contact: [phone]    Copy/paste Anna Campbell

## 2019-05-23 ENCOUNTER — OFFICE VISIT (OUTPATIENT)
Dept: INTERNAL MEDICINE CLINIC | Age: 64
End: 2019-05-23

## 2019-05-23 VITALS
SYSTOLIC BLOOD PRESSURE: 131 MMHG | OXYGEN SATURATION: 96 % | HEIGHT: 66 IN | WEIGHT: 124.4 LBS | TEMPERATURE: 98.3 F | DIASTOLIC BLOOD PRESSURE: 71 MMHG | RESPIRATION RATE: 18 BRPM | BODY MASS INDEX: 19.99 KG/M2 | HEART RATE: 93 BPM

## 2019-05-23 DIAGNOSIS — I10 ESSENTIAL HYPERTENSION: ICD-10-CM

## 2019-05-23 DIAGNOSIS — F41.9 ANXIETY: ICD-10-CM

## 2019-05-23 DIAGNOSIS — R60.9 EDEMA, UNSPECIFIED TYPE: Primary | ICD-10-CM

## 2019-05-23 DIAGNOSIS — G25.0 ESSENTIAL TREMOR: ICD-10-CM

## 2019-05-23 RX ORDER — HYDROXYZINE 25 MG/1
TABLET, FILM COATED ORAL
Qty: 30 TAB | Refills: 0 | Status: SHIPPED | OUTPATIENT
Start: 2019-05-23 | End: 2019-06-05 | Stop reason: SDUPTHER

## 2019-05-23 NOTE — PROGRESS NOTES
Laney Ponce is a 71 y.o. female who presents      Reports bilateral forefoot swelling right greater than left. Likes to [employer]. Reports shaking hands bilaterally. Worse when fatigued. Worse when using hands. Avoid caffeine. Was in ED on 4/11 for vomiting and abdominal pain. Given phenergan, not effective initially, but helped after second dose. CT abd/pelvis normal. Prior normal gastric emptying study. Feels well if eating routinely, followed by GI. Today, no nausea or vomiting. Normal BM. Feeling stressed. Caring for 1year old grandson. Past Medical History:   Diagnosis Date    Acid reflux     Acid reflux     Arthritis     Chronic pain     [facility] [address] [address]     Heart failure (HCC)     Hypertension     IBS (irritable bowel syndrome)     Squamous cell carcinoma of skin of right elbow 5/2016    Takotsubo cardiomyopathy 11/16/2015    Tobacco abuse 11/16/2015       Family History   Problem Relation Age of Onset    Cancer Father         prostate    Heart Disease Mother     Heart Disease Maternal Grandmother        Social History     Socioeconomic History    Marital status:      Spouse name: Not on file    Number of children: Not on file    Years of education: Not on file    Highest education level: Not on file   Occupational History    Not on file   Social Needs    Financial resource strain: Not on file    Food insecurity:     Worry: Not on file     Inability: Not on file    Transportation needs:     Medical: Not on file     Non-medical: Not on file   Tobacco Use    Smoking status: Current Every Day Smoker     Packs/day: 1.00    Smokeless tobacco: Never Used    Tobacco comment: trying to quit 4-5 cigarettes daily   Substance and Sexual Activity    Alcohol use:  Yes     Alcohol/week: 5.0 oz     Types: 10 Glasses of wine per week     Comment: 4 times weekly    Drug use: No    Sexual activity: Yes     Partners: Male     Birth control/protection: None Lifestyle    Physical activity:     Days per week: Not on file     Minutes per session: Not on file    Stress: Not on file   Relationships    Social connections:     Talks on phone: Not on file     Gets together: Not on file     Attends Pentecostalism service: Not on file     Active member of club or organization: Not on file     Attends meetings of clubs or organizations: Not on file     Relationship status: Not on file    Intimate partner violence:     Fear of current or ex partner: Not on file     Emotionally abused: Not on file     Physically abused: Not on file     Forced sexual activity: Not on file   Other Topics Concern    Not on file   Social History Narrative    Not on file       Current Outpatient Medications on File Prior to Visit   Medication Sig Dispense Refill    albuterol (PROVENTIL HFA, VENTOLIN HFA, PROAIR HFA) 90 mcg/actuation inhaler Take 2 Puffs by inhalation every six (6) hours as needed for Wheezing. 1 Inhaler 0    doxycycline (MONODOX) 100 mg capsule Take 1 Cap by mouth two (2) times a day. 20 Cap 0    fluticasone propionate (FLONASE) 50 mcg/actuation nasal spray 2 Sprays by Both Nostrils route daily. 1 Bottle 0    promethazine (PHENERGAN) 25 mg tablet Take 1 Tab by mouth every six (6) hours as needed for Nausea. 12 Tab 0    amLODIPine (NORVASC) 2.5 mg tablet take 1 tablet by mouth once daily 30 Tab 1    adalimumab (HUMIRA,CF, PEN) 40 mg/0.4 mL pnkt 40 mg by SubCUTAneous route every fourteen (14) days. 2 Kit 6    carvedilol (COREG) 12.5 mg tablet take 1 tablet by mouth twice a day with meals 60 Tab 12    adalimumab (HUMIRA PEN) 40 mg/0.4 mL pnkt 40 mg by SubCUTAneous route every fourteen (14) days. 2 Kit 6    hydroxychloroquine (PLAQUENIL) 200 mg tablet take 1 tablet by mouth once daily 30 Tab 6    amLODIPine (NORVASC) 2.5 mg tablet take 1 tablet by mouth once daily 30 Tab 12    estrogens, conjugated,-methylTESTOSTERone (ESTRATEST) 1.25-2.5 mg per tablet 1 Tab daily.   0    atorvastatin (LIPITOR) 40 mg tablet take 1 tablet by mouth at bedtime 30 Tab 12    omeprazole (PRILOSEC) 40 mg capsule take 1 capsule by mouth once daily ON AN EMPTY STOMACH 30 Cap 5    ondansetron (ZOFRAN ODT) 4 mg disintegrating tablet Take 1 Tab by mouth every eight (8) hours as needed for Nausea. 30 Tab 1    HYDROcodone-acetaminophen (NORCO)  mg tablet TAKE 1 TABLET BY MOUTH 3 TIMES DAILY AS NEEDED FOR PAIN  0    desoximetasone (TOPICORT) 0.25 % topical cream Apply  to affected area two (2) times daily as needed for Skin Irritation. 15 g 0    Cetirizine (ZYRTEC) 10 mg cap Take  by mouth.  dicyclomine (BENTYL) 10 mg capsule TAKE 1 CAPSULE BY MOUTH BEFORE MEALS AND AT BEDTIME AS NEEDED.  0    gabapentin (NEURONTIN) 300 mg capsule TAKE 1 CAPSULE IN THE MORNING, 1 CAPSULE IN THE AFTERNOON, AD 2 CAPSULES AT BEDTIME (Patient taking differently: TAKE 1 CAPSULE IN THE MORNING, 1 CAPSULE IN THE AFTERNOON, AND 1 CAPSULE AT BEDTIME) 120 Cap 3    aspirin 81 mg chewable tablet Take 1 Tab by mouth daily. 30 Tab 0    albuterol (PROVENTIL VENTOLIN) 2.5 mg /3 mL (0.083 %) nebulizer solution inhale contents of 1 vial in nebulizer every 4 hours if needed 25 Each 2    promethazine-dextromethorphan (PROMETHAZINE-DM) 6.25-15 mg/5 mL syrup Take 5 mL by mouth nightly as needed for Cough. 60 mL 0    LIDOCAINE VISCOUS 2 % solution GARGLE AND HOLD 10MLS IN BACK OF THROAT AND THEN SPIT IN SINK AS . ..  (REFER TO PRESCRIPTION NOTES). 0    benzonatate (TESSALON) 200 mg capsule take 1 capsule by mouth three times a day as needed for cough 30 Cap 1    lidocaine (LIDODERM) 5 % 2 Patches by TransDERmal route as needed. 0    DULoxetine (CYMBALTA) 60 mg capsule Take  by mouth daily. 0     No current facility-administered medications on file prior to visit. Review of Systems  Pertinent items are noted in HPI.     Objective:     Visit Vitals  /71 (BP 1 Location: Left arm, BP Patient Position: Sitting)   Pulse 93 Temp 98.3 °F (36.8 °C) (Oral)   Resp 18   Ht 5' 6\" (1.676 m)   Wt 124 lb 6.4 oz (56.4 kg)   SpO2 96%   BMI 20.08 kg/m²     Gen: well appearing female  HEENT:   PERRL,normal conjunctiva. External ear and canals normal, TMs no opacification or erythema,  OP no erythema, no exudates, MMM  Neck:  Supple. Thyroid normal size, nontender, without nodules. No masses or LAD  Resp:  No wheezing, no rhonchi, no rales. CV:  RRR, normal S1S2, no murmur. GI: soft, nontender, without masses. No hepatosplenomegaly. Extrem:  +2 pulses, +1 ankle edema, warm distally      Assessment/Plan:       ICD-10-CM ICD-9-CM    1. Edema, unspecified type R60.9 782.3    2. Anxiety F41.9 300.00 hydrOXYzine HCl (ATARAX) 25 mg tablet   3. Essential tremor G25.0 333.1    4. Essential hypertension I10 401.9      Elevate legs, avoid salt, take breaks from brace. Follow-up and Dispositions    · Return if symptoms worsen or fail to improve.          Walter Victoria MD

## 2019-05-23 NOTE — PATIENT INSTRUCTIONS
Low Sodium Diet (2,000 Milligram): Care Instructions  Your Care Instructions    Too much sodium causes your body to hold on to extra water. This can raise your blood pressure and force your heart and kidneys to work harder. In very serious cases, this could cause you to be put in the hospital. It might even be life-threatening. By limiting sodium, you will feel better and lower your risk of serious problems. The most common source of sodium is salt. People get most of the salt in their diet from canned, prepared, and packaged foods. Fast food and restaurant meals also are very high in sodium. Your doctor will probably limit your sodium to less than 2,000 milligrams (mg) a day. This limit counts all the sodium in prepared and packaged foods and any salt you add to your food. Follow-up care is a key part of your treatment and safety. Be sure to make and go to all appointments, and call your doctor if you are having problems. It's also a good idea to know your test results and keep a list of the medicines you take. How can you care for yourself at home? Read food labels  · Read labels on cans and food packages. The labels tell you how much sodium is in each serving. Make sure that you look at the serving size. If you eat more than the serving size, you have eaten more sodium. · Food labels also tell you the Percent Daily Value for sodium. Choose products with low Percent Daily Values for sodium. · Be aware that sodium can come in forms other than salt, including monosodium glutamate (MSG), sodium citrate, and sodium bicarbonate (baking soda). MSG is often added to Asian food. When you eat out, you can sometimes ask for food without MSG or added salt. Buy low-sodium foods  · Buy foods that are labeled \"unsalted\" (no salt added), \"sodium-free\" (less than 5 mg of sodium per serving), or \"low-sodium\" (less than 140 mg of sodium per serving).  Foods labeled \"reduced-sodium\" and \"light sodium\" may still have too much sodium. Be sure to read the label to see how much sodium you are getting. · Buy fresh vegetables, or frozen vegetables without added sauces. Buy low-sodium versions of canned vegetables, soups, and other canned goods. Prepare low-sodium meals  · Cut back on the amount of salt you use in cooking. This will help you adjust to the taste. Do not add salt after cooking. One teaspoon of salt has about 2,300 mg of sodium. · Take the salt shaker off the table. · Flavor your food with garlic, lemon juice, onion, vinegar, herbs, and spices. Do not use soy sauce, lite soy sauce, steak sauce, onion salt, garlic salt, celery salt, mustard, or ketchup on your food. · Use low-sodium salad dressings, sauces, and ketchup. Or make your own salad dressings and sauces without adding salt. · Use less salt (or none) when recipes call for it. You can often use half the salt a recipe calls for without losing flavor. Other foods such as rice, pasta, and grains do not need added salt. · Rinse canned vegetables, and cook them in fresh water. This removes some--but not all--of the salt. · Avoid water that is naturally high in sodium or that has been treated with water softeners, which add sodium. Call your local water company to find out the sodium content of your water supply. If you buy bottled water, read the label and choose a sodium-free brand. Avoid high-sodium foods  · Avoid eating:  ? Smoked, cured, salted, and canned meat, fish, and poultry. ? Ham, herrera, hot dogs, and luncheon meats. ? Regular, hard, and processed cheese and regular peanut butter. ? Crackers with salted tops, and other salted snack foods such as pretzels, chips, and salted popcorn. ? Frozen prepared meals, unless labeled low-sodium. ? Canned and dried soups, broths, and bouillon, unless labeled sodium-free or low-sodium. ? Canned vegetables, unless labeled sodium-free or low-sodium. ? Western Nancy fries, pizza, tacos, and other fast foods.   ? Camryn Amabile, olives, ketchup, and other condiments, especially soy sauce, unless labeled sodium-free or low-sodium. Where can you learn more? Go to http://saul-charo.info/. Enter H230 in the search box to learn more about \"Low Sodium Diet (2,000 Milligram): Care Instructions. \"  Current as of: March 28, 2018  Content Version: 11.9  © 8656-8654 [employer]ac[employer] by [employer]). Care instructions adapted under license by [employer] (which disclaims liability or warranty for this information). If you have questions about a medical condition or this instruction, always ask your healthcare professional. [address] any warranty or liability for your use of this information. Avoid caffeine, avoid sudafed (decongestants).

## 2019-06-05 DIAGNOSIS — F41.9 ANXIETY: ICD-10-CM

## 2019-06-05 RX ORDER — HYDROXYZINE 25 MG/1
TABLET, FILM COATED ORAL
Qty: 30 TAB | Refills: 0 | Status: SHIPPED | OUTPATIENT
Start: 2019-06-05 | End: 2019-07-10

## 2019-06-05 NOTE — TELEPHONE ENCOUNTER
Patient states she needs refill done thru [facility] indicated. Please call if any questions or when done.  Thank you

## 2019-06-18 ENCOUNTER — TELEPHONE (OUTPATIENT)
Dept: CARDIOLOGY CLINIC | Age: 64
End: 2019-06-18

## 2019-06-18 NOTE — TELEPHONE ENCOUNTER
atorvastatin (LIPITOR) 40 mg tablet     Patient states refill was denied due to her needing an apt, I have her scheduled to come in on  Wednesday, July 10, 2019 09:45 AM      Please advise    Thanks

## 2019-06-18 NOTE — TELEPHONE ENCOUNTER
Spoke with patient  Verified patient with 2 patient identifiers    Informed need labs prior to medication refill. Patient verbalized understanding, states PCP has checked lipids. Referred to PCP for refill.

## 2019-06-19 ENCOUNTER — TELEPHONE (OUTPATIENT)
Dept: INTERNAL MEDICINE CLINIC | Age: 64
End: 2019-06-19

## 2019-06-19 NOTE — TELEPHONE ENCOUNTER
----- Message from Max Cesar sent at 6/18/2019  4:55 PM EDT -----  Regarding: Dr. Devi Guaman Telephone  Patient would like a call back.  Wanted to know if she got a CHOL test done last august. Contact is [phone]    Copy/paste [email or web address]

## 2019-06-19 NOTE — TELEPHONE ENCOUNTER
Left message for patient advising that her last cholesterol check was done 10/2017.   Patient advised to contact the office with any additional questions

## 2019-06-20 ENCOUNTER — TELEPHONE (OUTPATIENT)
Dept: CARDIOLOGY CLINIC | Age: 64
End: 2019-06-20

## 2019-06-20 NOTE — TELEPHONE ENCOUNTER
Returned patient call not available, left message on voicemail. Need inform patient will need labs prior to medication refill.

## 2019-06-21 NOTE — TELEPHONE ENCOUNTER
Spoke with patient  Verified patient with 2 patient identifiers    Informed need lipids prior to medication refill on cholesterol medication. Patient verbalized understanding,states will check with PCP and if labs done by PCP will have them refill medication. If lipids not done will advise so that labs can be ordered.

## 2019-06-25 ENCOUNTER — OFFICE VISIT (OUTPATIENT)
Dept: RHEUMATOLOGY | Age: 64
End: 2019-06-25

## 2019-06-25 VITALS
TEMPERATURE: 97.9 F | WEIGHT: 124 LBS | DIASTOLIC BLOOD PRESSURE: 81 MMHG | RESPIRATION RATE: 16 BRPM | BODY MASS INDEX: 20.01 KG/M2 | SYSTOLIC BLOOD PRESSURE: 148 MMHG | HEART RATE: 87 BPM | OXYGEN SATURATION: 95 %

## 2019-06-25 DIAGNOSIS — M05.79 SEROPOSITIVE RHEUMATOID ARTHRITIS OF MULTIPLE SITES (HCC): Primary | ICD-10-CM

## 2019-06-25 DIAGNOSIS — M70.62 TROCHANTERIC BURSITIS OF LEFT HIP: ICD-10-CM

## 2019-06-25 NOTE — PROGRESS NOTES
RHEUMATOLOGY PROBLEM LIST AND CHIEF COMPLAINT  1. Inflammatory arthritis - morning stiffness, arthritis on the 2nd and 3rd MCPs bilaterally. 2. Osteoarthritis - hands, knees, feet, hips  3. Fibromyalgia    Therapy History:  Current DMARDs: Plaquenil (4/2017 - 3/2018, ran out in April, 5/2018-current), Humira (1/2018-current)    INTERVAL HISTORY  This is a 71 y.o.  female. Today, the patient complains of pain in the joints. Location: back  Severity:  4 on a scale of 0-10  Timing: all day   Duration:  3 months  Context/Associated signs and symptoms: The patient is doing well today. She reports the Humira has been effective at controlling joint symptoms, no AEs reported. She requests another left trochanteric bursa injection. She continues with 40 mg q2 weeks and Plaquenil 200 mg daily.      RHEUMATOLOGY REVIEW OF SYSTEMS   Positives as per history  Negatives as follows:  Andrei Many:  Denies unexplained persistent fevers, weight change, chronic fatigue  HEAD/EYES:   Denies eye redness, blurry vision or sudden loss of vision, dry eyes, HA, temporal artery pain  ENT:    Denies oral/nasal ulcers, recurrent sinus infections, dry mouth  RESPIRATORY:  No pleuritic pain, history of pleural effusions, hemoptysis, exertional dyspnea  CARDIOVASCULAR:  Denies chest pain, history of pericardial effusions  GASTRO:   Denies heartburn, esophageal dysmotility, abdominal pain, nausea, vomiting, diarrhea, blood in the stool  HEMATOLOGIC:  No easy bruising, purpura, swollen lymph nodes  SKIN:    Denies alopecia, ulcers, nodules, sun sensitivity, unexplained persistent rash   VASCULAR:   Denies edema, cyanosis, raynaud phenomenon  NEUROLOGIC:  Denies specific muscle weakness, paresthesias   PSYCHIATRIC:  No sleep disturbance / snoring, depression, anxiety  MSK:    No morning stiffness >1 hour, SI joint pain,     PAST MEDICAL HISTORY  Reviewed with patient, significant changes in medical history - No      FAMILY HISTORY No autoimmune disease in the family    PHYSICAL EXAM  Blood pressure 148/81, pulse 87, temperature 97.9 °F (36.6 °C), temperature source Oral, resp. rate 16, weight 124 lb (56.2 kg), SpO2 95 %. GENERAL APPEARANCE: Well-nourished, no acute distress  EYES: No scleral erythema, conjunctival injection  ENT: No oral ulcer, parotid enlargement  NECK: No adenopathy, thyroid enlargement  CARDIOVASCULAR: Heart rhythm is regular. No murmur, rub, gallop  CHEST: Normal vesicular breath sounds. No wheezes, rales, pleural friction rubs  ABDOMINAL: The abdomen is soft and nontender. Bowel sounds are normal  EXTREMITIES: There is no evidence of clubbing, cyanosis, edema  SKIN: No rash, palpable purpura, digital ulcer, abnormal thickening, normal nailfold capillaries   NEUROLOGICAL: Normal gait and station, full strength in upper and lower extremities,  normal sensation to light touch  MUSCULOSKELETAL:   Upper extremities - Heberden's and Hang's nodes bilaterally. Lower extremities - R knee moderate swelling, bony prominence. Left lateral thigh pain    LABS, RADIOLOGY AND PROCEDURES  Previous labs reviewed -Yes  Previous radiology reviewed -Yes  Previous procedures reviewed -Yes  Previous medical records reviewed/summarized -Yes      ASSESSMENT  1. Rheumatoid arthritis - (Established problem -  Very good partial response) - The patient has improved on exam today. We will continue with Humira 40 mg q2 weeks and Plaquenil 200 mg daily. She should return in 4 months for a follow up. 2. Osteoarthritis (hands, knees) - The patient should continue physical therapy and NSAIDs as needed. 3. Pain syndrome (fibromyalgia) - Continue physical therapy. We did not discuss this today. 4. Trochanteric bursitis -  We will inject Medrol 80 mg into left trochanteric bursa since previous steroid injections have provided relief.   5. Drug therapy monitoring for toxicity (plaquenil ) - CBC, BUN, Cr, AST, ALT and albumin every 3 months; eye exams every 6-12 months for retinal toxicity. 6. Drug therapy monitoring for toxicity (Humira) - CBC, BUN, Cr, AST, ALT and albumin every 4 months      PLAN  1. Plaquenil 200 mg daily  2. Humira 40 mg q2 weeks  3. Left trochanteric bursa injection  4. Return in 4 months. Lotus Montes MD  Adult and Pediatric Rheumatology     [address], [address], Phone [phone], Fax [phone]   E-mail: [email or web address]    Visiting  of Pediatrics    Department of Pediatrics, [facility] of [address] [place] [address], [address] [place] [address], Phone [phone], Fax [phone]  E-mail: [email or web address]    There are no Patient Instructions on file for this visit. cc:  Marino Roth MD    Written by beatriz Richards, as dictated by Soy He. Coleen Montes M.D.    Fish Galnilo reviewed for the following:   Kaye Duckworth MD, have reviewed the History, Physical and updated the Allergic reactions for Yasmine E [address] [place] [address], [facility]. performed immediately prior to start of procedure:   Kaye Duckworth MD, have performed the following reviews on Miguel Fofana MD prior to the start of the procedure:            * Patient was identified by name and date of birth   * Agreement on procedure being performed was verified  * Risks and Benefits explained to the patient  * Procedure site verified and marked as necessary  * Patient was positioned for comfort  * Consent was signed and verified     Time: 12:30      Date of procedure: 6/25/2019    Procedure performed by:   Miguel Fofana MD    Provider assisted by: none    Patient assisted by: self    How tolerated by patient: tolerated the procedure well with no complications    Post Procedural Pain Scale: 0 - No Hurt    Comments: none    PROCEDURE  After consent was obtained, using sterile technique the left trochanteric bursa was prepped and Kenalog 80 mg and 1 ml of lidocaine was then injected and the needle withdrawn. The procedure was well tolerated. The patient is asked to continue to rest for 24 hours before resuming regular activities. It may be more painful for the first 1-2 days. Watch for fever, or increased swelling or persistent pain. Call or return to clinic prn if such symptoms occur.

## 2019-06-25 NOTE — PROGRESS NOTES
Chief Complaint   Patient presents with    Joint Pain     1. Have you been to the ER, urgent care clinic since your last visit? Hospitalized since your last visit? No    2. Have you seen or consulted any other health care providers outside of the [address] [place] [address] since your last visit? Include any pap smears or colon screening.  No

## 2019-06-27 LAB
ALBUMIN SERPL-MCNC: 4 G/DL (ref 3.6–4.8)
ALBUMIN/GLOB SERPL: 1.7 {RATIO} (ref 1.2–2.2)
ALP SERPL-CCNC: 100 IU/L (ref 39–117)
ALT SERPL-CCNC: 9 IU/L (ref 0–32)
AST SERPL-CCNC: 26 IU/L (ref 0–40)
BASOPHILS # BLD MANUAL: 0 X10E3/UL (ref 0–0.2)
BASOPHILS NFR BLD MANUAL: 0 %
BILIRUB SERPL-MCNC: 0.2 MG/DL (ref 0–1.2)
BUN SERPL-MCNC: 10 MG/DL (ref 8–27)
BUN/CREAT SERPL: 7 (ref 12–28)
CALCIUM SERPL-MCNC: 9.2 MG/DL (ref 8.7–10.3)
CHLORIDE SERPL-SCNC: 102 MMOL/L (ref 96–106)
CO2 SERPL-SCNC: 27 MMOL/L (ref 20–29)
CREAT SERPL-MCNC: 1.52 MG/DL (ref 0.57–1)
CRP SERPL-MCNC: 3 MG/L (ref 0–10)
DIFFERENTIAL COMMENT, 115260: ABNORMAL
EOSINOPHIL # BLD MANUAL: 0.3 X10E3/UL (ref 0–0.4)
EOSINOPHIL NFR BLD MANUAL: 3 %
ERYTHROCYTE [DISTWIDTH] IN BLOOD BY AUTOMATED COUNT: 13.6 % (ref 12.3–15.4)
ERYTHROCYTE [SEDIMENTATION RATE] IN BLOOD BY WESTERGREN METHOD: 2 MM/HR (ref 0–40)
GLOBULIN SER CALC-MCNC: 2.4 G/DL (ref 1.5–4.5)
GLUCOSE SERPL-MCNC: 83 MG/DL (ref 65–99)
HCT VFR BLD AUTO: 37.7 % (ref 34–46.6)
HGB BLD-MCNC: 11.9 G/DL (ref 11.1–15.9)
LYMPHOCYTES # BLD MANUAL: 2.7 X10E3/UL (ref 0.7–3.1)
LYMPHOCYTES NFR BLD MANUAL: 29 %
MCH RBC QN AUTO: 31.7 PG (ref 26.6–33)
MCHC RBC AUTO-ENTMCNC: 31.6 G/DL (ref 31.5–35.7)
MCV RBC AUTO: 101 FL (ref 79–97)
MONOCYTES # BLD MANUAL: 0.8 X10E3/UL (ref 0.1–0.9)
MONOCYTES NFR BLD MANUAL: 9 %
NEUTROPHILS # BLD MANUAL: 5.5 X10E3/UL (ref 1.4–7)
NEUTROPHILS NFR BLD MANUAL: 59 %
PLATELET # BLD AUTO: 356 X10E3/UL (ref 150–450)
PLATELET BLD QL SMEAR: ADEQUATE
POTASSIUM SERPL-SCNC: 4.9 MMOL/L (ref 3.5–5.2)
PROT SERPL-MCNC: 6.4 G/DL (ref 6–8.5)
RBC # BLD AUTO: 3.75 X10E6/UL (ref 3.77–5.28)
RBC MORPH BLD: ABNORMAL
SODIUM SERPL-SCNC: 141 MMOL/L (ref 134–144)
WBC # BLD AUTO: 9.4 X10E3/UL (ref 3.4–10.8)

## 2019-07-09 RX ORDER — TRIAMCINOLONE ACETONIDE 40 MG/ML
80 INJECTION, SUSPENSION INTRA-ARTICULAR; INTRAMUSCULAR ONCE
Qty: 1 ML | Refills: 0
Start: 2019-07-09 | End: 2019-07-09

## 2019-07-10 ENCOUNTER — OFFICE VISIT (OUTPATIENT)
Dept: CARDIOLOGY CLINIC | Age: 64
End: 2019-07-10

## 2019-07-10 VITALS
RESPIRATION RATE: 16 BRPM | SYSTOLIC BLOOD PRESSURE: 140 MMHG | HEART RATE: 81 BPM | HEIGHT: 66 IN | DIASTOLIC BLOOD PRESSURE: 76 MMHG | WEIGHT: 123.2 LBS | OXYGEN SATURATION: 97 % | BODY MASS INDEX: 19.8 KG/M2

## 2019-07-10 DIAGNOSIS — I10 ESSENTIAL HYPERTENSION: ICD-10-CM

## 2019-07-10 DIAGNOSIS — I51.81 TAKOTSUBO CARDIOMYOPATHY: Primary | ICD-10-CM

## 2019-07-10 DIAGNOSIS — E78.2 MIXED HYPERLIPIDEMIA: ICD-10-CM

## 2019-07-10 RX ORDER — ATORVASTATIN CALCIUM 40 MG/1
TABLET, FILM COATED ORAL
Qty: 90 TAB | Refills: 1 | Status: SHIPPED | OUTPATIENT
Start: 2019-07-10 | End: 2019-07-11 | Stop reason: DRUGHIGH

## 2019-07-10 RX ORDER — FLUCONAZOLE 150 MG/1
TABLET ORAL
Refills: 0 | COMMUNITY
Start: 2019-06-30 | End: 2019-07-10 | Stop reason: ALTCHOICE

## 2019-07-10 NOTE — PROGRESS NOTES
Marjorie Poe, P-BC    Subjective/HPI:     Ms. Jackelyn Peng is a 71 y.o. female is here for routine f/u. She has a PMHx of takotsubo cardiomyopathy, tobacco abuse, HTN and HLD. She is doing well. She denies complaints of chest pains, dizziness, orthopnea or shortness of breath. She notes some lower extremity swelling that is intermittent. She has leg cramps, heaviness, and fatigue after being on her feet for a long period of time.          PCP Provider  Ze Hickman MD  Past Medical History:   Diagnosis Date    Acid reflux     Acid reflux     Arthritis     Chronic pain     [facility] ([facility]     Heart failure ([facility]     Hypertension     IBS (irritable bowel syndrome)     Squamous cell carcinoma of skin of right elbow 5/2016    Takotsubo cardiomyopathy 11/16/2015    Tobacco abuse 11/16/2015      Past Surgical History:   Procedure Laterality Date    ABDOMEN SURGERY PROC UNLISTED      adhesion removal    BREAST SURGERY PROCEDURE UNLISTED      lumpectomy    HX GYN      oophorectomy    HX ORTHOPAEDIC      right knee arthroscopy    HX ORTHOPAEDIC      right thumb    HX ORTHOPAEDIC      back surgeries    HX OTHER SURGICAL      8 route canals    DE COLONOSCOPY FLX DX W/COLLJ SPEC WHEN PFRMD  10/15/2012          Family History   Problem Relation Age of Onset    Cancer Father         prostate    Heart Disease Mother     Heart Disease Maternal Grandmother      Social History     Socioeconomic History    Marital status:      Spouse name: Not on file    Number of children: Not on file    Years of education: Not on file    Highest education level: Not on file   Occupational History    Not on file   Social Needs    Financial resource strain: Not on file    Food insecurity:     Worry: Not on file     Inability: Not on file    Transportation needs:     Medical: Not on file     Non-medical: Not on file   Tobacco Use    Smoking status: Current Every Day Smoker Packs/day: 1.00    Smokeless tobacco: Never Used    Tobacco comment: trying to quit 4-5 cigarettes daily   Substance and Sexual Activity    Alcohol use: Yes     Alcohol/week: 5.0 oz     Types: 10 Glasses of wine per week     Comment: 4 times weekly    Drug use: No    Sexual activity: Yes     Partners: Male     Birth control/protection: None   Lifestyle    Physical activity:     Days per week: Not on file     Minutes per session: Not on file    Stress: Not on file   Relationships    Social connections:     Talks on phone: Not on file     Gets together: Not on file     Attends Rastafarian service: Not on file     Active member of club or organization: Not on file     Attends meetings of clubs or organizations: Not on file     Relationship status: Not on file    Intimate partner violence:     Fear of current or ex partner: Not on file     Emotionally abused: Not on file     Physically abused: Not on file     Forced sexual activity: Not on file   Other Topics Concern    Not on file   Social History Narrative    Not on file       Allergies   Allergen Reactions    Ace Inhibitors Swelling    Arb-Angiotensin Receptor Antagonist Other (comments)     Prior ACE inhibitor angioedema, cross reaction risk    Codeine Nausea Only    Keflex [Cephalexin] Hives    Milk Other (comments)     Lactose intolerant.  Morphine Nausea and Vomiting        Current Outpatient Medications   Medication Sig    atorvastatin (LIPITOR) 40 mg tablet take 1 tablet by mouth at bedtime    albuterol (PROVENTIL HFA, VENTOLIN HFA, PROAIR HFA) 90 mcg/actuation inhaler Take 2 Puffs by inhalation every six (6) hours as needed for Wheezing.  doxycycline (MONODOX) 100 mg capsule Take 1 Cap by mouth two (2) times a day.  promethazine (PHENERGAN) 25 mg tablet Take 1 Tab by mouth every six (6) hours as needed for Nausea.     amLODIPine (NORVASC) 2.5 mg tablet take 1 tablet by mouth once daily    carvedilol (COREG) 12.5 mg tablet take 1 tablet by mouth twice a day with meals    adalimumab (HUMIRA PEN) 40 mg/0.4 mL pnkt 40 mg by SubCUTAneous route every fourteen (14) days.  hydroxychloroquine (PLAQUENIL) 200 mg tablet take 1 tablet by mouth once daily    estrogens, conjugated,-methylTESTOSTERone (ESTRATEST) 1.25-2.5 mg per tablet 1 Tab daily.  omeprazole (PRILOSEC) 40 mg capsule take 1 capsule by mouth once daily ON AN EMPTY STOMACH    ondansetron (ZOFRAN ODT) 4 mg disintegrating tablet Take 1 Tab by mouth every eight (8) hours as needed for Nausea.  HYDROcodone-acetaminophen (NORCO)  mg tablet TAKE 1 TABLET BY MOUTH 3 TIMES DAILY AS NEEDED FOR PAIN    desoximetasone (TOPICORT) 0.25 % topical cream Apply  to affected area two (2) times daily as needed for Skin Irritation.  Cetirizine (ZYRTEC) 10 mg cap Take  by mouth.  dicyclomine (BENTYL) 10 mg capsule TAKE 1 CAPSULE BY MOUTH BEFORE MEALS AND AT BEDTIME AS NEEDED.  gabapentin (NEURONTIN) 300 mg capsule TAKE 1 CAPSULE IN THE MORNING, 1 CAPSULE IN THE AFTERNOON, AD 2 CAPSULES AT BEDTIME (Patient taking differently: TAKE 1 CAPSULE IN THE MORNING, 1 CAPSULE IN THE AFTERNOON, AND 1 CAPSULE AT BEDTIME)    aspirin 81 mg chewable tablet Take 1 Tab by mouth daily.  DULoxetine (CYMBALTA) 60 mg capsule Take  by mouth daily. No current facility-administered medications for this visit. I have reviewed the problem list, allergy list, medical history, family, social history and medications. Review of Symptoms:    Review of Systems   Constitutional: Negative for chills, fever and weight loss. HENT: Negative for nosebleeds. Eyes: Negative for blurred vision and double vision. Respiratory: Negative for cough, shortness of breath and wheezing. Cardiovascular: Negative for chest pain, palpitations, orthopnea, leg swelling and PND. Gastrointestinal: Negative for abdominal pain, blood in stool, diarrhea, nausea and vomiting.    Musculoskeletal: Negative for joint pain.   Skin: Negative for rash. Neurological: Negative for dizziness, tingling and loss of consciousness. Endo/Heme/Allergies: Does not bruise/bleed easily. Physical Exam:      General: Well developed, in no acute distress, cooperative and alert  HEENT: No carotid bruits, no JVD, trach is midline. Neck Supple, PEERL, EOM intact. Heart:  reg rate and rhythm; normal S1/S2; no murmurs, gallops or rubs. Respiratory: Clear bilaterally x 4, no wheezing or rales  Abdomen:   Soft, non-tender, no distention, no masses. + BS. Extremities:  Normal cap refill, no cyanosis, atraumatic. No edema. Neuro: A&Ox3, speech clear, gait stable. Skin: Skin color is normal. No rashes or lesions.  Non diaphoretic  Vascular: 2+ pulses symmetric in all extremities    Vitals:    07/10/19 0959 07/10/19 1017 07/10/19 1056   BP: 150/82 158/82 140/76   Pulse: 81     Resp: 16     SpO2: 97%     Weight: 123 lb 3.2 oz (55.9 kg)     Height: 5' 6\" (1.676 m)         Cardiographics    ECG: sinus rhythm  Results for orders placed or performed during the hospital encounter of 04/11/19   EKG, 12 LEAD, INITIAL   Result Value Ref Range    Ventricular Rate 75 BPM    Atrial Rate 75 BPM    P-R Interval 174 ms    QRS Duration 90 ms    Q-T Interval 436 ms    QTC Calculation (Bezet) 486 ms    Calculated P Axis 69 degrees    Calculated R Axis 35 degrees    Calculated T Axis 48 degrees    Diagnosis       Normal sinus rhythm  Possible Left atrial enlargement  Septal infarct (cited on or before 05-AUG-2015)  When compared with ECG of 28-DEC-2017 11:27,  No significant change was found  Confirmed by Knau Bansal ([id number]) on 4/11/2019 11:16:31 AM         Cardiology Labs:  Lab Results   Component Value Date/Time    Cholesterol, total 116 10/11/2017 03:53 PM    HDL Cholesterol 57 10/11/2017 03:53 PM    LDL, calculated 48 10/11/2017 03:53 PM    Triglyceride 55 10/11/2017 03:53 PM       Lab Results   Component Value Date/Time    Sodium 141 06/25/2019 04:36 PM    Potassium 4.9 06/25/2019 04:36 PM    Chloride 102 06/25/2019 04:36 PM    CO2 27 06/25/2019 04:36 PM    Anion gap 12 04/11/2019 03:49 AM    Glucose 83 06/25/2019 04:36 PM    BUN 10 06/25/2019 04:36 PM    Creatinine 1.52 (H) 06/25/2019 04:36 PM    BUN/Creatinine ratio 7 (L) 06/25/2019 04:36 PM    GFR est AA 41 (L) 06/25/2019 04:36 PM    GFR est non-AA 36 (L) 06/25/2019 04:36 PM    Calcium 9.2 06/25/2019 04:36 PM    Bilirubin, total 0.2 06/25/2019 04:36 PM    AST (SGOT) 26 06/25/2019 04:36 PM    Alk. phosphatase 100 06/25/2019 04:36 PM    Protein, total 6.4 06/25/2019 04:36 PM    Albumin 4.0 06/25/2019 04:36 PM    Globulin 3.7 04/11/2019 03:49 AM    A-G Ratio 1.7 06/25/2019 04:36 PM    ALT (SGPT) 9 06/25/2019 04:36 PM           Assessment:     Assessment:       ICD-10-CM ICD-9-CM    1. Takotsubo cardiomyopathy I51.81 429.83    2. Essential hypertension I10 401.9 AMB POC EKG ROUTINE W/ 12 LEADS, INTER & REP   3. Mixed hyperlipidemia E78.2 272.2 CK      LIPID PANEL        Plan:     1. Takotsubo cardiomyopathy  Resolved; echo in 8/2018 with preserved LVEF 60-65% and mild TR with mild PHTN  Continue with present medical management    2. Essential hypertension  BP optimal; continue with anti-hypertensive therapy and encouraged low sodium diet    3. Mixed hyperlipidemia  Will repeat lipid panel this year; refill statin therapy  Encouraged low sodium diet    4. Tobacco abuse  Encouraged smoking cessation; she has Chantix at home and is willing to try this    5. Leg edema  Likely venous insufficiency; offered venous duplex to assess, but she would like to wait until after her  and gone through back surgeries. She will call when ready to set up an appointment.     F/u in 1 year      Adam Botello MD

## 2019-07-10 NOTE — PROGRESS NOTES
1. Have you been to the ER, urgent care clinic since your last visit? Hospitalized since your last visit? No    2. Have you seen or consulted any other health care providers outside of the [address] [place] [address] since your last visit? Include any pap smears or colon screening.  No     Chief Complaint   Patient presents with    Hypertension     annual f/u    Medication Refill    Labs

## 2019-07-11 ENCOUNTER — TELEPHONE (OUTPATIENT)
Dept: CARDIOLOGY CLINIC | Age: 64
End: 2019-07-11

## 2019-07-11 DIAGNOSIS — E78.2 MIXED HYPERLIPIDEMIA: Primary | ICD-10-CM

## 2019-07-11 LAB
CHOLEST SERPL-MCNC: 200 MG/DL (ref 100–199)
CK SERPL-CCNC: 46 U/L (ref 24–173)
HDLC SERPL-MCNC: 67 MG/DL
INTERPRETATION, 910389: NORMAL
LDLC SERPL CALC-MCNC: 107 MG/DL (ref 0–99)
TRIGL SERPL-MCNC: 131 MG/DL (ref 0–149)
VLDLC SERPL CALC-MCNC: 26 MG/DL (ref 5–40)

## 2019-07-11 RX ORDER — ATORVASTATIN CALCIUM 80 MG/1
80 TABLET, FILM COATED ORAL DAILY
COMMUNITY
End: 2019-07-11 | Stop reason: SDUPTHER

## 2019-07-11 RX ORDER — ATORVASTATIN CALCIUM 80 MG/1
80 TABLET, FILM COATED ORAL DAILY
Qty: 90 TAB | Refills: 1 | Status: SHIPPED | OUTPATIENT
Start: 2019-07-11 | End: 2020-03-24 | Stop reason: SDUPTHER

## 2019-07-11 NOTE — PROGRESS NOTES
Abad,    Please call the patient and inform that lipids show increase in total and bad cholesterol. As discussed, last year LDL was 48 and total cholesterol 116. This year, total cholesterol is 200 and LDL is 107. She said she was taking her medications every day, so if that's really the case, she needs to increase her atorvastatin to 80 mg daily and let's plan to repeat lipids in 3 months. Like discussed, keeping her cholesterol under control is important for cardiac health and reducing her further risk for stroke, kidney disease and future heart attacks. If agreeable, please send new rx for atorvastatin 80 mg -- I just refilled her atorvastatin 40 mg, so she can take two tablets of those until she completes her bottle.     Thanks,  [employer]

## 2019-09-16 RX ORDER — HYDROXYCHLOROQUINE SULFATE 200 MG/1
TABLET, FILM COATED ORAL
Qty: 30 TAB | Refills: 0 | Status: SHIPPED | OUTPATIENT
Start: 2019-09-16 | End: 2019-10-06 | Stop reason: SDUPTHER

## 2019-10-01 ENCOUNTER — TELEPHONE (OUTPATIENT)
Dept: INTERNAL MEDICINE CLINIC | Age: 64
End: 2019-10-01

## 2019-10-01 NOTE — TELEPHONE ENCOUNTER
Patient states she needs a call back to get an appt to be seen for an Acute Appt asap for Hand & Ankle Swelling that patient reports has been going on for about 4-5- days & not sure why. Please call to discuss. No Appts available in Time Frame.

## 2019-10-01 NOTE — TELEPHONE ENCOUNTER
Spoke with patient. Two pt identifiers confirmed. Patient advised that Dr. Patricia Orozco does not have any availability for the remainder of the week. Patient states that she is willing to see another provider. Patient offered an appointment on 10/02/19 with Dr. Shun Silva. Appointment accepted. Patient advised if anything changes or if unable to keep this appointment to call the office  Pt verbalized understanding of information discussed w/ no further questions at this time.

## 2019-10-02 ENCOUNTER — OFFICE VISIT (OUTPATIENT)
Dept: INTERNAL MEDICINE CLINIC | Age: 64
End: 2019-10-02

## 2019-10-02 VITALS
HEART RATE: 63 BPM | TEMPERATURE: 98.3 F | RESPIRATION RATE: 16 BRPM | BODY MASS INDEX: 21.38 KG/M2 | OXYGEN SATURATION: 95 % | WEIGHT: 133 LBS | SYSTOLIC BLOOD PRESSURE: 133 MMHG | DIASTOLIC BLOOD PRESSURE: 62 MMHG | HEIGHT: 66 IN

## 2019-10-02 DIAGNOSIS — R60.0 LOCALIZED EDEMA: Primary | ICD-10-CM

## 2019-10-02 DIAGNOSIS — I51.81 TAKOTSUBO CARDIOMYOPATHY: ICD-10-CM

## 2019-10-02 DIAGNOSIS — I10 ESSENTIAL HYPERTENSION: ICD-10-CM

## 2019-10-02 DIAGNOSIS — N28.9 RENAL INSUFFICIENCY: ICD-10-CM

## 2019-10-02 RX ORDER — FLUTICASONE PROPIONATE 50 MCG
SPRAY, SUSPENSION (ML) NASAL
COMMUNITY

## 2019-10-02 RX ORDER — CLOTRIMAZOLE 10 MG/1
LOZENGE ORAL; TOPICAL
Refills: 5 | COMMUNITY
Start: 2019-09-11 | End: 2020-04-15 | Stop reason: SDUPTHER

## 2019-10-02 RX ORDER — DULOXETIN HYDROCHLORIDE 60 MG/1
60 CAPSULE, DELAYED RELEASE ORAL DAILY
Qty: 30 CAP | Refills: 0 | Status: SHIPPED | OUTPATIENT
Start: 2019-10-02 | End: 2019-10-30 | Stop reason: SDUPTHER

## 2019-10-02 RX ORDER — MUPIROCIN 20 MG/G
OINTMENT TOPICAL
Refills: 0 | COMMUNITY
Start: 2019-07-21 | End: 2021-02-08 | Stop reason: CLARIF

## 2019-10-02 RX ORDER — ALCLOMETASONE DIPROPIONATE 0.5 MG/G
CREAM TOPICAL
Refills: 3 | COMMUNITY
Start: 2019-09-17 | End: 2022-01-12

## 2019-10-02 RX ORDER — HYDROCHLOROTHIAZIDE 25 MG/1
25 TABLET ORAL DAILY
Qty: 30 TAB | Refills: 0 | Status: SHIPPED | OUTPATIENT
Start: 2019-10-02 | End: 2019-10-22 | Stop reason: SDUPTHER

## 2019-10-02 NOTE — PROGRESS NOTES
HISTORY OF PRESENT ILLNESS  Sofia Taylor is a 71 y.o. female. HPI   Pt normally follows with Dr. Radha Noyola (PCP). Pt is here for acute care. Pt c/o hand and foot swelling x a couple months   States this started a couple of months ago with her foot swelling   States this typically resolved when her feet were elevated   States now swelling has spread to hands, primarily in her R hand   Pt is on norvasc 2.5 mg   Pt states she gets dizzy when she stands up sometimes   Pt wonders if this is from when she fell and cut her leg as it has been hurting, discussed that swelling is contributing to pain  Discussed leg elevation and compression hose    Will get labs   Will get US of legs     BP today is 133/62   Will stop norvasc and start hctz   Ordered compression hose   Continue coreg BID       Pt follows with rheum for arthritis   Reviewed labs from rheum       Reviewed notes dr Patricia Orozco 5/19: elevate legs     Reviewed duplex 12/2018:1. No deep venous thrombosis. Pt follows with Dr Iram Raymundo (cardio)  Reviewed  7/10/19:1. Takotsubo cardiomyopathy  Resolved; echo in 8/2018 with preserved LVEF 60-65% and mild TR with mild PHTN  Continue with present medical management  2. Essential hypertension  BP optimal; continue with anti-hypertensive therapy and encouraged low sodium diet  3. Mixed hyperlipidemia  Will repeat lipid panel this year; refill statin therapy  Encouraged low sodium diet  4. Tobacco abuse  Encouraged smoking cessation; she has Chantix at home and is willing to try this  5. Leg edema  Likely venous insufficiency; offered venous duplex to assess, but she would like to wait until after her  and gone through back surgeries. She will call when ready to set up an appointment.     Patient Active Problem List    Diagnosis Date Noted    Intractable vomiting without nausea 12/28/2017    Coronary artery disease involving native coronary artery without angina pectoris 11/20/2015    Heart attack ([facility] [address]  Takotsubo cardiomyopathy 11/16/2015    Tobacco abuse 11/16/2015    Nausea & vomiting 11/14/2015    NSTEMI (non-ST elevated myocardial infarction) ([facility] [address] 11/14/2015    Fibromyalgia 08/25/2015    Bronchospasm 08/25/2015    Lumbar disc disease 08/25/2015    HTN (hypertension) 10/14/2012    UTI (lower urinary tract infection) 10/14/2012    Rectal bleed 10/13/2012    Hypokalemia 10/13/2012     Current Outpatient Medications   Medication Sig Dispense Refill    hydroxychloroquine (PLAQUENIL) 200 mg tablet TAKE 1 TABLET BY MOUTH ONCE DAILY 30 Tab 0    HUMIRA,CF, PEN 40 mg/0.4 mL pnkt INJECT 40 MG UNDER THE SKIN (SUBCUTANEOUS INJECTION) EVERY 2 WEEKS 2 Kit 3    atorvastatin (LIPITOR) 80 mg tablet Take 1 Tab by mouth daily. 90 Tab 1    albuterol (PROVENTIL HFA, VENTOLIN HFA, PROAIR HFA) 90 mcg/actuation inhaler Take 2 Puffs by inhalation every six (6) hours as needed for Wheezing. 1 Inhaler 0    doxycycline (MONODOX) 100 mg capsule Take 1 Cap by mouth two (2) times a day. 20 Cap 0    promethazine (PHENERGAN) 25 mg tablet Take 1 Tab by mouth every six (6) hours as needed for Nausea. 12 Tab 0    amLODIPine (NORVASC) 2.5 mg tablet take 1 tablet by mouth once daily 30 Tab 1    carvedilol (COREG) 12.5 mg tablet take 1 tablet by mouth twice a day with meals 60 Tab 12    estrogens, conjugated,-methylTESTOSTERone (ESTRATEST) 1.25-2.5 mg per tablet 1 Tab daily. 0    omeprazole (PRILOSEC) 40 mg capsule take 1 capsule by mouth once daily ON AN EMPTY STOMACH 30 Cap 5    ondansetron (ZOFRAN ODT) 4 mg disintegrating tablet Take 1 Tab by mouth every eight (8) hours as needed for Nausea. 30 Tab 1    HYDROcodone-acetaminophen (NORCO)  mg tablet TAKE 1 TABLET BY MOUTH 3 TIMES DAILY AS NEEDED FOR PAIN  0    desoximetasone (TOPICORT) 0.25 % topical cream Apply  to affected area two (2) times daily as needed for Skin Irritation. 15 g 0    Cetirizine (ZYRTEC) 10 mg cap Take  by mouth.       dicyclomine (BENTYL) 10 mg capsule TAKE 1 CAPSULE BY MOUTH BEFORE MEALS AND AT BEDTIME AS NEEDED.  0    gabapentin (NEURONTIN) 300 mg capsule TAKE 1 CAPSULE IN THE MORNING, 1 CAPSULE IN THE AFTERNOON, AD 2 CAPSULES AT BEDTIME (Patient taking differently: TAKE 1 CAPSULE IN THE MORNING, 1 CAPSULE IN THE AFTERNOON, AND 1 CAPSULE AT BEDTIME) 120 Cap 3    aspirin 81 mg chewable tablet Take 1 Tab by mouth daily. 30 Tab 0    DULoxetine (CYMBALTA) 60 mg capsule Take  by mouth daily. 0     Past Surgical History:   Procedure Laterality Date    ABDOMEN SURGERY PROC UNLISTED      adhesion removal    BREAST SURGERY PROCEDURE UNLISTED      lumpectomy    HX GYN      oophorectomy    HX ORTHOPAEDIC      right knee arthroscopy    HX ORTHOPAEDIC      right thumb    HX ORTHOPAEDIC      back surgeries    HX OTHER SURGICAL      8 route canals    ND COLONOSCOPY FLX DX W/COLLJ SPEC WHEN PFRMD  10/15/2012           Lab Results   Component Value Date/Time    WBC 9.4 06/25/2019 04:36 PM    HGB 11.9 06/25/2019 04:36 PM    HCT 37.7 06/25/2019 04:36 PM    PLATELET 504 63/41/7460 04:36 PM     (H) 06/25/2019 04:36 PM     Lab Results   Component Value Date/Time    Cholesterol, total 200 (H) 07/10/2019 11:29 AM    HDL Cholesterol 67 07/10/2019 11:29 AM    LDL, calculated 107 (H) 07/10/2019 11:29 AM    Triglyceride 131 07/10/2019 11:29 AM     Lab Results   Component Value Date/Time    GFR est non-AA 36 (L) 06/25/2019 04:36 PM    GFRNA, POC >60 01/19/2017 02:12 PM    GFR est AA 41 (L) 06/25/2019 04:36 PM    GFRAA, POC >60 01/19/2017 02:12 PM    Creatinine 1.52 (H) 06/25/2019 04:36 PM    Creatinine (POC) 0.9 01/19/2017 02:12 PM    BUN 10 06/25/2019 04:36 PM    Sodium 141 06/25/2019 04:36 PM    Potassium 4.9 06/25/2019 04:36 PM    Chloride 102 06/25/2019 04:36 PM    CO2 27 06/25/2019 04:36 PM    Magnesium 1.4 (L) 04/11/2019 03:49 AM        Review of Systems   Respiratory: Negative for shortness of breath. Cardiovascular: Negative for chest pain. Physical Exam   Constitutional: She is oriented to person, place, and time. She appears well-developed and well-nourished. No distress. HENT:   Head: Normocephalic and atraumatic. Eyes: Conjunctivae and EOM are normal. Right eye exhibits no discharge. Left eye exhibits no discharge. Neck: Normal range of motion. Neck supple. Cardiovascular: Normal rate, regular rhythm and normal heart sounds. Exam reveals no gallop and no friction rub. No murmur heard. Pulmonary/Chest: Effort normal and breath sounds normal. No respiratory distress. She has no wheezes. She has no rales. She exhibits no tenderness. Musculoskeletal: She exhibits edema (1+ pitting on the R trace on the L ). She exhibits no tenderness or deformity. Lymphadenopathy:     She has no cervical adenopathy. Neurological: She is alert and oriented to person, place, and time. Coordination abnormal.   Skin: Skin is warm and dry. No rash noted. She is not diaphoretic. No erythema. No pallor. Psychiatric: She has a normal mood and affect. Her behavior is normal.       ASSESSMENT and PLAN    ICD-10-CM ICD-9-CM    1. Localized edema                    Pt with persistent edema which has been progressive over last 6 months to a year r leg is worse than left has increased pain will check duplex will check basic labs norvasc may be playing a role here will stsop norvasc and start hctz instead ordered compression hose discussed leg elevation  O30.8 077.7 METABOLIC PANEL, COMPREHENSIVE      CBC W/O DIFF      TSH 3RD GENERATION      DUPLEX LOWER EXT VENOUS BILAT      AMB SUPPLY ORDER   2. Essential hypertension                bp adequately controlled but will changing norvasc to hctz continue coreg BID  D44 778.2 METABOLIC PANEL, COMPREHENSIVE      CBC W/O DIFF      TSH 3RD GENERATION      DUPLEX LOWER EXT VENOUS BILAT      AMB SUPPLY ORDER   3.  Takotsubo cardiomyopathy                Follows with cardio utd on this medically managed euvolemic does not seem to be cause of edema  I51.81 429.83    4. Renal insufficiency              Cr 1.5 on last labs normally under 1 will repeat bmp today    N28.9 593.9                 Scribed by Gabby Resendiz of [address] [place] [address] [address], as dictated by Dr. Juan Daniel Jane. Current diagnosis and concerns discussed with pt at length. Pt understands risks and benefits or current treatment plan and medications, and accepts the treatment and medication with any possible risks. Pt asks appropriate questions, which were answered. Pt was instructed to call with any concerns or problems. I have reviewed the note documented by the scribe. The services provided are my own.   The documentation is accurate

## 2019-10-03 ENCOUNTER — HOSPITAL ENCOUNTER (OUTPATIENT)
Dept: VASCULAR SURGERY | Age: 64
Discharge: HOME | End: 2019-10-03
Attending: INTERNAL MEDICINE
Payer: COMMERCIAL

## 2019-10-03 DIAGNOSIS — I10 ESSENTIAL HYPERTENSION: ICD-10-CM

## 2019-10-03 DIAGNOSIS — R60.0 LOCALIZED EDEMA: ICD-10-CM

## 2019-10-03 LAB
ALBUMIN SERPL-MCNC: 4.1 G/DL (ref 3.6–4.8)
ALBUMIN/GLOB SERPL: 2.4 {RATIO} (ref 1.2–2.2)
ALP SERPL-CCNC: 93 IU/L (ref 39–117)
ALT SERPL-CCNC: 11 IU/L (ref 0–32)
AST SERPL-CCNC: 19 IU/L (ref 0–40)
BILIRUB SERPL-MCNC: <0.2 MG/DL (ref 0–1.2)
BUN SERPL-MCNC: 7 MG/DL (ref 8–27)
BUN/CREAT SERPL: 9 (ref 12–28)
CALCIUM SERPL-MCNC: 8.8 MG/DL (ref 8.7–10.3)
CHLORIDE SERPL-SCNC: 99 MMOL/L (ref 96–106)
CO2 SERPL-SCNC: 29 MMOL/L (ref 20–29)
CREAT SERPL-MCNC: 0.78 MG/DL (ref 0.57–1)
ERYTHROCYTE [DISTWIDTH] IN BLOOD BY AUTOMATED COUNT: 13.4 % (ref 12.3–15.4)
GLOBULIN SER CALC-MCNC: 1.7 G/DL (ref 1.5–4.5)
GLUCOSE SERPL-MCNC: 81 MG/DL (ref 65–99)
HCT VFR BLD AUTO: 31.5 % (ref 34–46.6)
HGB BLD-MCNC: 10.1 G/DL (ref 11.1–15.9)
MCH RBC QN AUTO: 30.5 PG (ref 26.6–33)
MCHC RBC AUTO-ENTMCNC: 32.1 G/DL (ref 31.5–35.7)
MCV RBC AUTO: 95 FL (ref 79–97)
PLATELET # BLD AUTO: 331 X10E3/UL (ref 150–450)
POTASSIUM SERPL-SCNC: 4.4 MMOL/L (ref 3.5–5.2)
PROT SERPL-MCNC: 5.8 G/DL (ref 6–8.5)
RBC # BLD AUTO: 3.31 X10E6/UL (ref 3.77–5.28)
SODIUM SERPL-SCNC: 141 MMOL/L (ref 134–144)
TSH SERPL DL<=0.005 MIU/L-ACNC: 1.96 UIU/ML (ref 0.45–4.5)
WBC # BLD AUTO: 7 X10E3/UL (ref 3.4–10.8)

## 2019-10-03 PROCEDURE — 93970 EXTREMITY STUDY: CPT

## 2019-10-03 NOTE — PROGRESS NOTES
the patient with new anemia on labs, add ferritin and iron to blood at lab, also b12 if possible.     Kidney fxn good/normal    Will need repeat bmp and cbc prior to f/u

## 2019-10-03 NOTE — PROGRESS NOTES
Spoke with patient. Two pt identifiers confirmed. Patient advised of her recent lab results. Pt verbalized understanding of information discussed w/ no further questions at this time.

## 2019-10-07 ENCOUNTER — OFFICE VISIT (OUTPATIENT)
Dept: RHEUMATOLOGY | Age: 64
End: 2019-10-07

## 2019-10-07 VITALS
SYSTOLIC BLOOD PRESSURE: 135 MMHG | DIASTOLIC BLOOD PRESSURE: 62 MMHG | HEART RATE: 90 BPM | WEIGHT: 127.8 LBS | TEMPERATURE: 98.2 F | BODY MASS INDEX: 20.63 KG/M2 | OXYGEN SATURATION: 96 % | RESPIRATION RATE: 16 BRPM

## 2019-10-07 DIAGNOSIS — M19.90 INFLAMMATORY ARTHRITIS: ICD-10-CM

## 2019-10-07 DIAGNOSIS — M70.62 TROCHANTERIC BURSITIS OF LEFT HIP: ICD-10-CM

## 2019-10-07 DIAGNOSIS — Z79.60 LONG-TERM USE OF IMMUNOSUPPRESSANT MEDICATION: ICD-10-CM

## 2019-10-07 DIAGNOSIS — M70.62 TROCHANTERIC BURSITIS, LEFT HIP: ICD-10-CM

## 2019-10-07 DIAGNOSIS — M05.79 SEROPOSITIVE RHEUMATOID ARTHRITIS OF MULTIPLE SITES (HCC): Primary | ICD-10-CM

## 2019-10-07 NOTE — PROGRESS NOTES
Chief Complaint   Patient presents with    Joint Pain     1. Have you been to the ER, urgent care clinic since your last visit? Hospitalized since your last visit? No    2. Have you seen or consulted any other health care providers outside of the [address] [place] [address] since your last visit? Include any pap smears or colon screening. No

## 2019-10-07 NOTE — PROGRESS NOTES
RHEUMATOLOGY PROBLEM LIST AND CHIEF COMPLAINT  1. Inflammatory arthritis - morning stiffness, arthritis on the 2nd and 3rd MCPs bilaterally. 2. Osteoarthritis - hands, knees, feet, hips  3. Fibromyalgia    Therapy History:  Current DMARDs: Plaquenil (4/2017 - 3/2018, ran out in April, 5/2018-current), Humira (1/2018-current)    INTERVAL HISTORY  This is a 71 y.o.  female. Today, the patient complains of pain in the joints. Location: hip   Severity:  6 on a scale of 0-10  Timing: all day   Duration:  3 months  Context/Associated signs and symptoms: The patient is doing well today. She has no complaints regarding her inflammatory disease. She continues with Humira 40 mg q2 weeks and Plaquenil 200 mg daily. The patient request a left trochanteric bursa injection, previous injections have been effective.      RHEUMATOLOGY REVIEW OF SYSTEMS   Positives as per history  Negatives as follows:  Yulia :  Denies unexplained persistent fevers, weight change, chronic fatigue  HEAD/EYES:   Denies eye redness, blurry vision or sudden loss of vision, dry eyes, HA, temporal artery pain  ENT:    Denies oral/nasal ulcers, recurrent sinus infections, dry mouth  RESPIRATORY:  No pleuritic pain, history of pleural effusions, hemoptysis, exertional dyspnea  CARDIOVASCULAR:  Denies chest pain, history of pericardial effusions  GASTRO:   Denies heartburn, esophageal dysmotility, abdominal pain, nausea, vomiting, diarrhea, blood in the stool  HEMATOLOGIC:  No easy bruising, purpura, swollen lymph nodes  SKIN:    Denies alopecia, ulcers, nodules, sun sensitivity, unexplained persistent rash   VASCULAR:   Denies edema, cyanosis, raynaud phenomenon  NEUROLOGIC:  Denies specific muscle weakness, paresthesias   PSYCHIATRIC:  No sleep disturbance / snoring, depression, anxiety  MSK:    No morning stiffness >1 hour, SI joint pain,     PAST MEDICAL HISTORY  Reviewed with patient, significant changes in medical history - No      FAMILY HISTORY   No autoimmune disease in the family    PHYSICAL EXAM  Blood pressure 135/62, pulse 90, temperature 98.2 °F (36.8 °C), temperature source Oral, resp. rate 16, weight 127 lb 12.8 oz (58 kg), SpO2 96 %. GENERAL APPEARANCE: Well-nourished, no acute distress  EYES: No scleral erythema, conjunctival injection  ENT: No oral ulcer, parotid enlargement  NECK: No adenopathy, thyroid enlargement  CARDIOVASCULAR: Heart rhythm is regular. No murmur, rub, gallop  CHEST: Normal vesicular breath sounds. No wheezes, rales, pleural friction rubs  ABDOMINAL: The abdomen is soft and nontender. Bowel sounds are normal  EXTREMITIES: There is no evidence of clubbing, cyanosis, edema  SKIN: No rash, palpable purpura, digital ulcer, abnormal thickening, normal nailfold capillaries   NEUROLOGICAL: Normal gait and station, full strength in upper and lower extremities,  normal sensation to light touch  MUSCULOSKELETAL:   Upper extremities - Heberden's and Hang's nodes bilaterally. Lower extremities - R knee moderate swelling, bony prominence. Left lateral thigh pain    LABS, RADIOLOGY AND PROCEDURES  Previous labs reviewed -Yes  Previous radiology reviewed -Yes  Previous procedures reviewed -Yes  Previous medical records reviewed/summarized -Yes      ASSESSMENT  1. Rheumatoid arthritis - (Established problem -  Very good partial response) - The patient has improved with reduced synovial thickening on exam. She should continue with Humira 40 mg q2 weeks and Plaquenil 200 mg daily. She should return in 4 months for a follow up. 2. Osteoarthritis (hands, knees) - The patient should continue physical therapy and NSAIDs as needed. 3. Pain syndrome (fibromyalgia) - Continue physical therapy. We did not discuss this today. 4. Trochanteric bursitis -  The patient has received relief from previous steroid injections. I will inject the left trochanteric bursa with 80 mg of Kenalog.    5. Drug therapy monitoring for toxicity (plaquenil ) - CBC, BUN, Cr, AST, ALT and albumin every 3 months; eye exams every 6-12 months for retinal toxicity. 6. Drug therapy monitoring for toxicity (Humira) - CBC, BUN, Cr, AST, ALT and albumin every 4 months      PLAN  1. Plaquenil 200 mg daily  2. Humira 40 mg q2 weeks  3. Left trochanteric bursa injection  4. Return in 4 months. Lotus Whaley MD  Adult and Pediatric Rheumatology     [facility], Phone [phone], Fax [phone]   E-mail: [email or web address]    Visiting  of Pediatrics    Department of Pediatrics, [facility] of [address] [place] [address], [address] [place] [address], Phone [phone], Fax [phone]  E-mail: [email or web address]    There are no Patient Instructions on file for this visit. cc:  Celso Clements MD    Written by beatriz Awan, as dictated by David Castillo. Vangie Whaley M.D.    [facility] reviewed for the following:   Pato Albrecht MD, have reviewed the History, Physical and updated the Allergic reactions for Yasmine E [address] [place] [address]. performed immediately prior to start of procedure:   Pato Albrecht MD, have performed the following reviews on Yao Wang MD prior to the start of the procedure:            * Patient was identified by name and date of birth   * Agreement on procedure being performed was verified  * Risks and Benefits explained to the patient  * Procedure site verified and marked as necessary  * Patient was positioned for comfort  * Consent was signed and verified     Time: 2:00      Date of procedure: 10/7/2019    Procedure performed by:   Yao Wang MD    Provider assisted by: none    Patient assisted by: self    How tolerated by patient: tolerated the procedure well with no complications    Post Procedural Pain Scale: 0 - No Hurt    Comments: none    PROCEDURE  After consent was obtained, using sterile technique the left trochanteric bursa was prepped and Kenalog 80 mg and 1 ml of lidocaine was then injected and the needle withdrawn. The procedure was well tolerated. The patient is asked to continue to rest for 24 hours before resuming regular activities. It may be more painful for the first 1-2 days. Watch for fever, or increased swelling or persistent pain. Call or return to clinic prn if such symptoms occur.

## 2019-10-09 RX ORDER — LIDOCAINE HYDROCHLORIDE 10 MG/ML
1 INJECTION, SOLUTION EPIDURAL; INFILTRATION; INTRACAUDAL; PERINEURAL ONCE
Qty: 1 ML | Refills: 0
Start: 2019-10-09 | End: 2019-10-09

## 2019-10-09 RX ORDER — TRIAMCINOLONE ACETONIDE 40 MG/ML
80 INJECTION, SUSPENSION INTRA-ARTICULAR; INTRAMUSCULAR ONCE
Qty: 1 ML | Refills: 0
Start: 2019-10-09 | End: 2019-10-09

## 2019-10-09 NOTE — PROGRESS NOTES
Received call from pt. Two pt identifiers confirmed. Pt informed per Dr. Franklyn Abreu there is no DVT. Pt verbalized understanding of information discussed w/ no further questions at this time.

## 2019-10-11 DIAGNOSIS — E78.2 MIXED HYPERLIPIDEMIA: ICD-10-CM

## 2019-10-30 ENCOUNTER — OFFICE VISIT (OUTPATIENT)
Dept: INTERNAL MEDICINE CLINIC | Age: 64
End: 2019-10-30

## 2019-10-30 VITALS
HEART RATE: 65 BPM | DIASTOLIC BLOOD PRESSURE: 89 MMHG | OXYGEN SATURATION: 96 % | HEIGHT: 66 IN | WEIGHT: 127.8 LBS | SYSTOLIC BLOOD PRESSURE: 136 MMHG | RESPIRATION RATE: 16 BRPM | BODY MASS INDEX: 20.54 KG/M2 | TEMPERATURE: 98.8 F

## 2019-10-30 DIAGNOSIS — B37.0 THRUSH: ICD-10-CM

## 2019-10-30 DIAGNOSIS — I10 ESSENTIAL HYPERTENSION: ICD-10-CM

## 2019-10-30 DIAGNOSIS — F41.9 ANXIETY: ICD-10-CM

## 2019-10-30 DIAGNOSIS — D64.9 ANEMIA, UNSPECIFIED TYPE: Primary | ICD-10-CM

## 2019-10-30 RX ORDER — DULOXETIN HYDROCHLORIDE 60 MG/1
60 CAPSULE, DELAYED RELEASE ORAL DAILY
Qty: 30 CAP | Refills: 5 | Status: SHIPPED | OUTPATIENT
Start: 2019-10-30 | End: 2020-08-14 | Stop reason: SDUPTHER

## 2019-10-30 RX ORDER — HYDROXYZINE 25 MG/1
25 TABLET, FILM COATED ORAL
Qty: 30 TAB | Refills: 3 | Status: SHIPPED | OUTPATIENT
Start: 2019-10-30 | End: 2019-12-23

## 2019-10-30 NOTE — PATIENT INSTRUCTIONS
STAY OFF AMLODIPINE. CONTINUE HCTZ AND CARVEDILOL. MONITOR BLOOD PRESSURE. WE ARE CHECKING ANEMIA TESTS. OKAY TO CONTINUE IRON FOR NOW.

## 2019-10-30 NOTE — PROGRESS NOTES
uDnia Gallo is a 71 y.o. female who presents for follow up. Reports right ankle swelling. Seen by Dr. Jyoti Michaud on Oct 2 with concern of hand and foot swelling for a few months. Was taking amlodipine 2.5mg and BP was controlled. This was stopped and HCTZ started for HTN. Continued on coreg BID. She is currently on the coreg and hctz. Reports 4 days on this regimen, swelling improved. LE DVT ultrasound was negative. Labs done. TSH normal. Normal COMP. hgb 10.1. Anemia is new, hgb was 13.6 in April 2019.  hgb 11.9 June 2019. Started iron recently. Colonoscopy July 2018 Dr. Lasha Alexis, small polyp, recheck 3 years. Reports recurrent thrush. Using mycelex troches for the past 3 days. No recent steroids or antibiotics. Using listerine. Has trouble swallowing. Had EGD and dilation. Has RA. Sees Dr Leigh Hashimoto. On humira and plaquenil.          Past Medical History:   Diagnosis Date    Acid reflux     Acid reflux     Arthritis     Chronic pain     Heart attack ([address]     Heart failure (HCC)     Hypertension     IBS (irritable bowel syndrome)     Squamous cell carcinoma of skin of right elbow 5/2016    Takotsubo cardiomyopathy 11/16/2015    Tobacco abuse 11/16/2015       Family History   Problem Relation Age of Onset    Cancer Father         prostate    Heart Disease Mother     Heart Disease Maternal Grandmother        Social History     Socioeconomic History    Marital status:      Spouse name: Not on file    Number of children: Not on file    Years of education: Not on file    Highest education level: Not on file   Occupational History    Not on file   Social Needs    Financial resource strain: Not on file    Food insecurity:     Worry: Not on file     Inability: Not on file    Transportation needs:     Medical: Not on file     Non-medical: Not on file   Tobacco Use    Smoking status: Current Every Day Smoker     Packs/day: 1.00    Smokeless tobacco: Never Used   [facility] Tobacco comment: trying to quit 4-5 cigarettes daily   Substance and Sexual Activity    Alcohol use: Yes     Alcohol/week: 8.3 standard drinks     Types: 10 Glasses of wine per week     Comment: 4 times weekly    Drug use: No    Sexual activity: Yes     Partners: Male     Birth control/protection: None   Lifestyle    Physical activity:     Days per week: Not on file     Minutes per session: Not on file    Stress: Not on file   Relationships    Social connections:     Talks on phone: Not on file     Gets together: Not on file     Attends Sikh service: Not on file     Active member of club or organization: Not on file     Attends meetings of clubs or organizations: Not on file     Relationship status: Not on file    Intimate partner violence:     Fear of current or ex partner: Not on file     Emotionally abused: Not on file     Physically abused: Not on file     Forced sexual activity: Not on file   Other Topics Concern    Not on file   Social History Narrative    Not on file       Current Outpatient Medications on File Prior to Visit   Medication Sig Dispense Refill    hydroCHLOROthiazide (HYDRODIURIL) 25 mg tablet TAKE 1 TABLET BY MOUTH DAILY 30 Tab 3    hydroxychloroquine (PLAQUENIL) 200 mg tablet TAKE 1 TABLET BY MOUTH ONCE DAILY 30 Tab 0    clotrimazole (MYCELEX) 10 mg jeremy DISSOLVE 1 JEREMY IN MOUTH AT BEDTIME AS NEEDED  5    fluticasone propionate (FLONASE) 50 mcg/actuation nasal spray fluticasone propionate 50 mcg/actuation nasal spray,suspension      HUMIRA,CF, PEN 40 mg/0.4 mL pnkt INJECT 40 MG UNDER THE SKIN (SUBCUTANEOUS INJECTION) EVERY 2 WEEKS 2 Kit 3    atorvastatin (LIPITOR) 80 mg tablet Take 1 Tab by mouth daily.  90 Tab 1    amLODIPine (NORVASC) 2.5 mg tablet take 1 tablet by mouth once daily 30 Tab 1    carvedilol (COREG) 12.5 mg tablet take 1 tablet by mouth twice a day with meals 60 Tab 12    estrogens, conjugated,-methylTESTOSTERone (ESTRATEST) 1.25-2.5 mg per tablet 1 Tab daily.  0    omeprazole (PRILOSEC) 40 mg capsule take 1 capsule by mouth once daily ON AN EMPTY STOMACH 30 Cap 5    HYDROcodone-acetaminophen (NORCO)  mg tablet TAKE 1 TABLET BY MOUTH 3 TIMES DAILY AS NEEDED FOR PAIN  0    desoximetasone (TOPICORT) 0.25 % topical cream Apply  to affected area two (2) times daily as needed for Skin Irritation. 15 g 0    Cetirizine (ZYRTEC) 10 mg cap Take  by mouth.  dicyclomine (BENTYL) 10 mg capsule TAKE 1 CAPSULE BY MOUTH BEFORE MEALS AND AT BEDTIME AS NEEDED.  0    gabapentin (NEURONTIN) 300 mg capsule TAKE 1 CAPSULE IN THE MORNING, 1 CAPSULE IN THE AFTERNOON, AD 2 CAPSULES AT BEDTIME (Patient taking differently: TAKE 1 CAPSULE IN THE MORNING, 1 CAPSULE IN THE AFTERNOON, AND 1 CAPSULE AT BEDTIME) 120 Cap 3    aspirin 81 mg chewable tablet Take 1 Tab by mouth daily. 30 Tab 0    alclometasone (ACLOVATE) 0.05 % topical cream USE ON CORNERS OF MOUTH FOR RASH TWICE A DAY AS NEEDED  3    mupirocin (BACTROBAN) 2 % ointment DEBBIE SML AMT EXT AA TID  0    [DISCONTINUED] DULoxetine (CYMBALTA) 60 mg capsule Take 1 Cap by mouth daily. 30 Cap 0    albuterol (PROVENTIL HFA, VENTOLIN HFA, PROAIR HFA) 90 mcg/actuation inhaler Take 2 Puffs by inhalation every six (6) hours as needed for Wheezing. 1 Inhaler 0    promethazine (PHENERGAN) 25 mg tablet Take 1 Tab by mouth every six (6) hours as needed for Nausea. 12 Tab 0    ondansetron (ZOFRAN ODT) 4 mg disintegrating tablet Take 1 Tab by mouth every eight (8) hours as needed for Nausea. 30 Tab 1     No current facility-administered medications on file prior to visit. Review of Systems  Pertinent items are noted in HPI.     Objective:     Visit Vitals  /89 (BP 1 Location: Left arm, BP Patient Position: Sitting)   Pulse 65   Temp 98.8 °F (37.1 °C) (Oral)   Resp 16   Ht 5' 6\" (1.676 m)   Wt 127 lb 12.8 oz (58 kg)   SpO2 96%   BMI 20.63 kg/m²     Gen: well appearing female   HEENT: OP with dry MM, oral thrush  Resp:  No wheezing, no rhonchi, no rales. CV:  RRR, normal S1S2, no murmur    GI: soft, nontender, without masses Extrem:  +2 pulses, no edema, warm distally      Assessment/Plan:       ICD-10-CM ICD-9-CM    1. Anemia, unspecified type D64.9 285.9 IRON PROFILE      FERRITIN      VITAMIN B12      CBC W/O DIFF   2. Thrush B37.0 112.0    3. Anxiety F41.9 300.00 hydrOXYzine HCl (ATARAX) 25 mg tablet   4. Essential hypertension I10 401.9       continue mycelex troches. Continue on HCTZ and coreg. Off amlodipine. Follow-up and Dispositions    · Return for FOLLOW UP PENDING LABS .          Rita Schwartz MD

## 2019-10-31 LAB
ERYTHROCYTE [DISTWIDTH] IN BLOOD BY AUTOMATED COUNT: 13.8 % (ref 12.3–15.4)
FERRITIN SERPL-MCNC: 36 NG/ML (ref 15–150)
HCT VFR BLD AUTO: 35.6 % (ref 34–46.6)
HGB BLD-MCNC: 11.4 G/DL (ref 11.1–15.9)
IRON SATN MFR SERPL: 14 % (ref 15–55)
IRON SERPL-MCNC: 70 UG/DL (ref 27–139)
MCH RBC QN AUTO: 31 PG (ref 26.6–33)
MCHC RBC AUTO-ENTMCNC: 32 G/DL (ref 31.5–35.7)
MCV RBC AUTO: 97 FL (ref 79–97)
PLATELET # BLD AUTO: 379 X10E3/UL (ref 150–450)
RBC # BLD AUTO: 3.68 X10E6/UL (ref 3.77–5.28)
TIBC SERPL-MCNC: 486 UG/DL (ref 250–450)
UIBC SERPL-MCNC: 416 UG/DL (ref 118–369)
VIT B12 SERPL-MCNC: 244 PG/ML (ref 232–1245)
WBC # BLD AUTO: 14.2 X10E3/UL (ref 3.4–10.8)

## 2019-11-08 RX ORDER — HYDROXYCHLOROQUINE SULFATE 200 MG/1
TABLET, FILM COATED ORAL
Qty: 30 TAB | Refills: 0 | Status: SHIPPED | OUTPATIENT
Start: 2019-11-08 | End: 2020-03-24

## 2019-12-16 ENCOUNTER — DOCUMENTATION ONLY (OUTPATIENT)
Dept: RHEUMATOLOGY | Age: 64
End: 2019-12-16

## 2019-12-18 ENCOUNTER — DOCUMENTATION ONLY (OUTPATIENT)
Dept: RHEUMATOLOGY | Age: 64
End: 2019-12-18

## 2019-12-18 NOTE — PROGRESS NOTES
Received approval from [facility] for Humira 40mg Pen approval good from 06/21/8298- 21/20/6544   Certification # [id number]

## 2019-12-23 DIAGNOSIS — F41.9 ANXIETY: ICD-10-CM

## 2019-12-23 RX ORDER — HYDROXYZINE 25 MG/1
TABLET, FILM COATED ORAL
Qty: 30 TAB | Refills: 3 | Status: SHIPPED | OUTPATIENT
Start: 2019-12-23 | End: 2020-01-13 | Stop reason: SDUPTHER

## 2020-01-13 DIAGNOSIS — F41.9 ANXIETY: ICD-10-CM

## 2020-01-13 RX ORDER — HYDROXYZINE 25 MG/1
TABLET, FILM COATED ORAL
Qty: 30 TAB | Refills: 3 | Status: SHIPPED | OUTPATIENT
Start: 2020-01-13 | End: 2020-03-12

## 2020-02-10 ENCOUNTER — OFFICE VISIT (OUTPATIENT)
Dept: RHEUMATOLOGY | Age: 65
End: 2020-02-10

## 2020-02-10 VITALS
HEART RATE: 75 BPM | TEMPERATURE: 98.1 F | DIASTOLIC BLOOD PRESSURE: 116 MMHG | BODY MASS INDEX: 19.66 KG/M2 | WEIGHT: 121.8 LBS | SYSTOLIC BLOOD PRESSURE: 144 MMHG | OXYGEN SATURATION: 96 % | RESPIRATION RATE: 16 BRPM

## 2020-02-10 DIAGNOSIS — M05.79 SEROPOSITIVE RHEUMATOID ARTHRITIS OF MULTIPLE SITES (HCC): ICD-10-CM

## 2020-02-10 DIAGNOSIS — M70.62 TROCHANTERIC BURSITIS OF LEFT HIP: ICD-10-CM

## 2020-02-10 DIAGNOSIS — N95.9 POST MENOPAUSAL PROBLEMS: Primary | ICD-10-CM

## 2020-02-10 RX ORDER — PANTOPRAZOLE SODIUM 40 MG/1
40 TABLET, DELAYED RELEASE ORAL 2 TIMES DAILY
COMMUNITY
Start: 2020-02-03

## 2020-02-10 NOTE — PROGRESS NOTES
RHEUMATOLOGY PROBLEM LIST AND CHIEF COMPLAINT  1. Inflammatory arthritis - morning stiffness, arthritis on the 2nd and 3rd MCPs bilaterally. 2. Osteoarthritis - hands, knees, feet, hips  3. Fibromyalgia    Therapy History:  Current DMARDs: Plaquenil (4/2017 - 3/2018, ran out in April, 5/2018-current), Humira (1/2018-current)    INTERVAL HISTORY  This is a 71 y.o.  female. Today, the patient complains of pain in the joints. Location: back  Severity:  7 on a scale of 0-10  Timing: all day   Duration:  4 months  Context/Associated signs and symptoms: The patient is doing well today. She denies daily morning stiffness or joint swelling. She continues with Humira 40 mg q2 weeks and Plaquenil 200 mg daily. Her chief complaint today is pain on the left lateral thigh. She requests a bursa injection today which has previously provided relief. The patient reports she has had several recent falls due to balance issues and back/hip pain. No numbness or tingling present. She reports she has right ear discomfort and dizziness.       RHEUMATOLOGY REVIEW OF SYSTEMS   Positives as per history  Negatives as follows:  Lynda Fink:  Denies unexplained persistent fevers, weight change, chronic fatigue  HEAD/EYES:   Denies eye redness, blurry vision or sudden loss of vision, dry eyes, HA, temporal artery pain  ENT:    Denies oral/nasal ulcers, recurrent sinus infections, dry mouth  RESPIRATORY:  No pleuritic pain, history of pleural effusions, hemoptysis, exertional dyspnea  CARDIOVASCULAR:  Denies chest pain, history of pericardial effusions  GASTRO:   Denies heartburn, esophageal dysmotility, abdominal pain, nausea, vomiting, diarrhea, blood in the stool  HEMATOLOGIC:  No easy bruising, purpura, swollen lymph nodes  SKIN:    Denies alopecia, ulcers, nodules, sun sensitivity, unexplained persistent rash   VASCULAR:   Denies edema, cyanosis, raynaud phenomenon  NEUROLOGIC:  Denies specific muscle weakness, paresthesias PSYCHIATRIC:  No sleep disturbance / snoring, depression, anxiety  MSK:    No morning stiffness >1 hour, SI joint pain,     PAST MEDICAL HISTORY  Reviewed with patient, significant changes in medical history - No      FAMILY HISTORY   No autoimmune disease in the family    PHYSICAL EXAM  Blood pressure (!) 144/116, pulse 75, temperature 98.1 °F (36.7 °C), temperature source Oral, resp. rate 16, weight 121 lb 12.8 oz (55.2 kg), SpO2 96 %. GENERAL APPEARANCE: Well-nourished, no acute distress  EYES: No scleral erythema, conjunctival injection  ENT: No oral ulcer, parotid enlargement Blood in right ear, no visible tympanic membrane  NECK: No adenopathy, thyroid enlargement  CARDIOVASCULAR: Heart rhythm is regular. No murmur, rub, gallop  CHEST: Normal vesicular breath sounds. No wheezes, rales, pleural friction rubs  ABDOMINAL: The abdomen is soft and nontender. Bowel sounds are normal  EXTREMITIES: There is no evidence of clubbing, cyanosis, edema  SKIN: No rash, palpable purpura, digital ulcer, abnormal thickening, normal nailfold capillaries   NEUROLOGICAL: Normal gait and station, full strength in upper and lower extremities,  normal sensation to light touch  MUSCULOSKELETAL:   Upper extremities - Heberden's and Hang's nodes bilaterally. Lower extremities - R knee moderate swelling, bony prominence. Left lateral thigh pain    LABS, RADIOLOGY AND PROCEDURES  Previous labs reviewed -Yes  Previous radiology reviewed -Yes  Previous procedures reviewed -Yes  Previous medical records reviewed/summarized -Yes      ASSESSMENT  1. Rheumatoid arthritis - (Established problem -  Very good partial response) - The patient's disease is well controlled on Humira 40 mg q2 and Plaquenil 200 mg daily. She should continue on these medications. If the patient continues to have frequent falls we will consider ordering an EMG to rule out a neuropathy and send her to neurology for evaluation.  I will also give the patient a referral to ENT for the blood present in the right ear. She should return in 4 months for a follow up. 2. Osteoarthritis (hands, knees) - The patient should continue physical therapy and NSAIDs as needed. 3. Pain syndrome (fibromyalgia) - Continue physical therapy. We did not discuss this today. 4. Trochanteric bursitis -  The patient has received relief from previous steroid injections. I will inject the left trochanteric bursa with 80 mg of Kenalog at the patient's request.  5. Drug therapy monitoring for toxicity (plaquenil, Humira ) - CBC, BUN, Cr, AST, ALT and albumin every 3 months; eye exams every 6-12 months for retinal toxicity. PLAN  1. Plaquenil 200 mg daily  2. Humira 40 mg q2 weeks  3. Left trochanteric bursa injection  4. DEXA scan  5. Referral to ENT for blood in ear - possible perforated TM  6. Return in 4 months    Lotus Tsai MD  Adult and Pediatric Rheumatology     [facility], [address] [place] [address], Phone [phone], Fax [phone]     Visiting  of Pediatrics    Department of Pediatrics, [facility] of [address] [place] [address], [address] [place] [address], Phone [phone], Fax [phone]    There are no Patient Instructions on file for this visit. cc:  Ya Cabello MD    Written by beatriz Crisostomo, as dictated by Bert Bses. Edouard Tsai M.D. Total face-to face time was 25 minutes, greater than 50% of which was spent in counseling and coordination of care. The diagnosis, treatment and various other items were discussed in detail: Test results, medication options, possible side effects, lifestyle changes.      [facility]  OFFICE PROCEDURE PROGRESS NOTE        Chart reviewed for the following:   Lyna Sacks, MD,  have reviewed the History, Physical and updated the Allergic reactions for Yasmine Meléndez     TIME OUT performed immediately prior to start of procedure:   I, Leandro Fleming MD, have performed the following reviews on Breann Fu prior to the start of the procedure:            * Patient was identified by name and date of birth   * Agreement on procedure being performed was verified  * Risks and Benefits explained to the patient  * Procedure site verified and marked as necessary  * Patient was positioned for comfort  * Consent was signed and verified     Time: 2:30 PM      Date of procedure: 2/10/2020    Procedure performed by: Leandro Fleming MD    Provider assisted by: None    Patient assisted by: self    How tolerated by patient: tolerated the procedure well with no complications    Post Procedural Pain Scale: 0 - No Hurt    Comments: none    PROCEDURE  After consent was obtained, using sterile technique the left trochanteric bursa was prepped and Kenalog 80 mg was then injected and the needle withdrawn. The procedure was well tolerated. The patient is asked to continue to rest for 24 hours before resuming regular activities. It may be more painful for the first 1-2 days. Watch for fever, or increased swelling or persistent pain. Call or return to clinic prn if such symptoms occur.

## 2020-02-10 NOTE — PROGRESS NOTES
Chief Complaint   Patient presents with    Joint Pain     1. Have you been to the ER, urgent care clinic since your last visit? Hospitalized since your last visit? No    2. Have you seen or consulted any other health care providers outside of the [address] [place] [address] since your last visit? Include any pap smears or colon screening.  No

## 2020-02-11 ENCOUNTER — TELEPHONE (OUTPATIENT)
Dept: INTERNAL MEDICINE CLINIC | Age: 65
End: 2020-02-11

## 2020-02-11 RX ORDER — TRIAMCINOLONE ACETONIDE 40 MG/ML
40 INJECTION, SUSPENSION INTRA-ARTICULAR; INTRAMUSCULAR ONCE
Qty: 1 ML | Refills: 0
Start: 2020-02-11 | End: 2020-02-11

## 2020-02-11 NOTE — TELEPHONE ENCOUNTER
Caller's first and last name and relationship (if not the patient):   Best contact number(s): [phone]   What are the symptoms: fractured left ear drum, repeated falls due to losing balance   Transfer successful - yes/no (include outcome): n/a   Transfer declined - yes/no (include reason): n/a   Was caller advised to seek appropriate level of care - yes/no: yes   Details to clarify the request: Pt has an appt tomorrow with Dr. Valenzuela Marking at 8:45 am. PT DECLINED TRANSFER AND SAID SHE'LL BE SEEN TOMORROW   [facility]

## 2020-02-12 ENCOUNTER — OFFICE VISIT (OUTPATIENT)
Dept: INTERNAL MEDICINE CLINIC | Age: 65
End: 2020-02-12

## 2020-02-12 VITALS
DIASTOLIC BLOOD PRESSURE: 75 MMHG | WEIGHT: 122.8 LBS | SYSTOLIC BLOOD PRESSURE: 143 MMHG | HEIGHT: 66 IN | BODY MASS INDEX: 19.73 KG/M2 | OXYGEN SATURATION: 100 % | RESPIRATION RATE: 16 BRPM | TEMPERATURE: 99.3 F | HEART RATE: 71 BPM

## 2020-02-12 DIAGNOSIS — R42 VERTIGO: ICD-10-CM

## 2020-02-12 DIAGNOSIS — H92.21 BLOOD IN RIGHT EAR CANAL: Primary | ICD-10-CM

## 2020-02-12 NOTE — PROGRESS NOTES
SUBJECTIVE  Ms. Shannon Guerrero is a patient of Mark Medrano MD and presents today acutely for     Chief Complaint   Patient presents with    Ear Pain     pt here today c.o pain to R ear drum; pt was seen with rheumatologist on yesterday fue to falls- pt inured her R arm; pt was told by her doctor on yesterday that she punctured her ear drum      1. R ear pain: \"A few days ago I noted some blood in the outer ear. \" Has had ear pain. 2. Then, \"I went to my rheumatologist. I had fallen three times in the last two weeks. I'll be honest; I've been dizzy. 3. Easy bruising. She has a laceration on R proximal forearm. This is healing, no longer bleeding. No purulent drainage. She has it covered with a large bandage. OBJECTIVE  Visit Vitals  /75 (BP 1 Location: Left arm, BP Patient Position: Sitting)   Pulse 71   Temp 99.3 °F (37.4 °C) (Oral)   Resp 16   Ht 5' 6\" (1.676 m)   Wt 122 lb 12.8 oz (55.7 kg)   SpO2 100%   BMI 19.82 kg/m²     Gen: Pleasant 71 y.o.  female in NAD.   HEENT: PERRLA. EOMI. TM: She has a blood clot in her R external ear, obstructing view of TM. OP moist and pink.  Neck: Supple.  No LAD.  HEART: RRR, No M/G/R.   LUNGS: CTAB No W/R.   ABDOMEN: S, NT, ND, BS+.   EXTREMITIES: Warm. SKIN: 3 cm abrasion/shallow ulcer on R proximal forearm. ASSESSMENT / PLAN    1. Blood in right ear canal  -     REFERRAL TO ENT-OTOLARYNGOLOGY    2. Vertigo  -     REFERRAL TO ENT-OTOLARYNGOLOGY    3. Abrasion R proximal forearm: It appears to be healing well. Re-dressed today. I have reviewed with the patient details of the assessment and plan and all questions were answered. Relevant patient education was performed. Follow-up and Dispositions    · Return if symptoms worsen or fail to improve.

## 2020-02-12 NOTE — PATIENT INSTRUCTIONS
Office Policies    Phone calls/patient messages:            Please allow up to 24 hours for someone in the office to contact you about your call or message. Be mindful your provider may be out of the office or your message may require further review. We encourage you to use [employer] for your messages as this is a faster, more efficient way to communicate with our office                         Medication Refills:            Prescription medications require 48-72 business hours to process. We encourage you to use [employer] for your refills. For controlled medications: Please allow 72 business hours to process. Certain medications may require you to  a written prescription at our office. NO narcotic/controlled medications will be prescribed after 4pm Monday through Friday or on weekends              Form/Paperwork Completion:            Please note a $25 fee may incur for all paperwork for completed by our providers. We ask that you allow 7-10 business days. Pre-payment is due prior to picking up/faxing the completed form. You may also download your forms to [employer] to have your doctor print off.      1. Have you been to the ER, urgent care clinic since your last visit? Hospitalized since your last visit?no    2. Have you seen or consulted any other health care providers outside of the [address] [place] [address] since your last visit? Include any pap smears or colon screening.  Rheumatologist

## 2020-02-14 RX ORDER — CARVEDILOL 12.5 MG/1
TABLET ORAL
Qty: 180 TAB | Refills: 1 | Status: SHIPPED | OUTPATIENT
Start: 2020-02-14 | End: 2020-05-21

## 2020-03-10 RX ORDER — HYDROCHLOROTHIAZIDE 25 MG/1
TABLET ORAL
Qty: 30 TAB | Refills: 3 | Status: SHIPPED | OUTPATIENT
Start: 2020-03-10 | End: 2020-09-14

## 2020-03-12 DIAGNOSIS — F41.9 ANXIETY: ICD-10-CM

## 2020-03-12 RX ORDER — HYDROXYZINE 25 MG/1
TABLET, FILM COATED ORAL
Qty: 30 TAB | Refills: 3 | Status: SHIPPED | OUTPATIENT
Start: 2020-03-12 | End: 2020-08-07

## 2020-03-12 NOTE — TELEPHONE ENCOUNTER
----- Message from Audrey Ferreira sent at 3/12/2020 11:36 AM EDT -----  Regarding: Dr. Morrissey Signs refill  Medication Refill    Caller (if not patient):      Relationship of caller (if not patient):      Best contact number(s): [phone]      Name of medication and dosage if known: hydrOXYzine HCl (ATARAX) 25 mg       Is patient out of this medication (yes/no): yes       Pharmacy name: [email or web address]     Pharmacy listed in chart? (yes/no):yes   Pharmacy phone number:      Details to clarify the request:      Audrey Ferreira

## 2020-03-20 ENCOUNTER — TELEPHONE (OUTPATIENT)
Dept: INTERNAL MEDICINE CLINIC | Age: 65
End: 2020-03-20

## 2020-03-20 DIAGNOSIS — J40 BRONCHITIS: Primary | ICD-10-CM

## 2020-03-20 RX ORDER — DOXYCYCLINE 100 MG/1
100 TABLET ORAL 2 TIMES DAILY
Qty: 20 TAB | Refills: 0 | Status: SHIPPED | OUTPATIENT
Start: 2020-03-20 | End: 2021-02-12

## 2020-03-20 RX ORDER — PROMETHAZINE HYDROCHLORIDE AND DEXTROMETHORPHAN HYDROBROMIDE 6.25; 15 MG/5ML; MG/5ML
5 SYRUP ORAL
Qty: 120 ML | Refills: 0 | Status: SHIPPED | OUTPATIENT
Start: 2020-03-20 | End: 2020-03-27

## 2020-03-20 NOTE — TELEPHONE ENCOUNTER
Danyell Xie MD 15 minutes ago (3:00 PM)         Antibiotic and cough medicine sent to the pharmacy        Spoke with patient. Two pt identifiers confirmed. Patient advised of the above message from Dr. ESDRAS COOLEY [facility]. Pt verbalized understanding of information discussed w/ no further questions at this time.

## 2020-03-20 NOTE — TELEPHONE ENCOUNTER
Spoke with patient. Two pt identifiers confirmed. Patient states that she has a productive cough with yellow phlegm. Patient states that she also have a lot of head congestion, sore throat and ear ache. Patient denies fever, body aches or abdominal symptoms. COVID screening completed with patient. Patient would like to know if Dr. Indira Guerrero can send a prescription for an antibiotic and something for her cough to her pharmacy. Patient states that she does not take Augmentin because it causes her to vomit. Advised patient that I will check with Dr. Indira Guerrero and will give her a call back as soon as I can. Pt verbalized understanding of information discussed w/ no further questions at this time.

## 2020-03-20 NOTE — TELEPHONE ENCOUNTER
#[phone] pt states she has bronchitis and is requesting an antibiotic and states Augmentin is not good for her. It makes her vomit. Please call pt with what you can do or advise.

## 2020-03-24 RX ORDER — HYDROXYCHLOROQUINE SULFATE 200 MG/1
TABLET, FILM COATED ORAL
Qty: 30 TAB | Refills: 0 | Status: SHIPPED | OUTPATIENT
Start: 2020-03-24 | End: 2021-02-15

## 2020-04-14 ENCOUNTER — TELEPHONE (OUTPATIENT)
Dept: INTERNAL MEDICINE CLINIC | Age: 65
End: 2020-04-14

## 2020-04-14 NOTE — TELEPHONE ENCOUNTER
#[phone] pt states she has a sore throat and thrush in her mouth. Pt has had the thrush a few times, so knows this is what it is. Pt is [facility].

## 2020-04-14 NOTE — TELEPHONE ENCOUNTER
Spoke with patient. Two pt identifiers confirmed. Patient advised that Dr. Renetta Arnold would like for her to schedule an virtual appointment with her on 04/15/2020. Appointment accepted. Patient advised if anything changes or if unable to keep this appointment to call the office  Pt verbalized understanding of information discussed w/ no further questions at this time.

## 2020-04-15 ENCOUNTER — VIRTUAL VISIT (OUTPATIENT)
Dept: INTERNAL MEDICINE CLINIC | Age: 65
End: 2020-04-15

## 2020-04-15 DIAGNOSIS — B37.0 THRUSH: Primary | ICD-10-CM

## 2020-04-15 RX ORDER — CLOTRIMAZOLE 10 MG/1
LOZENGE ORAL; TOPICAL
Qty: 35 TAB | Refills: 1 | Status: SHIPPED | OUTPATIENT
Start: 2020-04-15 | End: 2020-06-25

## 2020-04-15 NOTE — PROGRESS NOTES
Elom Cruz is a 76 y.o. female who presents with concern of thrush and sore throat. Onset 1 1/2 weeks. She has had thrush in the past and states this is the same. Recent antibiotics. No steroid inhalers. This is an established visit conducted via telemedicine, by phone. The patient has been instructed that this meets HIPAA criteria and acknowledges and agrees to this method of visitation. Pursuant to the emergency declaration under the [address] [place] [address], [facility] waiver authority and the [employer] Act, this Virtual Visit was conducted, with patient's consent, to reduce the patient's risk of exposure to COVID-19 and provide continuity of care for an established patient. Services were provided by phone to substitute for in-person clinic visit.        Past Medical History:   Diagnosis Date    Acid reflux     Acid reflux     Arthritis     Chronic pain     Heart attack ([facility] [address]     Heart failure (HCC)     Hypertension     IBS (irritable bowel syndrome)     Squamous cell carcinoma of skin of right elbow 5/2016    Takotsubo cardiomyopathy 11/16/2015    Tobacco abuse 11/16/2015       Family History   Problem Relation Age of Onset    Cancer Father         prostate    Heart Disease Mother     Heart Disease Maternal Grandmother        Social History     Socioeconomic History    Marital status:      Spouse name: Not on file    Number of children: Not on file    Years of education: Not on file    Highest education level: Not on file   Occupational History    Not on file   Social Needs    Financial resource strain: Not on file    Food insecurity     Worry: Not on file     Inability: Not on file    Transportation needs     Medical: Not on file     Non-medical: Not on file   Tobacco Use    Smoking status: Current Every Day Smoker     Packs/day: 1.00    Smokeless tobacco: Never Used    Tobacco comment: trying to quit 4-5 cigarettes daily   Substance and Sexual Activity    Alcohol use: Yes     Alcohol/week: 8.3 standard drinks     Types: 10 Glasses of wine per week     Comment: 4 times weekly    Drug use: No    Sexual activity: Yes     Partners: Male     Birth control/protection: None   Lifestyle    Physical activity     Days per week: Not on file     Minutes per session: Not on file    Stress: Not on file   Relationships    Social connections     Talks on phone: Not on file     Gets together: Not on file     Attends Religion service: Not on file     Active member of club or organization: Not on file     Attends meetings of clubs or organizations: Not on file     Relationship status: Not on file    Intimate partner violence     Fear of current or ex partner: Not on file     Emotionally abused: Not on file     Physically abused: Not on file     Forced sexual activity: Not on file   Other Topics Concern    Not on file   Social History Narrative    Not on file       Current Outpatient Medications on File Prior to Visit   Medication Sig Dispense Refill    hydroxychloroquine (PLAQUENIL) 200 mg tablet TAKE 1 TABLET BY MOUTH DAILY 30 Tab 0    atorvastatin (LIPITOR) 80 mg tablet Take 1 Tab by mouth daily. take 1 tablet by mouth at bedtime 90 Tab 3    hydrOXYzine HCL (ATARAX) 25 mg tablet TAKE 1 TABLET BY MOUTH THREE TIMES DAILY AS NEEDED FOR ANXIETY 30 Tab 3    hydroCHLOROthiazide (HYDRODIURIL) 25 mg tablet TAKE 1 TABLET BY MOUTH DAILY 30 Tab 3    carvediloL (COREG) 12.5 mg tablet take 1 tablet by mouth twice a day with meals 180 Tab 1    adalimumab (HUMIRA,CF, PEN) 40 mg/0.4 mL pnkt 40 mg by SubCUTAneous route Once every 2 weeks. 2 Kit 3    DULoxetine (CYMBALTA) 60 mg capsule Take 1 Cap by mouth daily.  (Patient taking differently: Take 40 mg by mouth daily.) 30 Cap 5    amLODIPine (NORVASC) 2.5 mg tablet take 1 tablet by mouth once daily 30 Tab 1    estrogens, conjugated,-methylTESTOSTERone (ESTRATEST) 1. 25-2.5 mg per tablet 1 Tab daily. 0    Cetirizine (ZYRTEC) 10 mg cap Take  by mouth.  gabapentin (NEURONTIN) 300 mg capsule TAKE 1 CAPSULE IN THE MORNING, 1 CAPSULE IN THE AFTERNOON, AD 2 CAPSULES AT BEDTIME (Patient taking differently: TAKE 1 CAPSULE IN THE MORNING, 1 CAPSULE IN THE AFTERNOON, AND 1 CAPSULE AT BEDTIME) 120 Cap 3    aspirin 81 mg chewable tablet Take 1 Tab by mouth daily. 30 Tab 0    doxycycline (ADOXA) 100 mg tablet Take 1 Tab by mouth two (2) times a day. 20 Tab 0    pantoprazole (PROTONIX) 40 mg tablet TK 1 T PO BID      alclometasone (ACLOVATE) 0.05 % topical cream USE ON CORNERS OF MOUTH FOR RASH TWICE A DAY AS NEEDED  3    fluticasone propionate (FLONASE) 50 mcg/actuation nasal spray fluticasone propionate 50 mcg/actuation nasal spray,suspension      mupirocin (BACTROBAN) 2 % ointment DEBBIE SML AMT EXT AA TID  0    [DISCONTINUED] clotrimazole (MYCELEX) 10 mg jeremy DISSOLVE 1 JEREMY IN MOUTH AT BEDTIME AS NEEDED  5    albuterol (PROVENTIL HFA, VENTOLIN HFA, PROAIR HFA) 90 mcg/actuation inhaler Take 2 Puffs by inhalation every six (6) hours as needed for Wheezing. 1 Inhaler 0    promethazine (PHENERGAN) 25 mg tablet Take 1 Tab by mouth every six (6) hours as needed for Nausea. 12 Tab 0    omeprazole (PRILOSEC) 40 mg capsule take 1 capsule by mouth once daily ON AN EMPTY STOMACH 30 Cap 5    ondansetron (ZOFRAN ODT) 4 mg disintegrating tablet Take 1 Tab by mouth every eight (8) hours as needed for Nausea. 30 Tab 1    HYDROcodone-acetaminophen (NORCO)  mg tablet TAKE 1 TABLET BY MOUTH 3 TIMES DAILY AS NEEDED FOR PAIN  0    desoximetasone (TOPICORT) 0.25 % topical cream Apply  to affected area two (2) times daily as needed for Skin Irritation. 15 g 0    dicyclomine (BENTYL) 10 mg capsule TAKE 1 CAPSULE BY MOUTH BEFORE MEALS AND AT BEDTIME AS NEEDED.  0     No current facility-administered medications on file prior to visit.         Review of Systems  Pertinent items are noted in HPI. Objective:     Gen: healthy sounding female  HEENT: no audible congestion, patient sees oral erythema  And white plaque on tongue  Neck: patient does not feel enlarged or tender LAD or masses  Resp: normal respiratory effort, no audible wheezing. CV: patient does not feel palpitations or heart irregularity  Neuro: Alert and oriented, able to answer questions without difficulty      Assessment/Plan:       ICD-10-CM ICD-9-CM    1. Thrush B37.0 112.0 clotrimazole (MYCELEX) 10 mg quang       This was a telemedicine visit by phone, duration 6 minutes. Juan Diego Krishnamurthy MD    Follow-up and Dispositions    · Return if symptoms worsen or fail to improve.

## 2020-05-05 ENCOUNTER — TELEPHONE (OUTPATIENT)
Dept: RHEUMATOLOGY | Age: 65
End: 2020-05-05

## 2020-05-05 NOTE — TELEPHONE ENCOUNTER
Spoke to [facility] at [address] was given a verbal order for the pt Destiney Frazier verbally acknowledged receiving the order

## 2020-05-05 NOTE — TELEPHONE ENCOUNTER
----- Message from Tiffanie Costa sent at 5/4/2020  2:37 PM EDT -----  Regarding: FW: Dr. Gerald Oliva    ----- Message -----  From: Ranjit Sanchez  Sent: 5/4/2020   2:30 PM EDT  To: [facility] Front Office Pool  Subject: Dr. Malick Appiah with [facility] is requesting for a call back to [facility]" Rx refill. Best contact number is [phone]. Op. 2 then option.

## 2020-05-13 DIAGNOSIS — J40 BRONCHITIS: ICD-10-CM

## 2020-05-13 RX ORDER — DOXYCYCLINE 100 MG/1
TABLET ORAL
Qty: 20 TAB | Refills: 0 | OUTPATIENT
Start: 2020-05-13

## 2020-05-14 NOTE — TELEPHONE ENCOUNTER
Called, spoke to pt. Two pt identifiers confirmed. Pt informed per Dr. Junito Domingo  See message below. Joey Carter MD 19 hours ago (2:42 PM)         Please clarify need for medication requested         Documentation      Pt states the medication request was an error. Pt verbalized understanding of information discussed w/ no further questions at this time.

## 2020-06-23 ENCOUNTER — OFFICE VISIT (OUTPATIENT)
Dept: RHEUMATOLOGY | Age: 65
End: 2020-06-23

## 2020-06-23 VITALS
HEART RATE: 82 BPM | BODY MASS INDEX: 20.05 KG/M2 | TEMPERATURE: 98.3 F | DIASTOLIC BLOOD PRESSURE: 77 MMHG | SYSTOLIC BLOOD PRESSURE: 159 MMHG | WEIGHT: 124.2 LBS

## 2020-06-23 DIAGNOSIS — M05.79 SEROPOSITIVE RHEUMATOID ARTHRITIS OF MULTIPLE SITES (HCC): ICD-10-CM

## 2020-06-23 DIAGNOSIS — M70.62 TROCHANTERIC BURSITIS OF LEFT HIP: Primary | ICD-10-CM

## 2020-06-23 NOTE — PROGRESS NOTES
RHEUMATOLOGY PROBLEM LIST AND CHIEF COMPLAINT  1. Inflammatory arthritis - morning stiffness, arthritis on the 2nd and 3rd MCPs bilaterally. 2. Osteoarthritis - hands, knees, feet, hips  3. Fibromyalgia    Therapy History:  Current DMARDs: Plaquenil (4/2017 - 3/2018, ran out in April, 5/2018-current), Humira (1/2018-current)    INTERVAL HISTORY  This is a 76 y.o.  female. Today, the patient complains of pain in the joints. Location: back  Severity:  5 on a scale of 0-10  Timing: all day   Duration:  4 months  Context/Associated signs and symptoms: The patient is doing well today on Humira 40 mg q2 weeks and Plaquenil 200 mg daily. She denies daily morning stiffness or joint swelling. Her chief complaint today is pain on the left lateral thigh. She requests a bursa injection today which has previously provided relief. Her last injection provided relief for 2.5 months. She also notes some discomfort in her left hip.       RHEUMATOLOGY REVIEW OF SYSTEMS   Positives as per history  Negatives as follows:  Karyn Lee:  Denies unexplained persistent fevers, weight change, chronic fatigue  HEAD/EYES:   Denies eye redness, blurry vision or sudden loss of vision, dry eyes, HA, temporal artery pain  ENT:    Denies oral/nasal ulcers, recurrent sinus infections, dry mouth  RESPIRATORY:  No pleuritic pain, history of pleural effusions, hemoptysis, exertional dyspnea  CARDIOVASCULAR:  Denies chest pain, history of pericardial effusions  GASTRO:   Denies heartburn, esophageal dysmotility, abdominal pain, nausea, vomiting, diarrhea, blood in the stool  HEMATOLOGIC:  No easy bruising, purpura, swollen lymph nodes  SKIN:    Denies alopecia, ulcers, nodules, sun sensitivity, unexplained persistent rash   VASCULAR:   Denies edema, cyanosis, raynaud phenomenon  NEUROLOGIC:  Denies specific muscle weakness, paresthesias   PSYCHIATRIC:  No sleep disturbance / snoring, depression, anxiety  MSK:    No morning stiffness >1 hour, SI joint pain     PAST MEDICAL HISTORY  Reviewed with patient, significant changes in medical history - No    FAMILY HISTORY   No autoimmune disease in the family    PHYSICAL EXAM  Blood pressure 159/77, pulse 82, temperature 98.3 °F (36.8 °C), weight 124 lb 3.2 oz (56.3 kg). GENERAL APPEARANCE: Well-nourished, no acute distress  EYES: No scleral erythema, conjunctival injection  ENT: No oral ulcer, parotid enlargement   NECK: No adenopathy, thyroid enlargement  CARDIOVASCULAR: Heart rhythm is regular. No murmur, rub, gallop  CHEST: Normal vesicular breath sounds. No wheezes, rales, pleural friction rubs  ABDOMINAL: The abdomen is soft and nontender. Bowel sounds are normal  EXTREMITIES: There is no evidence of clubbing, cyanosis, edema  SKIN: No rash, palpable purpura, digital ulcer, abnormal thickening, normal nailfold capillaries   NEUROLOGICAL: Normal gait and station, full strength in upper and lower extremities,  normal sensation to light touch  MUSCULOSKELETAL:   Upper extremities - Heberden's and Hang's nodes bilaterally. No synovitis noted. Lower extremities - R knee bony prominence. Left lateral thigh pain. Pain in back with internal rotation of left hip, but no decreased rotation of left hip or lymph node present. LABS, RADIOLOGY AND PROCEDURES  Previous labs reviewed -Yes  Previous radiology reviewed -Yes  Previous procedures reviewed -Yes  Previous medical records reviewed/summarized -Yes      ASSESSMENT  1. Rheumatoid arthritis - (Established problem -  Very good partial response) - The patient's disease continues to be well managed on Humira 40 mg q2 and Plaquenil 200 mg daily. She should continue on these medications. Patient continues to complain of difficulties with balance and concerns of falling. I will provide a referral for physical therapy for balance. She should return in 4 months for a follow up. 2. Osteoarthritis (hands, knees) - We did not discuss this today.  The patient should continue physical therapy and NSAIDs as needed. 3. Pain syndrome (fibromyalgia) - We did not discuss this today. Patient should continue physical therapy. 4. Trochanteric bursitis -  This is the patient's primary concern today. The patient has received relief from previous steroid injections. I will inject the left trochanteric bursa with 80 mg of Kenalog at the patient's request.  5. Drug therapy monitoring for toxicity (plaquenil, Humira) - CBC, BUN, Cr, AST, ALT and albumin every 3 months; eye exams every 6-12 months for retinal toxicity. PLAN  1. Plaquenil 200 mg daily  2. Humira 40 mg q2 weeks  3. Left trochanteric bursa injection  4. Check CMP & CBC  5. Referral for PT for balance   6. Return in 4 months    Lotus Terrazas MD  Adult and Pediatric Rheumatology     [address], Phone [phone], Fax [phone]     Visiting  of Pediatrics    Department of Pediatrics, [facility] [address], Phone [phone], Fax [phone]    There are no Patient Instructions on file for this visit.     cc:  Vivianne Spatz, MD    Written by beatriz Bentley, as dictated by Dr. Clarice Hammond M.D.      [facility] reviewed for the following:   Clarice MEJIA, have reviewed the History, Physical and updated the Allergic reactions for [address] performed immediately prior to start of procedure:   Clarice MEJIA, have performed the following reviews on [address] [place] [address] prior to the start of the procedure:            * Patient was identified by name and date of birth   * Agreement on procedure being performed was verified  * Risks and Benefits explained to the patient  * Procedure site verified and marked as necessary  * Patient was positioned for comfort  * Consent was signed and verified     Time: 12:15 PM      Date of procedure: 6/23/2020    Procedure performed by: Katie Head MD    Provider assisted by:  MD    Patient assisted by: self    How tolerated by patient: tolerated the procedure well with no complications    Post Procedural Pain Scale: 0 - No Hurt    Comments: none      PROCEDURE  After consent was obtained, using sterile technique the left thigh was prepped and Kenalog 80 mg and 1ml of lidocaine was then injected and the needle withdrawn. The procedure was well tolerated. The patient is asked to continue to rest for 24 hours before resuming regular activities. It may be more painful for the first 1-2 days. Watch for fever, or increased swelling or persistent pain. Call or return to clinic prn if such symptoms occur.

## 2020-06-25 DIAGNOSIS — B37.0 THRUSH: ICD-10-CM

## 2020-06-25 RX ORDER — CLOTRIMAZOLE 10 MG/1
LOZENGE ORAL; TOPICAL
Qty: 35 TAB | Refills: 1 | Status: SHIPPED | OUTPATIENT
Start: 2020-06-25 | End: 2020-08-14 | Stop reason: SDUPTHER

## 2020-06-26 ENCOUNTER — TELEPHONE (OUTPATIENT)
Dept: INTERNAL MEDICINE CLINIC | Age: 65
End: 2020-06-26

## 2020-06-26 NOTE — TELEPHONE ENCOUNTER
Patient states she needs a call back in reference to having a Flare up of Thrush that is so Bad she is losing her voice. Patient would like to get a refill on medication usually prescribed to be sent to [facility] on file. Please call to discuss & advise if can be done.  Thank you

## 2020-06-26 NOTE — TELEPHONE ENCOUNTER
MD Marcello Ellis LPN   Caller: Unspecified (Today,  9:54 AM)             Mycelex troches sent to [place] [address] in [place] yesterday. Receipt confirmed by pharmacy. They must not have called her.  And it has a refill. Remind her to rinse her mouth afterward. Spoke with patient. Two pt identifiers confirmed. Patient advised of the above message from Dr. Marcus Romero. Pt verbalized understanding of information discussed w/ no further questions at this time.

## 2020-06-29 RX ORDER — TRIAMCINOLONE ACETONIDE 40 MG/ML
40 INJECTION, SUSPENSION INTRA-ARTICULAR; INTRAMUSCULAR ONCE
Qty: 1 ML | Refills: 0
Start: 2020-06-29 | End: 2020-06-29

## 2020-08-03 ENCOUNTER — TELEPHONE (OUTPATIENT)
Dept: RHEUMATOLOGY | Age: 65
End: 2020-08-03

## 2020-08-03 NOTE — TELEPHONE ENCOUNTER
Spoke to pt informed pt per Dr Berna Grant that she can be seen at the next available appt or if the pain is urgent she can be seen at Ortho, pt stated that she does not have a ortho dr so she will like to be scheduled with Dr Beran Grant at the next available appt.  I verbally acknowledged understanding

## 2020-08-03 NOTE — TELEPHONE ENCOUNTER
----- Message from Ciera Jones MD sent at 2020  4:24 PM EDT -----  Regarding: RE: Dr Rios/telephone  She can be scheduled when I get back.   She can also see ortho if it is that urgent.   ----- Message -----  From: Mary Paula LPN  Sent: 3/21/9104  12:46 PM EDT  To: Ciera Jones MD  Subject: FW: Dr Rios/telephone                             ----- Message -----  From: Maggie Fullin2020  12:25 PM EDT  To: [facility] Nurse Pool  Subject: Dr Juan Vo first and last name:      Reason for call:pt said she is in pain and needs to schedule an appt for injections in her hip, as soon as possible said she can barley walk      Callback required yes/no and why:yes for reason given above       Best contact number(s):[phone]      Details to clarify the request:      Ren Bumpers

## 2020-08-05 DIAGNOSIS — F41.9 ANXIETY: ICD-10-CM

## 2020-08-07 RX ORDER — HYDROXYZINE 25 MG/1
TABLET, FILM COATED ORAL
Qty: 30 TAB | Refills: 3 | Status: SHIPPED | OUTPATIENT
Start: 2020-08-07 | End: 2020-09-28

## 2020-08-13 DIAGNOSIS — B37.0 THRUSH: ICD-10-CM

## 2020-08-13 NOTE — TELEPHONE ENCOUNTER
----- Message from Gennaro Rahman sent at 8/13/2020  4:31 PM EDT -----  Regarding: Dr. Chapito Quick  Patient would like to have a refill of her Clotrinazole and [facility] [address].       Copy/paste envera

## 2020-08-14 RX ORDER — CLOTRIMAZOLE 10 MG/1
LOZENGE ORAL; TOPICAL
Qty: 35 TAB | Refills: 1 | Status: SHIPPED | OUTPATIENT
Start: 2020-08-14 | End: 2020-11-25 | Stop reason: SDUPTHER

## 2020-08-14 RX ORDER — DULOXETIN HYDROCHLORIDE 60 MG/1
60 CAPSULE, DELAYED RELEASE ORAL DAILY
Qty: 30 CAP | Refills: 5 | Status: SHIPPED | OUTPATIENT
Start: 2020-08-14

## 2020-08-27 ENCOUNTER — DOCUMENTATION ONLY (OUTPATIENT)
Dept: RHEUMATOLOGY | Age: 65
End: 2020-08-27

## 2020-08-27 NOTE — PROGRESS NOTES
I left this patient a VM to call our office back to schedule an appointment with Dr Donny Mcdowell per the patient request.

## 2020-09-14 RX ORDER — HYDROCHLOROTHIAZIDE 25 MG/1
TABLET ORAL
Qty: 30 TAB | Refills: 3 | Status: SHIPPED | OUTPATIENT
Start: 2020-09-14 | End: 2021-01-29

## 2020-09-16 RX ORDER — ADALIMUMAB 40MG/0.4ML
KIT SUBCUTANEOUS
Qty: 1 KIT | Refills: 0 | Status: SHIPPED | OUTPATIENT
Start: 2020-09-16

## 2020-09-28 DIAGNOSIS — F41.9 ANXIETY: ICD-10-CM

## 2020-09-28 RX ORDER — HYDROXYZINE 25 MG/1
TABLET, FILM COATED ORAL
Qty: 30 TAB | Refills: 3 | Status: SHIPPED | OUTPATIENT
Start: 2020-09-28 | End: 2020-11-02 | Stop reason: ALTCHOICE

## 2020-10-12 ENCOUNTER — TELEPHONE (OUTPATIENT)
Dept: INTERNAL MEDICINE CLINIC | Age: 65
End: 2020-10-12

## 2020-10-20 DIAGNOSIS — M05.79 SEROPOSITIVE RHEUMATOID ARTHRITIS OF MULTIPLE SITES (HCC): Primary | ICD-10-CM

## 2020-10-28 ENCOUNTER — TELEPHONE (OUTPATIENT)
Dept: RHEUMATOLOGY | Age: 65
End: 2020-10-28

## 2020-10-28 NOTE — TELEPHONE ENCOUNTER
Attempted to reach pt x 3 no answer and could not leave a voicemail due to mailbox being full, to inform pt per Dr Sulaiman Hicks that labs are needed

## 2020-11-02 ENCOUNTER — HOSPITAL ENCOUNTER (EMERGENCY)
Age: 65
Discharge: HOME | End: 2020-11-02
Attending: EMERGENCY MEDICINE
Payer: COMMERCIAL

## 2020-11-02 ENCOUNTER — APPOINTMENT (OUTPATIENT)
Dept: CT IMAGING | Age: 65
End: 2020-11-02
Attending: EMERGENCY MEDICINE
Payer: COMMERCIAL

## 2020-11-02 VITALS
OXYGEN SATURATION: 98 % | RESPIRATION RATE: 18 BRPM | BODY MASS INDEX: 19.29 KG/M2 | SYSTOLIC BLOOD PRESSURE: 183 MMHG | HEIGHT: 66 IN | TEMPERATURE: 98.3 F | HEART RATE: 90 BPM | WEIGHT: 120 LBS | DIASTOLIC BLOOD PRESSURE: 99 MMHG

## 2020-11-02 DIAGNOSIS — R11.11 INTRACTABLE VOMITING WITHOUT NAUSEA: Primary | ICD-10-CM

## 2020-11-02 DIAGNOSIS — R19.7 DIARRHEA, UNSPECIFIED TYPE: ICD-10-CM

## 2020-11-02 DIAGNOSIS — R40.4 ALTERED AWARENESS, TRANSIENT: ICD-10-CM

## 2020-11-02 DIAGNOSIS — K58.0 IRRITABLE BOWEL SYNDROME WITH DIARRHEA: ICD-10-CM

## 2020-11-02 LAB
ALBUMIN SERPL-MCNC: 4.4 G/DL (ref 3.5–5)
ALBUMIN/GLOB SERPL: 1.1 {RATIO} (ref 1.1–2.2)
ALP SERPL-CCNC: 148 U/L (ref 45–117)
ALT SERPL-CCNC: 20 U/L (ref 12–78)
ANION GAP SERPL CALC-SCNC: 9 MMOL/L (ref 5–15)
APPEARANCE UR: CLEAR
AST SERPL-CCNC: 29 U/L (ref 15–37)
BACTERIA URNS QL MICRO: NEGATIVE /HPF
BASOPHILS # BLD: 0 K/UL (ref 0–0.1)
BASOPHILS NFR BLD: 0 % (ref 0–1)
BILIRUB SERPL-MCNC: 0.6 MG/DL (ref 0.2–1)
BILIRUB UR QL: NEGATIVE
BUN SERPL-MCNC: 19 MG/DL (ref 6–20)
BUN/CREAT SERPL: 15 (ref 12–20)
CALCIUM SERPL-MCNC: 10.1 MG/DL (ref 8.5–10.1)
CHLORIDE SERPL-SCNC: 96 MMOL/L (ref 97–108)
CO2 SERPL-SCNC: 26 MMOL/L (ref 21–32)
COLOR UR: ABNORMAL
CREAT SERPL-MCNC: 1.29 MG/DL (ref 0.55–1.02)
DIFFERENTIAL METHOD BLD: ABNORMAL
EOSINOPHIL # BLD: 0 K/UL (ref 0–0.4)
EOSINOPHIL NFR BLD: 0 % (ref 0–7)
EPITH CASTS URNS QL MICRO: ABNORMAL /LPF
ERYTHROCYTE [DISTWIDTH] IN BLOOD BY AUTOMATED COUNT: 12.2 % (ref 11.5–14.5)
GLOBULIN SER CALC-MCNC: 4 G/DL (ref 2–4)
GLUCOSE SERPL-MCNC: 111 MG/DL (ref 65–100)
GLUCOSE UR STRIP.AUTO-MCNC: NEGATIVE MG/DL
HCT VFR BLD AUTO: 39 % (ref 35–47)
HGB BLD-MCNC: 13.3 G/DL (ref 11.5–16)
HGB UR QL STRIP: NEGATIVE
HYALINE CASTS URNS QL MICRO: ABNORMAL /LPF (ref 0–5)
IMM GRANULOCYTES # BLD AUTO: 0 K/UL (ref 0–0.04)
IMM GRANULOCYTES NFR BLD AUTO: 0 % (ref 0–0.5)
KETONES UR QL STRIP.AUTO: NEGATIVE MG/DL
LEUKOCYTE ESTERASE UR QL STRIP.AUTO: NEGATIVE
LIPASE SERPL-CCNC: 116 U/L (ref 73–393)
LYMPHOCYTES # BLD: 1.5 K/UL (ref 0.8–3.5)
LYMPHOCYTES NFR BLD: 12 % (ref 12–49)
MCH RBC QN AUTO: 31.4 PG (ref 26–34)
MCHC RBC AUTO-ENTMCNC: 34.1 G/DL (ref 30–36.5)
MCV RBC AUTO: 92.2 FL (ref 80–99)
MONOCYTES # BLD: 0.8 K/UL (ref 0–1)
MONOCYTES NFR BLD: 6 % (ref 5–13)
NEUTS SEG # BLD: 10.1 K/UL (ref 1.8–8)
NEUTS SEG NFR BLD: 82 % (ref 32–75)
NITRITE UR QL STRIP.AUTO: NEGATIVE
NRBC # BLD: 0 K/UL (ref 0–0.01)
NRBC BLD-RTO: 0 PER 100 WBC
PH UR STRIP: 7 [PH] (ref 5–8)
PLATELET # BLD AUTO: 289 K/UL (ref 150–400)
PMV BLD AUTO: 9.6 FL (ref 8.9–12.9)
POTASSIUM SERPL-SCNC: 4.2 MMOL/L (ref 3.5–5.1)
PROT SERPL-MCNC: 8.4 G/DL (ref 6.4–8.2)
PROT UR STRIP-MCNC: 30 MG/DL
RBC # BLD AUTO: 4.23 M/UL (ref 3.8–5.2)
RBC #/AREA URNS HPF: ABNORMAL /HPF (ref 0–5)
SODIUM SERPL-SCNC: 131 MMOL/L (ref 136–145)
SP GR UR REFRACTOMETRY: 1.01 (ref 1–1.03)
UA: UC IF INDICATED,UAUC: ABNORMAL
UROBILINOGEN UR QL STRIP.AUTO: 0.2 EU/DL (ref 0.2–1)
WBC # BLD AUTO: 12.4 K/UL (ref 3.6–11)
WBC URNS QL MICRO: ABNORMAL /HPF (ref 0–4)

## 2020-11-02 PROCEDURE — 80053 COMPREHEN METABOLIC PANEL: CPT

## 2020-11-02 PROCEDURE — 96376 TX/PRO/DX INJ SAME DRUG ADON: CPT

## 2020-11-02 PROCEDURE — 74011250636 HC RX REV CODE- 250/636: Performed by: EMERGENCY MEDICINE

## 2020-11-02 PROCEDURE — 85025 COMPLETE CBC W/AUTO DIFF WBC: CPT

## 2020-11-02 PROCEDURE — 99285 EMERGENCY DEPT VISIT HI MDM: CPT

## 2020-11-02 PROCEDURE — 96374 THER/PROPH/DIAG INJ IV PUSH: CPT

## 2020-11-02 PROCEDURE — 70450 CT HEAD/BRAIN W/O DYE: CPT

## 2020-11-02 PROCEDURE — 36415 COLL VENOUS BLD VENIPUNCTURE: CPT

## 2020-11-02 PROCEDURE — 74011000250 HC RX REV CODE- 250: Performed by: EMERGENCY MEDICINE

## 2020-11-02 PROCEDURE — 96375 TX/PRO/DX INJ NEW DRUG ADDON: CPT

## 2020-11-02 PROCEDURE — 74177 CT ABD & PELVIS W/CONTRAST: CPT

## 2020-11-02 PROCEDURE — 81001 URINALYSIS AUTO W/SCOPE: CPT

## 2020-11-02 PROCEDURE — 74011000636 HC RX REV CODE- 636: Performed by: EMERGENCY MEDICINE

## 2020-11-02 PROCEDURE — 83690 ASSAY OF LIPASE: CPT

## 2020-11-02 RX ORDER — SODIUM CHLORIDE 0.9 % (FLUSH) 0.9 %
10 SYRINGE (ML) INJECTION
Status: COMPLETED | OUTPATIENT
Start: 2020-11-02 | End: 2020-11-02

## 2020-11-02 RX ORDER — FENTANYL CITRATE 50 UG/ML
25 INJECTION, SOLUTION INTRAMUSCULAR; INTRAVENOUS
Status: COMPLETED | OUTPATIENT
Start: 2020-11-02 | End: 2020-11-02

## 2020-11-02 RX ORDER — PROMETHAZINE HYDROCHLORIDE 12.5 MG/1
12.5 SUPPOSITORY RECTAL
Qty: 12 SUPPOSITORY | Refills: 0 | Status: SHIPPED | OUTPATIENT
Start: 2020-11-02 | End: 2020-11-09

## 2020-11-02 RX ORDER — PROCHLORPERAZINE EDISYLATE 5 MG/ML
10 INJECTION INTRAMUSCULAR; INTRAVENOUS
Status: DISCONTINUED | OUTPATIENT
Start: 2020-11-02 | End: 2020-11-02

## 2020-11-02 RX ORDER — PROMETHAZINE HYDROCHLORIDE 12.5 MG/1
25 TABLET ORAL
Qty: 20 TAB | Refills: 0 | Status: SHIPPED | OUTPATIENT
Start: 2020-11-02

## 2020-11-02 RX ORDER — LORAZEPAM 2 MG/ML
0.5 INJECTION INTRAMUSCULAR
Status: COMPLETED | OUTPATIENT
Start: 2020-11-02 | End: 2020-11-02

## 2020-11-02 RX ADMIN — Medication 10 ML: at 14:56

## 2020-11-02 RX ADMIN — PROCHLORPERAZINE EDISYLATE 10 MG: 5 INJECTION INTRAMUSCULAR; INTRAVENOUS at 14:10

## 2020-11-02 RX ADMIN — SODIUM CHLORIDE 1000 ML: 900 INJECTION, SOLUTION INTRAVENOUS at 14:38

## 2020-11-02 RX ADMIN — LORAZEPAM 0.5 MG: 2 INJECTION INTRAMUSCULAR; INTRAVENOUS at 16:08

## 2020-11-02 RX ADMIN — FENTANYL CITRATE 25 MCG: 50 INJECTION, SOLUTION INTRAMUSCULAR; INTRAVENOUS at 17:46

## 2020-11-02 RX ADMIN — FENTANYL CITRATE 25 MCG: 50 INJECTION, SOLUTION INTRAMUSCULAR; INTRAVENOUS at 14:10

## 2020-11-02 RX ADMIN — IOPAMIDOL 100 ML: 755 INJECTION, SOLUTION INTRAVENOUS at 14:56

## 2020-11-02 NOTE — DISCHARGE INSTRUCTIONS
Diarrhea: Care Instructions  Your Care Instructions     Diarrhea is loose, watery stools (bowel movements). The exact cause is often hard to find. Sometimes diarrhea is your body's way of getting rid of what caused an upset stomach. Viruses, food poisoning, and many medicines can cause diarrhea. Some people get diarrhea in response to emotional stress, anxiety, or certain foods. Almost everyone has diarrhea now and then. It usually isn't serious, and your stools will return to normal soon. The important thing to do is replace the fluids you have lost, so you can prevent dehydration. The doctor has checked you carefully, but problems can develop later. If you notice any problems or new symptoms, get medical treatment right away. Follow-up care is a key part of your treatment and safety. Be sure to make and go to all appointments, and call your doctor if you are having problems. It's also a good idea to know your test results and keep a list of the medicines you take. How can you care for yourself at home? · Watch for signs of dehydration, which means your body has lost too much water. Dehydration is a serious condition and should be treated right away. Signs of dehydration are:  ? Increasing thirst and dry eyes and mouth. ? Feeling faint or lightheaded. ? A smaller amount of urine than normal.  · To prevent dehydration, drink plenty of fluids. Choose water and other caffeine-free clear liquids until you feel better. If you have kidney, heart, or liver disease and have to limit fluids, talk with your doctor before you increase the amount of fluids you drink. · Begin eating small amounts of mild foods the next day, if you feel like it. ? Try yogurt that has live cultures of Lactobacillus. (Check the label.)  ? Avoid spicy foods, fruits, alcohol, and caffeine until 48 hours after all symptoms are gone. ? Avoid chewing gum that contains sorbitol. ?  Avoid dairy products (except for yogurt with Lactobacillus) while you have diarrhea and for 3 days after symptoms are gone. · The doctor may recommend that you take over-the-counter medicine, such as loperamide (Imodium), if you still have diarrhea after 6 hours. Read and follow all instructions on the label. Do not use this medicine if you have bloody diarrhea, a high fever, or other signs of serious illness. Call your doctor if you think you are having a problem with your medicine. When should you call for help? Call 911 anytime you think you may need emergency care. For example, call if:    · You passed out (lost consciousness).     · Your stools are maroon or very bloody. Call your doctor now or seek immediate medical care if:    · You are dizzy or lightheaded, or you feel like you may faint.     · Your stools are black and look like tar, or they have streaks of blood.     · You have new or worse belly pain.     · You have symptoms of dehydration, such as:  ? Dry eyes and a dry mouth. ? Passing only a little dark urine. ? Feeling thirstier than usual.     · You have a new or higher fever. Watch closely for changes in your health, and be sure to contact your doctor if:    · Your diarrhea is getting worse.     · You see pus in the diarrhea.     · You are not getting better after 2 days (48 hours). Where can you learn more? Go to http://www.gray.com/  Enter [id number] in the search box to learn more about \"Diarrhea: Care Instructions. \"  Current as of: June 26, 2019               Content Version: 12.6  © 2316-6052 [employer]. Care instructions adapted under license by [employer] (which disclaims liability or warranty for this information). If you have questions about a medical condition or this instruction, always ask your healthcare professional. [address] any warranty or liability for your use of this information.     Patient Education        Nausea and Vomiting: Care Instructions  Your Care Instructions     When you are nauseated, you may feel weak and sweaty and notice a lot of saliva in your mouth. Nausea often leads to vomiting. Most of the time you do not need to worry about nausea and vomiting, but they can be signs of other illnesses. Two common causes of nausea and vomiting are stomach flu and food poisoning. Nausea and vomiting from viral stomach flu will usually start to improve within 24 hours. Nausea and vomiting from food poisoning may last from 12 to 48 hours. The doctor has checked you carefully, but problems can develop later. If you notice any problems or new symptoms, get medical treatment right away. Follow-up care is a key part of your treatment and safety. Be sure to make and go to all appointments, and call your doctor if you are having problems. It's also a good idea to know your test results and keep a list of the medicines you take. How can you care for yourself at home? · To prevent dehydration, drink plenty of fluids, enough so that your urine is light yellow or clear like water. Choose water and other caffeine-free clear liquids until you feel better. If you have kidney, heart, or liver disease and have to limit fluids, talk with your doctor before you increase the amount of fluids you drink. · Rest in bed until you feel better. · When you are able to eat, try clear soups, mild foods, and liquids until all symptoms are gone for 12 to 48 hours. Other good choices include dry toast, crackers, cooked cereal, and gelatin dessert, such as Jell-O. When should you call for help? Call 911 anytime you think you may need emergency care. For example, call if:    · You passed out (lost consciousness). Call your doctor now or seek immediate medical care if:    · You have symptoms of dehydration, such as:  ? Dry eyes and a dry mouth. ? Passing only a little dark urine. ?  Feeling thirstier than usual.     · You have new or worsening belly pain.     · You have a new or higher fever.     · You vomit blood or what looks like coffee grounds. Watch closely for changes in your health, and be sure to contact your doctor if:    · You have ongoing nausea and vomiting.     · Your vomiting is getting worse.     · Your vomiting lasts longer than 2 days.     · You are not getting better as expected. Where can you learn more? Go to http://www.kinsey.com/  Enter H591 in the search box to learn more about \"Nausea and Vomiting: Care Instructions. \"  Current as of: June 26, 2019               Content Version: 12.6  © 3124-8514 [employer]. Care instructions adapted under license by [employer] (which disclaims liability or warranty for this information). If you have questions about a medical condition or this instruction, always ask your healthcare professional. [address] any warranty or liability for your use of this information. Patient Education        Diet for Irritable Bowel Syndrome: Care Instructions  Your Care Instructions     Irritable bowel syndrome, or IBS, is a problem with the intestines. IBS can cause belly pain, bloating, gas, constipation, and diarrhea. Most people can control their symptoms by changing their diet and easing stress. No specific foods cause everyone with IBS to have symptoms. Many people find that they feel better by limiting or eliminating foods that may bring on symptoms. Make sure you don't stop eating all foods from any one food group without talking with a dietitian. You need to make sure you are still getting all the nutrients you need. Follow-up care is a key part of your treatment and safety. Be sure to make and go to all appointments, and call your doctor if you are having problems. It's also a good idea to know your test results and keep a list of the medicines you take. How can you care for yourself at home?   To reduce constipation  · Include fruits, vegetables, beans, and whole grains in your diet each day. These foods are high in fiber. Slowly increase the amount of fiber you eat. This helps you avoid a lot of gas. · Drink plenty of fluids. If you have kidney, heart, or liver disease and have to limit fluids, talk with your doctor before you increase the amount of fluids you drink. · Get some exercise every day. Build up slowly to 30 to 60 minutes a day on 5 or more days of the week. · Take a fiber supplement, such as Citrucel or Metamucil, every day if needed. Read and follow all instructions on the label. · Schedule time each day for a bowel movement. Having a daily routine may help. Take your time and do not strain when having a bowel movement. · Check with your doctor before you increase the amount of fiber in your diet. For some people who have IBS, eating more fiber may make some symptoms worse. This includes bloating. To reduce diarrhea  You may try giving up foods or drinks one at a time to see whether symptoms improve. Limit or avoid the following:  · Alcohol  · Caffeine, which is found in coffee, tea, cola drinks, and chocolate  · Nicotine, from smoking or chewing tobacco  · Gas-producing foods, such as beans, broccoli, cabbage, and apples  · Dairy products that contain lactose (milk sugar), such as ice cream and milk. · Foods and drinks high in sugar, especially fruit juice, soda, candy, and other packaged sweets (such as cookies)  · Foods high in fat, including herrera, sausage, butter, oils, and anything deep-fried  · Sorbitol and xylitol, artificial sweeteners found in some sugarless candies and chewing gum  Keep track of foods  · Some people with IBS use a daily food diary to keep track of what they eat and whether they have any symptoms after eating certain foods. The diary also can be a good way to record what is going on in your life. · Stress plays a role in IBS.  So if you are aware that certain stresses bring on symptoms, you can try to reduce those stresses. Keep mealtimes pleasant  · Try to maintain a pleasant environment when you eat. This may reduce stress that can make symptoms likely to occur. · Give yourself plenty of time to eat, rather than eating on the go. Chew your food slowly. Try not to swallow air, which can cause bloating. Where can you learn more? Go to http://www.BragBet/  Enter M405 in the search box to learn more about \"Diet for Irritable Bowel Syndrome: Care Instructions. \"  Current as of: August 22, 2019               Content Version: 12.6  © 8299-6859 [employer]. Care instructions adapted under license by [employer] (which disclaims liability or warranty for this information). If you have questions about a medical condition or this instruction, always ask your healthcare professional. [address] any warranty or liability for your use of this information. Patient Education        Irritable Bowel Syndrome: Care Instructions  Your Care Instructions  Irritable bowel syndrome, or IBS, is a problem with the intestines that causes belly pain, bloating, gas, constipation, and diarrhea. The cause of IBS is not well known. IBS can last for many years, but it does not get worse over time or lead to serious disease. Most people can control their symptoms by changing their diet and reducing stress. Follow-up care is a key part of your treatment and safety. Be sure to make and go to all appointments, and call your doctor if you are having problems. It's also a good idea to know your test results and keep a list of the medicines you take. How can you care for yourself at home? · Prevent diarrhea:  ? Limit the amount of high-fiber foods you eat. This includes vegetables, fruits, whole-grain breads and pasta, high-fiber cereal, and brown rice. ? Limit dairy products. ? Limit artificial sweeteners such as sorbitol and xylitol.   · Avoid constipation:  ? Include fruits, vegetables, beans, and whole grains in your diet each day. These foods are high in fiber. ? Drink plenty of fluids. If you have kidney, heart, or liver disease and have to limit fluids, talk with your doctor before you increase the amount of fluids you drink. ? Get some exercise every day. Build up slowly to 30 to 60 minutes a day on 5 or more days of the week. ? Take a fiber supplement, such as Citrucel or Metamucil, every day if needed. Read and follow all instructions on the label. ? Schedule time each day for a bowel movement. Having a daily routine may help. Take your time and do not strain when having a bowel movement. · To help relieve bloating or gas, avoid foods such as beans, cabbage, cauliflower, or broccoli. · Keep a daily diary of what you eat and what symptoms you have. This may help find foods that cause you problems. · Eat slowly. Try to make mealtime relaxing. · Find ways to reduce stress. When should you call for help? Call your doctor now or seek immediate medical care if:    · Your pain is different than usual or occurs with fever.     · You lose weight without trying, or you lose your appetite and you do not know why.     · Your symptoms often wake you from sleep.     · Your stools are black and tarlike or have streaks of blood. Watch closely for changes in your health, and be sure to contact your doctor if:    · Your IBS symptoms get worse or begin to disrupt your day-to-day life.     · You become more tired than usual.     · Your home treatment stops working. Where can you learn more? Go to http://www.gray.com/  Enter Y447 in the search box to learn more about \"Irritable Bowel Syndrome: Care Instructions. \"  Current as of: April 15, 2020               Content Version: 12.6  © 3488-9379 [employer].    Care instructions adapted under license by [employer] (which disclaims liability or warranty for this information). If you have questions about a medical condition or this instruction, always ask your healthcare professional. [place] [address] any warranty or liability for your use of this information.

## 2020-11-02 NOTE — ED PROVIDER NOTES
EMERGENCY DEPARTMENT HISTORY AND PHYSICAL EXAM      Date: 11/2/2020  Patient Name: Liyah Zimmer    History of Presenting Illness     Chief Complaint   Patient presents with    Vomiting     given 4mg of zofran odt by EMS. vomiting x several days hx of IBS    Diarrhea     x several days. hx of IBS       History Provided By: Patient, Patient's  and EMS    HPI: Liyah Zimmer, 76 y.o. female presents to the ED with cc of vomiting and diarrhea patient has history of IBS. Her symptoms started 1 week ago. She had vomiting with episodes of diarrhea. Symptoms subsided 2 days ago, and the patient was able to eat a full meal.  After that, her vomiting and diarrhea increased, to the point where she had diarrhea all night long. She feels lightheaded and weak. She is unable to keep any of her medications down due to the vomiting. She denies fever, chill, chest pain, cough or shortness of breath. She also denies headache. She has abdominal pain in the mid abdomen which is a 6 out of 10. She feels that her nausea is actually worse than the pain. Blood pressure was elevated per EMS. She received Zofran ODT in route with no relief of symptoms. She denies dysuria or any recent antibiotic use. .    There are no other complaints, changes, or physical findings at this time. PCP: Imani Moscoso MD    No current facility-administered medications on file prior to encounter. Current Outpatient Medications on File Prior to Encounter   Medication Sig Dispense Refill    hydrOXYzine HCL (ATARAX) 25 mg tablet TAKE 1 TABLET BY MOUTH THREE TIMES DAILY AS NEEDED FOR ANXIETY 30 Tab 3    HumkeCF, Pen 40 mg/0.4 mL injection pen INJECT ONE PEN (40MG) UNDER THE SKIN (SUBCUTANEOUS INJECTION) EVERY 2 WEEKS 1 Kit 0    hydroCHLOROthiazide (HYDRODIURIL) 25 mg tablet TAKE 1 TABLET BY MOUTH DAILY 30 Tab 3    DULoxetine (CYMBALTA) 60 mg capsule Take 1 Cap by mouth daily.  30 Cap 5    clotrimazole (MYCELEX) 10 mg quang DISSOLVE 1 LOZENGE IN MOUTH FIVE TIMES DAILY FOR 1 WEEK 35 Tab 1    carvediloL (COREG) 12.5 mg tablet TAKE 1 TABLET BY MOUTH TWICE DAILY WITH MEALS 180 Tab 3    hydroxychloroquine (PLAQUENIL) 200 mg tablet TAKE 1 TABLET BY MOUTH DAILY 30 Tab 0    atorvastatin (LIPITOR) 80 mg tablet Take 1 Tab by mouth daily. take 1 tablet by mouth at bedtime 90 Tab 3    doxycycline (ADOXA) 100 mg tablet Take 1 Tab by mouth two (2) times a day. 20 Tab 0    pantoprazole (PROTONIX) 40 mg tablet TK 1 T PO BID      alclometasone (ACLOVATE) 0.05 % topical cream USE ON CORNERS OF MOUTH FOR RASH TWICE A DAY AS NEEDED  3    fluticasone propionate (FLONASE) 50 mcg/actuation nasal spray fluticasone propionate 50 mcg/actuation nasal spray,suspension      mupirocin (BACTROBAN) 2 % ointment DEBBIE SML AMT EXT AA TID  0    albuterol (PROVENTIL HFA, VENTOLIN HFA, PROAIR HFA) 90 mcg/actuation inhaler Take 2 Puffs by inhalation every six (6) hours as needed for Wheezing. 1 Inhaler 0    promethazine (PHENERGAN) 25 mg tablet Take 1 Tab by mouth every six (6) hours as needed for Nausea. 12 Tab 0    amLODIPine (NORVASC) 2.5 mg tablet take 1 tablet by mouth once daily 30 Tab 1    estrogens, conjugated,-methylTESTOSTERone (ESTRATEST) 1.25-2.5 mg per tablet 1 Tab daily. 0    omeprazole (PRILOSEC) 40 mg capsule take 1 capsule by mouth once daily ON AN EMPTY STOMACH 30 Cap 5    ondansetron (ZOFRAN ODT) 4 mg disintegrating tablet Take 1 Tab by mouth every eight (8) hours as needed for Nausea. 30 Tab 1    HYDROcodone-acetaminophen (NORCO)  mg tablet TAKE 1 TABLET BY MOUTH 3 TIMES DAILY AS NEEDED FOR PAIN  0    desoximetasone (TOPICORT) 0.25 % topical cream Apply  to affected area two (2) times daily as needed for Skin Irritation. 15 g 0    Cetirizine (ZYRTEC) 10 mg cap Take  by mouth.       dicyclomine (BENTYL) 10 mg capsule TAKE 1 CAPSULE BY MOUTH BEFORE MEALS AND AT BEDTIME AS NEEDED.  0    gabapentin (NEURONTIN) 300 mg capsule TAKE 1 CAPSULE IN THE MORNING, 1 CAPSULE IN THE AFTERNOON, AD 2 CAPSULES AT BEDTIME (Patient taking differently: TAKE 1 CAPSULE IN THE MORNING, 1 CAPSULE IN THE AFTERNOON, AND 1 CAPSULE AT BEDTIME) 120 Cap 3    aspirin 81 mg chewable tablet Take 1 Tab by mouth daily. 27 Tab 0       Past History     Past Medical History:  Past Medical History:   Diagnosis Date    Acid reflux     Acid reflux     Arthritis     Chronic pain     Heart attack ([address]     Heart failure (HCC)     Hypertension     IBS (irritable bowel syndrome)     Squamous cell carcinoma of skin of right elbow 5/2016    Takotsubo cardiomyopathy 11/16/2015    Tobacco abuse 11/16/2015       Past Surgical History:  Past Surgical History:   Procedure Laterality Date    ABDOMEN SURGERY PROC UNLISTED      adhesion removal    BREAST SURGERY PROCEDURE UNLISTED      lumpectomy    HX GYN      oophorectomy    HX ORTHOPAEDIC      right knee arthroscopy    HX ORTHOPAEDIC      right thumb    HX ORTHOPAEDIC      back surgeries    HX OTHER SURGICAL      8 route canals    MS COLONOSCOPY FLX DX W/COLLJ SPEC WHEN PFRMD  10/15/2012            Family History:  Family History   Problem Relation Age of Onset    Cancer Father         prostate    Heart Disease Mother     Heart Disease Maternal Grandmother        Social History:  Social History     Tobacco Use    Smoking status: Current Every Day Smoker     Packs/day: 1.00    Smokeless tobacco: Never Used    Tobacco comment: trying to quit 4-5 cigarettes daily   Substance Use Topics    Alcohol use: Yes     Alcohol/week: 8.3 standard drinks     Types: 10 Glasses of wine per week     Comment: 4 times weekly    Drug use: No       Allergies:   Allergies   Allergen Reactions    Ace Inhibitors Swelling    Arb-Angiotensin Receptor Antagonist Other (comments)     Prior ACE inhibitor angioedema, cross reaction risk    Codeine Nausea Only    Keflex [Cephalexin] Hives    Milk Other (comments)     Lactose intolerant.  Morphine Nausea and Vomiting         Review of Systems   Review of Systems   Constitutional: Negative for fever. HENT: Negative for congestion. Eyes: Negative. Respiratory: Negative for shortness of breath. Cardiovascular: Negative for chest pain. Gastrointestinal: Positive for abdominal pain, diarrhea, nausea and vomiting. Endocrine: Negative for heat intolerance. Genitourinary: Negative. Musculoskeletal: Negative for back pain. Skin: Negative for rash. Allergic/Immunologic: Negative for immunocompromised state. Neurological: Positive for light-headedness. Hematological: Does not bruise/bleed easily. Psychiatric/Behavioral: Negative. All other systems reviewed and are negative. Physical Exam   Physical Exam  Vitals signs and nursing note reviewed. Constitutional:       General: She is not in acute distress. Appearance: She is well-developed. HENT:      Head: Normocephalic. Neck:      Musculoskeletal: Normal range of motion and neck supple. Cardiovascular:      Rate and Rhythm: Normal rate and regular rhythm. Heart sounds: Normal heart sounds. Pulmonary:      Effort: Pulmonary effort is normal.      Breath sounds: Normal breath sounds. Abdominal:      General: Bowel sounds are normal.      Palpations: Abdomen is soft. Tenderness: There is abdominal tenderness. Musculoskeletal: Normal range of motion. Skin:     General: Skin is warm and dry. Neurological:      General: No focal deficit present. Mental Status: She is alert and oriented to person, place, and time.    Psychiatric:         Mood and Affect: Mood normal.         Behavior: Behavior normal.         Diagnostic Study Results     Labs -     Recent Results (from the past 12 hour(s))   URINALYSIS W/ REFLEX CULTURE    Collection Time: 11/02/20 12:49 PM    Specimen: Urine   Result Value Ref Range    Color YELLOW/STRAW      Appearance CLEAR CLEAR      Specific gravity 1.010 1.003 - 1.030      pH (UA) 7.0 5.0 - 8.0      Protein 30 (A) NEG mg/dL    Glucose Negative NEG mg/dL    Ketone Negative NEG mg/dL    Bilirubin Negative NEG      Blood Negative NEG      Urobilinogen 0.2 0.2 - 1.0 EU/dL    Nitrites Negative NEG      Leukocyte Esterase Negative NEG      WBC 0-4 0 - 4 /hpf    RBC 0-5 0 - 5 /hpf    Epithelial cells FEW FEW /lpf    Bacteria Negative NEG /hpf    UA:UC IF INDICATED CULTURE NOT INDICATED BY UA RESULT CNI      Hyaline cast 0-2 0 - 5 /lpf   CBC WITH AUTOMATED DIFF    Collection Time: 11/02/20  1:41 PM   Result Value Ref Range    WBC 12.4 (H) 3.6 - 11.0 K/uL    RBC 4.23 3.80 - 5.20 M/uL    HGB 13.3 11.5 - 16.0 g/dL    HCT 39.0 35.0 - 47.0 %    MCV 92.2 80.0 - 99.0 FL    MCH 31.4 26.0 - 34.0 PG    MCHC 34.1 30.0 - 36.5 g/dL    RDW 12.2 11.5 - 14.5 %    PLATELET 570 207 - 808 K/uL    MPV 9.6 8.9 - 12.9 FL    NRBC 0.0 0  WBC    ABSOLUTE NRBC 0.00 0.00 - 0.01 K/uL    NEUTROPHILS 82 (H) 32 - 75 %    LYMPHOCYTES 12 12 - 49 %    MONOCYTES 6 5 - 13 %    EOSINOPHILS 0 0 - 7 %    BASOPHILS 0 0 - 1 %    IMMATURE GRANULOCYTES 0 0.0 - 0.5 %    ABS. NEUTROPHILS 10.1 (H) 1.8 - 8.0 K/UL    ABS. LYMPHOCYTES 1.5 0.8 - 3.5 K/UL    ABS. MONOCYTES 0.8 0.0 - 1.0 K/UL    ABS. EOSINOPHILS 0.0 0.0 - 0.4 K/UL    ABS. BASOPHILS 0.0 0.0 - 0.1 K/UL    ABS. IMM.  GRANS. 0.0 0.00 - 0.04 K/UL    DF AUTOMATED     METABOLIC PANEL, COMPREHENSIVE    Collection Time: 11/02/20  1:41 PM   Result Value Ref Range    Sodium 131 (L) 136 - 145 mmol/L    Potassium 4.2 3.5 - 5.1 mmol/L    Chloride 96 (L) 97 - 108 mmol/L    CO2 26 21 - 32 mmol/L    Anion gap 9 5 - 15 mmol/L    Glucose 111 (H) 65 - 100 mg/dL    BUN 19 6 - 20 MG/DL    Creatinine 1.29 (H) 0.55 - 1.02 MG/DL    BUN/Creatinine ratio 15 12 - 20      GFR est AA 50 (L) >60 ml/min/1.73m2    GFR est non-AA 41 (L) >60 ml/min/1.73m2    Calcium 10.1 8.5 - 10.1 MG/DL    Bilirubin, total 0.6 0.2 - 1.0 MG/DL    ALT (SGPT) 20 12 - 78 U/L    AST (SGOT) 29 15 - 37 U/L Alk. phosphatase 148 (H) 45 - 117 U/L    Protein, total 8.4 (H) 6.4 - 8.2 g/dL    Albumin 4.4 3.5 - 5.0 g/dL    Globulin 4.0 2.0 - 4.0 g/dL    A-G Ratio 1.1 1.1 - 2.2     LIPASE    Collection Time: 11/02/20  1:41 PM   Result Value Ref Range    Lipase 116 73 - 393 U/L       Radiologic Studies -   CT ABD PELV W CONT   Final Result   IMPRESSION:   No acute or significant findings suspected. CT HEAD WO CONT   Final Result   IMPRESSION: No acute findings suspected. CT Results  (Last 48 hours)    None        CXR Results  (Last 48 hours)    None          Medical Decision Making   I am the first provider for this patient. I reviewed the vital signs, available nursing notes, past medical history, past surgical history, family history and social history. Vital Signs-Reviewed the patient's vital signs. Patient Vitals for the past 12 hrs:   Temp Pulse Resp BP SpO2   11/02/20 1353 98 °F (36.7 °C) 89 16 (!) 173/87 99 %   11/02/20 1225 97.6 °F (36.4 °C) 82 15 (!) 206/102 100 %         Records Reviewed: Nursing Notes, Old Medical Records, Ambulance Run Sheet, Previous Radiology Studies and Previous Laboratory Studies    Provider Notes (Medical Decision Making):   IBS, gastroenteritis, diverticulitis, colitis, dehydration, electrolyte abnormality,    ED Course:   Initial assessment performed. The patients presenting problems have been discussed, and they are in agreement with the care plan formulated and outlined with them. I have encouraged them to ask questions as they arise throughout their visit. Progress note: The patient's nurse asked her what year it was and she said 2011. Her  states normally she would know the year. I asked her what month it was and she said it was February, then she said it was October. She did tell me the correct year. I will add on a head CT. Progress note:    Patient has returned to baseline mental status. Her results have been reviewed.   She is feeling better. She is advised to follow-up and return to ER if worse      Critical Care Time:   0    Disposition:  home    DISCHARGE PLAN:  1. Discharge Medication List as of 11/2/2020  5:12 PM      START taking these medications    Details   promethazine (PHENERGAN) 12.5 mg suppository Insert 1 Suppository into rectum every six (6) hours as needed for Nausea for up to 7 days. , Normal, Disp-12 Suppository,R-0         CONTINUE these medications which have CHANGED    Details   promethazine (PHENERGAN) 12.5 mg tablet Take 2 Tabs by mouth every six (6) hours as needed for Nausea., Normal, Disp-20 Tab,R-0         CONTINUE these medications which have NOT CHANGED    Details   JAZMYN Billy, Pen 40 mg/0.4 mL injection pen INJECT ONE PEN (40MG) UNDER THE SKIN (SUBCUTANEOUS INJECTION) EVERY 2 WEEKS, Normal, Disp-1 Kit,R-0      hydroCHLOROthiazide (HYDRODIURIL) 25 mg tablet TAKE 1 TABLET BY MOUTH DAILY, Normal, Disp-30 Tab,R-3      DULoxetine (CYMBALTA) 60 mg capsule Take 1 Cap by mouth daily. , Normal, Disp-30 Cap,R-5      clotrimazole (MYCELEX) 10 mg quang DISSOLVE 1 LOZENGE IN MOUTH FIVE TIMES DAILY FOR 1 WEEK, Normal, Disp-35 Tab,R-1      carvediloL (COREG) 12.5 mg tablet TAKE 1 TABLET BY MOUTH TWICE DAILY WITH MEALS, Normal, Disp-180 Tab, R-3      hydroxychloroquine (PLAQUENIL) 200 mg tablet TAKE 1 TABLET BY MOUTH DAILY, Normal, Disp-30 Tab, R-0      atorvastatin (LIPITOR) 80 mg tablet Take 1 Tab by mouth daily.  take 1 tablet by mouth at bedtime, Normal, Disp-90 Tab, R-3      doxycycline (ADOXA) 100 mg tablet Take 1 Tab by mouth two (2) times a day., Normal, Disp-20 Tab, R-0      pantoprazole (PROTONIX) 40 mg tablet TK 1 T PO BID, Historical Med      alclometasone (ACLOVATE) 0.05 % topical cream USE ON CORNERS OF MOUTH FOR RASH TWICE A DAY AS NEEDED, Historical Med, R-3      fluticasone propionate (FLONASE) 50 mcg/actuation nasal spray fluticasone propionate 50 mcg/actuation nasal spray,suspension, Historical Med      mupirocin (BACTROBAN) 2 % ointment DEBBIE SML AMT EXT AA TID, Historical Med, R-0      albuterol (PROVENTIL HFA, VENTOLIN HFA, PROAIR HFA) 90 mcg/actuation inhaler Take 2 Puffs by inhalation every six (6) hours as needed for Wheezing., Normal, Disp-1 Inhaler, R-0      amLODIPine (NORVASC) 2.5 mg tablet take 1 tablet by mouth once daily, Normal, Disp-30 Tab, R-1      estrogens, conjugated,-methylTESTOSTERone (ESTRATEST) 1.25-2.5 mg per tablet 1 Tab daily. , Historical Med, R-0      omeprazole (PRILOSEC) 40 mg capsule take 1 capsule by mouth once daily ON AN EMPTY STOMACH, Normal, Disp-30 Cap, R-5      ondansetron (ZOFRAN ODT) 4 mg disintegrating tablet Take 1 Tab by mouth every eight (8) hours as needed for Nausea., Normal, Disp-30 Tab, R-1      HYDROcodone-acetaminophen (NORCO)  mg tablet TAKE 1 TABLET BY MOUTH 3 TIMES DAILY AS NEEDED FOR PAIN, Historical Med, R-0      desoximetasone (TOPICORT) 0.25 % topical cream Apply  to affected area two (2) times daily as needed for Skin Irritation. , Normal, Disp-15 g, R-0      Cetirizine (ZYRTEC) 10 mg cap Take  by mouth., Historical Med      dicyclomine (BENTYL) 10 mg capsule TAKE 1 CAPSULE BY MOUTH BEFORE MEALS AND AT BEDTIME AS NEEDED., Historical Med, R-0      gabapentin (NEURONTIN) 300 mg capsule TAKE 1 CAPSULE IN THE MORNING, 1 CAPSULE IN THE AFTERNOON, AD 2 CAPSULES AT BEDTIME, Normal, Disp-120 Cap, R-3      aspirin 81 mg chewable tablet Take 1 Tab by mouth daily. , Print, Disp-30 Tab, R-0         STOP taking these medications       hydrOXYzine HCL (ATARAX) 25 mg tablet Comments:   Reason for Stoppin.   Follow-up Information     Follow up With Specialties Details Why Contact Info    Alise Decker MD Internal Medicine  As needed [address] [facility]  [id number]      Obed Fabian MD Gastroenterology  As needed [address] [facility]  [address] [facility]  [phone]      [facility] EMERGENCY DEPT Emergency Medicine  If symptoms worsen [address] [place] [address]  [phone]        3. Return to ED if worse     Diagnosis     Clinical Impression:   1. Intractable vomiting without nausea    2. Irritable bowel syndrome with diarrhea    3. Altered awareness, transient    4. Diarrhea, unspecified type        Attestations:    Jason Tang MD    Please note that this dictation was completed with [employer], the computer voice recognition software. Quite often unanticipated grammatical, syntax, homophones, and other interpretive errors are inadvertently transcribed by the computer software. Please disregard these errors. Please excuse any errors that have escaped final proofreading. Thank you.

## 2020-11-02 NOTE — ED NOTES
I have reviewed discharge instructions with the patient and spouse. The patient and spouse verbalized understanding and signed.

## 2020-11-25 ENCOUNTER — TELEPHONE (OUTPATIENT)
Dept: INTERNAL MEDICINE CLINIC | Age: 65
End: 2020-11-25

## 2020-11-25 DIAGNOSIS — B37.0 THRUSH: ICD-10-CM

## 2020-11-25 RX ORDER — CLOTRIMAZOLE 10 MG/1
LOZENGE ORAL; TOPICAL
Qty: 35 TAB | Refills: 1 | Status: SHIPPED | OUTPATIENT
Start: 2020-11-25 | End: 2021-02-01 | Stop reason: ALTCHOICE

## 2020-11-25 NOTE — TELEPHONE ENCOUNTER
----- Message from Novavax sent at 11/25/2020  2:48 PM EST -----  Regarding: /Telephone  Contact: [phone]  Medication Refill    Caller (if not patient):pt      Relationship of caller (if not patient):n/a      Best contact number(s):[phone]      Name of medication and dosage if known:used for \"thrush\" tablet to dissolve      Is patient out of this medication (yes/no):yes      Pharmacy name:[facility]    Pharmacy listed in chart? (yes/no):yes  Pharmacy phone number:n/a      Details to clarify the request:pt does not recall the name of the med, please advise      Message from [facility]

## 2020-12-04 ENCOUNTER — TELEPHONE (OUTPATIENT)
Dept: RHEUMATOLOGY | Age: 65
End: 2020-12-04

## 2020-12-04 NOTE — TELEPHONE ENCOUNTER
----- Message from Anu Newsome RN sent at 12/3/2020  3:58 PM EST -----  Regarding: FW: Dr. Tim Hidalgo    ----- Message -----  From: Asaf Barnhartin/3/2020   3:54 PM EST  To: [facility] Nurse Pool  Subject: FW: Dr. Tim Hidalgo                               ----- Message -----  From: Liliana Beltran  Sent: 12/3/2020   3:40 PM EST  To: [facility] Front Office Pool  Subject: Dr. Tim Hidalgo                                 Pt request for her \"Huira\" injections to be sent to her Mail order pharmacy on file. Best contact number is [phone].

## 2020-12-04 NOTE — TELEPHONE ENCOUNTER
Spoke to pt informed pt per Dr Nadya Gimenez that a office visit and lab work is needed before the pt can receive the Humira refills pt verbally acknowledged understanding, pt was scheduled for the next available appt

## 2020-12-08 ENCOUNTER — TELEPHONE (OUTPATIENT)
Dept: INTERNAL MEDICINE CLINIC | Age: 65
End: 2020-12-08

## 2020-12-08 RX ORDER — HYDROXYZINE 25 MG/1
25 TABLET, FILM COATED ORAL
Status: CANCELLED | OUTPATIENT
Start: 2020-12-08 | End: 2020-12-18

## 2020-12-08 NOTE — TELEPHONE ENCOUNTER
Please clarify request for hydroxyzine. I do not see that in her usual medications. The medication is used for itching or for anxiety. What does the patient need to take it for?

## 2020-12-08 NOTE — TELEPHONE ENCOUNTER
Requested Prescriptions     Pending Prescriptions Disp Refills    hydrOXYzine HCL (ATARAX) 25 mg tablet        Sig: Take 1 Tab by mouth three (3) times daily as needed for up to 10 days. Future Appointments:  Future Appointments   Date Time Provider Luca Kramer   12/15/2020  3:00 PM Elmo Cho MD [facility] BS AMB   2/1/2021  2:30 PM Indu Ramsay MD [facility] BS AMB        Last Appointment With Me:  Visit date not found     Requested Prescriptions     Pending Prescriptions Disp Refills    hydrOXYzine HCL (ATARAX) 25 mg tablet        Sig: Take 1 Tab by mouth three (3) times daily as needed for up to 10 days.

## 2020-12-09 NOTE — TELEPHONE ENCOUNTER
Advised patient that if she is still on Plaquenil (hydrochloric wine) she cannot take hydroxyzine. There is a medication interaction that can cause heart rhythm issues.

## 2020-12-09 NOTE — TELEPHONE ENCOUNTER
Called, spoke to pt. Two pt identifiers confirmed. Patient states she uses the hydroxyzine for anxiety as needed. Pt verbalized understanding of information discussed w/ no further questions at this time.

## 2020-12-10 NOTE — TELEPHONE ENCOUNTER
Called, spoke to pt. Two pt identifiers confirmed. Patient advised per Dr. Ramos Senters. See message below. Advised patient that if she is still on     Plaquenil (hydrochloric wine) she cannot take hydroxyzine. There is a medication interaction that can cause heart rhythm issues. Patient confirmed she is still taking Plaquenil daily. Patient would like something sent to pharmacy for anxiety. Patient verbalized understanding of information discussed w/ no further questions at this time.

## 2020-12-10 NOTE — TELEPHONE ENCOUNTER
----- Message from Elaina Garber sent at 12/9/2020  5:07 PM EST -----  Regarding: Dr. Christopher Khanna Message/Vendor Calls    Caller's first and last name:      Reason for call: attn: burt       Callback required yes/no and why:yes       Best contact number(s): [phone]      Details to clarify the request: Patient is returning call to [facility], please call patient back       [address]

## 2020-12-11 RX ORDER — BUSPIRONE HYDROCHLORIDE 5 MG/1
5 TABLET ORAL 2 TIMES DAILY
Qty: 60 TAB | Refills: 0 | Status: SHIPPED | OUTPATIENT
Start: 2020-12-11 | End: 2021-01-06

## 2020-12-11 NOTE — TELEPHONE ENCOUNTER
Please advise patient BuSpar sent to the pharmacy for anxiety. She is to take it twice a day regularly.

## 2020-12-11 NOTE — TELEPHONE ENCOUNTER
Spoke with patient. Two pt identifiers confirmed. Patient advised per Dr. Peter Sharma that a prescription for Buspar has been sent to her pharmacy on file. Patient advised that she is take this medication twice a day consistently. Pt verbalized understanding of information discussed w/ no further questions at this time.

## 2020-12-15 ENCOUNTER — OFFICE VISIT (OUTPATIENT)
Dept: RHEUMATOLOGY | Age: 65
End: 2020-12-15
Payer: COMMERCIAL

## 2020-12-15 VITALS
SYSTOLIC BLOOD PRESSURE: 136 MMHG | TEMPERATURE: 97.5 F | WEIGHT: 116.4 LBS | RESPIRATION RATE: 16 BRPM | OXYGEN SATURATION: 98 % | BODY MASS INDEX: 18.79 KG/M2 | HEART RATE: 87 BPM | DIASTOLIC BLOOD PRESSURE: 81 MMHG

## 2020-12-15 DIAGNOSIS — M05.79 SEROPOSITIVE RHEUMATOID ARTHRITIS OF MULTIPLE SITES (HCC): ICD-10-CM

## 2020-12-15 DIAGNOSIS — M70.62 TROCHANTERIC BURSITIS OF LEFT HIP: Primary | ICD-10-CM

## 2020-12-15 PROCEDURE — G8754 DIAS BP LESS 90: HCPCS | Performed by: PEDIATRICS

## 2020-12-15 PROCEDURE — G8510 SCR DEP NEG, NO PLAN REQD: HCPCS | Performed by: PEDIATRICS

## 2020-12-15 PROCEDURE — G8752 SYS BP LESS 140: HCPCS | Performed by: PEDIATRICS

## 2020-12-15 PROCEDURE — G8427 DOCREV CUR MEDS BY ELIG CLIN: HCPCS | Performed by: PEDIATRICS

## 2020-12-15 PROCEDURE — 3288F FALL RISK ASSESSMENT DOCD: CPT | Performed by: PEDIATRICS

## 2020-12-15 PROCEDURE — G8399 PT W/DXA RESULTS DOCUMENT: HCPCS | Performed by: PEDIATRICS

## 2020-12-15 PROCEDURE — 1090F PRES/ABSN URINE INCON ASSESS: CPT | Performed by: PEDIATRICS

## 2020-12-15 PROCEDURE — 99214 OFFICE O/P EST MOD 30 MIN: CPT | Performed by: PEDIATRICS

## 2020-12-15 PROCEDURE — G8536 NO DOC ELDER MAL SCRN: HCPCS | Performed by: PEDIATRICS

## 2020-12-15 PROCEDURE — 3017F COLORECTAL CA SCREEN DOC REV: CPT | Performed by: PEDIATRICS

## 2020-12-15 PROCEDURE — 20610 DRAIN/INJ JOINT/BURSA W/O US: CPT | Performed by: PEDIATRICS

## 2020-12-15 PROCEDURE — 1100F PTFALLS ASSESS-DOCD GE2>/YR: CPT | Performed by: PEDIATRICS

## 2020-12-15 PROCEDURE — G8420 CALC BMI NORM PARAMETERS: HCPCS | Performed by: PEDIATRICS

## 2020-12-15 RX ORDER — COLESTIPOL HYDROCHLORIDE 5 G/5G
GRANULE, FOR SUSPENSION ORAL
COMMUNITY
Start: 2020-11-27 | End: 2021-02-08 | Stop reason: CLARIF

## 2020-12-15 NOTE — PROGRESS NOTES
Chief Complaint   Patient presents with    Joint Pain     1. Have you been to the ER, urgent care clinic since your last visit? Hospitalized since your last visit? Yes seen in the ER for nausea and dehydration    2. Have you seen or consulted any other health care providers outside of the [address] [place] [address] since your last visit? Include any pap smears or colon screening.  No

## 2020-12-15 NOTE — PROGRESS NOTES
RHEUMATOLOGY PROBLEM LIST AND CHIEF COMPLAINT  1. Inflammatory arthritis - morning stiffness, arthritis on the 2nd and 3rd MCPs bilaterally. 2. Osteoarthritis - hands, knees, feet, hips  3. Fibromyalgia    Therapy History:  Current DMARDs: Plaquenil (4/2017 - 3/2018, ran out in April, 5/2018-current), Humira (1/2018-current)    TB  (Q-gold) test: 10/2018 - negative  Hepatitis B titers: 10/2018 - negative    INTERVAL HISTORY  This is a 76 y.o.  female. Today, the patient complains of pain in the joints. Location: back  Severity:  8 on a scale of 0-10  Timing: all day   Duration:  4 months  Context/Associated signs and symptoms: The patient's chief complaint today is pain along the left lateral thigh. She requests a bursa injection today which has previously provided relief. She also complains of mild pain, morning stiffness, and swelling over her fingers since she ran out of Humira about two months ago. She reports that her left hand turns white and goes numb, especially in cold weather. She continues on Plaquenil 200 mg daily.  Labs reviewed were overall normal      RHEUMATOLOGY REVIEW OF SYSTEMS   Positives as per history  Negatives as follows:  CONSTITUTlONAL:  Denies unexplained persistent fevers, weight change, chronic fatigue  HEAD/EYES:   Denies eye redness, blurry vision or sudden loss of vision, dry eyes, HA, temporal artery pain  ENT:    Denies oral/nasal ulcers, recurrent sinus infections, dry mouth  RESPIRATORY:  No pleuritic pain, history of pleural effusions, hemoptysis, exertional dyspnea  CARDIOVASCULAR:  Denies chest pain, history of pericardial effusions  GASTRO:   Denies heartburn, esophageal dysmotility, abdominal pain, nausea, vomiting, diarrhea, blood in the stool  HEMATOLOGIC:  No easy bruising, purpura, swollen lymph nodes  SKIN:    Denies alopecia, ulcers, nodules, sun sensitivity, unexplained persistent rash   VASCULAR:   Denies edema, cyanosis, raynaud phenomenon  NEUROLOGIC: Denies specific muscle weakness, paresthesias   PSYCHIATRIC:  No sleep disturbance / snoring, depression, anxiety  MSK:    No morning stiffness >1 hour, SI joint pain     PAST MEDICAL HISTORY  Reviewed with patient, significant changes in medical history - No    FAMILY HISTORY   No autoimmune disease in the family    PHYSICAL EXAM  Blood pressure 136/81, pulse 87, temperature 97.5 °F (36.4 °C), temperature source Temporal, resp. rate 16, weight 116 lb 6.4 oz (52.8 kg), SpO2 98 %. GENERAL APPEARANCE: Well-nourished, no acute distress  EYES: No scleral erythema, conjunctival injection  ENT: No oral ulcer, parotid enlargement   NECK: No adenopathy, thyroid enlargement  CARDIOVASCULAR: Heart rhythm is regular. No murmur, rub, gallop  CHEST: Normal vesicular breath sounds. No wheezes, rales, pleural friction rubs  ABDOMINAL: The abdomen is soft and nontender. Bowel sounds are normal  EXTREMITIES: There is no evidence of clubbing, cyanosis, edema  SKIN: No rash, palpable purpura, digital ulcer, abnormal thickening, normal nailfold capillaries   NEUROLOGICAL: Normal gait and station, full strength in upper and lower extremities,  normal sensation to light touch  MUSCULOSKELETAL:   Upper extremities - Heberden's and Hang's nodes bilaterally. No synovitis noted. Fullness in 2nd and 3rd MCP   Lower extremities - R knee bony prominence. Left lateral thigh pain. Pain in back with internal rotation of left hip, but no decreased rotation of left hip or lymph node present. LABS, RADIOLOGY AND PROCEDURES  Previous labs reviewed -Yes  Previous radiology reviewed -Yes  Previous procedures reviewed -Yes  Previous medical records reviewed/summarized -Yes    ASSESSMENT  1. Rheumatoid arthritis - Stable Disease - The patient has started to experience some discomfort over her hands since running out of Humira about 2 months ago. Advised her to restart Humira 40 mg q2 weeks.  Patient should hold Plaquenil 200 mg daily to determine if this helps decrease her nausea. If this is not a contributing factor, she can restart Plaquenil. I suspect her hand numbness is related to exaggerated vasoconstriction. If her hand numbness and color changes persist, I recommend she consider undergoing an EMG. I will order a TB test. She should return in 4 months for a follow up. 2. Osteoarthritis (hands, knees) - This was not a significant concern today. The patient should continue physical therapy and NSAIDs as needed. 3. Pain syndrome (fibromyalgia) - This was not a significant concern today. Patient should continue physical therapy. 4. Trochanteric bursitis -  This is the patient's primary concern today. The patient has received relief from previous steroid injections. I will inject the left trochanteric bursa with 80 mg of Kenalog at the patient's request.  5. Drug therapy monitoring for toxicity (plaquenil, Humira) - CBC, BUN, Cr, AST, ALT and albumin every 3 months; eye exams every 6-12 months for retinal toxicity. PLAN  1. Plaquenil 200 mg daily - hold   2. Humira 40 mg q2 weeks  3. Left trochanteric bursa injection  4. Check quantiferon TB  5. Return in 4 months    Lotus Barber MD  Adult and Pediatric Rheumatology     [address], Phone [phone], Fax [phone]     Visiting  of Pediatrics    Department of Pediatrics, [facility] of [address] [place] [address], [address] [place] [address], Phone [phone], Fax [phone]    There are no Patient Instructions on file for this visit.     cc:  Suzanna Mejia MD    Written by beatriz Royal, as dictated by Dr. Pati Sandoval M.D.      Bubba Coulter reviewed for the following:   IPati, have reviewed the History, Physical and updated the Allergic reactions for Yasmine E [facility] performed immediately prior to start of procedure:   Alea MEJIA have performed the following reviews on [address] [place] [address] prior to the start of the procedure:            * Patient was identified by name and date of birth   * Agreement on procedure being performed was verified  * Risks and Benefits explained to the patient  * Procedure site verified and marked as necessary  * Patient was positioned for comfort  * Consent was signed and verified     Time: 3:50 PM      Date of procedure: 12/15/2020    Procedure performed by: Alea Copeland MD    Provider assisted by:  MD    Patient assisted by: self    How tolerated by patient: tolerated the procedure well with no complications    Post Procedural Pain Scale: 0 - No Hurt    Comments: none      PROCEDURE  After consent was obtained, using sterile technique the left thigh was prepped and Kenalog 80 mg and 1ml of lidocaine was then injected and the needle withdrawn. The procedure was well tolerated. The patient is asked to continue to rest for 24 hours before resuming regular activities. It may be more painful for the first 1-2 days. Watch for fever, or increased swelling or persistent pain. Call or return to clinic prn if such symptoms occur.

## 2020-12-16 RX ORDER — TRIAMCINOLONE ACETONIDE 40 MG/ML
80 INJECTION, SUSPENSION INTRA-ARTICULAR; INTRAMUSCULAR ONCE
Qty: 1 ML | Refills: 0
Start: 2020-12-16 | End: 2020-12-16

## 2020-12-21 ENCOUNTER — NURSE TRIAGE (OUTPATIENT)
Dept: OTHER | Facility: CLINIC | Age: 65
End: 2020-12-21

## 2020-12-21 NOTE — TELEPHONE ENCOUNTER
Reason for Disposition   [1] HIGH RISK patient (e.g., age > 59 years, diabetes, heart or lung disease, weak immune system) AND [2] new or worsening symptoms    Answer Assessment - Initial Assessment Questions  1. COVID-19 DIAGNOSIS: \"Who made your Coronavirus (COVID-19) diagnosis? \" \"Was it confirmed by a positive lab test?\" If not diagnosed by a HCP, ask \"Are there lots of cases (community spread) where you live? \" (See [facility] health department website, if unsure)      Tested on 12/14  2. COVID-19 EXPOSURE: \"Was there any known exposure to COVID before the symptoms began? \" CDC Definition of close contact: within 6 feet (2 meters) for a total of 15 minutes or more over a 24-hour period. Yes exposed to family  3. ONSET: \"When did the COVID-19 symptoms start? \"       Onset was 12/16  4. WORST SYMPTOM: \"What is your worst symptom? \" (e.g., cough, fever, shortness of breath, muscle aches)      Nausea / vomiting   5. COUGH: \"Do you have a cough? \" If so, ask: \"How bad is the cough? \"        Cough  6. FEVER: \"Do you have a fever? \" If so, ask: \"What is your temperature, how was it measured, and when did it start? \"      No fever  7. RESPIRATORY STATUS: \"Describe your breathing? \" (e.g., shortness of breath, wheezing, unable to speak)       No breathing problems  8. BETTER-SAME-WORSE: Veena Sr you getting better, staying the same or getting worse compared to yesterday? \"  If getting worse, ask, \"In what way? \"      same  9. HIGH RISK DISEASE: \"Do you have any chronic medical problems? \" (e.g., asthma, heart or lung disease, weak immune system, obesity, etc.)      Smoker  10. PREGNANCY: \"Is there any chance you are pregnant? \" \"When was your last menstrual period? \"        No  11. OTHER SYMPTOMS: \"Do you have any other symptoms? \"  (e.g., chills, fatigue, headache, loss of smell or taste, muscle pain, sore throat; new loss of smell or taste especially support the diagnosis of COVID-19)        Back rash, brain fog, cough, nasal congestion/chest congestion, nausea vomiting (x1 today)    Protocols used: CORONAVIRUS (COVID-19) DIAGNOSED OR SUSPECTED-ADULT-AH    Patient reports a positive Covid test and symptoms:  Cough, nasal/chest congestion, brain fog, back rash, nausea/vomiting (x1 today), and runny nose. Patient denies shortness of breath and fever. Recommended patient reach out to her provider. Patient agrees and would like to set up a virtual visit. Provided care advice. Transfer to [facility] at the service center for scheduling.

## 2020-12-23 ENCOUNTER — VIRTUAL VISIT (OUTPATIENT)
Dept: INTERNAL MEDICINE CLINIC | Age: 65
End: 2020-12-23
Payer: COMMERCIAL

## 2020-12-23 DIAGNOSIS — U07.1 COVID-19: Primary | ICD-10-CM

## 2020-12-23 PROCEDURE — 99213 OFFICE O/P EST LOW 20 MIN: CPT | Performed by: FAMILY MEDICINE

## 2020-12-23 RX ORDER — AZITHROMYCIN 250 MG/1
TABLET, FILM COATED ORAL
COMMUNITY
End: 2021-02-01 | Stop reason: ALTCHOICE

## 2020-12-23 NOTE — PROGRESS NOTES
Daniel Coello is a 76 y.o. female who was seen by synchronous (real-time) audio-video technology on 12/23/2020 for Follow-up (COVID 12/18/20)    Patient reports that she was diagnosed with Covid on Monday. She has been tested twice and they came back negative. Symptoms started on December 16th. She reports that her grandson came down with Covid and that may be how she contracted the virus. She reports she still continues to have a cough, however her pulse ox is in the normal range. She reports her temperature has been normal.  Her main symptom now is severe fatigue, nausea, and a rash that developed on her elbows and ribs. She reports that her appetite is intact and she is eating 3 meals a day. She is using Zofran to control her nausea. She reports she was also diagnosed with the flu and has taken Tamiflu. Assessment & Plan:   Diagnoses and all orders for this visit:    1. COVID-19      This patient was advised to continue using the Zofran as needed for nausea. She will continue to rest as she recovers from the Covid infection. She was advised to complete the course of antibiotics. The Tamiflu may have contributed to her nausea. For the rash on her elbows and ribs she was advised to use over-the-counter Cortaid. This will help reduce the itching and inflammation. She was advised to quarantine for the full 14 days prior to seeing any of her family members. Subjective:       Prior to Admission medications    Medication Sig Start Date End Date Taking?  Authorizing Provider   azithromycin (ZITHROMAX) 250 mg tablet Zithromax Z-Tanner 250 mg tablet   TAKE 2 TABLETS (500 MG) BY ORAL ROUTE ONCE DAILY FOR 1 DAY THEN 1 TABLET (250 MG) BY ORAL ROUTE ONCE DAILY FOR 4 DAYS   Yes Provider, Historical   colestipoL (COLESTID) 5 gram packet TAKE PO AS DIRECTED 11/27/20  Yes Provider, Historical   busPIRone (BUSPAR) 5 mg tablet Take 1 Tab by mouth two (2) times a day. 12/11/20  Yes Taylor Wynne MD   Humira,CF, Pen 40 mg/0.4 mL injection pen INJECT ONE PEN (40MG) UNDER THE SKIN (SUBCUTANEOUS INJECTION) EVERY 2 WEEKS 9/16/20  Yes Vickey Fried MD   hydroCHLOROthiazide (HYDRODIURIL) 25 mg tablet TAKE 1 TABLET BY MOUTH DAILY 9/14/20  Yes Taylor Wynne MD   DULoxetine (CYMBALTA) 60 mg capsule Take 1 Cap by mouth daily. 8/14/20  Yes Taylor Wynne MD   carvediloL (COREG) 12.5 mg tablet TAKE 1 TABLET BY MOUTH TWICE DAILY WITH MEALS 5/21/20  Yes Regina Zimmerman NP   hydroxychloroquine (PLAQUENIL) 200 mg tablet TAKE 1 TABLET BY MOUTH DAILY 3/24/20  Yes Vickey Fried MD   atorvastatin (LIPITOR) 80 mg tablet Take 1 Tab by mouth daily.  take 1 tablet by mouth at bedtime 3/24/20  Yes Regina Zimmerman NP   pantoprazole (PROTONIX) 40 mg tablet TK 1 T PO BID 2/3/20  Yes Provider, Historical   alclometasone (ACLOVATE) 0.05 % topical cream USE ON CORNERS OF MOUTH FOR RASH TWICE A DAY AS NEEDED 9/17/19  Yes Provider, Historical   amLODIPine (NORVASC) 2.5 mg tablet take 1 tablet by mouth once daily 2/1/19  Yes Gracie Bello MD   HYDROcodone-acetaminophen (NORCO)  mg tablet TAKE 1 TABLET BY MOUTH 3 TIMES DAILY AS NEEDED FOR PAIN 8/15/17  Yes Provider, Historical   dicyclomine (BENTYL) 10 mg capsule TAKE 1 CAPSULE BY MOUTH BEFORE MEALS AND AT BEDTIME AS NEEDED. 1/25/17  Yes Provider, Historical   gabapentin (NEURONTIN) 300 mg capsule TAKE 1 CAPSULE IN THE MORNING, 1 CAPSULE IN THE AFTERNOON, AD 2 CAPSULES AT BEDTIME  Patient taking differently: TAKE 1 CAPSULE IN THE MORNING, 1 CAPSULE IN THE AFTERNOON, AND 1 CAPSULE AT BEDTIME 9/8/16  Yes Taylor Wynne MD   aspirin 81 mg chewable tablet Take 1 Tab by mouth daily. 11/18/15  Yes Romaine Seymour MD   clotrimazole (MYCELEX) 10 mg quang DISSOLVE 1 LOZENGE IN MOUTH FIVE TIMES DAILY FOR 1 WEEK 11/25/20   Paco Bhakta MD   promethazine (PHENERGAN) 12.5 mg tablet Take 2 Tabs by mouth every six (6) hours as needed for Nausea. 11/2/20   Justin Mccray MD   doxycycline (ADOXA) 100 mg tablet Take 1 Tab by mouth two (2) times a day. 3/20/20   Paco Bhakta MD   fluticasone propionate (FLONASE) 50 mcg/actuation nasal spray fluticasone propionate 50 mcg/actuation nasal spray,suspension    Provider, Historical   mupirocin (BACTROBAN) 2 % ointment DEBBIE SML AMT EXT AA TID 7/21/19   Provider, Historical   albuterol (PROVENTIL HFA, VENTOLIN HFA, PROAIR HFA) 90 mcg/actuation inhaler Take 2 Puffs by inhalation every six (6) hours as needed for Wheezing. 4/17/19   Qi Walker MD   estrogens, conjugated,-methylTESTOSTERone (ESTRATEST) 1.25-2.5 mg per tablet 1 Tab daily. 4/19/18   Provider, Historical   omeprazole (PRILOSEC) 40 mg capsule take 1 capsule by mouth once daily ON AN EMPTY STOMACH 1/12/18   Paco Bhakta MD   ondansetron (ZOFRAN ODT) 4 mg disintegrating tablet Take 1 Tab by mouth every eight (8) hours as needed for Nausea. 1/3/18   Paco Bhakta MD   desoximetasone (TOPICORT) 0.25 % topical cream Apply  to affected area two (2) times daily as needed for Skin Irritation. 7/25/17   Marc Caldwell MD   Cetirizine (ZYRTEC) 10 mg cap Take  by mouth. Provider, Historical         Review of Systems   Constitutional: Positive for malaise/fatigue. HENT: Negative. Respiratory: Positive for cough. Cardiovascular: Negative. Gastrointestinal: Positive for nausea. Genitourinary: Negative. Musculoskeletal: Positive for myalgias. Skin: Positive for rash. Neurological: Negative. Psychiatric/Behavioral: Negative. Objective:   No flowsheet data found.      [INSTRUCTIONS:  \"[x]\" Indicates a positive item \"[]\" Indicates a negative item  -- DELETE ALL ITEMS NOT EXAMINED]    Constitutional: [x] Appears well-developed and well-nourished [x] No apparent distress      [] Abnormal -     Mental status: [x] Alert and awake  [x] Oriented to person/place/time [x] Able to follow commands    [] Abnormal -     Eyes:   EOM    [x]  Normal    [] Abnormal -   Sclera  [x]  Normal    [] Abnormal -          Discharge [x]  None visible   [] Abnormal -     HENT: [x] Normocephalic, atraumatic  [] Abnormal -   [x] Mouth/Throat: Mucous membranes are moist    External Ears [x] Normal  [] Abnormal -    Neck: [x] No visualized mass [] Abnormal -     Pulmonary/Chest: [x] Respiratory effort normal   [x] No visualized signs of difficulty breathing or respiratory distress        [] Abnormal -      Musculoskeletal:   [x] Normal gait with no signs of ataxia         [x] Normal range of motion of neck        [] Abnormal -     Neurological:        [x] No Facial Asymmetry (Cranial nerve 7 motor function) (limited exam due to video visit)          [x] No gaze palsy        [] Abnormal -          Skin:        [x] No significant exanthematous lesions or discoloration noted on facial skin         [] Abnormal -            Psychiatric:       [x] Normal Affect [] Abnormal -        [x] No Hallucinations    Other pertinent observable physical exam findings:-        We discussed the expected course, resolution and complications of the diagnosis(es) in detail. Medication risks, benefits, costs, interactions, and alternatives were discussed as indicated. I advised her to contact the office if her condition worsens, changes or fails to improve as anticipated. She expressed understanding with the diagnosis(es) and plan. Yogi Heathkiran, who was evaluated through a patient-initiated, synchronous (real-time) audio-video encounter, and/or her healthcare decision maker, is aware that it is a billable service, with coverage as determined by her insurance carrier. She provided verbal consent to proceed: Yes, and patient identification was verified. It was conducted pursuant to the emergency declaration under the [address] [place] [address] and the [employer] Act. A caregiver was present when appropriate. Ability to conduct physical exam was limited. I was at home. The patient was at home.       Rafael Pierce MD

## 2020-12-23 NOTE — PATIENT INSTRUCTIONS
This is an established visit conducted via telemedicine. The patient has been instructed that this meets HIPAA criteria and acknowledges and agrees to this method of visitation.  
 
Randon Aase, [facility] 
55/24/38 
58:02 PM

## 2021-01-27 ENCOUNTER — TRANSCRIBE ORDER (OUTPATIENT)
Dept: SCHEDULING | Age: 66
End: 2021-01-27

## 2021-01-27 DIAGNOSIS — R19.7 DIARRHEA: ICD-10-CM

## 2021-01-27 DIAGNOSIS — R11.2 NAUSEA WITH VOMITING: Primary | ICD-10-CM

## 2021-01-29 RX ORDER — HYDROCHLOROTHIAZIDE 25 MG/1
TABLET ORAL
Qty: 30 TAB | Refills: 3 | Status: SHIPPED | OUTPATIENT
Start: 2021-01-29

## 2021-02-01 ENCOUNTER — VIRTUAL VISIT (OUTPATIENT)
Dept: INTERNAL MEDICINE CLINIC | Age: 66
End: 2021-02-01
Payer: COMMERCIAL

## 2021-02-01 DIAGNOSIS — E55.9 VITAMIN D DEFICIENCY: ICD-10-CM

## 2021-02-01 DIAGNOSIS — I25.10 CORONARY ARTERY DISEASE INVOLVING NATIVE CORONARY ARTERY OF NATIVE HEART WITHOUT ANGINA PECTORIS: ICD-10-CM

## 2021-02-01 DIAGNOSIS — M79.7 FIBROMYALGIA: ICD-10-CM

## 2021-02-01 DIAGNOSIS — I51.81 TAKOTSUBO CARDIOMYOPATHY: ICD-10-CM

## 2021-02-01 DIAGNOSIS — M51.9 LUMBAR DISC DISEASE: ICD-10-CM

## 2021-02-01 DIAGNOSIS — I10 ESSENTIAL HYPERTENSION: Primary | ICD-10-CM

## 2021-02-01 DIAGNOSIS — Z00.00 MEDICARE ANNUAL WELLNESS VISIT, SUBSEQUENT: ICD-10-CM

## 2021-02-01 DIAGNOSIS — E78.00 PURE HYPERCHOLESTEROLEMIA: ICD-10-CM

## 2021-02-01 PROCEDURE — 1100F PTFALLS ASSESS-DOCD GE2>/YR: CPT | Performed by: FAMILY MEDICINE

## 2021-02-01 PROCEDURE — G8399 PT W/DXA RESULTS DOCUMENT: HCPCS | Performed by: FAMILY MEDICINE

## 2021-02-01 PROCEDURE — G8432 DEP SCR NOT DOC, RNG: HCPCS | Performed by: FAMILY MEDICINE

## 2021-02-01 PROCEDURE — G8536 NO DOC ELDER MAL SCRN: HCPCS | Performed by: FAMILY MEDICINE

## 2021-02-01 PROCEDURE — 3288F FALL RISK ASSESSMENT DOCD: CPT | Performed by: FAMILY MEDICINE

## 2021-02-01 PROCEDURE — G0439 PPPS, SUBSEQ VISIT: HCPCS | Performed by: FAMILY MEDICINE

## 2021-02-01 PROCEDURE — G8756 NO BP MEASURE DOC: HCPCS | Performed by: FAMILY MEDICINE

## 2021-02-01 PROCEDURE — 99443 PR PHYS/QHP TELEPHONE EVALUATION 21-30 MIN: CPT | Performed by: FAMILY MEDICINE

## 2021-02-01 PROCEDURE — 3017F COLORECTAL CA SCREEN DOC REV: CPT | Performed by: FAMILY MEDICINE

## 2021-02-01 PROCEDURE — G8420 CALC BMI NORM PARAMETERS: HCPCS | Performed by: FAMILY MEDICINE

## 2021-02-01 PROCEDURE — G8427 DOCREV CUR MEDS BY ELIG CLIN: HCPCS | Performed by: FAMILY MEDICINE

## 2021-02-01 RX ORDER — RIFAXIMIN 550 MG/1
TABLET ORAL
COMMUNITY
Start: 2021-01-28 | End: 2021-03-05

## 2021-02-01 NOTE — PROGRESS NOTES
Dimitry Almonte is a 76 y.o. female who presents for follow-up. To have an EGD, Dr Roselyn Castro, GI, tomorrow. Has had nausea and vomiting. Off phenergan and off zofran. Able to eat better now. Reports weight loss. Given xifaxan for IBS-D. Seen by Dr. Chery Cowan December 23 by virtual visit with diagnosis of Covid. Had a lot of GI symptoms at that time. Followed by Dr. Berta Haile for rheumatoid arthritis, osteoarthritis of the hands, knees, feet, and hips. And fibromyalgia. .  Treatment with Plaquenil and Humira. Sees Dr. Landry Minors for history of NSTEMI with Takotsubo cardiomyopathy. Has been followed by Dr. Blane Wallace, pain management, for chronic back pain. Hydrocodone QID. Treated for hyperlipidemia with statin. No myalgias. Sees Dr. Frank Cruz, gyn. Followed by Dr. Brittanie Garcia, derm, for prior skin cancer. Due for follow up. Prior normal bone density 2016. Reviewed immunizations. Smoker previously tried NicoDerm and Chantix. This is an established visit conducted via telemedicine, by phone. The patient has been instructed that this meets HIPAA criteria and acknowledges and agrees to this method of visitation. Pursuant to the emergency declaration under the [facility], 1135 waiver authority and the [employer] Act, this Virtual Visit was conducted, with patient's consent, to reduce the patient's risk of exposure to COVID-19 and provide continuity of care for an established patient. Services were provided by phone to substitute for in-person clinic visit.        Past Medical History:   Diagnosis Date    Acid reflux     Acid reflux     Arthritis     Chronic pain     Heart attack ([facility] [address]     Heart failure (HCC)     Hypertension     IBS (irritable bowel syndrome)     Squamous cell carcinoma of skin of right elbow 5/2016    Takotsubo cardiomyopathy 11/16/2015    Tobacco abuse 11/16/2015 Family History   Problem Relation Age of Onset   23 [facility] Father         prostate    Heart Disease Mother     Heart Disease Maternal Grandmother        Social History     Socioeconomic History    Marital status:      Spouse name: Not on file    Number of children: Not on file    Years of education: Not on file    Highest education level: Not on file   Occupational History    Not on file   Social Needs    Financial resource strain: Not on file    Food insecurity     Worry: Not on file     Inability: Not on file    Transportation needs     Medical: Not on file     Non-medical: Not on file   Tobacco Use    Smoking status: Current Every Day Smoker     Packs/day: 1.00    Smokeless tobacco: Never Used    Tobacco comment: trying to quit 4-5 cigarettes daily   Substance and Sexual Activity    Alcohol use:  Yes     Alcohol/week: 8.3 standard drinks     Types: 10 Glasses of wine per week     Comment: 4 times weekly    Drug use: No    Sexual activity: Yes     Partners: Male     Birth control/protection: None   Lifestyle    Physical activity     Days per week: Not on file     Minutes per session: Not on file    Stress: Not on file   Relationships    Social connections     Talks on phone: Not on file     Gets together: Not on file     Attends Yazdanism service: Not on file     Active member of club or organization: Not on file     Attends meetings of clubs or organizations: Not on file     Relationship status: Not on file    Intimate partner violence     Fear of current or ex partner: Not on file     Emotionally abused: Not on file     Physically abused: Not on file     Forced sexual activity: Not on file   Other Topics Concern    Not on file   Social History Narrative    Not on file       Current Outpatient Medications on File Prior to Visit   Medication Sig Dispense Refill    Xifaxan 550 mg tablet TAKE 1 TABLET BY MOUTH THREE TIMES DAILY      hydroCHLOROthiazide (HYDRODIURIL) 25 mg tablet TAKE 1 TABLET BY MOUTH DAILY 30 Tab 3    busPIRone (BUSPAR) 5 mg tablet TAKE 1 TABLET BY MOUTH TWICE DAILY 60 Tab 5    colestipoL (COLESTID) 5 gram packet TAKE PO AS DIRECTED      JAZMYN Billy Pen 40 mg/0.4 mL injection pen INJECT ONE PEN (40MG) UNDER THE SKIN (SUBCUTANEOUS INJECTION) EVERY 2 WEEKS 1 Kit 0    DULoxetine (CYMBALTA) 60 mg capsule Take 1 Cap by mouth daily. 30 Cap 5    carvediloL (COREG) 12.5 mg tablet TAKE 1 TABLET BY MOUTH TWICE DAILY WITH MEALS 180 Tab 3    hydroxychloroquine (PLAQUENIL) 200 mg tablet TAKE 1 TABLET BY MOUTH DAILY 30 Tab 0    atorvastatin (LIPITOR) 80 mg tablet Take 1 Tab by mouth daily. take 1 tablet by mouth at bedtime 90 Tab 3    pantoprazole (PROTONIX) 40 mg tablet TK 1 T PO BID      alclometasone (ACLOVATE) 0.05 % topical cream USE ON CORNERS OF MOUTH FOR RASH TWICE A DAY AS NEEDED  3    mupirocin (BACTROBAN) 2 % ointment DEBBIE SML AMT EXT AA TID  0    albuterol (PROVENTIL HFA, VENTOLIN HFA, PROAIR HFA) 90 mcg/actuation inhaler Take 2 Puffs by inhalation every six (6) hours as needed for Wheezing. 1 Inhaler 0    amLODIPine (NORVASC) 2.5 mg tablet take 1 tablet by mouth once daily 30 Tab 1    estrogens, conjugated,-methylTESTOSTERone (ESTRATEST) 1.25-2.5 mg per tablet 1 Tab daily. 0    HYDROcodone-acetaminophen (NORCO)  mg tablet TAKE 1 TABLET BY MOUTH 3 TIMES DAILY AS NEEDED FOR PAIN  0    desoximetasone (TOPICORT) 0.25 % topical cream Apply  to affected area two (2) times daily as needed for Skin Irritation. 15 g 0    Cetirizine (ZYRTEC) 10 mg cap Take  by mouth.       dicyclomine (BENTYL) 10 mg capsule TAKE 1 CAPSULE BY MOUTH BEFORE MEALS AND AT BEDTIME AS NEEDED.  0    gabapentin (NEURONTIN) 300 mg capsule TAKE 1 CAPSULE IN THE MORNING, 1 CAPSULE IN THE AFTERNOON, AD 2 CAPSULES AT BEDTIME (Patient taking differently: TAKE 1 CAPSULE IN THE MORNING, 1 CAPSULE IN THE AFTERNOON, AND 1 CAPSULE AT BEDTIME) 120 Cap 3    aspirin 81 mg chewable tablet Take 1 Tab by mouth daily. 30 Tab 0    [DISCONTINUED] azithromycin (ZITHROMAX) 250 mg tablet Zithromax Z-Tanner 250 mg tablet   TAKE 2 TABLETS (500 MG) BY ORAL ROUTE ONCE DAILY FOR 1 DAY THEN 1 TABLET (250 MG) BY ORAL ROUTE ONCE DAILY FOR 4 DAYS      [DISCONTINUED] clotrimazole (MYCELEX) 10 mg quang DISSOLVE 1 LOZENGE IN MOUTH FIVE TIMES DAILY FOR 1 WEEK 35 Tab 1    promethazine (PHENERGAN) 12.5 mg tablet Take 2 Tabs by mouth every six (6) hours as needed for Nausea. 20 Tab 0    doxycycline (ADOXA) 100 mg tablet Take 1 Tab by mouth two (2) times a day. 20 Tab 0    fluticasone propionate (FLONASE) 50 mcg/actuation nasal spray fluticasone propionate 50 mcg/actuation nasal spray,suspension      omeprazole (PRILOSEC) 40 mg capsule take 1 capsule by mouth once daily ON AN EMPTY STOMACH 30 Cap 5    ondansetron (ZOFRAN ODT) 4 mg disintegrating tablet Take 1 Tab by mouth every eight (8) hours as needed for Nausea. 30 Tab 1     No current facility-administered medications on file prior to visit. Review of Systems  Pertinent items are noted in HPI. Objective:     Gen: healthy sounding female  HEENT: no audible congestion, patient does not see oral erythema and sees MMM  Neck: patient does not feel enlarged or tender LAD or masses  Resp: normal respiratory effort, no audible wheezing. CV: patient does not feel palpitations or heart irregularity  Abd: patient does not feel abdominal tenderness or mass, patient does not notice distension  Extrem: patient does not see swelling in ankles or joints. Neuro: Alert and oriented, able to answer questions without difficulty, patient reports able to move all extremities and walk normally        Assessment/Plan:       ICD-10-CM ICD-9-CM    1. Essential hypertension  I10 401.9    2. Medicare annual wellness visit, subsequent  Z00.00 V70.0    3. Fibromyalgia  M79.7 729.1    4.  Coronary artery disease involving native coronary artery of native heart without angina pectoris  I25.10 414.01    5. Takotsubo cardiomyopathy  I51.81 429.83    6. Lumbar disc disease  M51.9 722.93    7. Vitamin D deficiency  E55.9 268.9 VITAMIN D, 25 HYDROXY   8. Pure hypercholesterolemia  E78.00 272.0 LIPID PANEL   follow up with Dr Georges Collins, rheumatology    To mail lab orders. This was a telemedicine visit by phone, duration 23 minutes. Regina Law MD    Follow-up and Dispositions    · Return if symptoms worsen or fail to improve.

## 2021-02-01 NOTE — PROGRESS NOTES
This is the Subsequent Medicare Annual Wellness Exam, performed 12 months or more after the Initial AWV or the last Subsequent AWV    I have reviewed the patient's medical history in detail and updated the computerized patient record. Depression Risk Factor Screening:     3 most recent PHQ Screens 12/15/2020   PHQ Not Done -   Little interest or pleasure in doing things Not at all   Feeling down, depressed, irritable, or hopeless Not at all   Total Score PHQ 2 0       Alcohol Risk Screen    Do you average more than 1 drink per night or more than 7 drinks a week:  No    On any one occasion in the past three months have you have had more than 3 drinks containing alcohol:  No        Functional Ability and Level of Safety:    Hearing: Hearing is good. Activities of Daily Living: The home contains: no safety equipment. Patient does total self care      Ambulation: with no difficulty     Fall Risk:  Fall Risk Assessment, last 12 mths 12/15/2020   Able to walk? Yes   Fall in past 12 months? Yes   Number of falls in past 12 months 2   Fall with injury? 0      Abuse Screen:  Patient is not abused       Cognitive Screening    Has your family/caregiver stated any concerns about your memory: no    Cognitive Screening: Normal - Verbal Fluency Test    Assessment/Plan   Education and counseling provided:  Are appropriate based on today's review and evaluation    Diagnoses and all orders for this visit:    1. Essential hypertension    2. Medicare annual wellness visit, subsequent    3. Fibromyalgia    4. Coronary artery disease involving native coronary artery of native heart without angina pectoris    5. Takotsubo cardiomyopathy    6. Lumbar disc disease    7. Vitamin D deficiency  -     VITAMIN D, 25 HYDROXY; Future    8. Pure hypercholesterolemia  -     LIPID PANEL;  Future        Health Maintenance Due     Health Maintenance Due   Topic Date Due    COVID-19 Vaccine (1 of 2) 02/20/1970    Shingrix Vaccine Age 50> (1 of 2) 02/20/2004    Breast Cancer Screen Mammogram  08/17/2017    GLAUCOMA SCREENING Q2Y  02/20/2019    Lipid Screen  07/10/2020    Pneumococcal 65+ years (2 of 2 - PPSV23) 08/25/2020       Patient Care Team   Patient Care Team:  Bob Franco MD as PCP - General (Internal Medicine)  Bob Franco MD as PCP - [facility] Empaneled Provider  Oscar Slaughter MD as Consulting Provider (Orthopedic Surgery)  Taylor Reno MD (Cardiology)  Chapincito Gonzalez MD as Physician (Plastic Surgery)  Yasir Mccann NP as Consulting Provider (Nurse Practitioner)  Meera Khoury MD as Consulting Provider (Physical Medicine and Rehabilitation)  Javan Santiago MD as Consulting Provider (Dermatology)    History     Patient Active Problem List   Diagnosis Code    Rectal bleed K62.5    Hypokalemia E87.6    HTN (hypertension) I10    UTI (lower urinary tract infection) N39.0    Fibromyalgia M79.7    Bronchospasm J98.01    Lumbar disc disease M51.9    Nausea & vomiting R11.2    NSTEMI (non-ST elevated myocardial infarction) ([address] I21.4    Takotsubo cardiomyopathy I51.81    Tobacco abuse Z72.0    Heart attack ([address] I21.9    Coronary artery disease involving native coronary artery without angina pectoris I25.10    Intractable vomiting without nausea R11.11     Past Medical History:   Diagnosis Date    Acid reflux     Acid reflux     Arthritis     Chronic pain     Heart attack ([address]     Heart failure ([address]     Hypertension     IBS (irritable bowel syndrome)     Squamous cell carcinoma of skin of right elbow 5/2016    Takotsubo cardiomyopathy 11/16/2015    Tobacco abuse 11/16/2015      Past Surgical History:   Procedure Laterality Date    HX GYN      oophorectomy    HX ORTHOPAEDIC      right knee arthroscopy    HX ORTHOPAEDIC      right thumb    HX ORTHOPAEDIC      back surgeries    HX OTHER SURGICAL      8 route canals    KS ABDOMEN SURGERY PROC UNLISTED      adhesion removal    [place] BREAST SURGERY PROCEDURE UNLISTED      lumpectomy    [place] COLONOSCOPY FLX DX W/COLLJ SPEC WHEN PFRMD  10/15/2012          Current Outpatient Medications   Medication Sig Dispense Refill    Xifaxan 550 mg tablet TAKE 1 TABLET BY MOUTH THREE TIMES DAILY      hydroCHLOROthiazide (HYDRODIURIL) 25 mg tablet TAKE 1 TABLET BY MOUTH DAILY 30 Tab 3    busPIRone (BUSPAR) 5 mg tablet TAKE 1 TABLET BY MOUTH TWICE DAILY 60 Tab 5    colestipoL (COLESTID) 5 gram packet TAKE PO AS DIRECTED      JAZMYN Billy, Pen 40 mg/0.4 mL injection pen INJECT ONE PEN (40MG) UNDER THE SKIN (SUBCUTANEOUS INJECTION) EVERY 2 WEEKS 1 Kit 0    DULoxetine (CYMBALTA) 60 mg capsule Take 1 Cap by mouth daily. 30 Cap 5    carvediloL (COREG) 12.5 mg tablet TAKE 1 TABLET BY MOUTH TWICE DAILY WITH MEALS 180 Tab 3    hydroxychloroquine (PLAQUENIL) 200 mg tablet TAKE 1 TABLET BY MOUTH DAILY 30 Tab 0    atorvastatin (LIPITOR) 80 mg tablet Take 1 Tab by mouth daily. take 1 tablet by mouth at bedtime 90 Tab 3    pantoprazole (PROTONIX) 40 mg tablet TK 1 T PO BID      alclometasone (ACLOVATE) 0.05 % topical cream USE ON CORNERS OF MOUTH FOR RASH TWICE A DAY AS NEEDED  3    mupirocin (BACTROBAN) 2 % ointment DEBBIE SML AMT EXT AA TID  0    albuterol (PROVENTIL HFA, VENTOLIN HFA, PROAIR HFA) 90 mcg/actuation inhaler Take 2 Puffs by inhalation every six (6) hours as needed for Wheezing. 1 Inhaler 0    amLODIPine (NORVASC) 2.5 mg tablet take 1 tablet by mouth once daily 30 Tab 1    estrogens, conjugated,-methylTESTOSTERone (ESTRATEST) 1.25-2.5 mg per tablet 1 Tab daily. 0    HYDROcodone-acetaminophen (NORCO)  mg tablet TAKE 1 TABLET BY MOUTH 3 TIMES DAILY AS NEEDED FOR PAIN  0    desoximetasone (TOPICORT) 0.25 % topical cream Apply  to affected area two (2) times daily as needed for Skin Irritation. 15 g 0    Cetirizine (ZYRTEC) 10 mg cap Take  by mouth.  dicyclomine (BENTYL) 10 mg capsule TAKE 1 CAPSULE BY MOUTH BEFORE MEALS AND AT BEDTIME AS NEEDED. 0    gabapentin (NEURONTIN) 300 mg capsule TAKE 1 CAPSULE IN THE MORNING, 1 CAPSULE IN THE AFTERNOON, AD 2 CAPSULES AT BEDTIME (Patient taking differently: TAKE 1 CAPSULE IN THE MORNING, 1 CAPSULE IN THE AFTERNOON, AND 1 CAPSULE AT BEDTIME) 120 Cap 3    aspirin 81 mg chewable tablet Take 1 Tab by mouth daily. 30 Tab 0    promethazine (PHENERGAN) 12.5 mg tablet Take 2 Tabs by mouth every six (6) hours as needed for Nausea. 20 Tab 0    doxycycline (ADOXA) 100 mg tablet Take 1 Tab by mouth two (2) times a day. 20 Tab 0    fluticasone propionate (FLONASE) 50 mcg/actuation nasal spray fluticasone propionate 50 mcg/actuation nasal spray,suspension      omeprazole (PRILOSEC) 40 mg capsule take 1 capsule by mouth once daily ON AN EMPTY STOMACH 30 Cap 5    ondansetron (ZOFRAN ODT) 4 mg disintegrating tablet Take 1 Tab by mouth every eight (8) hours as needed for Nausea. 30 Tab 1     Allergies   Allergen Reactions    Ace Inhibitors Swelling    Arb-Angiotensin Receptor Antagonist Other (comments)     Prior ACE inhibitor angioedema, cross reaction risk    Codeine Nausea Only    Keflex [Cephalexin] Hives    Milk Other (comments)     Lactose intolerant.  Morphine Nausea and Vomiting       Family History   Problem Relation Age of Onset    Cancer Father         prostate    Heart Disease Mother     Heart Disease Maternal Grandmother      Social History     Tobacco Use    Smoking status: Current Every Day Smoker     Packs/day: 1.00    Smokeless tobacco: Never Used    Tobacco comment: trying to quit 4-5 cigarettes daily   Substance Use Topics    Alcohol use: Yes     Alcohol/week: 8.3 standard drinks     Types: 10 Glasses of wine per week     Comment: 4 times weekly       Semaj Wiley, who was evaluated through a synchronous (real-time)  Audio encounter, and/or her healthcare decision maker, is aware that it is a billable service, with coverage as determined by her insurance carrier.  She provided verbal consent to proceed: Yes, and patient identification was verified. It was conducted pursuant to the emergency declaration under the [address] [facility], [address] [place] [address] and the [employer] General Act. A caregiver was present when appropriate. Ability to conduct physical exam was limited. I was at home. The patient was at home. Thelma Ng MD   [address] [facility]  [address] [facility] [address]    SUBSEQUENT MEDICARE ANNUAL WELLNESS VISIT       CHIEF COMPLAINT     Robi Villalobos, 76 y.o. female, presents for Follow-up. HPI       OBJECTIVE   There were no vitals taken for this visit. BP Readings from Last 3 Encounters:   12/15/20 136/81   11/02/20 (!) 183/99   06/23/20 159/77      Wt Readings from Last 3 Encounters:   12/15/20 116 lb 6.4 oz (52.8 kg)   11/02/20 120 lb (54.4 kg)   06/23/20 124 lb 3.2 oz (56.3 kg)     Physical Exam      ASSESSMENT AND PLAN     Diagnoses and all orders for this visit:    1. Essential hypertension    2. Medicare annual wellness visit, subsequent    3. Fibromyalgia    4. Coronary artery disease involving native coronary artery of native heart without angina pectoris    5. Takotsubo cardiomyopathy    6. Lumbar disc disease    7. Vitamin D deficiency  -     VITAMIN D, 25 HYDROXY; Future    8. Pure hypercholesterolemia  -     LIPID PANEL; Future        WELLNESS EVALUATION & HEALTH RISK ASSESSMENT     DEPRESSION SCREENING  3 most recent PHQ Screens 12/15/2020   PHQ Not Done -   Little interest or pleasure in doing things Not at all   Feeling down, depressed, irritable, or hopeless Not at all   Total Score PHQ 2 0     C-SSRS [place] Suicide Severity Rating Scale 11/2/2020   1) Within the past month, have you wished you were dead or wished you could go to sleep and not wake up? No   2) Have you actually had any thoughts of killing yourself?  No   6) Have you ever done anything, started to do anything, or prepared to do anything to end your life? No       FALL RISK SCREENING  Fall Risk Assessment, last 12 mths 12/15/2020   Able to walk? Yes   Fall in past 12 months? Yes   Number of falls in past 12 months 2   Fall with injury? 0       GENERAL       HEALTH HABITS AND NUTRITION       HEARING/VISION/SKIN       SLEEP/MEMORY/ANXIETY       SUBSTANCE AND OPIOID USE       SAFETY       ADLS       PHYSICAL ACTIVITY        TIMED UP AND GO TEST (If indicated)       MINI-COG SCORE (If indicated)       STOP-BANG SCORE (If indicated)         [address] [facility] Maintenance Topics with due status: Overdue       Topic Date Due    COVID-19 Vaccine 02/20/1970    Shingrix Vaccine Age 50> 02/20/2004    Breast Cancer Screen Mammogram 08/17/2017    GLAUCOMA SCREENING Q2Y 02/20/2019    Lipid Screen 07/10/2020    Pneumococcal 65+ years 08/25/2020     Health Maintenance Topics with due status: Not Due       Topic Last Completion Date    DTaP/Tdap/Td series 11/07/2017    Colorectal Cancer Screening Combo 07/09/2018    Medicare Yearly Exam 02/01/2021     Health Maintenance Topics with due status: Completed       Topic Last Completion Date    Bone Densitometry (Dexa) Screening 08/11/2016    Hepatitis C Screening 10/01/2018    Flu Vaccine 10/29/2020         Colon cancer:  Recommendation: Colonoscopy every 10y or annual FIT test from 50-75 or every 3 year stool DNA based test with consideration of ongoing screening from 76-85. Lung cancer (LDCT): Recommendation: Yearly LDCT for pts 55-77 w 30-pack year hx and currently smoke or quit <15 yr ago. Hepatitis C:  Recommendation: One time screening for all patient's aged 18-79. Diabetes:   Recommendation: USPSTF recommends screening ages 38-69 y/o if overweight or obese.  Medicare covers screening in those patients who are overweight, obese, have HTN or dyslipiemia, a personal history of gestational diabetes or prior elevated blood sugar, a family hx of DM, or any patient over age 72    Lipids:   Recommendation: screening for hyperlipidemia every 5 years after age 39    PREVENTIVE CARE - FEMALE SCREENINGS     Cervical cancer: Recommendation: Every 3 yr from 21-29 and every 5 yr from 33-67, with Pap and HPV testing. and Not Indicated    Breast cancer: Recommendation:  USPSTF recommends screening mammography every 2 yr from 54-69. The decision to start screening mammography in women prior to age 48 years should be an individual one. Women who place a higher value on the potential benefit than the potential harms may choose to begin biennial screening between the ages of 36 and 52 years. Medicare covers annual screening mammography and USPSTF also recommends women with a personal or family history of breast, ovarian, tubal, or peritoneal cancer or who have an ancestry ([address]) associated with breast cancer susceptibility 1 and 2 (BRCA1/2) gene mutations with an appropriate brief familial risk assessment tool.  Women with a positive result on the risk assessment tool should receive genetic counseling and, if indicated after counseling, genetic testing    Osteoporosis: Recommendation: Screen all women at 72, earlier if elevated risk    AAA:   Recommendation: One-time screening if family history of AAA and Not Indicated    IMMUNIZATIONS     Immunization History   Administered Date(s) Administered    Influenza High Dose Vaccine PF 11/10/2019    Influenza Vaccine 08/23/2017, 11/07/2017, 12/31/2018    Influenza Vaccine (Quad) PF (>6 Mo Flulaval, Fluarix, and >3 Yrs Afluria, Fluzone 15865) 11/15/2015, 12/31/2018    Influenza Vaccine Split 10/14/2012    Influenza, High-dose, Quadrivalent (>65 Yrs Fluzone High Dose Quad [id number]) 10/29/2020    Pneumococcal Conjugate (PCV-13) 10/11/2017    Pneumococcal Polysaccharide (PPSV-23) 08/25/2015    Tdap 11/07/2017       Pneumovax:   Recommendation: PPSV23 once for all >65 and high risk <65     Prevnar: Recommendation: PCV13 only if >65 and immunocompromised or residing in a nursing home, or in areas of low childhood Pneumococcal vaccination and Not Indicated    Influenza:   Recommendation: Vaccination annually, high dose if 65 or older    Shingrix:  Recommendation: Vaccination 2 shots 2-6 months apart for all age >47    TDaP:    Recommendation: Vaccination Booster with TDaP every 10 yr.      INDIVIDUALIZED SCREENING, EDUCATION, AND PLAN     The patient and any caregiver(s) were counseled on: Healthcare maintenance and preventive items as above. The patient is not on high risk medication(s) including benzodiaepines. Based on my evaluation of the patient and the Health Risk Assessment performed today, there is not evidence of cognitive impairment. Medications, allergies, problem list, previous encounters, recent results, medical, social and family history reviewed in the electronic health record. Medications and problems are viewable in the Encounter tab for this visit. Current Outpatient Medications   Medication Sig    Xifaxan 550 mg tablet TAKE 1 TABLET BY MOUTH THREE TIMES DAILY    hydroCHLOROthiazide (HYDRODIURIL) 25 mg tablet TAKE 1 TABLET BY MOUTH DAILY    busPIRone (BUSPAR) 5 mg tablet TAKE 1 TABLET BY MOUTH TWICE DAILY    colestipoL (COLESTID) 5 gram packet TAKE PO AS DIRECTED    JAZMYN Billy, Pen 40 mg/0.4 mL injection pen INJECT ONE PEN (40MG) UNDER THE SKIN (SUBCUTANEOUS INJECTION) EVERY 2 WEEKS    DULoxetine (CYMBALTA) 60 mg capsule Take 1 Cap by mouth daily.  carvediloL (COREG) 12.5 mg tablet TAKE 1 TABLET BY MOUTH TWICE DAILY WITH MEALS    hydroxychloroquine (PLAQUENIL) 200 mg tablet TAKE 1 TABLET BY MOUTH DAILY    atorvastatin (LIPITOR) 80 mg tablet Take 1 Tab by mouth daily.  take 1 tablet by mouth at bedtime    pantoprazole (PROTONIX) 40 mg tablet TK 1 T PO BID    alclometasone (ACLOVATE) 0.05 % topical cream USE ON CORNERS OF MOUTH FOR RASH TWICE A DAY AS NEEDED    mupirocin (BACTROBAN) 2 % ointment DEBBIE SML AMT EXT AA TID    albuterol (PROVENTIL HFA, VENTOLIN HFA, PROAIR HFA) 90 mcg/actuation inhaler Take 2 Puffs by inhalation every six (6) hours as needed for Wheezing.  amLODIPine (NORVASC) 2.5 mg tablet take 1 tablet by mouth once daily    estrogens, conjugated,-methylTESTOSTERone (ESTRATEST) 1.25-2.5 mg per tablet 1 Tab daily.  HYDROcodone-acetaminophen (NORCO)  mg tablet TAKE 1 TABLET BY MOUTH 3 TIMES DAILY AS NEEDED FOR PAIN    desoximetasone (TOPICORT) 0.25 % topical cream Apply  to affected area two (2) times daily as needed for Skin Irritation.  Cetirizine (ZYRTEC) 10 mg cap Take  by mouth.  dicyclomine (BENTYL) 10 mg capsule TAKE 1 CAPSULE BY MOUTH BEFORE MEALS AND AT BEDTIME AS NEEDED.  gabapentin (NEURONTIN) 300 mg capsule TAKE 1 CAPSULE IN THE MORNING, 1 CAPSULE IN THE AFTERNOON, AD 2 CAPSULES AT BEDTIME (Patient taking differently: TAKE 1 CAPSULE IN THE MORNING, 1 CAPSULE IN THE AFTERNOON, AND 1 CAPSULE AT BEDTIME)    aspirin 81 mg chewable tablet Take 1 Tab by mouth daily.  promethazine (PHENERGAN) 12.5 mg tablet Take 2 Tabs by mouth every six (6) hours as needed for Nausea.  doxycycline (ADOXA) 100 mg tablet Take 1 Tab by mouth two (2) times a day.  fluticasone propionate (FLONASE) 50 mcg/actuation nasal spray fluticasone propionate 50 mcg/actuation nasal spray,suspension    omeprazole (PRILOSEC) 40 mg capsule take 1 capsule by mouth once daily ON AN EMPTY STOMACH    ondansetron (ZOFRAN ODT) 4 mg disintegrating tablet Take 1 Tab by mouth every eight (8) hours as needed for Nausea. No current facility-administered medications for this visit.       Medications Discontinued During This Encounter   Medication Reason    azithromycin (ZITHROMAX) 250 mg tablet Therapy Completed    clotrimazole (MYCELEX) 10 mg quang Therapy Completed     Allergies   Allergen Reactions    Ace Inhibitors Swelling    Arb-Angiotensin Receptor Antagonist Other (comments)     Prior ACE inhibitor angioedema, cross reaction risk    Codeine Nausea Only    Keflex [Cephalexin] Hives    Milk Other (comments)     Lactose intolerant.  Morphine Nausea and Vomiting     Past Medical History:   Diagnosis Date    Acid reflux     Acid reflux     Arthritis     Chronic pain     Heart attack ([address]     Heart failure (HCC)     Hypertension     IBS (irritable bowel syndrome)     Squamous cell carcinoma of skin of right elbow 5/2016    Takotsubo cardiomyopathy 11/16/2015    Tobacco abuse 11/16/2015     Past Surgical History:   Procedure Laterality Date    HX GYN      oophorectomy    HX ORTHOPAEDIC      right knee arthroscopy    HX ORTHOPAEDIC      right thumb    HX ORTHOPAEDIC      back surgeries    HX OTHER SURGICAL      8 route canals    TX ABDOMEN SURGERY PROC UNLISTED      adhesion removal    TX BREAST SURGERY PROCEDURE UNLISTED      lumpectomy    [place] COLONOSCOPY FLX DX W/COLLJ SPEC WHEN PFRMD  10/15/2012          Family History   Problem Relation Age of Onset    Cancer Father         prostate    Heart Disease Mother     Heart Disease Maternal Grandmother      Social History     Socioeconomic History    Marital status:      Spouse name: Not on file    Number of children: Not on file    Years of education: Not on file    Highest education level: Not on file   Tobacco Use    Smoking status: Current Every Day Smoker     Packs/day: 1.00    Smokeless tobacco: Never Used    Tobacco comment: trying to quit 4-5 cigarettes daily   Substance and Sexual Activity    Alcohol use:  Yes     Alcohol/week: 8.3 standard drinks     Types: 10 Glasses of wine per week     Comment: 4 times weekly    Drug use: No    Sexual activity: Yes     Partners: Male     Birth control/protection: None          Patient Care Team:  Krupa Garcia MD as PCP - General (Internal Medicine)  Krupa Garcia MD as PCP - REHABILITATION [facility] Empaneled Provider  Esperanza May MD as Consulting Provider (Orthopedic Surgery)  Jonh Spence MD (Cardiology)  Ritu Espinosa MD as Physician (Plastic Surgery)  Milton Manzanares NP as Consulting Provider (Nurse Practitioner)  Aury Ocampo MD as Consulting Provider (Physical Medicine and Rehabilitation)  Emmy Deras MD as Consulting Provider (Dermatology)      Future Appointments   Date Time Provider Luca Kramer   2/2/2021  8:00 AM [address] [facility]         Sejal Joya MD, 2/1/2021  This encounter has been electronically signed

## 2021-02-01 NOTE — PATIENT INSTRUCTIONS
Medicare Wellness Visit, Female The best way to live healthy is to have a lifestyle where you eat a well-balanced diet, exercise regularly, limit alcohol use, and quit all forms of tobacco/nicotine, if applicable. Regular preventive services are another way to keep healthy. Preventive services (vaccines, screening tests, monitoring & exams) can help personalize your care plan, which helps you manage your own care. Screening tests can find health problems at the earliest stages, when they are easiest to treat. [facility] follows the current, evidence-based guidelines published by the [facility] ([facility]) when recommending preventive services for our patients. Because we follow these guidelines, sometimes recommendations change over time as research supports it. (For example, mammograms used to be recommended annually. Even though Medicare will still pay for an annual mammogram, the newer guidelines recommend a mammogram every two years for women of average risk). Of course, you and your doctor may decide to screen more often for some diseases, based on your risk and your co-morbidities (chronic disease you are already diagnosed with). Preventive services for you include: - Medicare offers their members a free annual wellness visit, which is time for you and your primary care provider to discuss and plan for your preventive service needs. Take advantage of this benefit every year! 
-All adults over the age of 72 should receive the recommended pneumonia vaccines. Current USPSTF guidelines recommend a series of two vaccines for the best pneumonia protection.  
-All adults should have a flu vaccine yearly and a tetanus vaccine every 10 years.  
-All adults age 48 and older should receive the shingles vaccines (series of two vaccines). -All adults age 38-68 who are overweight should have a diabetes screening test once every three years. -All adults born between 80 and 1965 should be screened once for Hepatitis C. 
-Other screening tests and preventive services for persons with diabetes include: an eye exam to screen for diabetic retinopathy, a kidney function test, a foot exam, and stricter control over your cholesterol.  
-Cardiovascular screening for adults with routine risk involves an electrocardiogram (ECG) at intervals determined by your doctor.  
-Colorectal cancer screenings should be done for adults age 54-65 with no increased risk factors for colorectal cancer. There are a number of acceptable methods of screening for this type of cancer. Each test has its own benefits and drawbacks. Discuss with your doctor what is most appropriate for you during your annual wellness visit. The different tests include: colonoscopy (considered the best screening method), a fecal occult blood test, a fecal DNA test, and sigmoidoscopy. 
 
-A bone mass density test is recommended when a woman turns 65 to screen for osteoporosis. This test is only recommended one time, as a screening. Some providers will use this same test as a disease monitoring tool if you already have osteoporosis. -Breast cancer screenings are recommended every other year for women of normal risk, age 54-69. 
-Cervical cancer screenings for women over age 72 are only recommended with certain risk factors. Here is a list of your current Health Maintenance items (your personalized list of preventive services) with a due date: 
Health Maintenance Due Topic Date Due  
 COVID-19 Vaccine (1 of 2) 02/20/1970  Shingles Vaccine (1 of 2) 02/20/2004  Mammogram  08/17/2017  Glaucoma Screening   02/20/2019  Cholesterol Test   07/10/2020  Pneumococcal Vaccine (2 of 2 - PPSV23) 08/25/2020

## 2021-02-02 ENCOUNTER — HOSPITAL ENCOUNTER (OUTPATIENT)
Dept: GENERAL RADIOLOGY | Age: 66
Discharge: HOME | End: 2021-02-02
Attending: INTERNAL MEDICINE
Payer: COMMERCIAL

## 2021-02-02 DIAGNOSIS — R19.7 DIARRHEA: ICD-10-CM

## 2021-02-02 DIAGNOSIS — R11.2 NAUSEA WITH VOMITING: ICD-10-CM

## 2021-02-02 PROCEDURE — 74246 X-RAY XM UPR GI TRC 2CNTRST: CPT

## 2021-02-08 ENCOUNTER — APPOINTMENT (OUTPATIENT)
Dept: GENERAL RADIOLOGY | Age: 66
DRG: 281 | End: 2021-02-08
Attending: EMERGENCY MEDICINE
Payer: COMMERCIAL

## 2021-02-08 ENCOUNTER — APPOINTMENT (OUTPATIENT)
Dept: CT IMAGING | Age: 66
DRG: 281 | End: 2021-02-08
Attending: EMERGENCY MEDICINE
Payer: COMMERCIAL

## 2021-02-08 ENCOUNTER — APPOINTMENT (OUTPATIENT)
Dept: NON INVASIVE DIAGNOSTICS | Age: 66
DRG: 281 | End: 2021-02-08
Attending: NURSE PRACTITIONER
Payer: COMMERCIAL

## 2021-02-08 ENCOUNTER — HOSPITAL ENCOUNTER (INPATIENT)
Age: 66
LOS: 4 days | Discharge: HOME | DRG: 281 | End: 2021-02-12
Attending: EMERGENCY MEDICINE | Admitting: STUDENT IN AN ORGANIZED HEALTH CARE EDUCATION/TRAINING PROGRAM
Payer: COMMERCIAL

## 2021-02-08 DIAGNOSIS — R77.8 ELEVATED TROPONIN: ICD-10-CM

## 2021-02-08 DIAGNOSIS — R11.2 INTRACTABLE NAUSEA AND VOMITING: Primary | ICD-10-CM

## 2021-02-08 DIAGNOSIS — I10 ESSENTIAL HYPERTENSION: ICD-10-CM

## 2021-02-08 DIAGNOSIS — I21.4 NSTEMI (NON-ST ELEVATED MYOCARDIAL INFARCTION) (HCC): ICD-10-CM

## 2021-02-08 DIAGNOSIS — E86.0 DEHYDRATION: ICD-10-CM

## 2021-02-08 DIAGNOSIS — E87.6 HYPOKALEMIA: ICD-10-CM

## 2021-02-08 DIAGNOSIS — M54.50 CHRONIC LOW BACK PAIN WITHOUT SCIATICA, UNSPECIFIED BACK PAIN LATERALITY: ICD-10-CM

## 2021-02-08 DIAGNOSIS — G89.29 CHRONIC LOW BACK PAIN WITHOUT SCIATICA, UNSPECIFIED BACK PAIN LATERALITY: ICD-10-CM

## 2021-02-08 PROBLEM — K58.9 IBS (IRRITABLE BOWEL SYNDROME): Status: ACTIVE | Noted: 2021-02-08

## 2021-02-08 LAB
ALBUMIN SERPL-MCNC: 3 G/DL (ref 3.5–5)
ALBUMIN/GLOB SERPL: 1.2 {RATIO} (ref 1.1–2.2)
ALP SERPL-CCNC: 113 U/L (ref 45–117)
ALT SERPL-CCNC: 33 U/L (ref 12–78)
ANION GAP SERPL CALC-SCNC: 10 MMOL/L (ref 5–15)
APPEARANCE UR: CLEAR
APTT PPP: 26.2 SEC (ref 22.1–31)
APTT PPP: 35.3 SEC (ref 22.1–31)
APTT PPP: 64.1 SEC (ref 22.1–31)
AST SERPL-CCNC: 34 U/L (ref 15–37)
BACTERIA URNS QL MICRO: NEGATIVE /HPF
BASOPHILS # BLD: 0 K/UL (ref 0–0.1)
BASOPHILS NFR BLD: 0 % (ref 0–1)
BILIRUB SERPL-MCNC: 0.2 MG/DL (ref 0.2–1)
BILIRUB UR QL: NEGATIVE
BUN SERPL-MCNC: 17 MG/DL (ref 6–20)
BUN/CREAT SERPL: 25 (ref 12–20)
CALCIUM SERPL-MCNC: 7.4 MG/DL (ref 8.5–10.1)
CHLORIDE SERPL-SCNC: 110 MMOL/L (ref 97–108)
CK MB CFR SERPL CALC: 4.5 % (ref 0–2.5)
CK MB SERPL-MCNC: 2.1 NG/ML (ref 5–25)
CK SERPL-CCNC: 47 U/L (ref 26–192)
CO2 SERPL-SCNC: 19 MMOL/L (ref 21–32)
COLOR UR: ABNORMAL
CREAT SERPL-MCNC: 0.69 MG/DL (ref 0.55–1.02)
DIFFERENTIAL METHOD BLD: ABNORMAL
EOSINOPHIL # BLD: 0.1 K/UL (ref 0–0.4)
EOSINOPHIL NFR BLD: 2 % (ref 0–7)
EPITH CASTS URNS QL MICRO: ABNORMAL /LPF
ERYTHROCYTE [DISTWIDTH] IN BLOOD BY AUTOMATED COUNT: 14 % (ref 11.5–14.5)
GLOBULIN SER CALC-MCNC: 2.5 G/DL (ref 2–4)
GLUCOSE SERPL-MCNC: 89 MG/DL (ref 65–100)
GLUCOSE UR STRIP.AUTO-MCNC: NEGATIVE MG/DL
HCT VFR BLD AUTO: 32.4 % (ref 35–47)
HGB BLD-MCNC: 10.5 G/DL (ref 11.5–16)
HGB UR QL STRIP: NEGATIVE
HYALINE CASTS URNS QL MICRO: ABNORMAL /LPF (ref 0–5)
IMM GRANULOCYTES # BLD AUTO: 0 K/UL (ref 0–0.04)
IMM GRANULOCYTES NFR BLD AUTO: 0 % (ref 0–0.5)
KETONES UR QL STRIP.AUTO: ABNORMAL MG/DL
LACTATE BLD-SCNC: 1.06 MMOL/L (ref 0.4–2)
LACTATE BLD-SCNC: 2.69 MMOL/L (ref 0.4–2)
LEUKOCYTE ESTERASE UR QL STRIP.AUTO: NEGATIVE
LIPASE SERPL-CCNC: 111 U/L (ref 73–393)
LYMPHOCYTES # BLD: 1.6 K/UL (ref 0.8–3.5)
LYMPHOCYTES NFR BLD: 17 % (ref 12–49)
MCH RBC QN AUTO: 32 PG (ref 26–34)
MCHC RBC AUTO-ENTMCNC: 32.4 G/DL (ref 30–36.5)
MCV RBC AUTO: 98.8 FL (ref 80–99)
MONOCYTES # BLD: 0.6 K/UL (ref 0–1)
MONOCYTES NFR BLD: 6 % (ref 5–13)
NEUTS SEG # BLD: 6.7 K/UL (ref 1.8–8)
NEUTS SEG NFR BLD: 75 % (ref 32–75)
NITRITE UR QL STRIP.AUTO: NEGATIVE
NRBC # BLD: 0 K/UL (ref 0–0.01)
NRBC BLD-RTO: 0 PER 100 WBC
PH UR STRIP: 6.5 [PH] (ref 5–8)
PLATELET # BLD AUTO: 304 K/UL (ref 150–400)
PMV BLD AUTO: 9.8 FL (ref 8.9–12.9)
POTASSIUM SERPL-SCNC: 3 MMOL/L (ref 3.5–5.1)
PROT SERPL-MCNC: 5.5 G/DL (ref 6.4–8.2)
PROT UR STRIP-MCNC: NEGATIVE MG/DL
RBC # BLD AUTO: 3.28 M/UL (ref 3.8–5.2)
RBC #/AREA URNS HPF: ABNORMAL /HPF (ref 0–5)
SODIUM SERPL-SCNC: 139 MMOL/L (ref 136–145)
SP GR UR REFRACTOMETRY: 1.01 (ref 1–1.03)
THERAPEUTIC RANGE,PTTT: ABNORMAL SECS (ref 58–77)
THERAPEUTIC RANGE,PTTT: ABNORMAL SECS (ref 58–77)
THERAPEUTIC RANGE,PTTT: NORMAL SECS (ref 58–77)
TROPONIN I SERPL-MCNC: 0.27 NG/ML
TROPONIN I SERPL-MCNC: 0.9 NG/ML
TROPONIN I SERPL-MCNC: 2.11 NG/ML
TROPONIN I SERPL-MCNC: 2.29 NG/ML
UA: UC IF INDICATED,UAUC: ABNORMAL
UROBILINOGEN UR QL STRIP.AUTO: 0.2 EU/DL (ref 0.2–1)
WBC # BLD AUTO: 9 K/UL (ref 3.6–11)
WBC URNS QL MICRO: ABNORMAL /HPF (ref 0–4)

## 2021-02-08 PROCEDURE — 84484 ASSAY OF TROPONIN QUANT: CPT

## 2021-02-08 PROCEDURE — 83605 ASSAY OF LACTIC ACID: CPT

## 2021-02-08 PROCEDURE — 74011250636 HC RX REV CODE- 250/636: Performed by: EMERGENCY MEDICINE

## 2021-02-08 PROCEDURE — 80053 COMPREHEN METABOLIC PANEL: CPT

## 2021-02-08 PROCEDURE — 36415 COLL VENOUS BLD VENIPUNCTURE: CPT

## 2021-02-08 PROCEDURE — 74011250637 HC RX REV CODE- 250/637: Performed by: STUDENT IN AN ORGANIZED HEALTH CARE EDUCATION/TRAINING PROGRAM

## 2021-02-08 PROCEDURE — 74011250637 HC RX REV CODE- 250/637: Performed by: EMERGENCY MEDICINE

## 2021-02-08 PROCEDURE — 83690 ASSAY OF LIPASE: CPT

## 2021-02-08 PROCEDURE — 96367 TX/PROPH/DG ADDL SEQ IV INF: CPT

## 2021-02-08 PROCEDURE — 74011250636 HC RX REV CODE- 250/636: Performed by: STUDENT IN AN ORGANIZED HEALTH CARE EDUCATION/TRAINING PROGRAM

## 2021-02-08 PROCEDURE — 74011000250 HC RX REV CODE- 250: Performed by: NURSE PRACTITIONER

## 2021-02-08 PROCEDURE — 93005 ELECTROCARDIOGRAM TRACING: CPT

## 2021-02-08 PROCEDURE — 96361 HYDRATE IV INFUSION ADD-ON: CPT

## 2021-02-08 PROCEDURE — 96375 TX/PRO/DX INJ NEW DRUG ADDON: CPT

## 2021-02-08 PROCEDURE — 99223 1ST HOSP IP/OBS HIGH 75: CPT | Performed by: INTERNAL MEDICINE

## 2021-02-08 PROCEDURE — 85025 COMPLETE CBC W/AUTO DIFF WBC: CPT

## 2021-02-08 PROCEDURE — 82553 CREATINE MB FRACTION: CPT

## 2021-02-08 PROCEDURE — 96374 THER/PROPH/DIAG INJ IV PUSH: CPT

## 2021-02-08 PROCEDURE — 96365 THER/PROPH/DIAG IV INF INIT: CPT

## 2021-02-08 PROCEDURE — 81001 URINALYSIS AUTO W/SCOPE: CPT

## 2021-02-08 PROCEDURE — 99285 EMERGENCY DEPT VISIT HI MDM: CPT

## 2021-02-08 PROCEDURE — 85730 THROMBOPLASTIN TIME PARTIAL: CPT

## 2021-02-08 PROCEDURE — 74177 CT ABD & PELVIS W/CONTRAST: CPT

## 2021-02-08 PROCEDURE — C8929 TTE W OR WO FOL WCON,DOPPLER: HCPCS

## 2021-02-08 PROCEDURE — 65660000001 HC RM ICU INTERMED STEPDOWN

## 2021-02-08 PROCEDURE — 74022 RADEX COMPL AQT ABD SERIES: CPT

## 2021-02-08 PROCEDURE — 74011000636 HC RX REV CODE- 636: Performed by: EMERGENCY MEDICINE

## 2021-02-08 PROCEDURE — 93306 TTE W/DOPPLER COMPLETE: CPT | Performed by: INTERNAL MEDICINE

## 2021-02-08 PROCEDURE — 74011250637 HC RX REV CODE- 250/637: Performed by: NURSE PRACTITIONER

## 2021-02-08 RX ORDER — FAMOTIDINE 20 MG/1
20 TABLET, FILM COATED ORAL 2 TIMES DAILY
COMMUNITY
End: 2021-02-15 | Stop reason: ALTCHOICE

## 2021-02-08 RX ORDER — SODIUM CHLORIDE 0.9 % (FLUSH) 0.9 %
5-40 SYRINGE (ML) INJECTION EVERY 8 HOURS
Status: DISCONTINUED | OUTPATIENT
Start: 2021-02-08 | End: 2021-02-12 | Stop reason: HOSPADM

## 2021-02-08 RX ORDER — ONDANSETRON 2 MG/ML
4 INJECTION INTRAMUSCULAR; INTRAVENOUS
Status: DISCONTINUED | OUTPATIENT
Start: 2021-02-08 | End: 2021-02-09 | Stop reason: SDUPTHER

## 2021-02-08 RX ORDER — CLOTRIMAZOLE 10 MG/1
10 LOZENGE ORAL; TOPICAL
COMMUNITY
End: 2021-02-25 | Stop reason: SDUPTHER

## 2021-02-08 RX ORDER — LORAZEPAM 2 MG/ML
1 INJECTION INTRAMUSCULAR
Status: DISCONTINUED | OUTPATIENT
Start: 2021-02-08 | End: 2021-02-12 | Stop reason: HOSPADM

## 2021-02-08 RX ORDER — HEPARIN SODIUM 10000 [USP'U]/100ML
12-25 INJECTION, SOLUTION INTRAVENOUS
Status: DISCONTINUED | OUTPATIENT
Start: 2021-02-08 | End: 2021-02-12 | Stop reason: HOSPADM

## 2021-02-08 RX ORDER — ACETAMINOPHEN 650 MG/1
650 SUPPOSITORY RECTAL
Status: DISCONTINUED | OUTPATIENT
Start: 2021-02-08 | End: 2021-02-12 | Stop reason: HOSPADM

## 2021-02-08 RX ORDER — ASPIRIN 325 MG/1
100 TABLET, FILM COATED ORAL DAILY
Status: DISCONTINUED | OUTPATIENT
Start: 2021-02-08 | End: 2021-02-12 | Stop reason: HOSPADM

## 2021-02-08 RX ORDER — PANTOPRAZOLE SODIUM 40 MG/1
40 TABLET, DELAYED RELEASE ORAL
Status: DISCONTINUED | OUTPATIENT
Start: 2021-02-08 | End: 2021-02-08 | Stop reason: SDUPTHER

## 2021-02-08 RX ORDER — GABAPENTIN 300 MG/1
300 CAPSULE ORAL 3 TIMES DAILY
Status: DISCONTINUED | OUTPATIENT
Start: 2021-02-08 | End: 2021-02-12 | Stop reason: HOSPADM

## 2021-02-08 RX ORDER — PANTOPRAZOLE SODIUM 40 MG/1
40 TABLET, DELAYED RELEASE ORAL
Status: DISCONTINUED | OUTPATIENT
Start: 2021-02-08 | End: 2021-02-12 | Stop reason: HOSPADM

## 2021-02-08 RX ORDER — PROCHLORPERAZINE EDISYLATE 5 MG/ML
5 INJECTION INTRAMUSCULAR; INTRAVENOUS
Status: COMPLETED | OUTPATIENT
Start: 2021-02-08 | End: 2021-02-08

## 2021-02-08 RX ORDER — DULOXETIN HYDROCHLORIDE 30 MG/1
60 CAPSULE, DELAYED RELEASE ORAL DAILY
Status: DISCONTINUED | OUTPATIENT
Start: 2021-02-08 | End: 2021-02-12 | Stop reason: HOSPADM

## 2021-02-08 RX ORDER — PROMETHAZINE HYDROCHLORIDE 25 MG/1
12.5 TABLET ORAL
Status: DISCONTINUED | OUTPATIENT
Start: 2021-02-08 | End: 2021-02-12 | Stop reason: HOSPADM

## 2021-02-08 RX ORDER — BUSPIRONE HYDROCHLORIDE 5 MG/1
5 TABLET ORAL DAILY
Status: DISCONTINUED | OUTPATIENT
Start: 2021-02-08 | End: 2021-02-12 | Stop reason: HOSPADM

## 2021-02-08 RX ORDER — LORAZEPAM 2 MG/ML
0.5 INJECTION INTRAMUSCULAR
Status: COMPLETED | OUTPATIENT
Start: 2021-02-08 | End: 2021-02-08

## 2021-02-08 RX ORDER — FOLIC ACID 1 MG/1
1 TABLET ORAL DAILY
Status: DISCONTINUED | OUTPATIENT
Start: 2021-02-08 | End: 2021-02-12 | Stop reason: HOSPADM

## 2021-02-08 RX ORDER — CETIRIZINE HCL 10 MG
10 TABLET ORAL
COMMUNITY

## 2021-02-08 RX ORDER — AMLODIPINE BESYLATE 5 MG/1
5 TABLET ORAL DAILY
Status: DISCONTINUED | OUTPATIENT
Start: 2021-02-08 | End: 2021-02-12 | Stop reason: HOSPADM

## 2021-02-08 RX ORDER — GUAIFENESIN 100 MG/5ML
81 LIQUID (ML) ORAL DAILY
Status: DISCONTINUED | OUTPATIENT
Start: 2021-02-08 | End: 2021-02-12 | Stop reason: HOSPADM

## 2021-02-08 RX ORDER — GABAPENTIN 300 MG/1
300 CAPSULE ORAL 3 TIMES DAILY
COMMUNITY

## 2021-02-08 RX ORDER — IBUPROFEN 200 MG
1 TABLET ORAL DAILY
Status: DISCONTINUED | OUTPATIENT
Start: 2021-02-08 | End: 2021-02-12 | Stop reason: HOSPADM

## 2021-02-08 RX ORDER — IPRATROPIUM BROMIDE AND ALBUTEROL SULFATE 2.5; .5 MG/3ML; MG/3ML
3 SOLUTION RESPIRATORY (INHALATION)
Status: DISCONTINUED | OUTPATIENT
Start: 2021-02-08 | End: 2021-02-12 | Stop reason: HOSPADM

## 2021-02-08 RX ORDER — METRONIDAZOLE 500 MG/100ML
500 INJECTION, SOLUTION INTRAVENOUS EVERY 12 HOURS
Status: DISCONTINUED | OUTPATIENT
Start: 2021-02-08 | End: 2021-02-11

## 2021-02-08 RX ORDER — ACETAMINOPHEN 325 MG/1
650 TABLET ORAL
Status: DISCONTINUED | OUTPATIENT
Start: 2021-02-08 | End: 2021-02-12 | Stop reason: HOSPADM

## 2021-02-08 RX ORDER — SODIUM CHLORIDE 0.9 % (FLUSH) 0.9 %
5-40 SYRINGE (ML) INJECTION EVERY 8 HOURS
Status: DISCONTINUED | OUTPATIENT
Start: 2021-02-08 | End: 2021-02-09 | Stop reason: SDUPTHER

## 2021-02-08 RX ORDER — CIPROFLOXACIN 2 MG/ML
400 INJECTION, SOLUTION INTRAVENOUS
Status: COMPLETED | OUTPATIENT
Start: 2021-02-08 | End: 2021-02-08

## 2021-02-08 RX ORDER — ONDANSETRON 2 MG/ML
4 INJECTION INTRAMUSCULAR; INTRAVENOUS
Status: DISCONTINUED | OUTPATIENT
Start: 2021-02-08 | End: 2021-02-12 | Stop reason: HOSPADM

## 2021-02-08 RX ORDER — HEPARIN SODIUM 5000 [USP'U]/ML
30 INJECTION, SOLUTION INTRAVENOUS; SUBCUTANEOUS AS NEEDED
Status: DISCONTINUED | OUTPATIENT
Start: 2021-02-08 | End: 2021-02-12 | Stop reason: HOSPADM

## 2021-02-08 RX ORDER — SODIUM CHLORIDE 0.9 % (FLUSH) 0.9 %
5-40 SYRINGE (ML) INJECTION AS NEEDED
Status: DISCONTINUED | OUTPATIENT
Start: 2021-02-08 | End: 2021-02-09 | Stop reason: SDUPTHER

## 2021-02-08 RX ORDER — METRONIDAZOLE 500 MG/100ML
500 INJECTION, SOLUTION INTRAVENOUS
Status: COMPLETED | OUTPATIENT
Start: 2021-02-08 | End: 2021-02-08

## 2021-02-08 RX ORDER — LABETALOL HYDROCHLORIDE 5 MG/ML
10 INJECTION, SOLUTION INTRAVENOUS ONCE
Status: DISCONTINUED | OUTPATIENT
Start: 2021-02-08 | End: 2021-02-08

## 2021-02-08 RX ORDER — POLYETHYLENE GLYCOL 3350 17 G/17G
17 POWDER, FOR SOLUTION ORAL DAILY PRN
Status: DISCONTINUED | OUTPATIENT
Start: 2021-02-08 | End: 2021-02-12 | Stop reason: HOSPADM

## 2021-02-08 RX ORDER — CIPROFLOXACIN 2 MG/ML
400 INJECTION, SOLUTION INTRAVENOUS EVERY 12 HOURS
Status: DISCONTINUED | OUTPATIENT
Start: 2021-02-08 | End: 2021-02-08 | Stop reason: CLARIF

## 2021-02-08 RX ORDER — DICYCLOMINE HYDROCHLORIDE 20 MG/1
10 TABLET ORAL
COMMUNITY

## 2021-02-08 RX ORDER — HYDROCODONE BITARTRATE AND ACETAMINOPHEN 10; 325 MG/1; MG/1
1 TABLET ORAL
Status: DISCONTINUED | OUTPATIENT
Start: 2021-02-08 | End: 2021-02-12 | Stop reason: HOSPADM

## 2021-02-08 RX ORDER — ONDANSETRON 2 MG/ML
4 INJECTION INTRAMUSCULAR; INTRAVENOUS
Status: COMPLETED | OUTPATIENT
Start: 2021-02-08 | End: 2021-02-08

## 2021-02-08 RX ORDER — THERA TABS 400 MCG
1 TAB ORAL DAILY
Status: DISCONTINUED | OUTPATIENT
Start: 2021-02-08 | End: 2021-02-12 | Stop reason: HOSPADM

## 2021-02-08 RX ORDER — HEPARIN SODIUM 5000 [USP'U]/ML
60 INJECTION, SOLUTION INTRAVENOUS; SUBCUTANEOUS AS NEEDED
Status: DISCONTINUED | OUTPATIENT
Start: 2021-02-08 | End: 2021-02-12 | Stop reason: HOSPADM

## 2021-02-08 RX ORDER — HEPARIN SODIUM 5000 [USP'U]/ML
60 INJECTION, SOLUTION INTRAVENOUS; SUBCUTANEOUS ONCE
Status: COMPLETED | OUTPATIENT
Start: 2021-02-08 | End: 2021-02-08

## 2021-02-08 RX ORDER — ATORVASTATIN CALCIUM 40 MG/1
80 TABLET, FILM COATED ORAL DAILY
Status: DISCONTINUED | OUTPATIENT
Start: 2021-02-08 | End: 2021-02-12 | Stop reason: HOSPADM

## 2021-02-08 RX ORDER — OMEPRAZOLE 20 MG/1
40 CAPSULE, DELAYED RELEASE ORAL DAILY
COMMUNITY

## 2021-02-08 RX ORDER — DIPHENHYDRAMINE HYDROCHLORIDE 50 MG/ML
12.5 INJECTION, SOLUTION INTRAMUSCULAR; INTRAVENOUS
Status: COMPLETED | OUTPATIENT
Start: 2021-02-08 | End: 2021-02-08

## 2021-02-08 RX ORDER — GUAIFENESIN 100 MG/5ML
325 LIQUID (ML) ORAL
Status: COMPLETED | OUTPATIENT
Start: 2021-02-08 | End: 2021-02-08

## 2021-02-08 RX ORDER — HYDROXYZINE 25 MG/1
25 TABLET, FILM COATED ORAL
COMMUNITY

## 2021-02-08 RX ORDER — HYDROCHLOROTHIAZIDE 25 MG/1
25 TABLET ORAL DAILY
Status: DISCONTINUED | OUTPATIENT
Start: 2021-02-08 | End: 2021-02-12 | Stop reason: HOSPADM

## 2021-02-08 RX ORDER — PROMETHAZINE HYDROCHLORIDE 12.5 MG/1
12.5 SUPPOSITORY RECTAL
COMMUNITY
End: 2021-03-05

## 2021-02-08 RX ORDER — HYDROXYCHLOROQUINE SULFATE 200 MG/1
200 TABLET, FILM COATED ORAL
Status: DISCONTINUED | OUTPATIENT
Start: 2021-02-08 | End: 2021-02-12 | Stop reason: HOSPADM

## 2021-02-08 RX ORDER — DICYCLOMINE HYDROCHLORIDE 10 MG/1
10 CAPSULE ORAL 3 TIMES DAILY
Status: DISCONTINUED | OUTPATIENT
Start: 2021-02-08 | End: 2021-02-12 | Stop reason: HOSPADM

## 2021-02-08 RX ORDER — LEVOFLOXACIN 5 MG/ML
750 INJECTION, SOLUTION INTRAVENOUS EVERY 24 HOURS
Status: DISCONTINUED | OUTPATIENT
Start: 2021-02-08 | End: 2021-02-10

## 2021-02-08 RX ORDER — POTASSIUM CHLORIDE 7.45 MG/ML
10 INJECTION INTRAVENOUS
Status: DISPENSED | OUTPATIENT
Start: 2021-02-08 | End: 2021-02-08

## 2021-02-08 RX ORDER — METOPROLOL TARTRATE 5 MG/5ML
5 INJECTION INTRAVENOUS EVERY 6 HOURS
Status: DISCONTINUED | OUTPATIENT
Start: 2021-02-08 | End: 2021-02-09

## 2021-02-08 RX ORDER — SODIUM CHLORIDE 0.9 % (FLUSH) 0.9 %
5-40 SYRINGE (ML) INJECTION AS NEEDED
Status: DISCONTINUED | OUTPATIENT
Start: 2021-02-08 | End: 2021-02-12 | Stop reason: HOSPADM

## 2021-02-08 RX ORDER — HALOPERIDOL 5 MG/ML
2 INJECTION INTRAMUSCULAR
Status: COMPLETED | OUTPATIENT
Start: 2021-02-08 | End: 2021-02-08

## 2021-02-08 RX ORDER — CARVEDILOL 3.12 MG/1
3.12 TABLET ORAL 2 TIMES DAILY WITH MEALS
Status: DISCONTINUED | OUTPATIENT
Start: 2021-02-08 | End: 2021-02-08

## 2021-02-08 RX ORDER — MORPHINE SULFATE 2 MG/ML
2 INJECTION, SOLUTION INTRAMUSCULAR; INTRAVENOUS
Status: DISCONTINUED | OUTPATIENT
Start: 2021-02-08 | End: 2021-02-08

## 2021-02-08 RX ADMIN — POTASSIUM CHLORIDE 10 MEQ: 10 INJECTION, SOLUTION INTRAVENOUS at 20:00

## 2021-02-08 RX ADMIN — AMLODIPINE BESYLATE 5 MG: 5 TABLET ORAL at 09:48

## 2021-02-08 RX ADMIN — HEPARIN SODIUM AND DEXTROSE 12 UNITS/KG/HR: 10000; 5 INJECTION INTRAVENOUS at 08:14

## 2021-02-08 RX ADMIN — DICYCLOMINE HYDROCHLORIDE 10 MG: 10 CAPSULE ORAL at 09:47

## 2021-02-08 RX ADMIN — THIAMINE HCL TAB 100 MG 100 MG: 100 TAB at 11:02

## 2021-02-08 RX ADMIN — THERA TABS 1 TABLET: TAB at 12:05

## 2021-02-08 RX ADMIN — METRONIDAZOLE 500 MG: 500 INJECTION, SOLUTION INTRAVENOUS at 05:29

## 2021-02-08 RX ADMIN — GABAPENTIN 300 MG: 300 CAPSULE ORAL at 17:41

## 2021-02-08 RX ADMIN — Medication 10 ML: at 14:00

## 2021-02-08 RX ADMIN — ASPIRIN 324 MG: 81 TABLET, CHEWABLE ORAL at 07:01

## 2021-02-08 RX ADMIN — DIPHENHYDRAMINE HYDROCHLORIDE 12.5 MG: 50 INJECTION, SOLUTION INTRAMUSCULAR; INTRAVENOUS at 02:32

## 2021-02-08 RX ADMIN — GABAPENTIN 300 MG: 300 CAPSULE ORAL at 09:47

## 2021-02-08 RX ADMIN — RIFAXIMIN 550 MG: 550 TABLET ORAL at 09:47

## 2021-02-08 RX ADMIN — IOPAMIDOL 100 ML: 755 INJECTION, SOLUTION INTRAVENOUS at 03:01

## 2021-02-08 RX ADMIN — HEPARIN SODIUM 2850 UNITS: 5000 INJECTION INTRAVENOUS; SUBCUTANEOUS at 14:44

## 2021-02-08 RX ADMIN — Medication 10 ML: at 14:49

## 2021-02-08 RX ADMIN — Medication 10 ML: at 08:52

## 2021-02-08 RX ADMIN — ATORVASTATIN CALCIUM 80 MG: 40 TABLET, FILM COATED ORAL at 09:47

## 2021-02-08 RX ADMIN — BUSPIRONE HYDROCHLORIDE 5 MG: 5 TABLET ORAL at 09:47

## 2021-02-08 RX ADMIN — ONDANSETRON 4 MG: 2 INJECTION INTRAMUSCULAR; INTRAVENOUS at 01:46

## 2021-02-08 RX ADMIN — LEVOFLOXACIN 750 MG: 5 INJECTION, SOLUTION INTRAVENOUS at 12:00

## 2021-02-08 RX ADMIN — METOPROLOL TARTRATE 5 MG: 5 INJECTION INTRAVENOUS at 17:40

## 2021-02-08 RX ADMIN — LORAZEPAM 1 MG: 2 INJECTION INTRAMUSCULAR; INTRAVENOUS at 09:48

## 2021-02-08 RX ADMIN — POTASSIUM CHLORIDE 10 MEQ: 10 INJECTION, SOLUTION INTRAVENOUS at 11:03

## 2021-02-08 RX ADMIN — PANTOPRAZOLE SODIUM 40 MG: 40 TABLET, DELAYED RELEASE ORAL at 09:48

## 2021-02-08 RX ADMIN — LORAZEPAM 0.5 MG: 2 INJECTION INTRAMUSCULAR; INTRAVENOUS at 05:04

## 2021-02-08 RX ADMIN — ONDANSETRON 4 MG: 2 INJECTION INTRAMUSCULAR; INTRAVENOUS at 12:07

## 2021-02-08 RX ADMIN — FOLIC ACID 1 MG: 1 TABLET ORAL at 11:02

## 2021-02-08 RX ADMIN — METOPROLOL TARTRATE 5 MG: 5 INJECTION INTRAVENOUS at 12:04

## 2021-02-08 RX ADMIN — RIFAXIMIN 550 MG: 550 TABLET ORAL at 18:08

## 2021-02-08 RX ADMIN — METOPROLOL TARTRATE 5 MG: 5 INJECTION INTRAVENOUS at 08:52

## 2021-02-08 RX ADMIN — HEPARIN SODIUM 2850 UNITS: 5000 INJECTION INTRAVENOUS; SUBCUTANEOUS at 08:14

## 2021-02-08 RX ADMIN — DULOXETINE HYDROCHLORIDE 60 MG: 30 CAPSULE, DELAYED RELEASE ORAL at 09:47

## 2021-02-08 RX ADMIN — CARVEDILOL 3.12 MG: 3.12 TABLET, FILM COATED ORAL at 09:47

## 2021-02-08 RX ADMIN — SODIUM CHLORIDE 500 ML: 900 INJECTION, SOLUTION INTRAVENOUS at 01:32

## 2021-02-08 RX ADMIN — ACETAMINOPHEN 650 MG: 325 TABLET ORAL at 22:35

## 2021-02-08 RX ADMIN — GABAPENTIN 300 MG: 300 CAPSULE ORAL at 21:48

## 2021-02-08 RX ADMIN — DICYCLOMINE HYDROCHLORIDE 10 MG: 10 CAPSULE ORAL at 21:48

## 2021-02-08 RX ADMIN — HYDROXYCHLOROQUINE SULFATE 200 MG: 200 TABLET ORAL at 09:47

## 2021-02-08 RX ADMIN — POTASSIUM CHLORIDE 10 MEQ: 10 INJECTION, SOLUTION INTRAVENOUS at 10:00

## 2021-02-08 RX ADMIN — PROCHLORPERAZINE EDISYLATE 5 MG: 5 INJECTION INTRAMUSCULAR; INTRAVENOUS at 02:32

## 2021-02-08 RX ADMIN — HALOPERIDOL LACTATE 2 MG: 5 INJECTION, SOLUTION INTRAMUSCULAR at 08:14

## 2021-02-08 RX ADMIN — ONDANSETRON 4 MG: 2 INJECTION INTRAMUSCULAR; INTRAVENOUS at 06:51

## 2021-02-08 RX ADMIN — HYDROCHLOROTHIAZIDE 25 MG: 25 TABLET ORAL at 09:48

## 2021-02-08 RX ADMIN — METRONIDAZOLE 500 MG: 500 INJECTION, SOLUTION INTRAVENOUS at 17:42

## 2021-02-08 RX ADMIN — HYDROCODONE BITARTRATE AND ACETAMINOPHEN 1 TABLET: 10; 325 TABLET ORAL at 12:07

## 2021-02-08 RX ADMIN — CIPROFLOXACIN 400 MG: 2 INJECTION, SOLUTION INTRAVENOUS at 06:52

## 2021-02-08 RX ADMIN — DICYCLOMINE HYDROCHLORIDE 10 MG: 10 CAPSULE ORAL at 17:41

## 2021-02-08 RX ADMIN — HYDROCODONE BITARTRATE AND ACETAMINOPHEN 1 TABLET: 10; 325 TABLET ORAL at 18:08

## 2021-02-08 RX ADMIN — POTASSIUM CHLORIDE 10 MEQ: 10 INJECTION, SOLUTION INTRAVENOUS at 16:30

## 2021-02-08 NOTE — H&P
Hospitalist Admission Note    NAME: Yuliana Gonzales   :  1955   MRN:  [id number]     Date/Time:  2021 8:34 AM    Patient PCP: Artur Quintanilla MD  ______________________________________________________________________  Given the patient's current clinical presentation, I have a high level of concern for decompensation if discharged from the emergency department. Complex decision making was performed, which includes reviewing the patient's available past medical records, laboratory results, and x-ray films. My assessment of this patient's clinical condition and my plan of care is as follows. Assessment / Plan:  NSTEMI:    Troponin  0.27--0.9  EKG show normal sinus rhythm, non specific ST wave changes  C/w Aspirin, heparin drip, statin  Cardiology consult pending  Trend troponin  F/u ECHO    Hypertensive urgency POA  C/w home meds, iv metoprolol ordered by cardiology    Hypokalemia:  Will replete and  monitor    Nausea/vomiting  IBS  CT scan: Mild hepatomegaly. 2. Diffuse colonic wall thickening could indicate an infectious/inflammatory  colitis, though could also be due to underdistention. C/w cipro and flagyl  F/u Gastroenterology  Polypharmacy, pharmacy to help with admission med res    IBS  GERD  Anxiety   History of Takosubo  C/w home meds    Active smoker:  ETOH use daily:  Drinks atleast 2 glasses of 12 ounce wine every day, monitor for withdrawal, CIWA  Nicotine patch not offered given the NSTEMI    Protein calorie malnutrition:  Will get Nutrition consult    Code Status: Full code  Surrogate Decision Maker:     DVT Prophylaxis:on heparin drip  GI Prophylaxis: not indicated    Baseline: Ambulatory      Subjective:   CHIEF COMPLAINT: Nausea and vomiting    HISTORY OF PRESENT ILLNESS:     Ignacio Wahl is a 76 y.o. female with PMH of IBS, GERD, RA, Takosubo Cardiomyopathy was brought in by  for worsening nausea, vomiting and diarrhea.  On my assessment, she is sleeping but arousable. History given by her . She has been having this nausea and vomiting for years now, which  attributes to her IBS, her symptoms has worsened over the last few days. She is also having on and off diarrhea. She denies any chest pain, cough, shortness of breath, lower ext edema. She had a upper GI series xray last week and was normal.   In the ED she was having uptrending troponin, and was given aspirin and started on heparin drip. History of COVID in Dec, was retested negative as per , no covid symptoms currently. We were asked to admit for work up and evaluation of the above problems. Past Medical History:   Diagnosis Date    Acid reflux     Acid reflux     Arthritis     Chronic pain     Heart attack ([address]     Heart failure (HCC)     Hypertension     IBS (irritable bowel syndrome)     Squamous cell carcinoma of skin of right elbow 5/2016    Takotsubo cardiomyopathy 11/16/2015    Tobacco abuse 11/16/2015        Past Surgical History:   Procedure Laterality Date    HX GYN      oophorectomy    HX ORTHOPAEDIC      right knee arthroscopy    HX ORTHOPAEDIC      right thumb    HX ORTHOPAEDIC      back surgeries    HX OTHER SURGICAL      8 route canals    IN ABDOMEN SURGERY PROC UNLISTED      adhesion removal    IN BREAST SURGERY PROCEDURE UNLISTED      lumpectomy    IN COLONOSCOPY FLX DX W/COLLJ SPEC WHEN PFRMD  10/15/2012            Social History     Tobacco Use    Smoking status: Current Every Day Smoker     Packs/day: 1.00    Smokeless tobacco: Never Used    Tobacco comment: trying to quit 4-5 cigarettes daily   Substance Use Topics    Alcohol use:  Yes     Alcohol/week: 8.3 standard drinks     Types: 10 Glasses of wine per week     Comment: 4 times weekly        Family History   Problem Relation Age of Onset    Cancer Father         prostate    Heart Disease Mother     Heart Disease Maternal Grandmother      Allergies   Allergen Reactions    Ace Inhibitors Swelling    Arb-Angiotensin Receptor Antagonist Other (comments)     Prior ACE inhibitor angioedema, cross reaction risk    Codeine Nausea Only    Keflex [Cephalexin] Hives    Milk Other (comments)     Lactose intolerant.  Morphine Nausea and Vomiting        Prior to Admission medications    Medication Sig Start Date End Date Taking? Authorizing Provider   Xifaxan 550 mg tablet TAKE 1 TABLET BY MOUTH THREE TIMES DAILY 1/28/21   Provider, Historical   hydroCHLOROthiazide (HYDRODIURIL) 25 mg tablet TAKE 1 TABLET BY MOUTH DAILY 1/29/21   Davi Arauz MD   busPIRone (BUSPAR) 5 mg tablet TAKE 1 TABLET BY MOUTH TWICE DAILY 1/6/21   Davi Arauz MD   colestipoL (COLESTID) 5 gram packet TAKE PO AS DIRECTED 11/27/20   Provider, Historical   promethazine (PHENERGAN) 12.5 mg tablet Take 2 Tabs by mouth every six (6) hours as needed for Nausea. 11/2/20   MD Mariajose Khan,CF, Pen 40 mg/0.4 mL injection pen INJECT ONE PEN (40MG) UNDER THE SKIN (SUBCUTANEOUS INJECTION) EVERY 2 WEEKS 9/16/20   Phoenix Almaraz MD   DULoxetine (CYMBALTA) 60 mg capsule Take 1 Cap by mouth daily. 8/14/20   Davi Arauz MD   carvediloL (COREG) 12.5 mg tablet TAKE 1 TABLET BY MOUTH TWICE DAILY WITH MEALS 5/21/20   Jack Zimmerman NP   hydroxychloroquine (PLAQUENIL) 200 mg tablet TAKE 1 TABLET BY MOUTH DAILY 3/24/20   Phoenix Almaraz MD   atorvastatin (LIPITOR) 80 mg tablet Take 1 Tab by mouth daily. take 1 tablet by mouth at bedtime 3/24/20   Jack Zimmerman NP   doxycycline (ADOXA) 100 mg tablet Take 1 Tab by mouth two (2) times a day.  3/20/20   Davi Arauz MD   pantoprazole (PROTONIX) 40 mg tablet TK 1 T PO BID 2/3/20   Provider, Historical   alclometasone (ACLOVATE) 0.05 % topical cream USE ON CORNERS OF MOUTH FOR RASH TWICE A DAY AS NEEDED 9/17/19   Provider, Historical   fluticasone propionate (FLONASE) 50 mcg/actuation nasal spray fluticasone propionate 50 mcg/actuation nasal spray,suspension    Provider, Historical   mupirocin (BACTROBAN) 2 % ointment DEBBIE SML AMT EXT AA TID 7/21/19   Provider, Historical   albuterol (PROVENTIL HFA, VENTOLIN HFA, PROAIR HFA) 90 mcg/actuation inhaler Take 2 Puffs by inhalation every six (6) hours as needed for Wheezing. 4/17/19   Yamileth Degroot MD   amLODIPine [facility]) 2.5 mg tablet take 1 tablet by mouth once daily 2/1/19   Jemma Zuluaga MD   estrogens, conjugated,-methylTESTOSTERone (ESTRATEST) 1.25-2.5 mg per tablet 1 Tab daily. 4/19/18   Provider, Historical   omeprazole (PRILOSEC) 40 mg capsule take 1 capsule by mouth once daily ON AN EMPTY STOMACH 1/12/18   Viral Alcaraz MD   ondansetron (ZOFRAN ODT) 4 mg disintegrating tablet Take 1 Tab by mouth every eight (8) hours as needed for Nausea. 1/3/18   Viral Alcaraz MD   HYDROcodone-acetaminophen (NORCO)  mg tablet TAKE 1 TABLET BY MOUTH 3 TIMES DAILY AS NEEDED FOR PAIN 8/15/17   Provider, Historical   desoximetasone (TOPICORT) 0.25 % topical cream Apply  to affected area two (2) times daily as needed for Skin Irritation. 7/25/17   Samantha Denton MD   Cetirizine (ZYRTEC) 10 mg cap Take  by mouth. Provider, Historical   dicyclomine (BENTYL) 10 mg capsule TAKE 1 CAPSULE BY MOUTH BEFORE MEALS AND AT BEDTIME AS NEEDED. 1/25/17   Provider, Historical   gabapentin (NEURONTIN) 300 mg capsule TAKE 1 CAPSULE IN THE MORNING, 1 CAPSULE IN THE AFTERNOON, AD 2 CAPSULES AT BEDTIME  Patient taking differently: TAKE 1 CAPSULE IN THE MORNING, 1 CAPSULE IN THE AFTERNOON, AND 1 CAPSULE AT BEDTIME 9/8/16   Viral Alcaraz MD   aspirin 81 mg chewable tablet Take 1 Tab by mouth daily.  11/18/15   Jass Matta MD       REVIEW OF SYSTEMS:     Total of 12 systems reviewed as follows:       POSITIVE= underlined text  Negative = text not underlined  General:  fever, chills, sweats, generalized weakness, weight loss/gain,      loss of appetite   Eyes:    blurred vision, eye pain, loss of vision, double vision  ENT:    rhinorrhea, pharyngitis   Respiratory:   cough, sputum production, SOB, COOMBS, wheezing, pleuritic pain   Cardiology:   chest pain, palpitations, orthopnea, PND, edema, syncope   Gastrointestinal:  abdominal pain , N/V, diarrhea, dysphagia, constipation, bleeding   Genitourinary:  frequency, urgency, dysuria, hematuria, incontinence   Muskuloskeletal :  arthralgia, myalgia, back pain  Hematology:  easy bruising, nose or gum bleeding, lymphadenopathy   Dermatological: rash, ulceration, pruritis, color change / jaundice  Endocrine:   hot flashes or polydipsia   Neurological:  headache, dizziness, confusion, focal weakness, paresthesia,     Speech difficulties, memory loss, gait difficulty  Psychological: Feelings of anxiety, depression, agitation    Objective:   VITALS:    Visit Vitals  BP (!) 181/109   Pulse 95   Temp 98.5 °F (36.9 °C)   Resp 16   Wt 47.9 kg (105 lb 9.6 oz)   SpO2 95%   BMI 17.04 kg/m²       PHYSICAL EXAM:    General:    Alert, cooperative, no distress, appears stated age. HEENT: Atraumatic, anicteric sclerae, pink conjunctivae     No oral ulcers, mucosa moist, throat clear, dentition fair  Neck:  Supple, symmetrical,  thyroid: non tender  Lungs:   Clear to auscultation bilaterally. No Wheezing or Rhonchi. No rales. Chest wall:  No tenderness  No Accessory muscle use. Heart:   Regular  rhythm,  No  murmur   No edema  Abdomen:   Soft, non-tender. Not distended. Bowel sounds normal  Extremities: No cyanosis. No clubbing,      Skin turgor normal, Capillary refill normal, Radial dial pulse 2+  Skin:     Not pale.   Not Jaundiced  No rashes   Neurologic: A0x 3, non focal, sleepy but arousable    _______________________________________________________________________  Care Plan discussed with:    Comments   Patient y    Family  y    RN y    Care Manager                    Consultant:      _______________________________________________________________________  Expected Disposition:   Home with Family y   HH/PT/OT/RN    SNF/LTC    ROBIN    ________________________________________________________________________  TOTAL TIME:  28 Minutes    Critical Care Provided     Minutes non procedure based      Comments     Reviewed previous records   >50% of visit spent in counseling and coordination of care  Discussion with patient and/or family and questions answered       ________________________________________________________________________  Signed: Sarah Guevara MD    Procedures: see electronic medical records for all procedures/Xrays and details which were not copied into this note but were reviewed prior to creation of Plan. LAB DATA REVIEWED:    Recent Results (from the past 24 hour(s))   CBC WITH AUTOMATED DIFF    Collection Time: 02/08/21  1:56 AM   Result Value Ref Range    WBC 9.0 3.6 - 11.0 K/uL    RBC 3.28 (L) 3.80 - 5.20 M/uL    HGB 10.5 (L) 11.5 - 16.0 g/dL    HCT 32.4 (L) 35.0 - 47.0 %    MCV 98.8 80.0 - 99.0 FL    MCH 32.0 26.0 - 34.0 PG    MCHC 32.4 30.0 - 36.5 g/dL    RDW 14.0 11.5 - 14.5 %    PLATELET 159 595 - 957 K/uL    MPV 9.8 8.9 - 12.9 FL    NRBC 0.0 0  WBC    ABSOLUTE NRBC 0.00 0.00 - 0.01 K/uL    NEUTROPHILS 75 32 - 75 %    LYMPHOCYTES 17 12 - 49 %    MONOCYTES 6 5 - 13 %    EOSINOPHILS 2 0 - 7 %    BASOPHILS 0 0 - 1 %    IMMATURE GRANULOCYTES 0 0.0 - 0.5 %    ABS. NEUTROPHILS 6.7 1.8 - 8.0 K/UL    ABS. LYMPHOCYTES 1.6 0.8 - 3.5 K/UL    ABS. MONOCYTES 0.6 0.0 - 1.0 K/UL    ABS. EOSINOPHILS 0.1 0.0 - 0.4 K/UL    ABS. BASOPHILS 0.0 0.0 - 0.1 K/UL    ABS. IMM.  GRANS. 0.0 0.00 - 0.04 K/UL    DF AUTOMATED     METABOLIC PANEL, COMPREHENSIVE    Collection Time: 02/08/21  1:56 AM   Result Value Ref Range    Sodium 139 136 - 145 mmol/L    Potassium 3.0 (L) 3.5 - 5.1 mmol/L    Chloride 110 (H) 97 - 108 mmol/L    CO2 19 (L) 21 - 32 mmol/L    Anion gap 10 5 - 15 mmol/L    Glucose 89 65 - 100 mg/dL    BUN 17 6 - 20 MG/DL    Creatinine 0.69 0.55 - 1.02 MG/DL BUN/Creatinine ratio 25 (H) 12 - 20      GFR est AA >60 >60 ml/min/1.73m2    GFR est non-AA >60 >60 ml/min/1.73m2    Calcium 7.4 (L) 8.5 - 10.1 MG/DL    Bilirubin, total 0.2 0.2 - 1.0 MG/DL    ALT (SGPT) 33 12 - 78 U/L    AST (SGOT) 34 15 - 37 U/L    Alk.  phosphatase 113 45 - 117 U/L    Protein, total 5.5 (L) 6.4 - 8.2 g/dL    Albumin 3.0 (L) 3.5 - 5.0 g/dL    Globulin 2.5 2.0 - 4.0 g/dL    A-G Ratio 1.2 1.1 - 2.2     CK W/ CKMB & INDEX    Collection Time: 02/08/21  1:56 AM   Result Value Ref Range    CK - MB 2.1 <3.6 NG/ML    CK-MB Index 4.5 (H) 0.0 - 2.5      CK 47 26 - 192 U/L   TROPONIN I    Collection Time: 02/08/21  1:56 AM   Result Value Ref Range    Troponin-I, Qt. 0.27 (H) <0.05 ng/mL   LIPASE    Collection Time: 02/08/21  1:56 AM   Result Value Ref Range    Lipase 111 73 - 393 U/L   POC LACTIC ACID    Collection Time: 02/08/21  2:00 AM   Result Value Ref Range    Lactic Acid (POC) 2.69 (HH) 0.40 - 2.00 mmol/L   URINALYSIS W/ REFLEX CULTURE    Collection Time: 02/08/21  4:06 AM    Specimen: Urine   Result Value Ref Range    Color YELLOW/STRAW      Appearance CLEAR CLEAR      Specific gravity 1.010 1.003 - 1.030      pH (UA) 6.5 5.0 - 8.0      Protein Negative NEG mg/dL    Glucose Negative NEG mg/dL    Ketone TRACE (A) NEG mg/dL    Bilirubin Negative NEG      Blood Negative NEG      Urobilinogen 0.2 0.2 - 1.0 EU/dL    Nitrites Negative NEG      Leukocyte Esterase Negative NEG      WBC 0-4 0 - 4 /hpf    RBC 0-5 0 - 5 /hpf    Epithelial cells FEW FEW /lpf    Bacteria Negative NEG /hpf    UA:UC IF INDICATED CULTURE NOT INDICATED BY UA RESULT CNI      Hyaline cast 0-2 0 - 5 /lpf   TROPONIN I    Collection Time: 02/08/21  5:46 AM   Result Value Ref Range    Troponin-I, Qt. 0.90 (H) <0.05 ng/mL   PTT    Collection Time: 02/08/21  7:38 AM   Result Value Ref Range    aPTT 26.2 22.1 - 31.0 sec    aPTT, therapeutic range     58.0 - 77.0 SECS

## 2021-02-08 NOTE — CONSULTS
[facility]     Date of  Admission: 2/8/2021  1:12 AM     Admission type:Emergency    Consult for: elevated troponin  Consult by: hospitalist     Subjective:     Kenya Morris is a 76 y.o. female admitted for NSTEMI (non-ST elevated myocardial infarction) ([facility] [address] [I21.4]. Admitted with c/o n/v/diarrhea since last evening.  at bedside answering questions as patient somnolent, received haldol for nausea.  states that she has extensive GI hx with IBS and has been problematic since the fall. He states that she did c/o CP, SOB. BP elevated, did not take meds this AM.     ECG SR with no acute changes  Troponin 0.28-0.90. Significant cardiac hx:  2015 Takotsubo CM with EF 25%, neg cardiac cath; 2018 Echo EF 55-60%; +HTN    Previous treatment/evaluation includes echocardiogram and cardiac catheterization .  Cardiac risk factors: smoking/ tobacco exposure, sedentary life style, hypertension, stress, post-menopausal.      Patient Active Problem List    Diagnosis Date Noted    IBS (irritable bowel syndrome) 02/08/2021    Elevated troponin 02/08/2021    Intractable vomiting without nausea 12/28/2017    Coronary artery disease involving native coronary artery without angina pectoris 11/20/2015    Heart attack ([facility] [address]     Takotsubo cardiomyopathy 11/16/2015    Tobacco abuse 11/16/2015    Nausea & vomiting 11/14/2015    NSTEMI (non-ST elevated myocardial infarction) ([facility] [address] 11/14/2015    Fibromyalgia 08/25/2015    Bronchospasm 08/25/2015    Lumbar disc disease 08/25/2015    HTN (hypertension) 10/14/2012    UTI (lower urinary tract infection) 10/14/2012    Rectal bleed 10/13/2012    Hypokalemia 10/13/2012      Magalie Ryan MD  Past Medical History:   Diagnosis Date    Acid reflux     Acid reflux     Arthritis     Chronic pain     Elevated troponin 2/8/2021    Heart attack ([facility] [address]     Heart failure (HCC)     Hypertension     IBS (irritable bowel syndrome)     IBS (irritable bowel syndrome) 2/8/2021    Squamous cell carcinoma of skin of right elbow 5/2016    Takotsubo cardiomyopathy 11/16/2015    Tobacco abuse 11/16/2015      Social History     Socioeconomic History    Marital status:      Spouse name: Not on file    Number of children: Not on file    Years of education: Not on file    Highest education level: Not on file   Tobacco Use    Smoking status: Current Every Day Smoker     Packs/day: 1.00    Smokeless tobacco: Never Used    Tobacco comment: trying to quit 4-5 cigarettes daily   Substance and Sexual Activity    Alcohol use: Yes     Alcohol/week: 8.3 standard drinks     Types: 10 Glasses of wine per week     Comment: 4 times weekly    Drug use: No    Sexual activity: Yes     Partners: Male     Birth control/protection: None     Allergies   Allergen Reactions    Ace Inhibitors Swelling    Arb-Angiotensin Receptor Antagonist Other (comments)     Prior ACE inhibitor angioedema, cross reaction risk    Codeine Nausea Only    Keflex [Cephalexin] Hives    Milk Other (comments)     Lactose intolerant.     Morphine Nausea and Vomiting      Family History   Problem Relation Age of Onset    Cancer Father         prostate    Heart Disease Mother     Heart Disease Maternal Grandmother       Current Facility-Administered Medications   Medication Dose Route Frequency    sodium chloride (NS) flush 5-40 mL  5-40 mL IntraVENous Q8H    sodium chloride (NS) flush 5-40 mL  5-40 mL IntraVENous PRN    sodium chloride 0.9 % bolus infusion 1,000 mL  1,000 mL IntraVENous NOW    ondansetron (ZOFRAN) injection 4 mg  4 mg IntraVENous Q4H PRN    heparin 25,000 units in D5W 250 ml infusion  12-25 Units/kg/hr IntraVENous TITRATE    heparin (porcine) injection 1,450 Units  30 Units/kg IntraVENous PRN    Or    heparin (porcine) injection 2,850 Units  60 Units/kg IntraVENous PRN    sodium chloride (NS) flush 5-40 mL  5-40 mL IntraVENous Q8H  sodium chloride (NS) flush 5-40 mL  5-40 mL IntraVENous PRN    acetaminophen (TYLENOL) tablet 650 mg  650 mg Oral Q6H PRN    Or    acetaminophen (TYLENOL) suppository 650 mg  650 mg Rectal Q6H PRN    polyethylene glycol (MIRALAX) packet 17 g  17 g Oral DAILY PRN    promethazine (PHENERGAN) tablet 12.5 mg  12.5 mg Oral Q6H PRN    Or    ondansetron (ZOFRAN) injection 4 mg  4 mg IntraVENous Q6H PRN    metoprolol (LOPRESSOR) injection 5 mg  5 mg IntraVENous Q6H    albuterol-ipratropium (DUO-NEB) 2.5 MG-0.5 MG/3 ML  3 mL Nebulization Q6H PRN    amLODIPine (NORVASC) tablet 5 mg  5 mg Oral DAILY    aspirin chewable tablet 81 mg  81 mg Oral DAILY    atorvastatin (LIPITOR) tablet 80 mg  80 mg Oral DAILY    busPIRone (BUSPAR) tablet 5 mg  5 mg Oral DAILY    carvediloL (COREG) tablet 3.125 mg  3.125 mg Oral BID WITH MEALS    dicyclomine (BENTYL) capsule 10 mg  10 mg Oral TID    DULoxetine (CYMBALTA) capsule 60 mg  60 mg Oral DAILY    gabapentin (NEURONTIN) capsule 300 mg  300 mg Oral TID    hydroCHLOROthiazide (HYDRODIURIL) tablet 25 mg  25 mg Oral DAILY    hydrOXYchloroQUINE (PLAQUENIL) tablet 200 mg  200 mg Oral DAILY WITH BREAKFAST    pantoprazole (PROTONIX) tablet 40 mg  40 mg Oral ACB    rifAXIMin (XIFAXAN) tablet 550 mg  550 mg Oral BID    metroNIDAZOLE (FLAGYL) IVPB premix 500 mg  500 mg IntraVENous Q12H    LORazepam (ATIVAN) injection 1 mg  1 mg IntraVENous Q6H PRN    potassium chloride 10 mEq in 100 ml IVPB  10 mEq IntraVENous Q2H    HYDROcodone-acetaminophen (NORCO)  mg tablet 1 Tab  1 Tab Oral Q6H PRN    levoFLOXacin (LEVAQUIN) 750 mg in D5W IVPB  750 mg IntraVENous P02V    folic acid (FOLVITE) tablet 1 mg  1 mg Oral DAILY    thiamine mononitrate (B-1) tablet 100 mg  100 mg Oral DAILY    therapeutic multivitamin (THERAGRAN) tablet 1 Tab  1 Tab Oral DAILY     Current Outpatient Medications   Medication Sig    dicyclomine (BENTYL) 20 mg tablet Take 20 mg by mouth.  Before meals and at bedtime as needed    cetirizine (ZYRTEC) 10 mg tablet Take 10 mg by mouth daily as needed for Allergies.  gabapentin (NEURONTIN) 300 mg capsule Take 300 mg by mouth three (3) times daily. Take Morning, afternoon and at bedtime.  omeprazole (PRILOSEC) 20 mg capsule Take 20 mg by mouth daily. On an empty stomach    promethazine (PHENERGAN) 12.5 mg suppository Insert 12.5 mg into rectum every six (6) hours as needed for Nausea.  clotrimazole (MYCELEX) 10 mg quang Take 10 mg by mouth five (5) times daily. Dissolve one quang in mouth for one week.  famotidine (PEPCID) 20 mg tablet Take 20 mg by mouth two (2) times a day. For 10 days    hydrOXYzine HCL (ATARAX) 25 mg tablet Take 25 mg by mouth three (3) times daily as needed for Anxiety.  Xifaxan 550 mg tablet TAKE 1 TABLET BY MOUTH THREE TIMES DAILY    hydroCHLOROthiazide (HYDRODIURIL) 25 mg tablet TAKE 1 TABLET BY MOUTH DAILY    busPIRone (BUSPAR) 5 mg tablet TAKE 1 TABLET BY MOUTH TWICE DAILY    promethazine (PHENERGAN) 12.5 mg tablet Take 2 Tabs by mouth every six (6) hours as needed for Nausea.  Humira,CF, Pen 40 mg/0.4 mL injection pen INJECT ONE PEN (40MG) UNDER THE SKIN (SUBCUTANEOUS INJECTION) EVERY 2 WEEKS    DULoxetine (CYMBALTA) 60 mg capsule Take 1 Cap by mouth daily.  carvediloL (COREG) 12.5 mg tablet TAKE 1 TABLET BY MOUTH TWICE DAILY WITH MEALS    hydroxychloroquine (PLAQUENIL) 200 mg tablet TAKE 1 TABLET BY MOUTH DAILY    atorvastatin (LIPITOR) 80 mg tablet Take 1 Tab by mouth daily. take 1 tablet by mouth at bedtime    doxycycline (ADOXA) 100 mg tablet Take 1 Tab by mouth two (2) times a day.  pantoprazole (PROTONIX) 40 mg tablet Take 40 mg by mouth two (2) times a day.     alclometasone (ACLOVATE) 0.05 % topical cream USE ON CORNERS OF MOUTH FOR RASH TWICE A DAY AS NEEDED    fluticasone propionate (FLONASE) 50 mcg/actuation nasal spray fluticasone propionate 50 mcg/actuation nasal spray,suspension    albuterol (PROVENTIL HFA, VENTOLIN HFA, PROAIR HFA) 90 mcg/actuation inhaler Take 2 Puffs by inhalation every six (6) hours as needed for Wheezing.  amLODIPine (NORVASC) 2.5 mg tablet take 1 tablet by mouth once daily    ondansetron (ZOFRAN ODT) 4 mg disintegrating tablet Take 1 Tab by mouth every eight (8) hours as needed for Nausea.  HYDROcodone-acetaminophen (NORCO)  mg tablet TAKE 1 TABLET BY MOUTH 3 TIMES DAILY AS NEEDED FOR PAIN    desoximetasone (TOPICORT) 0.25 % topical cream Apply  to affected area two (2) times daily as needed for Skin Irritation.  aspirin 81 mg chewable tablet Take 1 Tab by mouth daily.         Review of Symptoms: with husbands assistance  11 systems reviewed, negative other than as stated in the HPI        Objective:      Visit Vitals  BP (!) 181/109   Pulse 95   Temp 98.5 °F (36.9 °C)   Resp 16   Ht 5' 6.14\" (1.68 m)   Wt 105 lb 9.6 oz (47.9 kg)   SpO2 95%   BMI 16.97 kg/m²       Physical:   General: older, cachectic  female in no acute distress  Heart: tachy, no m/S3/JVD,  Lungs: clear, diminished at bases  Abdomen: Soft, +BS, NTND   Extremities: LE olga +DP/PT, no edema   Neurologic: Grossly normal    Data Review:   Recent Labs     02/08/21  0156   WBC 9.0   HGB 10.5*   HCT 32.4*        Recent Labs     02/08/21  0156      K 3.0*   *   CO2 19*   GLU 89   BUN 17   CREA 0.69   CA 7.4*   ALB 3.0*   TBILI 0.2   ALT 33       Recent Labs     02/08/21  0546 02/08/21  0156   TROIQ 0.90* 0.27*   CPK  --  47   CKMB  --  2.1       No intake or output data in the 24 hours ending 02/08/21 1126     Cardiographics    Telemetry: ST  ECG: ST with no acute changes    Echocardiogram: pending  Echo 2018 EF 55-60%    CXRAY:no acute process        Assessment:       Active Problems:    HTN (hypertension) (10/14/2012)      Nausea & vomiting (11/14/2015)      NSTEMI (non-ST elevated myocardial infarction) ([facility] (11/14/2015)      Tobacco abuse (11/16/2015)      IBS (irritable bowel syndrome) (2/8/2021)      Elevated troponin (2/8/2021)         Plan:     Elevated troponin/NSTEMI Type 2:  Suspect elevated troponin r/t n/v with no c/o CP and no ECG changes. Previous admission in 11/2015 similar presentation:  N/v with elevated troponin 0.11-0.88  At that time cardiac cath showed normal cors with takotsubo CM. Follow troponin trend  If troponin significantly elevated, will consider inpt ischemic evaluation  Continue on ASA, statin, BB. IV BB at this time to help control heart rate and BP  Echo pending    HTN:  Continue on HCTZ and BB    Thank you for consulting [address] [place] [address], NP       [facility]    2/8/2021         Patient seen, examined by me personally. Plan discussed as detailed. Agree with note as outlined by  NP. I confirm findings in history and physical exam. No additional findings noted. Agree with plan as outlined above. Non specific troponin elevation. Symptoms appear to be GI. Check echo. Follow serial markers, stress test when medically stable. Dr. Maddie Owens will follow in AM. Thank  you for the consult.     Logan Ortiz MD

## 2021-02-08 NOTE — ED PROVIDER NOTES
EMERGENCY DEPARTMENT HISTORY AND PHYSICAL EXAM      Date: 2/8/2021  Patient Name: Leandro Menjivar    History of Presenting Illness     Chief Complaint   Patient presents with    Abdominal Pain     since 1400, hx IBS       History Provided By: Patient and Patient's     HPI: Leandro Menjivar, 76 y.o. female with PMHx significant for irritable bowel syndrome, GERD, rheumatoid arthritis, smoking addiction, Takotsubo cardiomyopathy who is status post Covid infection approximately 6 weeks ago presents to the ED with chief complaint of nausea, vomiting, diarrhea, and epigastric pain. Patient reports her symptoms started about 2 PM yesterday. She has had approximately 10 episodes of diarrhea and at least 10 episodes of nonblack and nonbloody emesis. She denies any fevers or chills. She denies any dysuria, hematuria, urinary frequency. She reports some shortness of breath, but denies any chest pain or cough. Patient is a poor historian and keeps saying \"please help me\". Her  reports that she had an endoscopy about 6 months ago by Dr. Oseas Gonzalez and last week had an upper GI with small bowel follow-through here at [facility].  It was unremarkable according to the results in the computer. Patient ate salmon and drink tomato juice and a glass of wine yesterday prior to the onset of her symptoms. Heddy  PCP: Caryle Hind, MD    No current facility-administered medications on file prior to encounter. Current Outpatient Medications on File Prior to Encounter   Medication Sig Dispense Refill    Xifaxan 550 mg tablet TAKE 1 TABLET BY MOUTH THREE TIMES DAILY      hydroCHLOROthiazide (HYDRODIURIL) 25 mg tablet TAKE 1 TABLET BY MOUTH DAILY 30 Tab 3    busPIRone (BUSPAR) 5 mg tablet TAKE 1 TABLET BY MOUTH TWICE DAILY 60 Tab 5    colestipoL (COLESTID) 5 gram packet TAKE PO AS DIRECTED      promethazine (PHENERGAN) 12.5 mg tablet Take 2 Tabs by mouth every six (6) hours as needed for Nausea.  20 Tab 0    Humira,CF, Pen 40 mg/0.4 mL injection pen INJECT ONE PEN (40MG) UNDER THE SKIN (SUBCUTANEOUS INJECTION) EVERY 2 WEEKS 1 Kit 0    DULoxetine (CYMBALTA) 60 mg capsule Take 1 Cap by mouth daily. 30 Cap 5    carvediloL (COREG) 12.5 mg tablet TAKE 1 TABLET BY MOUTH TWICE DAILY WITH MEALS 180 Tab 3    hydroxychloroquine (PLAQUENIL) 200 mg tablet TAKE 1 TABLET BY MOUTH DAILY 30 Tab 0    atorvastatin (LIPITOR) 80 mg tablet Take 1 Tab by mouth daily. take 1 tablet by mouth at bedtime 90 Tab 3    doxycycline (ADOXA) 100 mg tablet Take 1 Tab by mouth two (2) times a day. 20 Tab 0    pantoprazole (PROTONIX) 40 mg tablet TK 1 T PO BID      alclometasone (ACLOVATE) 0.05 % topical cream USE ON CORNERS OF MOUTH FOR RASH TWICE A DAY AS NEEDED  3    fluticasone propionate (FLONASE) 50 mcg/actuation nasal spray fluticasone propionate 50 mcg/actuation nasal spray,suspension      mupirocin (BACTROBAN) 2 % ointment DEBBIE SML AMT EXT AA TID  0    albuterol (PROVENTIL HFA, VENTOLIN HFA, PROAIR HFA) 90 mcg/actuation inhaler Take 2 Puffs by inhalation every six (6) hours as needed for Wheezing. 1 Inhaler 0    amLODIPine (NORVASC) 2.5 mg tablet take 1 tablet by mouth once daily 30 Tab 1    estrogens, conjugated,-methylTESTOSTERone (ESTRATEST) 1.25-2.5 mg per tablet 1 Tab daily. 0    omeprazole (PRILOSEC) 40 mg capsule take 1 capsule by mouth once daily ON AN EMPTY STOMACH 30 Cap 5    ondansetron (ZOFRAN ODT) 4 mg disintegrating tablet Take 1 Tab by mouth every eight (8) hours as needed for Nausea. 30 Tab 1    HYDROcodone-acetaminophen (NORCO)  mg tablet TAKE 1 TABLET BY MOUTH 3 TIMES DAILY AS NEEDED FOR PAIN  0    desoximetasone (TOPICORT) 0.25 % topical cream Apply  to affected area two (2) times daily as needed for Skin Irritation. 15 g 0    Cetirizine (ZYRTEC) 10 mg cap Take  by mouth.       dicyclomine (BENTYL) 10 mg capsule TAKE 1 CAPSULE BY MOUTH BEFORE MEALS AND AT BEDTIME AS NEEDED.  0    gabapentin (NEURONTIN) 300 mg capsule TAKE 1 CAPSULE IN THE MORNING, 1 CAPSULE IN THE AFTERNOON, AD 2 CAPSULES AT BEDTIME (Patient taking differently: TAKE 1 CAPSULE IN THE MORNING, 1 CAPSULE IN THE AFTERNOON, AND 1 CAPSULE AT BEDTIME) 120 Cap 3    aspirin 81 mg chewable tablet Take 1 Tab by mouth daily. 27 Tab 0       Past History     Past Medical History:  Past Medical History:   Diagnosis Date    Acid reflux     Acid reflux     Arthritis     Chronic pain     Heart attack ([address]     Heart failure (HCC)     Hypertension     IBS (irritable bowel syndrome)     Squamous cell carcinoma of skin of right elbow 5/2016    Takotsubo cardiomyopathy 11/16/2015    Tobacco abuse 11/16/2015       Past Surgical History:  Past Surgical History:   Procedure Laterality Date    HX GYN      oophorectomy    HX ORTHOPAEDIC      right knee arthroscopy    HX ORTHOPAEDIC      right thumb    HX ORTHOPAEDIC      back surgeries    HX OTHER SURGICAL      8 route canals    KY ABDOMEN SURGERY PROC UNLISTED      adhesion removal    [place] BREAST SURGERY PROCEDURE UNLISTED      lumpectomy    [place] COLONOSCOPY FLX DX W/COLLJ SPEC WHEN PFRMD  10/15/2012            Family History:  Family History   Problem Relation Age of Onset    Cancer Father         prostate    Heart Disease Mother     Heart Disease Maternal Grandmother        Social History:  Social History     Tobacco Use    Smoking status: Current Every Day Smoker     Packs/day: 1.00    Smokeless tobacco: Never Used    Tobacco comment: trying to quit 4-5 cigarettes daily   Substance Use Topics    Alcohol use: Yes     Alcohol/week: 8.3 standard drinks     Types: 10 Glasses of wine per week     Comment: 4 times weekly    Drug use: No       Allergies:   Allergies   Allergen Reactions    Ace Inhibitors Swelling    Arb-Angiotensin Receptor Antagonist Other (comments)     Prior ACE inhibitor angioedema, cross reaction risk    Codeine Nausea Only    Keflex [Cephalexin] Hives    Milk Other (comments) Lactose intolerant.  Morphine Nausea and Vomiting         Review of Systems   Review of Systems   Constitutional: Positive for chills. Negative for fever. HENT: Negative for rhinorrhea and sore throat. Respiratory: Positive for shortness of breath. Negative for cough and chest tightness. Cardiovascular: Negative for chest pain, palpitations and leg swelling. Gastrointestinal: Positive for diarrhea, nausea and vomiting. Negative for blood in stool. Genitourinary: Negative for dysuria, flank pain and hematuria. Musculoskeletal: Negative for back pain, myalgias and neck pain. Skin: Negative for rash and wound. Neurological: Negative for dizziness, syncope, light-headedness and headaches. Psychiatric/Behavioral: Negative for confusion. The patient is nervous/anxious. All other systems reviewed and are negative.         Physical Exam    General appearance - well nourished, well appearing, and in no distress  Eyes - pupils equal and reactive, extraocular eye movements intact  ENT - mucous membranes moist, pharynx normal without lesions  Neck - supple, no significant adenopathy; non-tender to palpation  Chest - clear to auscultation, no wheezes, rales or rhonchi; non-tender to palpation  Heart -tachycardic, regular rhythm, S1 and S2 normal, no murmurs noted  Abdomen - soft, tender to palpation epigastric area, nondistended, no masses or organomegaly  Musculoskeletal - no joint tenderness, deformity or swelling; normal ROM  Extremities - peripheral pulses normal, no pedal edema  Skin - normal coloration and turgor, no rashes  Neurological - alert, oriented x3, normal speech, no focal findings or movement disorder noted    Diagnostic Study Results     Labs -     Recent Results (from the past 12 hour(s))   CBC WITH AUTOMATED DIFF    Collection Time: 02/08/21  1:56 AM   Result Value Ref Range    WBC 9.0 3.6 - 11.0 K/uL    RBC 3.28 (L) 3.80 - 5.20 M/uL    HGB 10.5 (L) 11.5 - 16.0 g/dL    HCT 32.4 (L) 35.0 - 47.0 %    MCV 98.8 80.0 - 99.0 FL    MCH 32.0 26.0 - 34.0 PG    MCHC 32.4 30.0 - 36.5 g/dL    RDW 14.0 11.5 - 14.5 %    PLATELET 307 548 - 849 K/uL    MPV 9.8 8.9 - 12.9 FL    NRBC 0.0 0  WBC    ABSOLUTE NRBC 0.00 0.00 - 0.01 K/uL    NEUTROPHILS 75 32 - 75 %    LYMPHOCYTES 17 12 - 49 %    MONOCYTES 6 5 - 13 %    EOSINOPHILS 2 0 - 7 %    BASOPHILS 0 0 - 1 %    IMMATURE GRANULOCYTES 0 0.0 - 0.5 %    ABS. NEUTROPHILS 6.7 1.8 - 8.0 K/UL    ABS. LYMPHOCYTES 1.6 0.8 - 3.5 K/UL    ABS. MONOCYTES 0.6 0.0 - 1.0 K/UL    ABS. EOSINOPHILS 0.1 0.0 - 0.4 K/UL    ABS. BASOPHILS 0.0 0.0 - 0.1 K/UL    ABS. IMM. GRANS. 0.0 0.00 - 0.04 K/UL    DF AUTOMATED     METABOLIC PANEL, COMPREHENSIVE    Collection Time: 02/08/21  1:56 AM   Result Value Ref Range    Sodium 139 136 - 145 mmol/L    Potassium 3.0 (L) 3.5 - 5.1 mmol/L    Chloride 110 (H) 97 - 108 mmol/L    CO2 19 (L) 21 - 32 mmol/L    Anion gap 10 5 - 15 mmol/L    Glucose 89 65 - 100 mg/dL    BUN 17 6 - 20 MG/DL    Creatinine 0.69 0.55 - 1.02 MG/DL    BUN/Creatinine ratio 25 (H) 12 - 20      GFR est AA >60 >60 ml/min/1.73m2    GFR est non-AA >60 >60 ml/min/1.73m2    Calcium 7.4 (L) 8.5 - 10.1 MG/DL    Bilirubin, total 0.2 0.2 - 1.0 MG/DL    ALT (SGPT) 33 12 - 78 U/L    AST (SGOT) 34 15 - 37 U/L    Alk.  phosphatase 113 45 - 117 U/L    Protein, total 5.5 (L) 6.4 - 8.2 g/dL    Albumin 3.0 (L) 3.5 - 5.0 g/dL    Globulin 2.5 2.0 - 4.0 g/dL    A-G Ratio 1.2 1.1 - 2.2     CK W/ CKMB & INDEX    Collection Time: 02/08/21  1:56 AM   Result Value Ref Range    CK - MB 2.1 <3.6 NG/ML    CK-MB Index 4.5 (H) 0.0 - 2.5      CK 47 26 - 192 U/L   TROPONIN I    Collection Time: 02/08/21  1:56 AM   Result Value Ref Range    Troponin-I, Qt. 0.27 (H) <0.05 ng/mL   LIPASE    Collection Time: 02/08/21  1:56 AM   Result Value Ref Range    Lipase 111 73 - 393 U/L   POC LACTIC ACID    Collection Time: 02/08/21  2:00 AM   Result Value Ref Range    Lactic Acid (POC) 2.69 (HH) 0.40 - 2.00 mmol/L   URINALYSIS W/ REFLEX CULTURE    Collection Time: 02/08/21  4:06 AM    Specimen: Urine   Result Value Ref Range    Color YELLOW/STRAW      Appearance CLEAR CLEAR      Specific gravity 1.010 1.003 - 1.030      pH (UA) 6.5 5.0 - 8.0      Protein Negative NEG mg/dL    Glucose Negative NEG mg/dL    Ketone TRACE (A) NEG mg/dL    Bilirubin Negative NEG      Blood Negative NEG      Urobilinogen 0.2 0.2 - 1.0 EU/dL    Nitrites Negative NEG      Leukocyte Esterase Negative NEG      WBC 0-4 0 - 4 /hpf    RBC 0-5 0 - 5 /hpf    Epithelial cells FEW FEW /lpf    Bacteria Negative NEG /hpf    UA:UC IF INDICATED CULTURE NOT INDICATED BY UA RESULT CNI      Hyaline cast 0-2 0 - 5 /lpf   TROPONIN I    Collection Time: 02/08/21  5:46 AM   Result Value Ref Range    Troponin-I, Qt. 0.90 (H) <0.05 ng/mL       Radiologic Studies -   CT ABD PELV W CONT   Final Result      1. Mild hepatomegaly. 2. Diffuse colonic wall thickening could indicate an infectious/inflammatory   colitis, though could also be due to underdistention. Correlate clinically. XR ABD ACUTE W 1 V CHEST   Final Result   No acute process. CT Results  (Last 48 hours)               02/08/21 0437  CT ABD PELV W CONT Final result    Impression:      1. Mild hepatomegaly. 2. Diffuse colonic wall thickening could indicate an infectious/inflammatory   colitis, though could also be due to underdistention. Correlate clinically. Narrative:  INDICATION: abd pain       COMPARISON: None       TECHNIQUE:   Following the uneventful intravenous administration of IV contrast, thin axial   images were obtained through the abdomen and pelvis. Coronal and sagittal   reconstructions were generated. Oral contrast was not administered. CT dose   reduction was achieved through use of a standardized protocol tailored for this   examination and automatic exposure control for dose modulation. FINDINGS:   LUNG BASES: No abnormality.    LIVER: Mildly enlarged to 20 cm in craniocaudal dimension. GALLBLADDER: Unremarkable. SPLEEN: No enlargement or lesion. PANCREAS: No mass or ductal dilatation. ADRENALS: No mass. KIDNEYS: No mass, calculus, or hydronephrosis. GI TRACT: No bowel obstruction. Mild diffuse colonic wall thickening could be   accentuated by lack of distention/contrast material.   PERITONEUM: No free air or free fluid. APPENDIX: Unremarkable. RETROPERITONEUM: No aortic aneurysm. LYMPH NODES: None enlarged. ADDITIONAL COMMENTS: N/A.       URINARY BLADDER: Unremarkable. REPRODUCTIVE ORGANS: Unremarkable. LYMPH NODES: None enlarged. FREE FLUID: None. BONES: Dextroscoliosis lumbar spine with associated degenerative changes. ADDITIONAL COMMENTS: N/A. CXR Results  (Last 48 hours)               02/08/21 0352  XR ABD ACUTE W 1 V CHEST Final result    Impression:  No acute process. Narrative:  INDICATION: abd pain, nv       EXAM:  ACUTE ABDOMINAL SERIES        COMPARISON: April 17, 2019       FINDINGS:   Single view of the chest demonstrates a normal cardiomediastinal silhouette. Lungs are hyperinflated and clear. There is no free intraperitoneal air. Supine   and upright/decubitus views of the abdomen demonstrate a nonobstructive bowel   gas pattern. There are no abnormal calcifications. Dextroconvex curvature lumbar   spine with associated degenerative changes. Medical Decision Making   I am the first provider for this patient. I reviewed the vital signs, available nursing notes, past medical history, past surgical history, family history and social history. Vital Signs-Reviewed the patient's vital signs.   Patient Vitals for the past 12 hrs:   Temp Pulse Resp BP SpO2   02/08/21 0332 -- -- -- (!) 164/114 --   02/08/21 0047 98.2 °F (36.8 °C) 80 18 (!) 169/97 98 %       EKG at 3:59 AM on February 8, 2021 interpreted by me: Normal sinus rhythm, 88 bpm, normal axis, normal UT, QRS intervals, QTC slightly prolonged.,  Nonspecific ST changes  Records Reviewed: Nursing Notes and Old Medical Records    Provider Notes (Medical Decision Making):   Differential diagnosis: Gastritis, pancreatitis, cholecystitis, dehydration, electrolyte abnormality, UTI, pneumonia, acute coronary syndrome  We will check CBC, CMP, CPK, troponin, chest x-ray, lipase, lactate  ED Course:   Initial assessment performed. The patients presenting problems have been discussed, and they are in agreement with the care plan formulated and outlined with them. I have encouraged them to ask questions as they arise throughout their visit. Progress Notes:  ED Course as of Feb 08 0652   Mon Feb 08, 2021   5536 Case discussed with Dr. Peyman Baer (hospitalist) who will see and admit the patient. Patient's troponin is elevated. Troponin went from 0.26-0.9. Case discussed with Dr. Javier Newell who recommends starting patient on heparin bolus and drip and giving aspirin. He has reviewed the EKGs and does not think that there is any acute changes. Agrees with admission to hospitalist, and they will see in consult.    [AO]      ED Course User Index  [AO] Maile Romero MD       Disposition:  Admit to hospitalist    CRITICAL CARE NOTE :        IMPENDING DETERIORATION -Cardiovascular and Metabolic    ASSOCIATED RISK FACTORS - Metabolic changes and Dehydration    MANAGEMENT- Bedside Assessment and Supervision of Care    INTERPRETATION -  Xrays, ECG and Blood Pressure    INTERVENTIONS - Metobolic interventions    CASE REVIEW - Hospitalist/Intensivist, Medical Sub-Specialist, Nursing and Family    TREATMENT RESPONSE -Improved    PERFORMED BY - Self        NOTES   :      I have spent 45 minutes of critical care time involved in lab review, consultations with specialist, family decision- making, bedside attention and documentation. During this entire length of time I was immediately available to the patient .     Regina Mabry MD              Diagnosis Clinical Impression:   1. Intractable nausea and vomiting    2. Elevated troponin    3. Essential hypertension    4. NSTEMI (non-ST elevated myocardial infarction) ([address]    5. Dehydration    6.  Hypokalemia

## 2021-02-08 NOTE — PROGRESS NOTES
Pharmacy Clarification of the Prior to Admission Medication Regimen Retrospective to the Admission Medication Reconciliation    The patient was not interviewed regarding clarification of the prior to admission medication regimen. Spoke with  by phone and in person, there was a medication list provided by patient that was brought with them. She was not questioned regarding use of any other inhalers, topical products, over the counter medications, herbal medications, vitamin products or ophthalmic/nasal/otic medication use. Information Obtained From: query, medication list provided    Recommendations/Findings: The following amendments were made to the patient's active medication list on file at [facility]:     1) Additions:   Clotrimazole lozenge  Famotidine  Hydroxyzine  Promethazine supp      2) Removals:   Mupirocin  estratest  colestipol      3) Changes:  bentyl (Old regimen: 10 mg /New regimen: 20 mg)  Omeprazole (Old regimen: 40 mg  /New regimen: 20 mg)      4) Pertinent Pharmacy Findings: unable to verify when last dose was taken.  could only help by giving me a list.    Updated patients preferred outpatient pharmacy to: [facility] medication list was corrected to the following:     Prior to Admission Medications   Prescriptions Last Dose Informant Taking? DULoxetine (CYMBALTA) 60 mg capsule  Other Yes   Sig: Take 1 Cap by mouth daily. HYDROcodone-acetaminophen (NORCO)  mg tablet  Other Yes   Sig: TAKE 1 TABLET BY MOUTH 3 TIMES DAILY AS NEEDED FOR PAIN   Humira,CF, Pen 40 mg/0.4 mL injection pen  Other Yes   Sig: INJECT ONE PEN (40MG) UNDER THE SKIN (SUBCUTANEOUS INJECTION) EVERY 2 WEEKS   Xifaxan 550 mg tablet  Other Yes   Sig: TAKE 1 TABLET BY MOUTH THREE TIMES DAILY   albuterol (PROVENTIL HFA, VENTOLIN HFA, PROAIR HFA) 90 mcg/actuation inhaler  Other Yes   Sig: Take 2 Puffs by inhalation every six (6) hours as needed for Wheezing.    alclometasone (ACLOVATE) 0.05 % topical cream  Other Yes   Sig: USE ON CORNERS OF MOUTH FOR RASH TWICE A DAY AS NEEDED   amLODIPine (NORVASC) 2.5 mg tablet  Other Yes   Sig: take 1 tablet by mouth once daily   aspirin 81 mg chewable tablet  Other Yes   Sig: Take 1 Tab by mouth daily. atorvastatin (LIPITOR) 80 mg tablet  Other Yes   Sig: Take 1 Tab by mouth daily. take 1 tablet by mouth at bedtime   busPIRone (BUSPAR) 5 mg tablet  Other Yes   Sig: TAKE 1 TABLET BY MOUTH TWICE DAILY   carvediloL (COREG) 12.5 mg tablet  Other Yes   Sig: TAKE 1 TABLET BY MOUTH TWICE DAILY WITH MEALS   cetirizine (ZYRTEC) 10 mg tablet  Other Yes   Sig: Take 10 mg by mouth daily as needed for Allergies. clotrimazole (MYCELEX) 10 mg quang  Other Yes   Sig: Take 10 mg by mouth five (5) times daily. Dissolve one quang in mouth for one week. desoximetasone (TOPICORT) 0.25 % topical cream  Other Yes   Sig: Apply  to affected area two (2) times daily as needed for Skin Irritation. dicyclomine (BENTYL) 20 mg tablet  Other Yes   Sig: Take 20 mg by mouth. Before meals and at bedtime as needed   doxycycline (ADOXA) 100 mg tablet  Other Yes   Sig: Take 1 Tab by mouth two (2) times a day. famotidine (PEPCID) 20 mg tablet  Other Yes   Sig: Take 20 mg by mouth two (2) times a day. For 10 days   fluticasone propionate (FLONASE) 50 mcg/actuation nasal spray  Other Yes   Sig: fluticasone propionate 50 mcg/actuation nasal spray,suspension   gabapentin (NEURONTIN) 300 mg capsule  Other Yes   Sig: Take 300 mg by mouth three (3) times daily. Take Morning, afternoon and at bedtime. hydrOXYzine HCL (ATARAX) 25 mg tablet  Other Yes   Sig: Take 25 mg by mouth three (3) times daily as needed for Anxiety. hydroCHLOROthiazide (HYDRODIURIL) 25 mg tablet  Other Yes   Sig: TAKE 1 TABLET BY MOUTH DAILY   hydroxychloroquine (PLAQUENIL) 200 mg tablet  Other Yes   Sig: TAKE 1 TABLET BY MOUTH DAILY   omeprazole (PRILOSEC) 20 mg capsule  Other Yes   Sig: Take 20 mg by mouth daily.  On an empty stomach   ondansetron (ZOFRAN ODT) 4 mg disintegrating tablet  Other Yes   Sig: Take 1 Tab by mouth every eight (8) hours as needed for Nausea. pantoprazole (PROTONIX) 40 mg tablet  Other Yes   Sig: Take 40 mg by mouth two (2) times a day. promethazine (PHENERGAN) 12.5 mg suppository  Other Yes   Sig: Insert 12.5 mg into rectum every six (6) hours as needed for Nausea. promethazine (PHENERGAN) 12.5 mg tablet  Other Yes   Sig: Take 2 Tabs by mouth every six (6) hours as needed for Nausea.       Facility-Administered Medications: None        Thank you,  Dave Gasca CPHT  Medication History Pharmacy Technician

## 2021-02-08 NOTE — Clinical Note
TRANSFER - OUT REPORT:     Verbal report given to: Libby Vo. Report consisted of patient's Situation, Background, Assessment and   Recommendations(SBAR). Opportunity for questions and clarification was provided. Patient transported with a Registered Nurse. Patient transported to: [facility].

## 2021-02-08 NOTE — ED NOTES
Dr. Gabriella Duran at bedside to evaluate patient. Pt. To receive dose of IV metoprolol now. Will administer and ask pharmacy to reschedule.

## 2021-02-08 NOTE — ED NOTES
Bedside shift change report given to Sivan Malloy (oncoming nurse) by Staci Cuba (offgoing nurse). Report included the following information SBAR, ED Summary, Intake/Output, MAR, Recent Results and Cardiac Rhythm Sinus Tach.

## 2021-02-08 NOTE — PROGRESS NOTES
Pharmacy Automatic Renal Dosing Protocol - Antimicrobials  Indication for Antimicrobials: Intraabdominal infection/Infectious diarrhea     Current Regimen of Each Antimicrobial:  Metronidazole 500 mg IV  q 12h    (Start Date 21; Day # 1)  Cipro 400 mg IV x1 now then q 12h    (Start Date 21; Day # 1)    Previous Antimicrobial Therapy:    Significant Cultures:   NA  Radiology / Imaging results: (X-ray, CT scan or MRI): NA    Paralysis, amputations, malnutrition: NA    Labs:  Recent Labs     21  0156   CREA 0.69   BUN 17   WBC 9.0     Temp (24hrs), Av.5 °F (36.9 °C), Min:98.2 °F (36.8 °C), Max:98.8 °F (37.1 °C)      Is the Patient on Dialysis? No    Creatinine Clearance (mL/min):   CrCl (Actual Body Weight): 59.8  CrCl (Adjusted Body Weight): 68.6  CrCl (Ideal Body Weight): 74.4    Impression/Plan:   · Cipro q 12h changed to Levaquin 750 mg IV Daily per [facility] protocol  · Antimicrobial stop date: To be determined      Pharmacy will follow daily and adjust medications as appropriate for renal function and/or serum levels.     Thank you,  Mariel Hernandez, [facility]

## 2021-02-08 NOTE — CONSULTS
GI CONSULTATION NOTE  Nona Davis, VERONICA  [phone] NP in-hospital cell phone M-F until 4:30  After 5pm or on weekends, please call  for physician on call    NAME: Kalyani Luu   :  1955   MRN:  [id number]   Attending:  Dr. Salas Giraldo  Primary GI:  Dr. Jailene Nunez; Dr. Noelle Duckworth covering   Date/Time:  2021 10:27 AM  Assessment:   Nausea and vomiting  · Patient states she has a hx of IBS. Nausea and vomiting that started after eating salmon and tomato soup. No active vomiting witness while in ED. · CT abd/pel shows mild hepatomegaly. Diffuse colonic wall thickening could indicate an infectious/inflammatory colitis, though could also be due to under distention  · Abd XR with no acute process   · Levaquin and Flagyl ordered by primary team     GI History:  2021- UGI was within normal limits, started on Xifaxin   2020- EGD with Dr. Jailene Nunez showing gastritis and candida; recommended PPI and fluconazole  2018- EGD with Dr. Jailene Nunez showing reflux and candida; recommended PPI and fluconazole  2018- Colonoscopy with Dr. Jailene Nunez showing adenoma; recommended repeat in 3 years  2018- GES within normal limits  2017- EGD with Dr. Jonh Stevenson that was negative    NSTEMI  · Treatment per cardiology team   Plan:   · No direct plans for repeat EGD at this time. No plans for repeat GES during admission; results not accurate in while having acute symptoms   · Continue PPI   · Continue Xifaxin 550 mg PO BID  · Continue IV Levaquin and Flagyl  · Anti-emetics PRN  · Symptomatic care per primary team   Plan discussed with Dr. Noelle Duckworth     Subjective:     HISTORY OF PRESENT ILLNESS:     Minna Goddard is a 76 y.o. female with PMH of IBS, GERD, RA, Takosubo Cardiomyopathy was brought in by  for worsening nausea, vomiting and diarrhea. History given by her .  She has been having this nausea and vomiting for years now, which  attributes to her IBS, her symptoms has worsened over the last few days. She is also having on and off diarrhea. She denies any chest pain, cough, shortness of breath, lower ext edema. She had a upper GI series xray last week and was normal.   In the ED she was having uptrending troponin, and was given aspirin and started on heparin drip. History of COVID in Dec, was retested negative as per , no covid symptoms currently. Past Medical History:   Diagnosis Date    Acid reflux     Acid reflux     Arthritis     Chronic pain     Heart attack ([address]     Heart failure (HCC)     Hypertension     IBS (irritable bowel syndrome)     Squamous cell carcinoma of skin of right elbow 5/2016    Takotsubo cardiomyopathy 11/16/2015    Tobacco abuse 11/16/2015      Past Surgical History:   Procedure Laterality Date    HX GYN      oophorectomy    HX ORTHOPAEDIC      right knee arthroscopy    HX ORTHOPAEDIC      right thumb    HX ORTHOPAEDIC      back surgeries    HX OTHER SURGICAL      8 route canals    CO ABDOMEN SURGERY PROC UNLISTED      adhesion removal    CO BREAST SURGERY PROCEDURE UNLISTED      lumpectomy    CO COLONOSCOPY FLX DX W/COLLJ SPEC WHEN PFRMD  10/15/2012          Social History     Tobacco Use    Smoking status: Current Every Day Smoker     Packs/day: 1.00    Smokeless tobacco: Never Used    Tobacco comment: trying to quit 4-5 cigarettes daily   Substance Use Topics    Alcohol use: Yes     Alcohol/week: 8.3 standard drinks     Types: 10 Glasses of wine per week     Comment: 4 times weekly      Family History   Problem Relation Age of Onset    Cancer Father         prostate    Heart Disease Mother     Heart Disease Maternal Grandmother       Allergies   Allergen Reactions    Ace Inhibitors Swelling    Arb-Angiotensin Receptor Antagonist Other (comments)     Prior ACE inhibitor angioedema, cross reaction risk    Codeine Nausea Only    Keflex [Cephalexin] Hives    Milk Other (comments)     Lactose intolerant.     Morphine Nausea and Vomiting      Prior to Admission medications    Medication Sig Start Date End Date Taking? Authorizing Provider   dicyclomine (BENTYL) 20 mg tablet Take 20 mg by mouth. Before meals and at bedtime as needed   Yes Provider, Historical   cetirizine (ZYRTEC) 10 mg tablet Take 10 mg by mouth daily as needed for Allergies. Yes Provider, Historical   gabapentin (NEURONTIN) 300 mg capsule Take 300 mg by mouth three (3) times daily. Take Morning, afternoon and at bedtime. Yes Provider, Historical   omeprazole (PRILOSEC) 20 mg capsule Take 20 mg by mouth daily. On an empty stomach   Yes Provider, Historical   promethazine (PHENERGAN) 12.5 mg suppository Insert 12.5 mg into rectum every six (6) hours as needed for Nausea. Yes Provider, Historical   clotrimazole (MYCELEX) 10 mg quang Take 10 mg by mouth five (5) times daily. Dissolve one quang in mouth for one week. Yes Provider, Historical   famotidine (PEPCID) 20 mg tablet Take 20 mg by mouth two (2) times a day. For 10 days   Yes Provider, Historical   hydrOXYzine HCL (ATARAX) 25 mg tablet Take 25 mg by mouth three (3) times daily as needed for Anxiety. Yes Provider, Historical   Xifaxan 550 mg tablet TAKE 1 TABLET BY MOUTH THREE TIMES DAILY 1/28/21  Yes Provider, Historical   hydroCHLOROthiazide (HYDRODIURIL) 25 mg tablet TAKE 1 TABLET BY MOUTH DAILY 1/29/21  Yes Florencia Fothergill, MD   busPIRone (BUSPAR) 5 mg tablet TAKE 1 TABLET BY MOUTH TWICE DAILY 1/6/21  Yes Florencia Fothergill, MD   promethazine (PHENERGAN) 12.5 mg tablet Take 2 Tabs by mouth every six (6) hours as needed for Nausea. 11/2/20  Yes MD Mariajose Lopez,CF, Pen 40 mg/0.4 mL injection pen INJECT ONE PEN (40MG) UNDER THE SKIN (SUBCUTANEOUS INJECTION) EVERY 2 WEEKS 9/16/20  Yes Ronna Meléndez MD   DULoxetine (CYMBALTA) 60 mg capsule Take 1 Cap by mouth daily.  8/14/20  Yes Florencia Fothergill, MD   carvediloL (COREG) 12.5 mg tablet TAKE 1 TABLET BY MOUTH TWICE DAILY WITH MEALS 5/21/20  Yes Keith Zimmerman NP   hydroxychloroquine (PLAQUENIL) 200 mg tablet TAKE 1 TABLET BY MOUTH DAILY 3/24/20  Yes Aquilino Jung MD   atorvastatin (LIPITOR) 80 mg tablet Take 1 Tab by mouth daily. take 1 tablet by mouth at bedtime 3/24/20  Yes Keith Zimmerman NP   doxycycline (ADOXA) 100 mg tablet Take 1 Tab by mouth two (2) times a day. 3/20/20  Yes Fara Carlos MD   pantoprazole (PROTONIX) 40 mg tablet Take 40 mg by mouth two (2) times a day. 2/3/20  Yes Provider, Historical   alclometasone (ACLOVATE) 0.05 % topical cream USE ON CORNERS OF MOUTH FOR RASH TWICE A DAY AS NEEDED 9/17/19  Yes Provider, Historical   fluticasone propionate (FLONASE) 50 mcg/actuation nasal spray fluticasone propionate 50 mcg/actuation nasal spray,suspension   Yes Provider, Historical   albuterol (PROVENTIL HFA, VENTOLIN HFA, PROAIR HFA) 90 mcg/actuation inhaler Take 2 Puffs by inhalation every six (6) hours as needed for Wheezing. 4/17/19  Yes Manuel Jhaveri MD   amLODIPine (NORVASC) 2.5 mg tablet take 1 tablet by mouth once daily 2/1/19  Yes Kash Michelle MD   ondansetron (ZOFRAN ODT) 4 mg disintegrating tablet Take 1 Tab by mouth every eight (8) hours as needed for Nausea. 1/3/18  Yes Fara Carlos MD   HYDROcodone-acetaminophen (NORCO)  mg tablet TAKE 1 TABLET BY MOUTH 3 TIMES DAILY AS NEEDED FOR PAIN 8/15/17  Yes Provider, Historical   desoximetasone (TOPICORT) 0.25 % topical cream Apply  to affected area two (2) times daily as needed for Skin Irritation. 7/25/17  Yes Lisa Madrid MD   aspirin 81 mg chewable tablet Take 1 Tab by mouth daily.  11/18/15  Yes Holli Penaloza MD       Patient Active Problem List   Diagnosis Code    Rectal bleed K62.5    Hypokalemia E87.6    HTN (hypertension) I10    UTI (lower urinary tract infection) N39.0    Fibromyalgia M79.7    Bronchospasm J98.01    Lumbar disc disease M51.9    Nausea & vomiting R11.2    NSTEM[facility] (non-ST elevated [facility] infarction) [facility] I21.4    Takotsubo cardiomyopathy I51.81    Tobacco abuse Z72.0    Heart attack ([facility] I21.9    Coronary artery disease involving native coronary artery without angina pectoris I25.10    Intractable vomiting without nausea R11.11       REVIEW OF SYSTEMS:    Constitutional: negative fever, negative chills, negative weight loss  Eyes:   negative visual changes  ENT:   negative sore throat, tongue or lip swelling   Respiratory:  negative cough, negative dyspnea  Cards:  negative for chest pain, palpitations, lower extremity edema  GI:   See HPI  :  negative for frequency, dysuria  Integument:  negative for rash and pruritus  Heme:  negative for easy bruising and gum/nose bleeding  Musculoskel: negative for myalgias,  back pain and muscle weakness  Neuro: negative for headaches, dizziness, vertigo  Psych:  negative for feelings of anxiety, depression     Objective:   VITALS:    Visit Vitals  BP (!) 181/109   Pulse 95   Temp 98.5 °F (36.9 °C)   Resp 16   Ht 5' 6.14\" (1.68 m)   Wt 47.9 kg (105 lb 9.6 oz)   SpO2 95%   BMI 16.97 kg/m²       PHYSICAL EXAM:   General:           Alert  female. NAD. Head:               Normocephalic, without obvious abnormality, atraumatic. Eyes:               Conjunctivae clear and pale, anicteric sclerae. Pupils are equal  Nose:               Nares normal. No drainage or sinus tenderness. Throat:             Lips, mucosa, and tongue normal.  No Thrush  Neck:               Supple, symmetrical,  no adenopathy, thyroid: non tender  Back:               Symmetric,  No CVA tenderness. Lungs:             CTA bilaterally. No wheezing/rhonchi/rales. Chest wall:      No tenderness or deformity. No Accessory muscle use. Heart:              Regular rate and rhythm,  no murmur, rub or gallop. Abdomen:        Soft, non-tender. Not distended. Bowel sounds normal. No masses  Extremities:     Atraumatic, No cyanosis. No edema.  No clubbing  Skin: Texture, turgor normal. No rashes/lesions/jaundice  Psych:             Good insight. Not depressed. Not anxious or agitated. Neurologic:      EOMs intact. No facial asymmetry. No aphasia or slurred speech. Normal                         strength, A/O X 3. LAB DATA REVIEWED:    Recent Results (from the past 24 hour(s))   CBC WITH AUTOMATED DIFF    Collection Time: 02/08/21  1:56 AM   Result Value Ref Range    WBC 9.0 3.6 - 11.0 K/uL    RBC 3.28 (L) 3.80 - 5.20 M/uL    HGB 10.5 (L) 11.5 - 16.0 g/dL    HCT 32.4 (L) 35.0 - 47.0 %    MCV 98.8 80.0 - 99.0 FL    MCH 32.0 26.0 - 34.0 PG    MCHC 32.4 30.0 - 36.5 g/dL    RDW 14.0 11.5 - 14.5 %    PLATELET 652 405 - 207 K/uL    MPV 9.8 8.9 - 12.9 FL    NRBC 0.0 0  WBC    ABSOLUTE NRBC 0.00 0.00 - 0.01 K/uL    NEUTROPHILS 75 32 - 75 %    LYMPHOCYTES 17 12 - 49 %    MONOCYTES 6 5 - 13 %    EOSINOPHILS 2 0 - 7 %    BASOPHILS 0 0 - 1 %    IMMATURE GRANULOCYTES 0 0.0 - 0.5 %    ABS. NEUTROPHILS 6.7 1.8 - 8.0 K/UL    ABS. LYMPHOCYTES 1.6 0.8 - 3.5 K/UL    ABS. MONOCYTES 0.6 0.0 - 1.0 K/UL    ABS. EOSINOPHILS 0.1 0.0 - 0.4 K/UL    ABS. BASOPHILS 0.0 0.0 - 0.1 K/UL    ABS. IMM. GRANS. 0.0 0.00 - 0.04 K/UL    DF AUTOMATED     METABOLIC PANEL, COMPREHENSIVE    Collection Time: 02/08/21  1:56 AM   Result Value Ref Range    Sodium 139 136 - 145 mmol/L    Potassium 3.0 (L) 3.5 - 5.1 mmol/L    Chloride 110 (H) 97 - 108 mmol/L    CO2 19 (L) 21 - 32 mmol/L    Anion gap 10 5 - 15 mmol/L    Glucose 89 65 - 100 mg/dL    BUN 17 6 - 20 MG/DL    Creatinine 0.69 0.55 - 1.02 MG/DL    BUN/Creatinine ratio 25 (H) 12 - 20      GFR est AA >60 >60 ml/min/1.73m2    GFR est non-AA >60 >60 ml/min/1.73m2    Calcium 7.4 (L) 8.5 - 10.1 MG/DL    Bilirubin, total 0.2 0.2 - 1.0 MG/DL    ALT (SGPT) 33 12 - 78 U/L    AST (SGOT) 34 15 - 37 U/L    Alk.  phosphatase 113 45 - 117 U/L    Protein, total 5.5 (L) 6.4 - 8.2 g/dL    Albumin 3.0 (L) 3.5 - 5.0 g/dL    Globulin 2.5 2.0 - 4.0 g/dL    A-G Ratio 1.2 1.1 - 2.2     CK W/ CKMB & INDEX    Collection Time: 02/08/21  1:56 AM   Result Value Ref Range    CK - MB 2.1 <3.6 NG/ML    CK-MB Index 4.5 (H) 0.0 - 2.5      CK 47 26 - 192 U/L   TROPONIN I    Collection Time: 02/08/21  1:56 AM   Result Value Ref Range    Troponin-I, Qt. 0.27 (H) <0.05 ng/mL   LIPASE    Collection Time: 02/08/21  1:56 AM   Result Value Ref Range    Lipase 111 73 - 393 U/L   POC LACTIC ACID    Collection Time: 02/08/21  2:00 AM   Result Value Ref Range    Lactic Acid (POC) 2.69 (HH) 0.40 - 2.00 mmol/L   EKG, 12 LEAD, INITIAL    Collection Time: 02/08/21  3:59 AM   Result Value Ref Range    Ventricular Rate 88 BPM    Atrial Rate 88 BPM    P-R Interval 156 ms    QRS Duration 86 ms    Q-T Interval 406 ms    QTC Calculation (Bezet) 491 ms    Calculated P Axis 75 degrees    Calculated R Axis 27 degrees    Calculated T Axis 75 degrees    Diagnosis       Normal sinus rhythm  Possible Left atrial enlargement  Septal infarct (cited on or before 05-AUG-2015)  When compared with ECG of 11-APR-2019 04:47,  ST elevation now present in Lateral leads  Nonspecific T wave abnormality now evident in Inferior leads  Nonspecific T wave abnormality now evident in Lateral leads     URINALYSIS W/ REFLEX CULTURE    Collection Time: 02/08/21  4:06 AM    Specimen: Urine   Result Value Ref Range    Color YELLOW/STRAW      Appearance CLEAR CLEAR      Specific gravity 1.010 1.003 - 1.030      pH (UA) 6.5 5.0 - 8.0      Protein Negative NEG mg/dL    Glucose Negative NEG mg/dL    Ketone TRACE (A) NEG mg/dL    Bilirubin Negative NEG      Blood Negative NEG      Urobilinogen 0.2 0.2 - 1.0 EU/dL    Nitrites Negative NEG      Leukocyte Esterase Negative NEG      WBC 0-4 0 - 4 /hpf    RBC 0-5 0 - 5 /hpf    Epithelial cells FEW FEW /lpf    Bacteria Negative NEG /hpf    UA:UC IF INDICATED CULTURE NOT INDICATED BY UA RESULT CNI      Hyaline cast 0-2 0 - 5 /lpf   EKG, 12 LEAD, SUBSEQUENT    Collection Time: 02/08/21  5:34 AM   Result Value Ref Range    Ventricular Rate 96 BPM    Atrial Rate 96 BPM    P-R Interval 162 ms    QRS Duration 84 ms    Q-T Interval 376 ms    QTC Calculation (Bezet) 475 ms    Calculated P Axis 93 degrees    Calculated R Axis 68 degrees    Calculated T Axis -169 degrees    Diagnosis       Normal sinus rhythm  Possible Left atrial enlargement  Septal infarct , age undetermined  Lateral infarct , possibly acute  ** ** ACUTE MI / STEMI ** **  When compared with ECG of 08-FEB-2021 03:59,  MANUAL COMPARISON REQUIRED, DATA IS UNCONFIRMED     TROPONIN I    Collection Time: 02/08/21  5:46 AM   Result Value Ref Range    Troponin-I, Qt. 0.90 (H) <0.05 ng/mL   PTT    Collection Time: 02/08/21  7:38 AM   Result Value Ref Range    aPTT 26.2 22.1 - 31.0 sec    aPTT, therapeutic range     58.0 - 77.0 SECS   POC LACTIC ACID    Collection Time: 02/08/21  7:48 AM   Result Value Ref Range    Lactic Acid (POC) 1.06 0.40 - 2.00 mmol/L       IMAGING RESULTS:  I have personally reviewed the imaging reports      Total time spent with patient: 50 minutes ________________________________________________________________________  Care Plan discussed with:  Patient x   Family  x   RN x              Consultant:       CT  2/8/2021:  ________________________________________________________________________    ___________________________________________________  Consulting Provider: Rose Bethea NP      2/8/2021  10:27 AM

## 2021-02-08 NOTE — ED NOTES
Bedside shift change report given to Wilder Zimmer (oncoming nurse) by Bharathi Jarquin (offgoing nurse). Report included the following information SBAR, Kardex, ED Summary, [facility] and Recent Results.

## 2021-02-09 LAB
ANION GAP SERPL CALC-SCNC: 13 MMOL/L (ref 5–15)
APTT PPP: 43.7 SEC (ref 22.1–31)
APTT PPP: 60.1 SEC (ref 22.1–31)
APTT PPP: 62.9 SEC (ref 22.1–31)
ATRIAL RATE: 101 BPM
ATRIAL RATE: 88 BPM
BUN SERPL-MCNC: 20 MG/DL (ref 6–20)
BUN/CREAT SERPL: 17 (ref 12–20)
CALCIUM SERPL-MCNC: 8.8 MG/DL (ref 8.5–10.1)
CALCULATED P AXIS, ECG09: 74 DEGREES
CALCULATED P AXIS, ECG09: 75 DEGREES
CALCULATED R AXIS, ECG10: 27 DEGREES
CALCULATED R AXIS, ECG10: 69 DEGREES
CALCULATED T AXIS, ECG11: 130 DEGREES
CALCULATED T AXIS, ECG11: 75 DEGREES
CHLORIDE SERPL-SCNC: 96 MMOL/L (ref 97–108)
CO2 SERPL-SCNC: 17 MMOL/L (ref 21–32)
CREAT SERPL-MCNC: 1.16 MG/DL (ref 0.55–1.02)
DIAGNOSIS, 93000: NORMAL
DIAGNOSIS, 93000: NORMAL
ECHO AO ROOT DIAM: 3.01 CM
ECHO EST RA PRESSURE: 10 MMHG
ECHO LA MAJOR AXIS: 3.25 CM
ECHO LA MINOR AXIS: 2.13 CM
ECHO LV INTERNAL DIMENSION DIASTOLIC MMODE: 3.73 CM
ECHO LV INTERNAL DIMENSION DIASTOLIC: 3.13 CM (ref 3.9–5.3)
ECHO LV INTERNAL DIMENSION SYSTOLIC MMODE: 2.84 CM
ECHO LV INTERNAL DIMENSION SYSTOLIC: 2.04 CM
ECHO LV IVSD MMODE: 1.37 CM
ECHO LV IVSD: 1.73 CM (ref 0.6–0.9)
ECHO LV IVSS MMODE: 1.62 CM
ECHO LV IVSS: 1.86 CM
ECHO LV MASS 2D: 182.1 G (ref 67–162)
ECHO LV MASS INDEX 2D: 119.3 G/M2 (ref 43–95)
ECHO LV POSTERIOR WALL DIASTOLIC MMODE: 1.56 CM
ECHO LV POSTERIOR WALL DIASTOLIC: 1.43 CM (ref 0.6–0.9)
ECHO LV POSTERIOR WALL SYSTOLIC: 2.09 CM
ECHO LVOT DIAM: 2.13 CM
ECHO MV E DECELERATION TIME (DT): 89.61 MS
ECHO MV E VELOCITY: 61.69 CM/S
ECHO PV MAX VELOCITY: 80.02 CM/S
ECHO PV PEAK INSTANTANEOUS GRADIENT SYSTOLIC: 2.56 MMHG
ECHO PV REGURGITANT MAX VELOCITY: 107.9 CM/S
ECHO RIGHT VENTRICULAR SYSTOLIC PRESSURE (RVSP): 29.13 MMHG
ECHO RV INTERNAL DIMENSION: 2.12 CM
ECHO TV REGURGITANT MAX VELOCITY: 218.69 CM/S
ECHO TV REGURGITANT PEAK GRADIENT: 19.13 MMHG
ERYTHROCYTE [DISTWIDTH] IN BLOOD BY AUTOMATED COUNT: 14.3 % (ref 11.5–14.5)
GLUCOSE BLD STRIP.AUTO-MCNC: 109 MG/DL (ref 65–100)
GLUCOSE BLD STRIP.AUTO-MCNC: 112 MG/DL (ref 65–100)
GLUCOSE BLD STRIP.AUTO-MCNC: 121 MG/DL (ref 65–100)
GLUCOSE BLD STRIP.AUTO-MCNC: 189 MG/DL (ref 65–100)
GLUCOSE SERPL-MCNC: 91 MG/DL (ref 65–100)
HCT VFR BLD AUTO: 44.4 % (ref 35–47)
HGB BLD-MCNC: 14.3 G/DL (ref 11.5–16)
MCH RBC QN AUTO: 32.5 PG (ref 26–34)
MCHC RBC AUTO-ENTMCNC: 32.2 G/DL (ref 30–36.5)
MCV RBC AUTO: 100.9 FL (ref 80–99)
NRBC # BLD: 0 K/UL (ref 0–0.01)
NRBC BLD-RTO: 0 PER 100 WBC
P-R INTERVAL, ECG05: 152 MS
P-R INTERVAL, ECG05: 156 MS
PLATELET # BLD AUTO: 359 K/UL (ref 150–400)
PMV BLD AUTO: 9.5 FL (ref 8.9–12.9)
POTASSIUM SERPL-SCNC: 4.3 MMOL/L (ref 3.5–5.1)
Q-T INTERVAL, ECG07: 354 MS
Q-T INTERVAL, ECG07: 406 MS
QRS DURATION, ECG06: 86 MS
QRS DURATION, ECG06: 86 MS
QTC CALCULATION (BEZET), ECG08: 459 MS
QTC CALCULATION (BEZET), ECG08: 491 MS
RBC # BLD AUTO: 4.4 M/UL (ref 3.8–5.2)
SERVICE CMNT-IMP: ABNORMAL
SODIUM SERPL-SCNC: 126 MMOL/L (ref 136–145)
THERAPEUTIC RANGE,PTTT: ABNORMAL SECS (ref 58–77)
VENTRICULAR RATE, ECG03: 101 BPM
VENTRICULAR RATE, ECG03: 88 BPM
WBC # BLD AUTO: 16.8 K/UL (ref 3.6–11)

## 2021-02-09 PROCEDURE — 85730 THROMBOPLASTIN TIME PARTIAL: CPT

## 2021-02-09 PROCEDURE — 99233 SBSQ HOSP IP/OBS HIGH 50: CPT | Performed by: INTERNAL MEDICINE

## 2021-02-09 PROCEDURE — 74011250637 HC RX REV CODE- 250/637: Performed by: NURSE PRACTITIONER

## 2021-02-09 PROCEDURE — 80048 BASIC METABOLIC PNL TOTAL CA: CPT

## 2021-02-09 PROCEDURE — 74011000250 HC RX REV CODE- 250: Performed by: NURSE PRACTITIONER

## 2021-02-09 PROCEDURE — 74011250636 HC RX REV CODE- 250/636: Performed by: EMERGENCY MEDICINE

## 2021-02-09 PROCEDURE — 74011250636 HC RX REV CODE- 250/636: Performed by: STUDENT IN AN ORGANIZED HEALTH CARE EDUCATION/TRAINING PROGRAM

## 2021-02-09 PROCEDURE — 74011250636 HC RX REV CODE- 250/636: Performed by: NURSE PRACTITIONER

## 2021-02-09 PROCEDURE — 82962 GLUCOSE BLOOD TEST: CPT

## 2021-02-09 PROCEDURE — 85027 COMPLETE CBC AUTOMATED: CPT

## 2021-02-09 PROCEDURE — 36415 COLL VENOUS BLD VENIPUNCTURE: CPT

## 2021-02-09 PROCEDURE — 74011250637 HC RX REV CODE- 250/637: Performed by: STUDENT IN AN ORGANIZED HEALTH CARE EDUCATION/TRAINING PROGRAM

## 2021-02-09 PROCEDURE — 65660000001 HC RM ICU INTERMED STEPDOWN

## 2021-02-09 RX ORDER — CARVEDILOL 6.25 MG/1
6.25 TABLET ORAL 2 TIMES DAILY WITH MEALS
Status: DISCONTINUED | OUTPATIENT
Start: 2021-02-09 | End: 2021-02-12 | Stop reason: HOSPADM

## 2021-02-09 RX ORDER — SODIUM CHLORIDE 9 MG/ML
50 INJECTION, SOLUTION INTRAVENOUS CONTINUOUS
Status: DISCONTINUED | OUTPATIENT
Start: 2021-02-09 | End: 2021-02-12 | Stop reason: HOSPADM

## 2021-02-09 RX ADMIN — HYDROCODONE BITARTRATE AND ACETAMINOPHEN 1 TABLET: 10; 325 TABLET ORAL at 17:43

## 2021-02-09 RX ADMIN — Medication 10 ML: at 21:19

## 2021-02-09 RX ADMIN — RIFAXIMIN 550 MG: 550 TABLET ORAL at 15:54

## 2021-02-09 RX ADMIN — Medication 10 ML: at 00:48

## 2021-02-09 RX ADMIN — GABAPENTIN 300 MG: 300 CAPSULE ORAL at 10:15

## 2021-02-09 RX ADMIN — HYDROCODONE BITARTRATE AND ACETAMINOPHEN 1 TABLET: 10; 325 TABLET ORAL at 00:45

## 2021-02-09 RX ADMIN — RIFAXIMIN 550 MG: 550 TABLET ORAL at 21:14

## 2021-02-09 RX ADMIN — METRONIDAZOLE 500 MG: 500 INJECTION, SOLUTION INTRAVENOUS at 17:47

## 2021-02-09 RX ADMIN — METOPROLOL TARTRATE 5 MG: 5 INJECTION INTRAVENOUS at 05:15

## 2021-02-09 RX ADMIN — CARVEDILOL 6.25 MG: 6.25 TABLET, FILM COATED ORAL at 12:55

## 2021-02-09 RX ADMIN — PANTOPRAZOLE SODIUM 40 MG: 40 TABLET, DELAYED RELEASE ORAL at 10:16

## 2021-02-09 RX ADMIN — DICYCLOMINE HYDROCHLORIDE 10 MG: 10 CAPSULE ORAL at 10:15

## 2021-02-09 RX ADMIN — DICYCLOMINE HYDROCHLORIDE 10 MG: 10 CAPSULE ORAL at 15:54

## 2021-02-09 RX ADMIN — METOPROLOL TARTRATE 5 MG: 5 INJECTION INTRAVENOUS at 00:49

## 2021-02-09 RX ADMIN — ACETAMINOPHEN 650 MG: 325 TABLET ORAL at 21:14

## 2021-02-09 RX ADMIN — HYDROCODONE BITARTRATE AND ACETAMINOPHEN 1 TABLET: 10; 325 TABLET ORAL at 23:55

## 2021-02-09 RX ADMIN — GABAPENTIN 300 MG: 300 CAPSULE ORAL at 15:54

## 2021-02-09 RX ADMIN — HYDROCODONE BITARTRATE AND ACETAMINOPHEN 1 TABLET: 10; 325 TABLET ORAL at 12:52

## 2021-02-09 RX ADMIN — HYDROXYCHLOROQUINE SULFATE 200 MG: 200 TABLET ORAL at 10:16

## 2021-02-09 RX ADMIN — SODIUM CHLORIDE 75 ML/HR: 9 INJECTION, SOLUTION INTRAVENOUS at 11:27

## 2021-02-09 RX ADMIN — AMLODIPINE BESYLATE 5 MG: 5 TABLET ORAL at 10:16

## 2021-02-09 RX ADMIN — ACETAMINOPHEN 650 MG: 325 TABLET ORAL at 15:54

## 2021-02-09 RX ADMIN — Medication 10 ML: at 05:15

## 2021-02-09 RX ADMIN — RIFAXIMIN 550 MG: 550 TABLET ORAL at 10:16

## 2021-02-09 RX ADMIN — GABAPENTIN 300 MG: 300 CAPSULE ORAL at 21:14

## 2021-02-09 RX ADMIN — THERA TABS 1 TABLET: TAB at 10:15

## 2021-02-09 RX ADMIN — FOLIC ACID 1 MG: 1 TABLET ORAL at 10:16

## 2021-02-09 RX ADMIN — Medication 10 ML: at 15:55

## 2021-02-09 RX ADMIN — METRONIDAZOLE 500 MG: 500 INJECTION, SOLUTION INTRAVENOUS at 05:14

## 2021-02-09 RX ADMIN — HEPARIN SODIUM 1450 UNITS: 5000 INJECTION INTRAVENOUS; SUBCUTANEOUS at 04:02

## 2021-02-09 RX ADMIN — HYDROCHLOROTHIAZIDE 25 MG: 25 TABLET ORAL at 10:15

## 2021-02-09 RX ADMIN — ATORVASTATIN CALCIUM 80 MG: 40 TABLET, FILM COATED ORAL at 10:15

## 2021-02-09 RX ADMIN — HYDROCODONE BITARTRATE AND ACETAMINOPHEN 1 TABLET: 10; 325 TABLET ORAL at 06:04

## 2021-02-09 RX ADMIN — BUSPIRONE HYDROCHLORIDE 5 MG: 5 TABLET ORAL at 10:16

## 2021-02-09 RX ADMIN — ASPIRIN 81 MG: 81 TABLET, CHEWABLE ORAL at 10:15

## 2021-02-09 RX ADMIN — DICYCLOMINE HYDROCHLORIDE 10 MG: 10 CAPSULE ORAL at 21:14

## 2021-02-09 RX ADMIN — Medication 10 ML: at 00:49

## 2021-02-09 RX ADMIN — HEPARIN SODIUM AND DEXTROSE 18 UNITS/KG/HR: 10000; 5 INJECTION INTRAVENOUS at 12:50

## 2021-02-09 RX ADMIN — THIAMINE HCL TAB 100 MG 100 MG: 100 TAB at 10:15

## 2021-02-09 RX ADMIN — DULOXETINE HYDROCHLORIDE 60 MG: 30 CAPSULE, DELAYED RELEASE ORAL at 10:15

## 2021-02-09 RX ADMIN — CARVEDILOL 6.25 MG: 6.25 TABLET, FILM COATED ORAL at 17:43

## 2021-02-09 RX ADMIN — LEVOFLOXACIN 750 MG: 5 INJECTION, SOLUTION INTRAVENOUS at 12:53

## 2021-02-09 NOTE — ED NOTES
TRANSFER - OUT REPORT:    Verbal report given to Kavitha RN (name) on Colin Mosley  being transferred to room 2216(unit) for routine progression of care       Report consisted of patients Situation, Background, Assessment and   Recommendations(SBAR). Information from the following report(s) SBAR, Kardex, ED Summary, Recent Results and Med Rec Status was reviewed with the receiving nurse. Lines:   Peripheral IV 02/08/21 Right Arm (Active)       Peripheral IV 79/55/71 Left Basilic (Active)   Site Assessment Clean, dry, & intact 02/08/21 0752   Phlebitis Assessment 0 02/08/21 0752   Infiltration Assessment 0 02/08/21 0752   Dressing Status Clean, dry, & intact 02/08/21 0752   Dressing Type Transparent 02/08/21 0752   Hub Color/Line Status Flushed;Capped 02/08/21 8667        Opportunity for questions and clarification was provided.       Patient transported with:   Nagi

## 2021-02-09 NOTE — PROGRESS NOTES
0700: End of Shift Note    Bedside shift change report given to NOBLE Pal (oncoming nurse) by Washington Roberts (offgoing nurse). Report included the following information SBAR, Kardex, Intake/Output, MAR, Recent Results and Cardiac Rhythm NSR    Shift worked:  Night     Shift summary and any significant changes:     Patient's heparin drip now running at 18 units/kg/hr     Concerns for physician to address:       Zone phone for oncoming shift:          Activity:  Activity Level: Up with Assistance  Number times ambulated in hallways past shift: 0  Number of times OOB to chair past shift: 1    Cardiac:   Cardiac Monitoring: Yes      Cardiac Rhythm: Normal sinus rhythm    Access:   Current line(s): PIV     Genitourinary:   Urinary status: voiding    Respiratory:   O2 Device: Room air  Chronic home O2 use?: NO  Incentive spirometer at bedside: NO     GI:     Current diet:  DIET CLEAR LIQUID  Passing flatus: YES  Tolerating current diet: YES       Pain Management:   Patient states pain is manageable on current regimen: YES    Skin:  Reji Score: 19  Interventions: increase time out of bed    Patient Safety:  Fall Score:  Total Score: 2  Interventions: bed/chair alarm, gripper socks and pt to call before getting OOB       Length of Stay:  Expected LOS: 2d 14h  Actual LOS: 1      Kavitha Parkinson

## 2021-02-09 NOTE — PROGRESS NOTES
Problem: Falls - Risk of  Goal: *Absence of Falls  Description: Document Bertin Melchor Fall Risk and appropriate interventions in the flowsheet.   Outcome: Progressing Towards Goal  Note: Fall Risk Interventions:            Medication Interventions: Bed/chair exit alarm, Evaluate medications/consider consulting pharmacy, Patient to call before getting OOB    Elimination Interventions: Bed/chair exit alarm, Call light in reach, Patient to call for help with toileting needs

## 2021-02-09 NOTE — ED NOTES
Bedside shift change report given to [id number]  (oncoming nurse) by Berna Devlin (offgoing nurse). Report included the following information SBAR, Kardex, ED Summary, Recent Results and Med Rec Status. Perfect Serve- requesting nicotine patch.

## 2021-02-09 NOTE — PROGRESS NOTES
GI PROGRESS NOTE  Washington Patterson NP  [phone] NP in-hospital cell phone M-F until 4:30  After 5pm or on weekends, please call  for physician on call    Stephanie Alonzo :1955 PFQ:[id number]   ATTG: Dr. Elena Morales   PCP: Fara Carlos MD  Date/Time:  2021 10:26 AM   Primary GI: Dr. Bethany Miller; Dr. Alvarado Herbert covering  Reason for following: N/V    Assessment:     Nausea and vomiting- resolved  · Patient states she has a hx of IBS. Nausea and vomiting that started after eating salmon and tomato soup. No active vomiting witness while in ED. · CT abd/pel shows mild hepatomegaly. Diffuse colonic wall thickening could indicate an infectious/inflammatory colitis, though could also be due to under distention  · Abd XR with no acute process   · Levaquin and Flagyl ordered by primary team      GI History:  2021- UGI was within normal limits, started on Xifaxin   2020- EGD with Dr. Bethany Miller showing gastritis and candida; recommended PPI and fluconazole  2018- EGD with Dr. Bethany Miller showing reflux and candida; recommended PPI and fluconazole  2018- Colonoscopy with Dr. Bethany Miller showing adenoma; recommended repeat in 3 years  2018- GES within normal limits  2017- EGD with Dr. Izabella Wood that was negative     NSTEMI  · Treatment per cardiology team     Plan:     · No direct plans for repeat EGD at this time. No plans for repeat GES during admission; results not accurate while having acute symptoms   · Continue PPI   · Continue Xifaxin 550 mg PO TID  · Continue IV Levaquin and Flagyl  · Anti-emetics PRN  · Symptomatic care per primary team   *Plan of care discussed with Dr. Alvarado Herbert      Subjective:   Discussed with RN events overnight. Patient sitting up in bed with  at bedside. Reports feeling a lot better. No longer with nausea or vomiting. Tolerated CLD.       Review of Systems:  Symptom Y/N Comments  Symptom Y/N Comments   Fever/Chills n   Chest Pain n    Cough n   Headaches n Sputum n   Joint Pain n    SOB/COOMBS n   Pruritis/Rash n    Tolerating Diet y   Other       Could NOT obtain due to:      Objective:   VITALS:   Last 24hrs VS reviewed since prior progress note. Most recent are:  Visit Vitals  /67 (BP 1 Location: Right upper arm)   Pulse 93   Temp 97.3 °F (36.3 °C)   Resp 18   Ht 5' 6.14\" (1.68 m)   Wt 47.9 kg (105 lb 9.6 oz)   SpO2 98%   BMI 16.97 kg/m²       Intake/Output Summary (Last 24 hours) at 2/9/2021 1026  Last data filed at 2/8/2021 1842  Gross per 24 hour   Intake 340 ml   Output --   Net 340 ml     PHYSICAL EXAM:  General: Pleasant  female. NAD   HEENT: NC, Atraumatic. Anicteric sclerae. Lungs:  CTA Bilaterally. No Wheezing/Rhonchi/Rales. Heart:  Regular rate rhythm,  No murmur, No Rubs, No Gallops  Abdomen: Soft, Non distended, Non tender. +Bowel sounds, no HSM  Extremities: No c/c/e  Neurologic:  Alert and oriented X 3. No acute neurological distress   Psych:   Good insight. Not anxious nor agitated. Lab and Radiology Data Reviewed: (see below)    Medications Reviewed: (see below)  PMH/SH reviewed - no change compared to H&P  ________________________________________________________________________  Total time spent with patient: 30 minutes ________________________________________________________________________  Care Plan discussed with:  Patient x   Family     RN               Consultant:       Rose Bethea NP     Procedures: see electronic medical records for all procedures/Xrays and details which were not copied into this note but were reviewed prior to creation of Plan.       LABS:  Recent Labs     02/09/21 0214 02/08/21 0156   WBC 16.8* 9.0   HGB 14.3 10.5*   HCT 44.4 32.4*    304     Recent Labs     02/09/21 0214 02/08/21 0156   * 139   K 4.3 3.0*   CL 96* 110*   CO2 17* 19*   BUN 20 17   CREA 1.16* 0.69   GLU 91 89   CA 8.8 7.4*     Recent Labs     02/08/21 0156      TP 5.5*   ALB 3.0*   GLOB 2.5   LPSE 111     Recent Labs     02/09/21  0214 02/08/21 2031 02/08/21  1332   APTT 43.7* 64.1* 35.3*      No results for input(s): FE, TIBC, PSAT, FERR in the last 72 hours. No results found for: FOL, RBCF  No results for input(s): PH, PCO2, PO2 in the last 72 hours.   Recent Labs     02/08/21  0156   CPK 47   CKMB 2.1     Lab Results   Component Value Date/Time    Color YELLOW/STRAW 02/08/2021 04:06 AM    Appearance CLEAR 02/08/2021 04:06 AM    Specific gravity 1.010 02/08/2021 04:06 AM    pH (UA) 6.5 02/08/2021 04:06 AM    Protein Negative 02/08/2021 04:06 AM    Glucose Negative 02/08/2021 04:06 AM    Ketone TRACE (A) 02/08/2021 04:06 AM    Bilirubin Negative 02/08/2021 04:06 AM    Urobilinogen 0.2 02/08/2021 04:06 AM    Nitrites Negative 02/08/2021 04:06 AM    Leukocyte Esterase Negative 02/08/2021 04:06 AM    Epithelial cells FEW 02/08/2021 04:06 AM    Bacteria Negative 02/08/2021 04:06 AM    WBC 0-4 02/08/2021 04:06 AM    RBC 0-5 02/08/2021 04:06 AM       MEDICATIONS:  Current Facility-Administered Medications   Medication Dose Route Frequency    rifAXIMin (XIFAXAN) tablet 550 mg  550 mg Oral TID    0.9% sodium chloride infusion  75 mL/hr IntraVENous CONTINUOUS    carvediloL (COREG) tablet 6.25 mg  6.25 mg Oral BID WITH MEALS    heparin 25,000 units in D5W 250 ml infusion  12-25 Units/kg/hr IntraVENous TITRATE    heparin (porcine) injection 1,450 Units  30 Units/kg IntraVENous PRN    Or    heparin (porcine) injection 2,850 Units  60 Units/kg IntraVENous PRN    sodium chloride (NS) flush 5-40 mL  5-40 mL IntraVENous Q8H    sodium chloride (NS) flush 5-40 mL  5-40 mL IntraVENous PRN    acetaminophen (TYLENOL) tablet 650 mg  650 mg Oral Q6H PRN    Or    acetaminophen (TYLENOL) suppository 650 mg  650 mg Rectal Q6H PRN    polyethylene glycol (MIRALAX) packet 17 g  17 g Oral DAILY PRN    promethazine (PHENERGAN) tablet 12.5 mg  12.5 mg Oral Q6H PRN    Or    ondansetron (ZOFRAN) injection 4 mg  4 mg IntraVENous Q6H PRN    albuterol-ipratropium (DUO-NEB) 2.5 MG-0.5 MG/3 ML  3 mL Nebulization Q6H PRN    amLODIPine (NORVASC) tablet 5 mg  5 mg Oral DAILY    aspirin chewable tablet 81 mg  81 mg Oral DAILY    atorvastatin (LIPITOR) tablet 80 mg  80 mg Oral DAILY    busPIRone (BUSPAR) tablet 5 mg  5 mg Oral DAILY    dicyclomine (BENTYL) capsule 10 mg  10 mg Oral TID    DULoxetine (CYMBALTA) capsule 60 mg  60 mg Oral DAILY    gabapentin (NEURONTIN) capsule 300 mg  300 mg Oral TID    hydroCHLOROthiazide (HYDRODIURIL) tablet 25 mg  25 mg Oral DAILY    hydrOXYchloroQUINE (PLAQUENIL) tablet 200 mg  200 mg Oral DAILY WITH BREAKFAST    pantoprazole (PROTONIX) tablet 40 mg  40 mg Oral ACB    metroNIDAZOLE (FLAGYL) IVPB premix 500 mg  500 mg IntraVENous Q12H    LORazepam (ATIVAN) injection 1 mg  1 mg IntraVENous Q6H PRN    HYDROcodone-acetaminophen (NORCO)  mg tablet 1 Tab  1 Tab Oral Q6H PRN    levoFLOXacin (LEVAQUIN) 750 mg in D5W IVPB  750 mg IntraVENous K83V    folic acid (FOLVITE) tablet 1 mg  1 mg Oral DAILY    thiamine mononitrate (B-1) tablet 100 mg  100 mg Oral DAILY    therapeutic multivitamin (THERAGRAN) tablet 1 Tab  1 Tab Oral DAILY    nicotine (NICODERM CQ) 21 mg/24 hr patch 1 Patch  1 Patch TransDERmal DAILY

## 2021-02-09 NOTE — PROGRESS NOTES
Hospitalist Progress Note    NAME: Yuliana Gonzales   :  1955   MRN:  [id number]     Interim Hospital Summary: 76 y.o. female whom presented on 2021 with      Assessment / Plan:    NSTEMI (trop 2.29)  Hypertensive emergency  Hx of Takosubo CM  -continue asa statin  -continue coreg, norvasc  -heparin infusion  -for cardiac cath    Hypokalemia  Hyponatremia  -hold HCTZ for now    Nausea/vomiting  Hx IBS-D  -CT abdomen Diffuse colonic wall thickening could indicate an infectious/inflammatory  colitis, though could also be due to underdistention.  -had EGD () / Colonoscopy () showing gastritis, candida and colonic adenoma  -on IV levaquin / flagyl for now  -GI input appreciated. Continue Xifaxin TID    GERD  Anxiety  -PPI  -lorazepam prn  -continue psych meds    Smoker  Alcohol consumption  -nicoderm  -CIWA protocol    PC Malnutrition      less than 18.5 Underweight / Body mass index is 16.97 kg/m². Code status: Full  Prophylaxis: on heparin  Recommended Disposition: Home w/Family       Subjective:     Chief Complaint / Reason for Physician Visit  Follow up of  MI, IBS, HTN, smoking  Chart reviewed in detail. Discussed with RN events overnight. Review of Systems:  Symptom Y/N Comments  Symptom Y/N Comments   Fever/Chills    Chest Pain     Poor Appetite    Edema     Cough    Abdominal Pain     Sputum    Joint Pain     SOB/COOMBS    Pruritis/Rash     Nausea/vomit    Tolerating PT/OT     Diarrhea    Tolerating Diet     Constipation    Other       Could NOT obtain due to:      PO intake: No data found. Objective:     VITALS:   Last 24hrs VS reviewed since prior progress note.  Most recent are:  Patient Vitals for the past 24 hrs:   Temp Pulse Resp BP SpO2   21 1120 97.6 °F (36.4 °C) 100 18 93/81 99 %   21 0756 97.3 °F (36.3 °C) 93 18 101/67 98 %   21 0514 -- 96 -- 100/73 --   21 0216 97.9 °F (36.6 °C) 92 20 111/76 100 % 02/09/21 0049 -- (!) 104 -- 102/75 --   02/08/21 2125 98.2 °F (36.8 °C) 89 20 111/63 --   02/08/21 1900 -- 94 22 122/79 98 %   02/08/21 1800 -- 97 18 101/81 96 %   02/08/21 1740 -- (!) 121 -- 122/79 --   02/08/21 1700 -- (!) 113 22 122/79 99 %   02/08/21 1600 -- (!) 102 22 (!) 152/91 97 %   02/08/21 1300 98.5 °F (36.9 °C) (!) 103 28 (!) 150/102 98 %       Intake/Output Summary (Last 24 hours) at 2/9/2021 1230  Last data filed at 2/8/2021 1842  Gross per 24 hour   Intake 340 ml   Output --   Net 340 ml        I had a face to face encounter, and independently examined this patient on 2/9/2021, as outlined below:  PHYSICAL EXAM:  General: WD, WN. Alert, cooperative, no acute distress    EENT:  EOMI. Anicteric sclerae. MMM  Resp:  CTA bilaterally, no wheezing or rales. No accessory muscle use  CV:  Regular  rhythm,  No edema  GI:  Soft, Non distended, Non tender. +Bowel sounds  Neurologic:  Alert and oriented X 3, normal speech,   Psych:   Good insight. Not anxious nor agitated  Skin:  No rashes. No jaundice    Reviewed most current lab test results and cultures  YES  Reviewed most current radiology test results   YES  Review and summation of old records today    NO  Reviewed patient's current orders and MAR    YES  PMH/SH reviewed - no change compared to H&P  ________________________________________________________________________  Care Plan discussed with:    Comments   Patient x    Family      RN     Care Manager     Consultant                        Multidiciplinary team rounds were held today with , nursing, pharmacist and clinical coordinator. Patient's plan of care was discussed; medications were reviewed and discharge planning was addressed.      ________________________________________________________________________  Total NON critical care TIME:  25   Minutes    Total CRITICAL CARE TIME Spent:   Minutes non procedure based      Comments   >50% of visit spent in counseling and coordination of care x     This includes time during multidisciplinary rounds if indicated above   ________________________________________________________________________  Ada Gudino MD     Procedures: see electronic medical records for all procedures/Xrays and details which were not copied into this note but were reviewed prior to creation of Plan. LABS:  I reviewed today's most current labs and imaging studies.   Pertinent labs include:  Recent Labs     02/09/21 0214 02/08/21 0156   WBC 16.8* 9.0   HGB 14.3 10.5*   HCT 44.4 32.4*    304     Recent Labs     02/09/21 0214 02/08/21 0156   * 139   K 4.3 3.0*   CL 96* 110*   CO2 17* 19*   GLU 91 89   BUN 20 17   CREA 1.16* 0.69   CA 8.8 7.4*   ALB  --  3.0*   TBILI  --  0.2   ALT  --  33

## 2021-02-09 NOTE — PROGRESS NOTES
Comprehensive Nutrition Assessment    Type and Reason for Visit: Initial, Consult    Nutrition Recommendations/Plan:   · Resume diet as able  · Add Ensure clear to promote intake  · RD provided IBS diet information. Will f/u to answer additional questions  · Recommend f/u with outpatient RD. Contact info provided to pt    Nutrition Assessment:      Consult received for unintentional weight loss. Chart reviewed and case discussed during IDR. Pt medically noted for NSTEMI, IBS, GERD, RA, Takotsubo cardiomyopathy. Pt NPO today for cardiac cath. Per H&P, pt consumes 2, 12oz glasses of wine daily. RD visited bedside. Pt reports poor intake PTA d/t N/V/D she relates to her IBS. Pt reports 30lb weight loss as a result. EMR shows 15lb (12.5%) weight loss in the past 3 months. This is severe for the time frame. NFPE revealed mild to moderate muscle and fat wasting and pt reports being significantly weaker. This pt meets ASPEN criteria for malnutrition. She requested diet information to better manage her IBS. RD provided education, resources and encouraged pt to f/u with OP RD. Pt does not want to lose any more weight and wants to regain her strength. RD encouraged high kcal protein supplements as able. Pt hesitant to take Ensure d/t lactose intolerance, though Ensure shakes are suitable for lactose intolerance. She is agreeable to Ensure clear. Will order when diet resumes. Wt Readings from Last 5 Encounters:   02/08/21 47.9 kg (105 lb 9.6 oz)   12/15/20 52.8 kg (116 lb 6.4 oz)   11/02/20 54.4 kg (120 lb)   06/23/20 56.3 kg (124 lb 3.2 oz)   02/12/20 55.7 kg (122 lb 12.8 oz)   ]    Malnutrition Assessment:  Malnutrition Status:   Moderate malnutrition    Context:  Chronic illness     Findings of the 6 clinical characteristics of malnutrition:   Energy Intake:  7 - 75% or less est energy requirements for 1 month or longer  Weight Loss:  7.0 - Greater than 7.5% over 3 months     Body Fat Loss:  1 - Mild body fat loss, Triceps Muscle Mass Loss:  1 - Mild muscle mass loss, Calf (gastrocnemius), Clavicles (pectoralis &deltoids), Temples (temporalis), Scapula (trapezius)  Fluid Accumulation:  No significant fluid accumulation,     Strength:  Not performed(pt reported decreased strength)         Estimated Daily Nutrient Needs:  Energy (kcal): 1649 kcal (BMR x 1.3 AF + 300); Weight Used for Energy Requirements: Current  Protein (g): 48-57g (1-1.2g/kg); Weight Used for Protein Requirements: Current  Fluid (ml/day): 1600mL; Method Used for Fluid Requirements: 1 ml/kcal      Nutrition Related Findings:  Labs: Na 126. Meds: NS, bentyl, folvite, hydrodiuril, norco, levaquin, flagyl, protonix, MVI, thiamine.       Wounds:    None       Current Nutrition Therapies:  DIET NPO Except Meds, With Sips of Clear Fluids    Anthropometric Measures:  · Height:  5' 6.14\" (168 cm)  · Current Body Wt:  47.9 kg (105 lb 9.6 oz)   · Ideal Body Wt:  131 lbs:  80.6 %   · BMI Category:  Underweight (BMI less than 22) age over 72       Nutrition Diagnosis:   · Moderate malnutrition, In context of chronic illness related to altered GI function, inadequate protein-energy intake as evidenced by weight loss greater than or equal to 10% in 6 months, poor intake prior to admission, mild muscle loss, mild loss of subcutaneous fat, vomiting, diarrhea(pt reported decrease in strength)      Nutrition Interventions:   Food and/or Nutrient Delivery: Start oral diet  Nutrition Education and Counseling: Education completed  Coordination of Nutrition Care: Continue to monitor while inpatient    Goals:  Resume diet with intake >50% meals and supplements, and improved tolerance of meals/supplements w/ decreased N/V/D next 4-6 days       Nutrition Monitoring and Evaluation:   Behavioral-Environmental Outcomes: None identified  Food/Nutrient Intake Outcomes: Diet advancement/tolerance  Physical Signs/Symptoms Outcomes: Biochemical data, GI status, Weight, Nutrition focused physical findings    Discharge Planning:    Recommend pursue outpatient nutrition counseling, Too soon to determine     Electronically signed by Warden Arleen RD on 2/9/2021 at 2:04 PM    Contact: [phone]  Pager [phone]

## 2021-02-09 NOTE — PROGRESS NOTES
YASHIRA Plan    *Disposition: Home with spouse  *OP F/U: PCP  *Transport at d/c: Spouse to transport      Reason for Admission:   Abdominal Pain                  RUR Score:     19%             PCP: First and Last name:  Ciro Cheng   Name of Practice: [facility]   Are you a current patient: Yes/No: Yes   Approximate date of last visit: A week ago   Can you participate in a virtual visit if needed: Yes    Do you (patient/family) have any concerns for transition/discharge? Pt has no concerns for transition/discharge              Plan for utilizing home health:   TBD    Current Advanced Directive/Advance Care Plan:            Mitesh 13 (ACP) Conversation      Date of Conversation: 02/09/2021  Conducted with: Johnathon Aleman 94 Maker:Tyshawn Meléndez-[phone]    Click here to complete [address] including selection of the Healthcare Decision Maker Relationship (ie \"Primary\")  Today we discussed [address]. The patient is considering options. Content/Action Overview:   DECLINED ACP conversation - will revisit periodically   Reviewed DNR/DNI and patient elects Full Code (Attempt Resuscitation)         Length of Voluntary ACP Conversation in minutes:  <16 minutes (Non-Billable)    Emerson Chapman         Transition of Care Plan:          Pt is a 76 y.o. female who was admitted to [facility] with a dx of abdominal pain. PMHx includes acid reflux, arthritis, chronic pain, and etc. CM confirmed demographics with pt. Pt lives with spouse with a two story home with 2 steps to enter and 13 steps to the second floor. Pt has no hx of using home health and outpatient rehab or being admitted to a SNF or inpatient rehab. Pt has canes and a shower chair. Pt is independent in her ADLs and IADLs. Pt drives herself to her medical appointments. Pt uses [employer] in [place] [employer].  At the time of d/c spouse will transport. CM will continue to follow and assist with d/c planning. Care Management Interventions  PCP Verified by CM: Yes(Dr. Martin Avendano)  Mode of Transport at Discharge: Other (see comment)(Spouse to transport at the time of d/c)  Transition of Care Consult (CM Consult): Discharge Planning(Home with spouse)  Discharge Durable Medical Equipment: No(Canes and a shower chair)  Physical Therapy Consult: No  Occupational Therapy Consult: No  Current Support Network: Lives with Spouse(Spouse is very supportive and involved)  Confirm Follow Up Transport: Self(Pt drives herself to her medical appointments)  Discharge Location  Discharge Placement: Home with family assistance    Nelson Johnston..   Care Manager [address] [facility]  [phone]

## 2021-02-09 NOTE — PROGRESS NOTES
0700: Bedside shift change report given to Demarco RN (oncoming nurse) by Jonna El RN (offgoing nurse). Report included the following information SBAR and Kardex. 1300: Bedside shift change report given to Tavares Donnelly RN (oncoming nurse) by Teresa Johnson RN (offgoing nurse). Report included the following information SBAR and Kardex.

## 2021-02-09 NOTE — PROGRESS NOTES
[address]  [phone]      Cardiology Progress Note      2/9/2021 9:22 AM    Admit Date: 2/8/2021    Admit Diagnosis:   NSTEMI (non-ST elevated myocardial infarction) ([address] [I21.4]    Subjective:     Colin Mosley has no c/o CP, SOB, and did not have CP prior to admission, just n/v/diarrhea. Troponin 2.3.  EF 35-40%.     Dr. Lavonne Flores for cardiac cath this PM.     Visit Vitals  /67 (BP 1 Location: Right upper arm)   Pulse 93   Temp 97.3 °F (36.3 °C)   Resp 18   Ht 5' 6.14\" (1.68 m)   Wt 105 lb 9.6 oz (47.9 kg)   SpO2 98%   BMI 16.97 kg/m²       Current Facility-Administered Medications   Medication Dose Route Frequency    rifAXIMin (XIFAXAN) tablet 550 mg  550 mg Oral TID    0.9% sodium chloride infusion  75 mL/hr IntraVENous CONTINUOUS    carvediloL (COREG) tablet 6.25 mg  6.25 mg Oral BID WITH MEALS    heparin 25,000 units in D5W 250 ml infusion  12-25 Units/kg/hr IntraVENous TITRATE    heparin (porcine) injection 1,450 Units  30 Units/kg IntraVENous PRN    Or    heparin (porcine) injection 2,850 Units  60 Units/kg IntraVENous PRN    sodium chloride (NS) flush 5-40 mL  5-40 mL IntraVENous Q8H    sodium chloride (NS) flush 5-40 mL  5-40 mL IntraVENous PRN    acetaminophen (TYLENOL) tablet 650 mg  650 mg Oral Q6H PRN    Or    acetaminophen (TYLENOL) suppository 650 mg  650 mg Rectal Q6H PRN    polyethylene glycol (MIRALAX) packet 17 g  17 g Oral DAILY PRN    promethazine (PHENERGAN) tablet 12.5 mg  12.5 mg Oral Q6H PRN    Or    ondansetron (ZOFRAN) injection 4 mg  4 mg IntraVENous Q6H PRN    albuterol-ipratropium (DUO-NEB) 2.5 MG-0.5 MG/3 ML  3 mL Nebulization Q6H PRN    amLODIPine (NORVASC) tablet 5 mg  5 mg Oral DAILY    aspirin chewable tablet 81 mg  81 mg Oral DAILY    atorvastatin (LIPITOR) tablet 80 mg  80 mg Oral DAILY    busPIRone (BUSPAR) tablet 5 mg  5 mg Oral DAILY    dicyclomine (BENTYL) capsule 10 mg  10 mg Oral TID  DULoxetine (CYMBALTA) capsule 60 mg  60 mg Oral DAILY    gabapentin (NEURONTIN) capsule 300 mg  300 mg Oral TID    hydroCHLOROthiazide (HYDRODIURIL) tablet 25 mg  25 mg Oral DAILY    hydrOXYchloroQUINE (PLAQUENIL) tablet 200 mg  200 mg Oral DAILY WITH BREAKFAST    pantoprazole (PROTONIX) tablet 40 mg  40 mg Oral ACB    metroNIDAZOLE (FLAGYL) IVPB premix 500 mg  500 mg IntraVENous Q12H    LORazepam (ATIVAN) injection 1 mg  1 mg IntraVENous Q6H PRN    HYDROcodone-acetaminophen (NORCO)  mg tablet 1 Tab  1 Tab Oral Q6H PRN    levoFLOXacin (LEVAQUIN) 750 mg in D5W IVPB  750 mg IntraVENous V56O    folic acid (FOLVITE) tablet 1 mg  1 mg Oral DAILY    thiamine mononitrate (B-1) tablet 100 mg  100 mg Oral DAILY    therapeutic multivitamin (THERAGRAN) tablet 1 Tab  1 Tab Oral DAILY    nicotine (NICODERM CQ) 21 mg/24 hr patch 1 Patch  1 Patch TransDERmal DAILY       Objective:      Physical Exam:  General Appearance:  older cachectic  female in no acute distress  Chest:   Clear  Cardiovascular:  tachy no murmur. Abdomen:   Soft, non-tender, bowel sounds are active. Extremities: no peripheral edema  Skin:  Warm and dry. Data Review:   Recent Labs     02/09/21 0214 02/08/21  0156   WBC 16.8* 9.0   HGB 14.3 10.5*   HCT 44.4 32.4*    304     Recent Labs     02/09/21  0214 02/08/21  0156   * 139   K 4.3 3.0*   CL 96* 110*   CO2 17* 19*   GLU 91 89   BUN 20 17   CREA 1.16* 0.69   CA 8.8 7.4*   ALB  --  3.0*   TBILI  --  0.2   ALT  --  33       Recent Labs     02/08/21  2031 02/08/21  1332 02/08/21  0546 02/08/21  0156   TROIQ 2.29* 2.11* 0.90* 0.27*   CPK  --   --   --  47   CKMB  --   --   --  2.1         Intake/Output Summary (Last 24 hours) at 2/9/2021 0932  Last data filed at 2/8/2021 1842  Gross per 24 hour   Intake 340 ml   Output --   Net 340 ml        Telemetry: SR    Echo: 2/8/2021  · LV: Estimated LVEF is 35 - 40%. Visually measured ejection fraction. Normal cavity size. Mild concentric hypertrophy. Moderate-to-severely and segmentally reduced systolic function. Left ventricular diastolic dysfunction. · LA: Dilated left atrium. · Pericardium: Trivial pericardial effusion. · Contrast used: DEFINITY. Assessment:     Active Problems:    HTN (hypertension) (10/14/2012)      Nausea & vomiting (11/14/2015)      NSTEMI (non-ST elevated myocardial infarction) ([facility] (11/14/2015)      Tobacco abuse (11/16/2015)      IBS (irritable bowel syndrome) (2/8/2021)      Elevated troponin (2/8/2021)        Plan:     Elevated troponin/NSTEMI Type 2:  Troponin continues to rise at 2.3. Dr. Deepa Craig to discuss cardiac cath this afternoon for further ischemic evaluation. Previous admission in 11/2015 similar presentation:  N/v with elevated troponin 0.11-0.88  At that time cardiac cath showed normal cors with takotsubo CM.    Echo EF 35-40%  Continue on ASA, statin, BB.      HTN:  Much better controlled today  Continue on HCTZ and BB      Lisa Hastings [facility]  Cardiology

## 2021-02-10 PROBLEM — I42.9 CARDIOMYOPATHY (HCC): Status: ACTIVE | Noted: 2021-02-10

## 2021-02-10 LAB
ANION GAP SERPL CALC-SCNC: 10 MMOL/L (ref 5–15)
ANION GAP SERPL CALC-SCNC: 8 MMOL/L (ref 5–15)
APTT PPP: 50.1 SEC (ref 22.1–31)
BASOPHILS # BLD: 0 K/UL (ref 0–0.1)
BASOPHILS NFR BLD: 0 % (ref 0–1)
BUN SERPL-MCNC: 24 MG/DL (ref 6–20)
BUN SERPL-MCNC: 30 MG/DL (ref 6–20)
BUN/CREAT SERPL: 16 (ref 12–20)
BUN/CREAT SERPL: 17 (ref 12–20)
CALCIUM SERPL-MCNC: 8.5 MG/DL (ref 8.5–10.1)
CALCIUM SERPL-MCNC: 8.5 MG/DL (ref 8.5–10.1)
CHLORIDE SERPL-SCNC: 102 MMOL/L (ref 97–108)
CHLORIDE SERPL-SCNC: 98 MMOL/L (ref 97–108)
CO2 SERPL-SCNC: 21 MMOL/L (ref 21–32)
CO2 SERPL-SCNC: 22 MMOL/L (ref 21–32)
CREAT SERPL-MCNC: 1.5 MG/DL (ref 0.55–1.02)
CREAT SERPL-MCNC: 1.8 MG/DL (ref 0.55–1.02)
DIFFERENTIAL METHOD BLD: ABNORMAL
EOSINOPHIL # BLD: 0 K/UL (ref 0–0.4)
EOSINOPHIL NFR BLD: 0 % (ref 0–7)
ERYTHROCYTE [DISTWIDTH] IN BLOOD BY AUTOMATED COUNT: 14.1 % (ref 11.5–14.5)
GLUCOSE BLD STRIP.AUTO-MCNC: 111 MG/DL (ref 65–100)
GLUCOSE BLD STRIP.AUTO-MCNC: 121 MG/DL (ref 65–100)
GLUCOSE BLD STRIP.AUTO-MCNC: 131 MG/DL (ref 65–100)
GLUCOSE SERPL-MCNC: 108 MG/DL (ref 65–100)
GLUCOSE SERPL-MCNC: 196 MG/DL (ref 65–100)
HCT VFR BLD AUTO: 34.3 % (ref 35–47)
HGB BLD-MCNC: 11.1 G/DL (ref 11.5–16)
IMM GRANULOCYTES # BLD AUTO: 0 K/UL (ref 0–0.04)
IMM GRANULOCYTES NFR BLD AUTO: 0 % (ref 0–0.5)
LYMPHOCYTES # BLD: 1.4 K/UL (ref 0.8–3.5)
LYMPHOCYTES NFR BLD: 14 % (ref 12–49)
MAGNESIUM SERPL-MCNC: 1.3 MG/DL (ref 1.6–2.4)
MAGNESIUM SERPL-MCNC: 1.5 MG/DL (ref 1.6–2.4)
MCH RBC QN AUTO: 32.6 PG (ref 26–34)
MCHC RBC AUTO-ENTMCNC: 32.4 G/DL (ref 30–36.5)
MCV RBC AUTO: 100.9 FL (ref 80–99)
MONOCYTES # BLD: 1.3 K/UL (ref 0–1)
MONOCYTES NFR BLD: 13 % (ref 5–13)
NEUTS SEG # BLD: 7.1 K/UL (ref 1.8–8)
NEUTS SEG NFR BLD: 72 % (ref 32–75)
NRBC # BLD: 0 K/UL (ref 0–0.01)
NRBC BLD-RTO: 0 PER 100 WBC
PLATELET # BLD AUTO: 282 K/UL (ref 150–400)
PMV BLD AUTO: 9.9 FL (ref 8.9–12.9)
POTASSIUM SERPL-SCNC: 2.7 MMOL/L (ref 3.5–5.1)
POTASSIUM SERPL-SCNC: 3.4 MMOL/L (ref 3.5–5.1)
RBC # BLD AUTO: 3.4 M/UL (ref 3.8–5.2)
SERVICE CMNT-IMP: ABNORMAL
SODIUM SERPL-SCNC: 129 MMOL/L (ref 136–145)
SODIUM SERPL-SCNC: 132 MMOL/L (ref 136–145)
THERAPEUTIC RANGE,PTTT: ABNORMAL SECS (ref 58–77)
WBC # BLD AUTO: 9.8 K/UL (ref 3.6–11)

## 2021-02-10 PROCEDURE — 82962 GLUCOSE BLOOD TEST: CPT

## 2021-02-10 PROCEDURE — 74011250637 HC RX REV CODE- 250/637: Performed by: NURSE PRACTITIONER

## 2021-02-10 PROCEDURE — 85730 THROMBOPLASTIN TIME PARTIAL: CPT

## 2021-02-10 PROCEDURE — 80048 BASIC METABOLIC PNL TOTAL CA: CPT

## 2021-02-10 PROCEDURE — 36415 COLL VENOUS BLD VENIPUNCTURE: CPT

## 2021-02-10 PROCEDURE — 83735 ASSAY OF MAGNESIUM: CPT

## 2021-02-10 PROCEDURE — 74011250637 HC RX REV CODE- 250/637: Performed by: INTERNAL MEDICINE

## 2021-02-10 PROCEDURE — 85025 COMPLETE CBC W/AUTO DIFF WBC: CPT

## 2021-02-10 PROCEDURE — 74011250636 HC RX REV CODE- 250/636: Performed by: STUDENT IN AN ORGANIZED HEALTH CARE EDUCATION/TRAINING PROGRAM

## 2021-02-10 PROCEDURE — 74011250636 HC RX REV CODE- 250/636: Performed by: NURSE PRACTITIONER

## 2021-02-10 PROCEDURE — 65660000001 HC RM ICU INTERMED STEPDOWN

## 2021-02-10 PROCEDURE — 74011250636 HC RX REV CODE- 250/636: Performed by: EMERGENCY MEDICINE

## 2021-02-10 PROCEDURE — 74011250637 HC RX REV CODE- 250/637: Performed by: STUDENT IN AN ORGANIZED HEALTH CARE EDUCATION/TRAINING PROGRAM

## 2021-02-10 PROCEDURE — 99233 SBSQ HOSP IP/OBS HIGH 50: CPT | Performed by: INTERNAL MEDICINE

## 2021-02-10 RX ORDER — POTASSIUM CHLORIDE 20 MEQ/1
40 TABLET, EXTENDED RELEASE ORAL
Status: COMPLETED | OUTPATIENT
Start: 2021-02-10 | End: 2021-02-10

## 2021-02-10 RX ORDER — LEVOFLOXACIN 5 MG/ML
750 INJECTION, SOLUTION INTRAVENOUS
Status: DISCONTINUED | OUTPATIENT
Start: 2021-02-11 | End: 2021-02-11

## 2021-02-10 RX ORDER — POTASSIUM CHLORIDE 7.45 MG/ML
10 INJECTION INTRAVENOUS
Status: COMPLETED | OUTPATIENT
Start: 2021-02-10 | End: 2021-02-10

## 2021-02-10 RX ORDER — MAGNESIUM SULFATE HEPTAHYDRATE 40 MG/ML
2 INJECTION, SOLUTION INTRAVENOUS ONCE
Status: COMPLETED | OUTPATIENT
Start: 2021-02-10 | End: 2021-02-10

## 2021-02-10 RX ADMIN — DICYCLOMINE HYDROCHLORIDE 10 MG: 10 CAPSULE ORAL at 11:45

## 2021-02-10 RX ADMIN — DULOXETINE HYDROCHLORIDE 60 MG: 30 CAPSULE, DELAYED RELEASE ORAL at 11:44

## 2021-02-10 RX ADMIN — HYDROXYCHLOROQUINE SULFATE 200 MG: 200 TABLET ORAL at 11:43

## 2021-02-10 RX ADMIN — POTASSIUM CHLORIDE 10 MEQ: 10 INJECTION, SOLUTION INTRAVENOUS at 16:55

## 2021-02-10 RX ADMIN — SODIUM CHLORIDE 75 ML/HR: 9 INJECTION, SOLUTION INTRAVENOUS at 01:31

## 2021-02-10 RX ADMIN — RIFAXIMIN 550 MG: 550 TABLET ORAL at 21:59

## 2021-02-10 RX ADMIN — DICYCLOMINE HYDROCHLORIDE 10 MG: 10 CAPSULE ORAL at 21:59

## 2021-02-10 RX ADMIN — METRONIDAZOLE 500 MG: 500 INJECTION, SOLUTION INTRAVENOUS at 22:54

## 2021-02-10 RX ADMIN — METRONIDAZOLE 500 MG: 500 INJECTION, SOLUTION INTRAVENOUS at 05:13

## 2021-02-10 RX ADMIN — DICYCLOMINE HYDROCHLORIDE 10 MG: 10 CAPSULE ORAL at 17:59

## 2021-02-10 RX ADMIN — AMLODIPINE BESYLATE 5 MG: 5 TABLET ORAL at 11:45

## 2021-02-10 RX ADMIN — POTASSIUM CHLORIDE 10 MEQ: 10 INJECTION, SOLUTION INTRAVENOUS at 14:09

## 2021-02-10 RX ADMIN — Medication 10 ML: at 05:13

## 2021-02-10 RX ADMIN — BUSPIRONE HYDROCHLORIDE 5 MG: 5 TABLET ORAL at 11:43

## 2021-02-10 RX ADMIN — GABAPENTIN 300 MG: 300 CAPSULE ORAL at 11:43

## 2021-02-10 RX ADMIN — HYDROCODONE BITARTRATE AND ACETAMINOPHEN 1 TABLET: 10; 325 TABLET ORAL at 19:06

## 2021-02-10 RX ADMIN — Medication 10 ML: at 13:36

## 2021-02-10 RX ADMIN — ASPIRIN 81 MG: 81 TABLET, CHEWABLE ORAL at 11:46

## 2021-02-10 RX ADMIN — CARVEDILOL 6.25 MG: 6.25 TABLET, FILM COATED ORAL at 11:44

## 2021-02-10 RX ADMIN — HYDROCODONE BITARTRATE AND ACETAMINOPHEN 1 TABLET: 10; 325 TABLET ORAL at 12:58

## 2021-02-10 RX ADMIN — HEPARIN SODIUM AND DEXTROSE 18 UNITS/KG/HR: 10000; 5 INJECTION INTRAVENOUS at 18:28

## 2021-02-10 RX ADMIN — GABAPENTIN 300 MG: 300 CAPSULE ORAL at 21:59

## 2021-02-10 RX ADMIN — GABAPENTIN 300 MG: 300 CAPSULE ORAL at 17:59

## 2021-02-10 RX ADMIN — Medication 10 ML: at 22:01

## 2021-02-10 RX ADMIN — HYDROCODONE BITARTRATE AND ACETAMINOPHEN 1 TABLET: 10; 325 TABLET ORAL at 06:53

## 2021-02-10 RX ADMIN — POTASSIUM CHLORIDE 10 MEQ: 10 INJECTION, SOLUTION INTRAVENOUS at 17:00

## 2021-02-10 RX ADMIN — THERA TABS 1 TABLET: TAB at 11:43

## 2021-02-10 RX ADMIN — POTASSIUM CHLORIDE 40 MEQ: 20 TABLET, EXTENDED RELEASE ORAL at 11:46

## 2021-02-10 RX ADMIN — RIFAXIMIN 550 MG: 550 TABLET ORAL at 11:43

## 2021-02-10 RX ADMIN — THIAMINE HCL TAB 100 MG 100 MG: 100 TAB at 11:46

## 2021-02-10 RX ADMIN — PANTOPRAZOLE SODIUM 40 MG: 40 TABLET, DELAYED RELEASE ORAL at 11:44

## 2021-02-10 RX ADMIN — RIFAXIMIN 550 MG: 550 TABLET ORAL at 17:59

## 2021-02-10 RX ADMIN — ATORVASTATIN CALCIUM 80 MG: 40 TABLET, FILM COATED ORAL at 11:45

## 2021-02-10 RX ADMIN — FOLIC ACID 1 MG: 1 TABLET ORAL at 11:45

## 2021-02-10 RX ADMIN — CARVEDILOL 6.25 MG: 6.25 TABLET, FILM COATED ORAL at 17:59

## 2021-02-10 RX ADMIN — MAGNESIUM SULFATE HEPTAHYDRATE 2 G: 40 INJECTION, SOLUTION INTRAVENOUS at 12:56

## 2021-02-10 NOTE — PROGRESS NOTES
GI PROGRESS NOTE  Mary Zepeda NP  [phone] NP in-hospital cell phone M-F until 4:30  After 5pm or on weekends, please call  for physician on call    Janki Arango :[phone] UWK:[phone]   ATTG: Dr. Qing Bales   PCP: Latanya Zendejas MD  Date/Time:  2/10/2021 10:26 AM   Primary GI: Dr. Tamica Barboza; Dr. Lazarus Points covering  Reason for following: N/V    Assessment:     Nausea and vomiting- resolved  · Patient states she has a hx of IBS. Nausea and vomiting that started after eating salmon and tomato soup. No active vomiting witness while in ED. · CT abd/pel shows mild hepatomegaly. Diffuse colonic wall thickening could indicate an infectious/inflammatory colitis, though could also be due to under distention  · Abd XR with no acute process   · Levaquin and Flagyl ordered by primary team      GI History:  2021- UGI was within normal limits, started on Xifaxin   2020- EGD with Dr. Tamica Barboza showing gastritis and candida; recommended PPI and fluconazole  2018- EGD with Dr. Tamica Barboza showing reflux and candida; recommended PPI and fluconazole  2018- Colonoscopy with Dr. Tamica Barboza showing adenoma; recommended repeat in 3 years  2018- GES within normal limits  2017- EGD with Dr. Jennifer Riedel that was negative     NSTEMI  · Treatment per cardiology team  · Plan for cardiac cath today      Plan:     · No direct plans for repeat EGD at this time. No plans for repeat GES during admission; results not accurate while having acute symptoms   · Continue PPI   · Continue Xifaxin 550 mg PO TID  · Continue IV Levaquin and Flagyl  · Anti-emetics PRN  · Symptomatic care per primary team   · Nothing further to add from a GI standpoint. GI signing-off. Please call with any questions. Thank you for this consult. *Plan of care discussed with Dr. Lazarus Points      Subjective:   Discussed with RN events overnight. Patient reports feeling better. No further nausea, vomiting or abdominal pain.     Review of Systems:  Symptom Y/N Comments  Symptom Y/N Comments   Fever/Chills n   Chest Pain n    Cough n   Headaches n    Sputum n   Joint Pain n    SOB/COOMBS n   Pruritis/Rash n    Tolerating Diet y   Other       Could NOT obtain due to:      Objective:   VITALS:   Last 24hrs VS reviewed since prior progress note. Most recent are:  Visit Vitals  /72 (BP 1 Location: Left upper arm, BP Patient Position: At rest)   Pulse 89   Temp 97.7 °F (36.5 °C)   Resp 20   Ht 5' 6.14\" (1.68 m)   Wt 47.9 kg (105 lb 9.6 oz)   SpO2 99%   BMI 16.97 kg/m²       Intake/Output Summary (Last 24 hours) at 2/10/2021 1043  Last data filed at 2/10/2021 0343  Gross per 24 hour   Intake --   Output 200 ml   Net -200 ml     PHYSICAL EXAM:  General: Pleasant  female. NAD   HEENT: NC, Atraumatic. Anicteric sclerae. Lungs:  CTA Bilaterally. No Wheezing/Rhonchi/Rales. Heart:  Regular rate rhythm,  No murmur, No Rubs, No Gallops  Abdomen: Soft, Non distended, Non tender. +Bowel sounds, no HSM  Extremities: No c/c/e  Neurologic:  Alert and oriented X 3. No acute neurological distress   Psych:   Good insight. Not anxious nor agitated. Lab and Radiology Data Reviewed: (see below)    Medications Reviewed: (see below)  PMH/SH reviewed - no change compared to H&P  ________________________________________________________________________  Total time spent with patient: 30 minutes ________________________________________________________________________  Care Plan discussed with:  Patient x   Family     RN x              Consultant:       Gayathri Mart NP     Procedures: see electronic medical records for all procedures/Xrays and details which were not copied into this note but were reviewed prior to creation of Plan.       LABS:  Recent Labs     02/10/21  0131 02/09/21  0214   WBC 9.8 16.8*   HGB 11.1* 14.3   HCT 34.3* 44.4    359     Recent Labs     02/10/21  0131 02/09/21  0214 02/08/21  0156   * 126* 139   K 3.4* 4.3 3.0*   CL 98 96* 110*   CO2 21 17* 19*   BUN 30* 20 17   CREA 1.80* 1.16* 0.69   * 91 89   CA 8.5 8.8 7.4*   MG 1.5*  --   --      Recent Labs     02/08/21  0156      TP 5.5*   ALB 3.0*   GLOB 2.5   LPSE 111     Recent Labs     02/09/21  1725 02/09/21  1026 02/09/21  0214   APTT 62.9* 60.1* 43.7*      No results for input(s): FE, TIBC, PSAT, FERR in the last 72 hours. No results found for: FOL, RBCF  No results for input(s): PH, PCO2, PO2 in the last 72 hours.   Recent Labs     02/08/21  0156   CPK 47   CKMB 2.1     Lab Results   Component Value Date/Time    Color YELLOW/STRAW 02/08/2021 04:06 AM    Appearance CLEAR 02/08/2021 04:06 AM    Specific gravity 1.010 02/08/2021 04:06 AM    pH (UA) 6.5 02/08/2021 04:06 AM    Protein Negative 02/08/2021 04:06 AM    Glucose Negative 02/08/2021 04:06 AM    Ketone TRACE (A) 02/08/2021 04:06 AM    Bilirubin Negative 02/08/2021 04:06 AM    Urobilinogen 0.2 02/08/2021 04:06 AM    Nitrites Negative 02/08/2021 04:06 AM    Leukocyte Esterase Negative 02/08/2021 04:06 AM    Epithelial cells FEW 02/08/2021 04:06 AM    Bacteria Negative 02/08/2021 04:06 AM    WBC 0-4 02/08/2021 04:06 AM    RBC 0-5 02/08/2021 04:06 AM       MEDICATIONS:  Current Facility-Administered Medications   Medication Dose Route Frequency    [START ON 2/11/2021] levoFLOXacin (LEVAQUIN) 750 mg in D5W IVPB  750 mg IntraVENous Q48H    rifAXIMin (XIFAXAN) tablet 550 mg  550 mg Oral TID    0.9% sodium chloride infusion  75 mL/hr IntraVENous CONTINUOUS    carvediloL (COREG) tablet 6.25 mg  6.25 mg Oral BID WITH MEALS    heparin 25,000 units in D5W 250 ml infusion  12-25 Units/kg/hr IntraVENous TITRATE    heparin (porcine) injection 1,450 Units  30 Units/kg IntraVENous PRN    Or    heparin (porcine) injection 2,850 Units  60 Units/kg IntraVENous PRN    sodium chloride (NS) flush 5-40 mL  5-40 mL IntraVENous Q8H    sodium chloride (NS) flush 5-40 mL  5-40 mL IntraVENous PRN    acetaminophen (TYLENOL) tablet 650 mg  650 mg Oral Q6H PRN    Or    acetaminophen (TYLENOL) suppository 650 mg  650 mg Rectal Q6H PRN    polyethylene glycol (MIRALAX) packet 17 g  17 g Oral DAILY PRN    promethazine (PHENERGAN) tablet 12.5 mg  12.5 mg Oral Q6H PRN    Or    ondansetron (ZOFRAN) injection 4 mg  4 mg IntraVENous Q6H PRN    albuterol-ipratropium (DUO-NEB) 2.5 MG-0.5 MG/3 ML  3 mL Nebulization Q6H PRN    amLODIPine (NORVASC) tablet 5 mg  5 mg Oral DAILY    aspirin chewable tablet 81 mg  81 mg Oral DAILY    atorvastatin (LIPITOR) tablet 80 mg  80 mg Oral DAILY    busPIRone (BUSPAR) tablet 5 mg  5 mg Oral DAILY    dicyclomine (BENTYL) capsule 10 mg  10 mg Oral TID    DULoxetine (CYMBALTA) capsule 60 mg  60 mg Oral DAILY    gabapentin (NEURONTIN) capsule 300 mg  300 mg Oral TID    [Held by provider] hydroCHLOROthiazide (HYDRODIURIL) tablet 25 mg  25 mg Oral DAILY    hydrOXYchloroQUINE (PLAQUENIL) tablet 200 mg  200 mg Oral DAILY WITH BREAKFAST    pantoprazole (PROTONIX) tablet 40 mg  40 mg Oral ACB    metroNIDAZOLE (FLAGYL) IVPB premix 500 mg  500 mg IntraVENous Q12H    LORazepam (ATIVAN) injection 1 mg  1 mg IntraVENous Q6H PRN    HYDROcodone-acetaminophen (NORCO)  mg tablet 1 Tab  1 Tab Oral F7O PRN    folic acid (FOLVITE) tablet 1 mg  1 mg Oral DAILY    thiamine mononitrate (B-1) tablet 100 mg  100 mg Oral DAILY    therapeutic multivitamin (THERAGRAN) tablet 1 Tab  1 Tab Oral DAILY    nicotine (NICODERM CQ) 21 mg/24 hr patch 1 Patch  1 Patch TransDERmal DAILY

## 2021-02-10 NOTE — PROGRESS NOTES
[facility]      [address] [place] [address]  [phone]      Cardiology Progress Note      2/10/2021 12:09 PM    Admit Date: 2/8/2021    Admit Diagnosis:   NSTEMI (non-ST elevated myocardial infarction) ([facility] [address] [I21.4]    Subjective:     Yasmine Meléndez     No Sx    Visit Vitals  /67 (BP 1 Location: Left upper arm, BP Patient Position: At rest)   Pulse 78   Temp 97.8 °F (36.6 °C)   Resp 16   Ht 5' 6.14\" (1.68 m)   Wt 105 lb 9.6 oz (47.9 kg)   SpO2 98%   BMI 16.97 kg/m²       Current Facility-Administered Medications   Medication Dose Route Frequency    [START ON 2/11/2021] levoFLOXacin (LEVAQUIN) 750 mg in D5W IVPB  750 mg IntraVENous Q48H    magnesium sulfate 2 g/50 ml IVPB (premix or compounded)  2 g IntraVENous ONCE    potassium chloride 10 mEq in 100 ml IVPB  10 mEq IntraVENous Q2H    rifAXIMin (XIFAXAN) tablet 550 mg  550 mg Oral TID    0.9% sodium chloride infusion  50 mL/hr IntraVENous CONTINUOUS    carvediloL (COREG) tablet 6.25 mg  6.25 mg Oral BID WITH MEALS    heparin 25,000 units in D5W 250 ml infusion  12-25 Units/kg/hr IntraVENous TITRATE    heparin (porcine) injection 1,450 Units  30 Units/kg IntraVENous PRN    Or    heparin (porcine) injection 2,850 Units  60 Units/kg IntraVENous PRN    sodium chloride (NS) flush 5-40 mL  5-40 mL IntraVENous Q8H    sodium chloride (NS) flush 5-40 mL  5-40 mL IntraVENous PRN    acetaminophen (TYLENOL) tablet 650 mg  650 mg Oral Q6H PRN    Or    acetaminophen (TYLENOL) suppository 650 mg  650 mg Rectal Q6H PRN    polyethylene glycol (MIRALAX) packet 17 g  17 g Oral DAILY PRN    promethazine (PHENERGAN) tablet 12.5 mg  12.5 mg Oral Q6H PRN    Or    ondansetron (ZOFRAN) injection 4 mg  4 mg IntraVENous Q6H PRN    albuterol-ipratropium (DUO-NEB) 2.5 MG-0.5 MG/3 ML  3 mL Nebulization Q6H PRN    amLODIPine (NORVASC) tablet 5 mg  5 mg Oral DAILY    aspirin chewable tablet 81 mg  81 mg Oral DAILY    atorvastatin (LIPITOR) tablet 80 mg  80 mg Oral DAILY    busPIRone (BUSPAR) tablet 5 mg  5 mg Oral DAILY    dicyclomine (BENTYL) capsule 10 mg  10 mg Oral TID    DULoxetine (CYMBALTA) capsule 60 mg  60 mg Oral DAILY    gabapentin (NEURONTIN) capsule 300 mg  300 mg Oral TID    [Held by provider] hydroCHLOROthiazide (HYDRODIURIL) tablet 25 mg  25 mg Oral DAILY    hydrOXYchloroQUINE (PLAQUENIL) tablet 200 mg  200 mg Oral DAILY WITH BREAKFAST    pantoprazole (PROTONIX) tablet 40 mg  40 mg Oral ACB    metroNIDAZOLE (FLAGYL) IVPB premix 500 mg  500 mg IntraVENous Q12H    LORazepam (ATIVAN) injection 1 mg  1 mg IntraVENous Q6H PRN    HYDROcodone-acetaminophen (NORCO)  mg tablet 1 Tab  1 Tab Oral Z4R PRN    folic acid (FOLVITE) tablet 1 mg  1 mg Oral DAILY    thiamine mononitrate (B-1) tablet 100 mg  100 mg Oral DAILY    therapeutic multivitamin (THERAGRAN) tablet 1 Tab  1 Tab Oral DAILY    nicotine (NICODERM CQ) 21 mg/24 hr patch 1 Patch  1 Patch TransDERmal DAILY       Objective:      Physical Exam:  General Appearance:    Chest:   Clear  Cardiovascular: RRR  Extremities: no edema  Skin:  Warm and dry.     Data Review:   Recent Labs     02/10/21  0131 02/09/21 0214 02/08/21  0156   WBC 9.8 16.8* 9.0   HGB 11.1* 14.3 10.5*   HCT 34.3* 44.4 32.4*    359 304     Recent Labs     02/10/21  0939 02/10/21  0131 02/09/21 0214 02/08/21  0156   * 129* 126* 139   K 2.7* 3.4* 4.3 3.0*    98 96* 110*   CO2 22 21 17* 19*   * 108* 91 89   BUN 24* 30* 20 17   CREA 1.50* 1.80* 1.16* 0.69   CA 8.5 8.5 8.8 7.4*   MG 1.3* 1.5*  --   --    ALB  --   --   --  3.0*   TBILI  --   --   --  0.2   ALT  --   --   --  33       Recent Labs     02/08/21 2031 02/08/21  1332 02/08/21  0546 02/08/21  0156   TROIQ 2.29* 2.11* 0.90* 0.27*   CPK  --   --   --  47   CKMB  --   --   --  2.1         Intake/Output Summary (Last 24 hours) at 2/10/2021 1209  Last data filed at 2/10/2021 0343  Gross per 24 hour Intake --   Output 200 ml   Net -200 ml        Telemetry:   EKG:  Cxray:    Assessment:     Active Problems:    HTN (hypertension) (10/14/2012)      Nausea & vomiting (11/14/2015)      NSTEMI (non-ST elevated myocardial infarction) ([facility] [address] (11/14/2015)      Tobacco abuse (11/16/2015)      IBS (irritable bowel syndrome) (2/8/2021)      Elevated troponin (2/8/2021)        Plan:     Cr up to 1.8; prob from dehydration due to vomiting. Postpone cath and hydrate.     Awilda Pereira M.D., [facility] [place]

## 2021-02-10 NOTE — PROGRESS NOTES
Problem: Falls - Risk of  Goal: *Absence of Falls  Description: Document Omaira Layton Fall Risk and appropriate interventions in the flowsheet.   Outcome: Progressing Towards Goal  Note: Fall Risk Interventions:            Medication Interventions: Evaluate medications/consider consulting pharmacy, Patient to call before getting OOB, Teach patient to arise slowly    Elimination Interventions: Bed/chair exit alarm, Call light in reach

## 2021-02-10 NOTE — PROGRESS NOTES
0700: End of Shift Note    Bedside shift change report given to NOBLE Pal (oncoming nurse) by Liza Soni (offgoing nurse). Report included the following information SBAR, Kardex, Intake/Output, MAR, Recent Results and Cardiac Rhythm NSR    Shift worked:  Night     Shift summary and any significant changes:     Patient had no changes to heparin drip because she had two therapeutic PTT yesterday. Patient going for cath today. Concerns for physician to address:       Zone phone for oncoming shift:          Activity:  Activity Level: Up with Assistance  Number times ambulated in hallways past shift: 0  Number of times OOB to chair past shift: 2    Cardiac:   Cardiac Monitoring: Yes      Cardiac Rhythm: Normal sinus rhythm    Access:   Current line(s): PIV     Genitourinary:   Urinary status: voiding    Respiratory:   O2 Device: Room air  Chronic home O2 use?: NO  Incentive spirometer at bedside: NO     GI:     Current diet:  DIET NUTRITIONAL SUPPLEMENTS Breakfast, Dinner; Ensure Clear  DIET NPO Except Meds  DIET CLEAR LIQUID  Passing flatus: YES  Tolerating current diet: YES       Pain Management:   Patient states pain is manageable on current regimen: YES    Skin:  Reji Score: 19  Interventions: increase time out of bed    Patient Safety:  Fall Score:  Total Score: 2  Interventions: bed/chair alarm, gripper socks and pt to call before getting OOB       Length of Stay:  Expected LOS: 2d 14h  Actual LOS: 2      Lexi Marylee Arias

## 2021-02-10 NOTE — PROGRESS NOTES
0700: Bedside shift change report given to Demarco RN (oncoming nurse) by Selina Saleh RN (offgoing nurse). Report included the following information SBAR and Kardex. 0940: Per Dr. Airam Mayfield hold off on cath and redraw Bmp for creatinine level. 1128: Notified Dr. Airam Mayfield of creatnine level of 1.5 gave orders to hold off on cath and restart diet.

## 2021-02-10 NOTE — PROGRESS NOTES
[address] [place] [address]  [phone]      Cardiology Progress Note      2/10/2021 1:36 PM    Admit Date: 2/8/2021    Admit Diagnosis:   NSTEMI (non-ST elevated myocardial infarction) ([facility] [address] [I21.4]    Subjective:     Gisell Jackson feels much better today. Cath on hold due to elevated creatinine.      Visit Vitals  /67 (BP 1 Location: Left upper arm, BP Patient Position: At rest)   Pulse 78   Temp 97.8 °F (36.6 °C)   Resp 16   Ht 5' 6.14\" (1.68 m)   Wt 118 lb (53.5 kg)   SpO2 98%   BMI 18.97 kg/m²       Current Facility-Administered Medications   Medication Dose Route Frequency    [START ON 2/11/2021] levoFLOXacin (LEVAQUIN) 750 mg in D5W IVPB  750 mg IntraVENous Q48H    magnesium sulfate 2 g/50 ml IVPB (premix or compounded)  2 g IntraVENous ONCE    potassium chloride 10 mEq in 100 ml IVPB  10 mEq IntraVENous Q2H    rifAXIMin (XIFAXAN) tablet 550 mg  550 mg Oral TID    0.9% sodium chloride infusion  50 mL/hr IntraVENous CONTINUOUS    carvediloL (COREG) tablet 6.25 mg  6.25 mg Oral BID WITH MEALS    heparin 25,000 units in D5W 250 ml infusion  12-25 Units/kg/hr IntraVENous TITRATE    heparin (porcine) injection 1,450 Units  30 Units/kg IntraVENous PRN    Or    heparin (porcine) injection 2,850 Units  60 Units/kg IntraVENous PRN    sodium chloride (NS) flush 5-40 mL  5-40 mL IntraVENous Q8H    sodium chloride (NS) flush 5-40 mL  5-40 mL IntraVENous PRN    acetaminophen (TYLENOL) tablet 650 mg  650 mg Oral Q6H PRN    Or    acetaminophen (TYLENOL) suppository 650 mg  650 mg Rectal Q6H PRN    polyethylene glycol (MIRALAX) packet 17 g  17 g Oral DAILY PRN    promethazine (PHENERGAN) tablet 12.5 mg  12.5 mg Oral Q6H PRN    Or    ondansetron (ZOFRAN) injection 4 mg  4 mg IntraVENous Q6H PRN    albuterol-ipratropium (DUO-NEB) 2.5 MG-0.5 MG/3 ML  3 mL Nebulization Q6H PRN    amLODIPine (NORVASC) tablet 5 mg  5 mg Oral DAILY    aspirin chewable tablet 81 mg  81 mg Oral DAILY    atorvastatin (LIPITOR) tablet 80 mg  80 mg Oral DAILY    busPIRone (BUSPAR) tablet 5 mg  5 mg Oral DAILY    dicyclomine (BENTYL) capsule 10 mg  10 mg Oral TID    DULoxetine (CYMBALTA) capsule 60 mg  60 mg Oral DAILY    gabapentin (NEURONTIN) capsule 300 mg  300 mg Oral TID    [Held by provider] hydroCHLOROthiazide (HYDRODIURIL) tablet 25 mg  25 mg Oral DAILY    hydrOXYchloroQUINE (PLAQUENIL) tablet 200 mg  200 mg Oral DAILY WITH BREAKFAST    pantoprazole (PROTONIX) tablet 40 mg  40 mg Oral ACB    metroNIDAZOLE (FLAGYL) IVPB premix 500 mg  500 mg IntraVENous Q12H    LORazepam (ATIVAN) injection 1 mg  1 mg IntraVENous Q6H PRN    HYDROcodone-acetaminophen (NORCO)  mg tablet 1 Tab  1 Tab Oral E8I PRN    folic acid (FOLVITE) tablet 1 mg  1 mg Oral DAILY    thiamine mononitrate (B-1) tablet 100 mg  100 mg Oral DAILY    therapeutic multivitamin (THERAGRAN) tablet 1 Tab  1 Tab Oral DAILY    nicotine (NICODERM CQ) 21 mg/24 hr patch 1 Patch  1 Patch TransDERmal DAILY       Objective:      Physical Exam:  General Appearance:   female in NAD  Chest:   Clear post  Cardiovascular:  Regular rate and rhythm, no murmur. Abdomen:   Soft, non-tender, bowel sounds are active. Tolerating clear liquids without NV  Extremities: No edema  Skin:  Warm and dry.      Data Review:   Recent Labs     02/10/21  0131 02/09/21  0214 02/08/21  0156   WBC 9.8 16.8* 9.0   HGB 11.1* 14.3 10.5*   HCT 34.3* 44.4 32.4*    359 304     Recent Labs     02/10/21  0939 02/10/21  0131 02/09/21  0214 02/08/21  0156   * 129* 126* 139   K 2.7* 3.4* 4.3 3.0*    98 96* 110*   CO2 22 21 17* 19*   * 108* 91 89   BUN 24* 30* 20 17   CREA 1.50* 1.80* 1.16* 0.69   CA 8.5 8.5 8.8 7.4*   MG 1.3* 1.5*  --   --    ALB  --   --   --  3.0*   TBILI  --   --   --  0.2   ALT  --   --   --  33       Recent Labs     02/08/21 2031 02/08/21  1332 02/08/21  0546 02/08/21  0156   TROIQ 2.29* 2.11* 0.90* 0.27*   CPK --   --   --  47   CKMB  --   --   --  2.1         Intake/Output Summary (Last 24 hours) at 2/10/2021 1336  Last data filed at 2/10/2021 1326  Gross per 24 hour   Intake --   Output 700 ml   Net -700 ml        Telemetry: NSR  Cxray: 2/8 No acute process    Echo: 2/8 LV: Estimated LVEF is 35 - 40%. Visually measured ejection fraction. Normal cavity size. Mild concentric hypertrophy. Moderate-to-severely and segmentally reduced systolic function. Left ventricular diastolic dysfunction. · LA: Dilated left atrium. Pericardium: Trivial pericardial effusion. Assessment:     Active Problems:    HTN (hypertension) (10/14/2012)      Nausea & vomiting (11/14/2015)      NSTEMI (non-ST elevated myocardial infarction) ([facility] [address] (11/14/2015)      Tobacco abuse (11/16/2015)      IBS (irritable bowel syndrome) (2/8/2021)      Elevated troponin (2/8/2021)        Plan:   Elevated troponin/NSTEMI Type 2:  Troponin peak 2.3  Previous admission in 11/2015 similar presentation:  N/v with elevated troponin 0.11-0.88  At that time cardiac cath showed normal cors with takotsubo CM. Echo EF 35-40%  Continue on ASA, statin, BB. Cath on hold due to elevated creatinine after AM labs rechecked. Cr. 1.8 and 1.5. Most likely r/t dehydration from chronic N/V. Will continue to hydrate, recheck labs and attempt cath tomorrow if Cr improved. Discussed need to monitor for SOB, fluid overload with nurse and patient. Monitor daily weights, labs, I/O.      HTN:  Stable  Continue  BB. Hold HCTZ for elevated Cr per hospitalist.    Hypokalemia:   K=2.7  Patient received 40meq orally. Will add 30meq IV and replete Mg. Hypomagnesemia:   Mg=1.3  Mag sulfate 2gm IV ordered.          Markel Saavedra [facility] student  Cardiology    Addendum  Agree with NP student assessment and plan    Aleks Soria [facility]  Cardiology

## 2021-02-10 NOTE — PROGRESS NOTES
Hospitalist Progress Note    NAME: Grabiel Goss   :  1955   MRN:  [id number]     Interim Hospital Summary: 76 y.o. female whom presented on 2021 with      Assessment / Plan:    NSTEMI (trop 2.29)  Hypertensive emergency  Hx of Takosubo CM  -continue asa statin  -continue coreg, norvasc  -heparin infusion  -for cardiac cath when renal function improves. Discussed with cardiology. Rechecking labs    BRIANNA  Volume depletion for GI fluid loses  Hypokalemia  Hyponatremia  -hold HCTZ   -IV NS. Repeat labs later today and in AM    Nausea/vomiting  Hx IBS-D  -CT abdomen Diffuse colonic wall thickening could indicate an infectious/inflammatory  colitis, though could also be due to underdistention.  -had EGD () / Colonoscopy () showing gastritis, candida and colonic adenoma  -on IV levaquin / flagyl   -GI input appreciated. Continue Xifaxin TID    GERD  Anxiety  -PPI  -lorazepam prn  -continue psych meds    Smoker  Alcohol consumption  -nicoderm  -CIWA protocol    PC Malnutrition      less than 18.5 Underweight / Body mass index is 16.97 kg/m². Code status: Full  Prophylaxis: on heparin  Recommended Disposition: Home w/Family       Subjective:     Chief Complaint / Reason for Physician Visit  Follow up of  MI, IBS, HTN, smoking  Chart reviewed in detail. Discussed with RN events overnight. Review of Systems:  Symptom Y/N Comments  Symptom Y/N Comments   Fever/Chills    Chest Pain     Poor Appetite    Edema     Cough    Abdominal Pain     Sputum    Joint Pain     SOB/COOMBS    Pruritis/Rash     Nausea/vomit    Tolerating PT/OT     Diarrhea    Tolerating Diet     Constipation    Other       Could NOT obtain due to:      PO intake: No data found. Objective:     VITALS:   Last 24hrs VS reviewed since prior progress note.  Most recent are:  Patient Vitals for the past 24 hrs:   Temp Pulse Resp BP SpO2   02/10/21 0803 97.7 °F (36.5 °C) 89 20 127/72 99 %   02/10/21 0343 97.8 °F (36.6 °C) 87 18 123/60 100 %   02/09/21 2301 98 °F (36.7 °C) 79 18 90/70 98 %   02/09/21 1926 97.9 °F (36.6 °C) 83 20 (!) 94/58 100 %   02/09/21 1743 -- 90 -- 102/63 --   02/09/21 1626 -- 93 18 -- 94 %   02/09/21 1620 97.7 °F (36.5 °C) 93 18 109/62 100 %   02/09/21 1255 -- (!) 102 -- 111/66 --   02/09/21 1120 97.6 °F (36.4 °C) 100 18 93/81 99 %       Intake/Output Summary (Last 24 hours) at 2/10/2021 1009  Last data filed at 2/10/2021 0343  Gross per 24 hour   Intake --   Output 200 ml   Net -200 ml        I had a face to face encounter, and independently examined this patient on 2/10/2021, as outlined below:  PHYSICAL EXAM:  General: WD, WN. Alert, cooperative, no acute distress    EENT:  EOMI. Anicteric sclerae. MMM  Resp:  CTA bilaterally, no wheezing or rales. No accessory muscle use  CV:  Regular  rhythm,  No edema  GI:  Soft, Non distended, Non tender. +Bowel sounds  Neurologic:  Alert and oriented X 3, normal speech,   Psych:   Good insight. Not anxious nor agitated  Skin:  No rashes. No jaundice    Reviewed most current lab test results and cultures  YES  Reviewed most current radiology test results   YES  Review and summation of old records today    NO  Reviewed patient's current orders and MAR    YES  PMH/ reviewed - no change compared to H&P  ________________________________________________________________________  Care Plan discussed with:    Comments   Patient x    Family  x    RN     Care Manager     Consultant  x                      Multidiciplinary team rounds were held today with , nursing, pharmacist and clinical coordinator. Patient's plan of care was discussed; medications were reviewed and discharge planning was addressed.      ________________________________________________________________________  Total NON critical care TIME:  25   Minutes    Total CRITICAL CARE TIME Spent:   Minutes non procedure based      Comments   >50% of visit spent in counseling and coordination of care x     This includes time during multidisciplinary rounds if indicated above   ________________________________________________________________________  Rebecca Do MD     Procedures: see electronic medical records for all procedures/Xrays and details which were not copied into this note but were reviewed prior to creation of Plan. LABS:  I reviewed today's most current labs and imaging studies.   Pertinent labs include:  Recent Labs     02/10/21  0131 02/09/21  0214 02/08/21  0156   WBC 9.8 16.8* 9.0   HGB 11.1* 14.3 10.5*   HCT 34.3* 44.4 32.4*    359 304     Recent Labs     02/10/21  0131 02/09/21  0214 02/08/21  0156   * 126* 139   K 3.4* 4.3 3.0*   CL 98 96* 110*   CO2 21 17* 19*   * 91 89   BUN 30* 20 17   CREA 1.80* 1.16* 0.69   CA 8.5 8.8 7.4*   MG 1.5*  --   --    ALB  --   --  3.0*   TBILI  --   --  0.2   ALT  --   --  33

## 2021-02-10 NOTE — PROGRESS NOTES
Pharmacy Automatic Renal Dosing Protocol - Antimicrobials    Indication for Antimicrobials: Intraabdominal infection/Infectious diarrhea     Current Regimen of Each Antimicrobial:  Metronidazole 500 mg IV every 12 hours (Start Date ; Day # 3)  Levofloxacin 750 mg IV every 24 hours (Start Date ; Day # 3)    Previous Antimicrobial Therapy:  None    Significant Cultures:   NA    Paralysis, amputations, malnutrition: NA    Labs:  Recent Labs     02/10/21  0131 21  0214 21  0156   CREA 1.80* 1.16* 0.69   BUN 30* 20 17   WBC 9.8 16.8* 9.0     Temp (24hrs), Av.8 °F (36.6 °C), Min:97.6 °F (36.4 °C), Max:98 °F (36.7 °C)      Is the Patient on Dialysis? No    Creatinine Clearance (mL/min): (Actual Body Weight): 23.2    Impression/Plan:   Will change levofloxacin to 750 mg IV every 48 hours for CrCl 20-49 mL/min per renal dosing protocol. Will continue current regimen for metronidazole as appropriate for indication and renal function. Antimicrobial stop date: To be determined      Pharmacy will follow daily and adjust medications as appropriate for renal function and/or serum levels. Thank you,  Idalmis Olson, PHARMD    Recommended duration of therapy  [email or web address]. [email or web address]    Renal Dosing  [email or web address]/Renal%[email or web address]. pdf

## 2021-02-10 NOTE — PROGRESS NOTES
End of Shift Note    Bedside shift change report given to Kavitha (oncoming nurse) by Millicent Hampton (offgoing nurse). Report included the following information Intake/Output and Cardiac Rhythm nsr    Shift worked: 7a-7p     Shift summary and any significant changes:          Concerns for physician to address:       Zone phone for oncoming shift:          Activity:  Activity Level: Up with Assistance  Number times ambulated in hallways past shift: 0  Number of times OOB to chair past shift: 0    Cardiac:   Cardiac Monitoring: Yes      Cardiac Rhythm: Normal sinus rhythm    Access:   Current line(s): PIV     Genitourinary:   Urinary status: voiding    Respiratory:   O2 Device: Room air  Chronic home O2 use?: NO  Incentive spirometer at bedside: NO     GI:     Current diet:  DIET NUTRITIONAL SUPPLEMENTS Breakfast, Dinner; Ensure Clear  DIET NPO Except Meds  DIET CLEAR LIQUID  Passing flatus: YES  Tolerating current diet: YES       Pain Management:   Patient states pain is manageable on current regimen: YES    Skin:  Reji Score: 19  Interventions: float heels    Patient Safety:  Fall Score:  Total Score: 2  Interventions: bed/chair alarm and gripper socks       Length of Stay:  Expected LOS: 2d 14h  Actual LOS: 1      Millicent Hampton

## 2021-02-10 NOTE — PROGRESS NOTES
[address] INTERDISCIPLINARY ROUNDS    Cardiopulmonary Care Interdisciplinary Rounds were held today to discuss patient's plan of care and outcomes. The following members were present: NP/Physician, Pharmacy, Nursing and Case Management. PLAN OF CARE:   Continue current treatment plan, pt with labs redrawn this AM. Plan to hold off on cardiac cath for now and OK for pt to eat.      Expected Length of Stay:  2d 14h

## 2021-02-11 PROBLEM — I50.20 SYSTOLIC HEART FAILURE (HCC): Status: ACTIVE | Noted: 2021-02-11

## 2021-02-11 LAB
ANION GAP SERPL CALC-SCNC: 7 MMOL/L (ref 5–15)
APTT PPP: 57.9 SEC (ref 22.1–31)
APTT PPP: 73.2 SEC (ref 22.1–31)
BUN SERPL-MCNC: 15 MG/DL (ref 6–20)
BUN/CREAT SERPL: 16 (ref 12–20)
CALCIUM SERPL-MCNC: 8.7 MG/DL (ref 8.5–10.1)
CHLORIDE SERPL-SCNC: 110 MMOL/L (ref 97–108)
CO2 SERPL-SCNC: 22 MMOL/L (ref 21–32)
CREAT SERPL-MCNC: 0.95 MG/DL (ref 0.55–1.02)
GLUCOSE BLD STRIP.AUTO-MCNC: 110 MG/DL (ref 65–100)
GLUCOSE BLD STRIP.AUTO-MCNC: 135 MG/DL (ref 65–100)
GLUCOSE SERPL-MCNC: 101 MG/DL (ref 65–100)
POTASSIUM SERPL-SCNC: 3.7 MMOL/L (ref 3.5–5.1)
SERVICE CMNT-IMP: ABNORMAL
SERVICE CMNT-IMP: ABNORMAL
SODIUM SERPL-SCNC: 139 MMOL/L (ref 136–145)
THERAPEUTIC RANGE,PTTT: ABNORMAL SECS (ref 58–77)
THERAPEUTIC RANGE,PTTT: ABNORMAL SECS (ref 58–77)

## 2021-02-11 PROCEDURE — 99232 SBSQ HOSP IP/OBS MODERATE 35: CPT | Performed by: INTERNAL MEDICINE

## 2021-02-11 PROCEDURE — 74011250637 HC RX REV CODE- 250/637: Performed by: NURSE PRACTITIONER

## 2021-02-11 PROCEDURE — 77030030195 HC CATH ANGI DX PRF4 MRTM -A: Performed by: INTERNAL MEDICINE

## 2021-02-11 PROCEDURE — C1769 GUIDE WIRE: HCPCS | Performed by: INTERNAL MEDICINE

## 2021-02-11 PROCEDURE — 77030019698 HC SYR ANGI MDLON MRTM -A: Performed by: INTERNAL MEDICINE

## 2021-02-11 PROCEDURE — 85730 THROMBOPLASTIN TIME PARTIAL: CPT

## 2021-02-11 PROCEDURE — 82962 GLUCOSE BLOOD TEST: CPT

## 2021-02-11 PROCEDURE — 74011000250 HC RX REV CODE- 250: Performed by: INTERNAL MEDICINE

## 2021-02-11 PROCEDURE — 93458 L HRT ARTERY/VENTRICLE ANGIO: CPT | Performed by: INTERNAL MEDICINE

## 2021-02-11 PROCEDURE — 74011000636 HC RX REV CODE- 636: Performed by: INTERNAL MEDICINE

## 2021-02-11 PROCEDURE — 74011250636 HC RX REV CODE- 250/636: Performed by: EMERGENCY MEDICINE

## 2021-02-11 PROCEDURE — 99153 MOD SED SAME PHYS/QHP EA: CPT | Performed by: INTERNAL MEDICINE

## 2021-02-11 PROCEDURE — 74011250637 HC RX REV CODE- 250/637: Performed by: STUDENT IN AN ORGANIZED HEALTH CARE EDUCATION/TRAINING PROGRAM

## 2021-02-11 PROCEDURE — B2111ZZ FLUOROSCOPY OF MULTIPLE CORONARY ARTERIES USING LOW OSMOLAR CONTRAST: ICD-10-PCS | Performed by: INTERNAL MEDICINE

## 2021-02-11 PROCEDURE — 80048 BASIC METABOLIC PNL TOTAL CA: CPT

## 2021-02-11 PROCEDURE — C1894 INTRO/SHEATH, NON-LASER: HCPCS | Performed by: INTERNAL MEDICINE

## 2021-02-11 PROCEDURE — 65660000000 HC RM CCU STEPDOWN

## 2021-02-11 PROCEDURE — 77030015766: Performed by: INTERNAL MEDICINE

## 2021-02-11 PROCEDURE — 4A023N7 MEASUREMENT OF CARDIAC SAMPLING AND PRESSURE, LEFT HEART, PERCUTANEOUS APPROACH: ICD-10-PCS | Performed by: INTERNAL MEDICINE

## 2021-02-11 PROCEDURE — 74011250637 HC RX REV CODE- 250/637: Performed by: INTERNAL MEDICINE

## 2021-02-11 PROCEDURE — 74011250636 HC RX REV CODE- 250/636: Performed by: INTERNAL MEDICINE

## 2021-02-11 PROCEDURE — 2709999900 HC NON-CHARGEABLE SUPPLY

## 2021-02-11 PROCEDURE — 36415 COLL VENOUS BLD VENIPUNCTURE: CPT

## 2021-02-11 PROCEDURE — 77030010221 HC SPLNT WR POS TELE -B: Performed by: INTERNAL MEDICINE

## 2021-02-11 PROCEDURE — 99152 MOD SED SAME PHYS/QHP 5/>YRS: CPT | Performed by: INTERNAL MEDICINE

## 2021-02-11 PROCEDURE — 77030008543 HC TBNG MON PRSS MRTM -A: Performed by: INTERNAL MEDICINE

## 2021-02-11 PROCEDURE — 77030004549 HC CATH ANGI DX PRF MRTM -A: Performed by: INTERNAL MEDICINE

## 2021-02-11 PROCEDURE — 77030019569 HC BND COMPR RAD TERU -B: Performed by: INTERNAL MEDICINE

## 2021-02-11 PROCEDURE — 74011250636 HC RX REV CODE- 250/636: Performed by: NURSE PRACTITIONER

## 2021-02-11 PROCEDURE — 76937 US GUIDE VASCULAR ACCESS: CPT | Performed by: INTERNAL MEDICINE

## 2021-02-11 RX ORDER — HEPARIN SODIUM 1000 [USP'U]/ML
INJECTION, SOLUTION INTRAVENOUS; SUBCUTANEOUS AS NEEDED
Status: DISCONTINUED | OUTPATIENT
Start: 2021-02-11 | End: 2021-02-11 | Stop reason: HOSPADM

## 2021-02-11 RX ORDER — HEPARIN SODIUM 200 [USP'U]/100ML
INJECTION, SOLUTION INTRAVENOUS
Status: COMPLETED | OUTPATIENT
Start: 2021-02-11 | End: 2021-02-11

## 2021-02-11 RX ORDER — MIDAZOLAM HYDROCHLORIDE 1 MG/ML
INJECTION, SOLUTION INTRAMUSCULAR; INTRAVENOUS AS NEEDED
Status: DISCONTINUED | OUTPATIENT
Start: 2021-02-11 | End: 2021-02-11 | Stop reason: HOSPADM

## 2021-02-11 RX ORDER — LOPERAMIDE HYDROCHLORIDE 2 MG/1
2 CAPSULE ORAL
Status: DISCONTINUED | OUTPATIENT
Start: 2021-02-11 | End: 2021-02-12 | Stop reason: HOSPADM

## 2021-02-11 RX ORDER — FENTANYL CITRATE 50 UG/ML
INJECTION, SOLUTION INTRAMUSCULAR; INTRAVENOUS AS NEEDED
Status: DISCONTINUED | OUTPATIENT
Start: 2021-02-11 | End: 2021-02-11 | Stop reason: HOSPADM

## 2021-02-11 RX ORDER — LIDOCAINE HYDROCHLORIDE 10 MG/ML
INJECTION INFILTRATION; PERINEURAL AS NEEDED
Status: DISCONTINUED | OUTPATIENT
Start: 2021-02-11 | End: 2021-02-11 | Stop reason: HOSPADM

## 2021-02-11 RX ORDER — VERAPAMIL HYDROCHLORIDE 2.5 MG/ML
INJECTION, SOLUTION INTRAVENOUS AS NEEDED
Status: DISCONTINUED | OUTPATIENT
Start: 2021-02-11 | End: 2021-02-11 | Stop reason: HOSPADM

## 2021-02-11 RX ADMIN — DICYCLOMINE HYDROCHLORIDE 10 MG: 10 CAPSULE ORAL at 22:22

## 2021-02-11 RX ADMIN — THIAMINE HCL TAB 100 MG 100 MG: 100 TAB at 09:59

## 2021-02-11 RX ADMIN — HEPARIN SODIUM AND DEXTROSE 20 UNITS/KG/HR: 10000; 5 INJECTION INTRAVENOUS at 02:13

## 2021-02-11 RX ADMIN — ATORVASTATIN CALCIUM 80 MG: 40 TABLET, FILM COATED ORAL at 09:59

## 2021-02-11 RX ADMIN — CARVEDILOL 6.25 MG: 6.25 TABLET, FILM COATED ORAL at 16:36

## 2021-02-11 RX ADMIN — LOPERAMIDE HYDROCHLORIDE 2 MG: 2 CAPSULE ORAL at 22:24

## 2021-02-11 RX ADMIN — HYDROCODONE BITARTRATE AND ACETAMINOPHEN 1 TABLET: 10; 325 TABLET ORAL at 01:05

## 2021-02-11 RX ADMIN — Medication 10 ML: at 05:15

## 2021-02-11 RX ADMIN — DICYCLOMINE HYDROCHLORIDE 10 MG: 10 CAPSULE ORAL at 16:36

## 2021-02-11 RX ADMIN — Medication 40 ML: at 22:21

## 2021-02-11 RX ADMIN — AMLODIPINE BESYLATE 5 MG: 5 TABLET ORAL at 09:59

## 2021-02-11 RX ADMIN — DULOXETINE HYDROCHLORIDE 60 MG: 30 CAPSULE, DELAYED RELEASE ORAL at 09:59

## 2021-02-11 RX ADMIN — FOLIC ACID 1 MG: 1 TABLET ORAL at 09:59

## 2021-02-11 RX ADMIN — HYDROCODONE BITARTRATE AND ACETAMINOPHEN 1 TABLET: 10; 325 TABLET ORAL at 08:24

## 2021-02-11 RX ADMIN — SODIUM CHLORIDE 50 ML/HR: 9 INJECTION, SOLUTION INTRAVENOUS at 19:30

## 2021-02-11 RX ADMIN — RIFAXIMIN 550 MG: 550 TABLET ORAL at 09:59

## 2021-02-11 RX ADMIN — RIFAXIMIN 550 MG: 550 TABLET ORAL at 22:23

## 2021-02-11 RX ADMIN — Medication 10 ML: at 16:43

## 2021-02-11 RX ADMIN — ASPIRIN 81 MG: 81 TABLET, CHEWABLE ORAL at 08:24

## 2021-02-11 RX ADMIN — HYDROCODONE BITARTRATE AND ACETAMINOPHEN 1 TABLET: 10; 325 TABLET ORAL at 17:08

## 2021-02-11 RX ADMIN — GABAPENTIN 300 MG: 300 CAPSULE ORAL at 09:59

## 2021-02-11 RX ADMIN — PANTOPRAZOLE SODIUM 40 MG: 40 TABLET, DELAYED RELEASE ORAL at 09:59

## 2021-02-11 RX ADMIN — THERA TABS 1 TABLET: TAB at 09:59

## 2021-02-11 RX ADMIN — CARVEDILOL 6.25 MG: 6.25 TABLET, FILM COATED ORAL at 08:24

## 2021-02-11 RX ADMIN — DICYCLOMINE HYDROCHLORIDE 10 MG: 10 CAPSULE ORAL at 09:59

## 2021-02-11 RX ADMIN — HYDROCODONE BITARTRATE AND ACETAMINOPHEN 1 TABLET: 10; 325 TABLET ORAL at 22:24

## 2021-02-11 RX ADMIN — LOPERAMIDE HYDROCHLORIDE 2 MG: 2 CAPSULE ORAL at 17:10

## 2021-02-11 RX ADMIN — SODIUM CHLORIDE 50 ML/HR: 9 INJECTION, SOLUTION INTRAVENOUS at 01:06

## 2021-02-11 RX ADMIN — BUSPIRONE HYDROCHLORIDE 5 MG: 5 TABLET ORAL at 09:59

## 2021-02-11 RX ADMIN — RIFAXIMIN 550 MG: 550 TABLET ORAL at 16:36

## 2021-02-11 RX ADMIN — HYDROXYCHLOROQUINE SULFATE 200 MG: 200 TABLET ORAL at 09:59

## 2021-02-11 RX ADMIN — GABAPENTIN 300 MG: 300 CAPSULE ORAL at 22:23

## 2021-02-11 RX ADMIN — GABAPENTIN 300 MG: 300 CAPSULE ORAL at 16:36

## 2021-02-11 NOTE — PROGRESS NOTES
0700: End of Shift Note    Bedside shift change report given to NOBLE Traylor (oncoming nurse) by Alice Green (offgoing nurse). Report included the following information SBAR    Shift worked:  Night     Shift summary and any significant changes:     Patient potassium improved to 3.7 and creatinine improved to 0.95. Patient's heparin drip now running at 20 units/kg/hr. Concerns for physician to address:       Zone phone for oncoming shift:          Activity:  Activity Level: Up with Assistance  Number times ambulated in hallways past shift: 3  Number of times OOB to chair past shift: 0    Cardiac:   Cardiac Monitoring: Yes      Cardiac Rhythm: Normal sinus rhythm    Access:   Current line(s): PIV     Genitourinary:   Urinary status: voiding    Respiratory:   O2 Device: Room air  Chronic home O2 use?: NO  Incentive spirometer at bedside: NO     GI:  Last Bowel Movement Date: 02/10/21  Current diet:  DIET NUTRITIONAL SUPPLEMENTS Breakfast, Dinner; Ensure Clear  DIET CARDIAC Regular  Passing flatus: YES  Tolerating current diet: YES       Pain Management:   Patient states pain is manageable on current regimen: YES    Skin:  Reji Score: 20  Interventions: increase time out of bed    Patient Safety:  Fall Score:  Total Score: 2  Interventions: bed/chair alarm, gripper socks and pt to call before getting OOB       Length of Stay:  Expected LOS: 2d 14h  Actual LOS: 3      Kavitha Carrasquillo

## 2021-02-11 NOTE — PROGRESS NOTES
[address] [place] [address]  [phone]      Cardiology Progress Note      2/11/2021 11:15 AM    Admit Date: 2/8/2021    Admit Diagnosis:   NSTEMI (non-ST elevated myocardial infarction) ([facility] [address] [I21.4]    Subjective:     Skip López denies complaint. Creatinine improved. For cath later today.      Visit Vitals  /66   Pulse 73   Temp 98.5 °F (36.9 °C)   Resp 18   Ht 5' 6.14\" (1.68 m)   Wt 112 lb 3.2 oz (50.9 kg)   SpO2 98%   BMI 18.03 kg/m²       Current Facility-Administered Medications   Medication Dose Route Frequency    rifAXIMin (XIFAXAN) tablet 550 mg  550 mg Oral TID    0.9% sodium chloride infusion  50 mL/hr IntraVENous CONTINUOUS    carvediloL (COREG) tablet 6.25 mg  6.25 mg Oral BID WITH MEALS    heparin 25,000 units in D5W 250 ml infusion  12-25 Units/kg/hr IntraVENous TITRATE    heparin (porcine) injection 1,450 Units  30 Units/kg IntraVENous PRN    Or    heparin (porcine) injection 2,850 Units  60 Units/kg IntraVENous PRN    sodium chloride (NS) flush 5-40 mL  5-40 mL IntraVENous Q8H    sodium chloride (NS) flush 5-40 mL  5-40 mL IntraVENous PRN    acetaminophen (TYLENOL) tablet 650 mg  650 mg Oral Q6H PRN    Or    acetaminophen (TYLENOL) suppository 650 mg  650 mg Rectal Q6H PRN    polyethylene glycol (MIRALAX) packet 17 g  17 g Oral DAILY PRN    promethazine (PHENERGAN) tablet 12.5 mg  12.5 mg Oral Q6H PRN    Or    ondansetron (ZOFRAN) injection 4 mg  4 mg IntraVENous Q6H PRN    albuterol-ipratropium (DUO-NEB) 2.5 MG-0.5 MG/3 ML  3 mL Nebulization Q6H PRN    amLODIPine (NORVASC) tablet 5 mg  5 mg Oral DAILY    aspirin chewable tablet 81 mg  81 mg Oral DAILY    atorvastatin (LIPITOR) tablet 80 mg  80 mg Oral DAILY    busPIRone (BUSPAR) tablet 5 mg  5 mg Oral DAILY    dicyclomine (BENTYL) capsule 10 mg  10 mg Oral TID    DULoxetine (CYMBALTA) capsule 60 mg  60 mg Oral DAILY    gabapentin (NEURONTIN) capsule 300 mg  300 mg Oral TID   [facility] by provider] hydroCHLOROthiazide (HYDRODIURIL) tablet 25 mg  25 mg Oral DAILY   • hydrOXYchloroQUINE (PLAQUENIL) tablet 200 mg  200 mg Oral DAILY WITH BREAKFAST   • pantoprazole (PROTONIX) tablet 40 mg  40 mg Oral ACB   • LORazepam (ATIVAN) injection 1 mg  1 mg IntraVENous Q6H PRN   • HYDROcodone-acetaminophen (NORCO)  mg tablet 1 Tab  1 Tab Oral Q6H PRN   • folic acid (FOLVITE) tablet 1 mg  1 mg Oral DAILY   • thiamine mononitrate (B-1) tablet 100 mg  100 mg Oral DAILY   • therapeutic multivitamin (THERAGRAN) tablet 1 Tab  1 Tab Oral DAILY   • nicotine (NICODERM CQ) 21 mg/24 hr patch 1 Patch  1 Patch TransDERmal DAILY       Objective:      Physical Exam:  General Appearance:   female in NAD  Chest:   Clear post  Cardiovascular:  Regular rate and rhythm, no murmur.   Abdomen:   Soft, non-tender, bowel sounds are active.   Extremities: No edema  Skin:  Warm and dry.     Data Review:   Recent Labs     02/10/21  0131 02/09/21  0214   WBC 9.8 16.8*   HGB 11.1* 14.3   HCT 34.3* 44.4    359     Recent Labs     02/11/21  0516 02/10/21  0939 02/10/21  0131    132* 129*   K 3.7 2.7* 3.4*   * 102 98   CO2 22 22 21   * 196* 108*   BUN 15 24* 30*   CREA 0.95 1.50* 1.80*   CA 8.7 8.5 8.5   MG  --  1.3* 1.5*       Recent Labs     02/08/21 2031 02/08/21  1332   TROIQ 2.29* 2.11*         Intake/Output Summary (Last 24 hours) at 2/11/2021 1115  Last data filed at 2/11/2021 1003  Gross per 24 hour   Intake 550 ml   Output 1600 ml   Net -1050 ml        Telemetry: NSR  Echo: 2/8: EF 35-40%    Assessment:     Active Problems:    HTN (hypertension) (10/14/2012)      Nausea & vomiting (11/14/2015)      NSTEMI (non-ST elevated myocardial infarction) (HCC) (11/14/2015)      Tobacco abuse (11/16/2015)      IBS (irritable bowel syndrome) (2/8/2021)      Elevated troponin (2/8/2021)      Cardiomyopathy (HCC) (2/10/2021)      Systolic heart failure (HCC) (2/11/2021)        Plan:   Elevated  troponin/NSTEMI Type 2:  Troponin peak 2.3  Previous admission in 11/2015 similar presentation:  N/v with elevated troponin 0.11-0.88  At that time cardiac cath showed normal cors with takotsubo CM.   Echo EF 35-40%  Continue on ASA, statin, BB, Heparin gtt. Creatinine improved today. For cath this afternoon. Weight down 3kg. 1.5L/24h. Monitor daily weights, labs, I/O.      HTN:  Stable  Continue  BB. Hold HCTZ for elevated Cr per hospitalist.     Hypokalemia:   Resolved.  Follow labs.       Gwen Rivera [facility] student  Cardiology    Addendum  Agree with NP student assessment and plan    Tika Hoang [facility]  Cardiology

## 2021-02-11 NOTE — PROGRESS NOTES
Hospitalist Progress Note    NAME: Enrique Williamson   :  1955   MRN:  [id number]     Interim Hospital Summary: 76 y.o. female whom presented on 2021 with      Assessment / Plan:    NSTEMI (trop 2.29)  Hypertensive emergency  Hx of Takosubo CM  -continue asa statin  -continue coreg, norvasc  -heparin infusion  -for cardiac cath today. Cr has normalized    BRIANNA, resolved  Volume depletion for GI fluid loses  Hypokalemia  Hyponatremia  -hold HCTZ   -IV NS. Nausea/vomiting  Hx IBS-D  -CT abdomen Diffuse colonic wall thickening could indicate an infectious/inflammatory  colitis, though could also be due to underdistention.  -had EGD () / Colonoscopy () showing gastritis, candida and colonic adenoma  -GI input appreciated. Continue Xifaxin TID  -stop empiric abx    GERD  Anxiety  -PPI  -lorazepam prn  -continue psych meds    Smoker  Alcohol consumption  -nicoderm  -CIWA protocol    PC Malnutrition      less than 18.5 Underweight / Body mass index is 18.03 kg/m². Code status: Full  Prophylaxis: on heparin  Recommended Disposition: Home w/Family       Subjective:     Chief Complaint / Reason for Physician Visit  Follow up of  MI, IBS, HTN, smoking  Chart reviewed in detail. Discussed with RN events overnight. Review of Systems:  Symptom Y/N Comments  Symptom Y/N Comments   Fever/Chills    Chest Pain     Poor Appetite    Edema     Cough    Abdominal Pain     Sputum    Joint Pain     SOB/COOMBS    Pruritis/Rash     Nausea/vomit    Tolerating PT/OT     Diarrhea    Tolerating Diet     Constipation    Other       Could NOT obtain due to:      PO intake: No data found. Objective:     VITALS:   Last 24hrs VS reviewed since prior progress note.  Most recent are:  Patient Vitals for the past 24 hrs:   Temp Pulse Resp BP SpO2   21 0759 98.5 °F (36.9 °C) 90 18 (!) 158/72 98 %   21 0219 97.7 °F (36.5 °C) 76 18 124/64 --   02/10/21 2258 98.1 °F (36.7 °C) 79 20 119/63 --   02/10/21 1926 98 °F (36.7 °C) 85 18 124/61 --   02/10/21 1514 97.7 °F (36.5 °C) 77 20 136/76 99 %   02/10/21 1125 97.8 °F (36.6 °C) 78 16 133/67 98 %       Intake/Output Summary (Last 24 hours) at 2/11/2021 0920  Last data filed at 2/11/2021 0825  Gross per 24 hour   Intake 550 ml   Output 1500 ml   Net -950 ml        I had a face to face encounter, and independently examined this patient on 2/11/2021, as outlined below:  PHYSICAL EXAM:  General: WD, WN. Alert, cooperative, no acute distress    EENT:  EOMI. Anicteric sclerae. MMM  Resp:  CTA bilaterally, no wheezing or rales. No accessory muscle use  CV:  Regular  rhythm,  No edema  GI:  Soft, Non distended, Non tender. +Bowel sounds  Neurologic:  Alert and oriented X 3, normal speech,   Psych:   Good insight. Not anxious nor agitated  Skin:  No rashes. No jaundice    Reviewed most current lab test results and cultures  YES  Reviewed most current radiology test results   YES  Review and summation of old records today    NO  Reviewed patient's current orders and MAR    YES  PMH/ reviewed - no change compared to H&P  ________________________________________________________________________  Care Plan discussed with:    Comments   Patient x    Family  x    RN     Care Manager     Consultant  x                      Multidiciplinary team rounds were held today with , nursing, pharmacist and clinical coordinator. Patient's plan of care was discussed; medications were reviewed and discharge planning was addressed.      ________________________________________________________________________  Total NON critical care TIME:  25   Minutes    Total CRITICAL CARE TIME Spent:   Minutes non procedure based      Comments   >50% of visit spent in counseling and coordination of care x     This includes time during multidisciplinary rounds if indicated above ________________________________________________________________________  Rahel Biswas MD     Procedures: see electronic medical records for all procedures/Xrays and details which were not copied into this note but were reviewed prior to creation of Plan. LABS:  I reviewed today's most current labs and imaging studies.   Pertinent labs include:  Recent Labs     02/10/21  0131 02/09/21  0214   WBC 9.8 16.8*   HGB 11.1* 14.3   HCT 34.3* 44.4    359     Recent Labs     02/11/21  0516 02/10/21  0939 02/10/21  0131    132* 129*   K 3.7 2.7* 3.4*   * 102 98   CO2 22 22 21   * 196* 108*   BUN 15 24* 30*   CREA 0.95 1.50* 1.80*   CA 8.7 8.5 8.5   MG  --  1.3* 1.5*

## 2021-02-11 NOTE — PROGRESS NOTES
Pt off floor for cath    1709  TRANSFER - OUT REPORT:    Verbal report given to JORDI(name) on Dimitry Almonte  being transferred to IVCU(unit) for routine post - op       Report consisted of patients Situation, Background, Assessment and   Recommendations(SBAR). Information from the following report(s) SBAR was reviewed with the receiving nurse. Lines:   Peripheral IV 18/07/13 Left Basilic (Active)   Site Assessment Clean, dry, & intact 02/11/21 0825   Phlebitis Assessment 0 02/11/21 0825   Infiltration Assessment 0 02/11/21 0825   Dressing Status Clean, dry, & intact 02/11/21 0825   Dressing Type Transparent 02/11/21 0825   Hub Color/Line Status Pink; Infusing 02/11/21 0825       Peripheral IV 02/10/21 Anterior;Distal;Left Forearm (Active)   Site Assessment Clean, dry, & intact 02/11/21 0825   Phlebitis Assessment 0 02/11/21 0825   Infiltration Assessment 0 02/11/21 0825   Dressing Status Clean, dry, & intact 02/11/21 0825   Dressing Type Transparent 02/11/21 0825   Hub Color/Line Status Blue; Infusing 02/11/21 0825        Opportunity for questions and clarification was provided.

## 2021-02-11 NOTE — PROGRESS NOTES
Problem: Falls - Risk of  Goal: *Absence of Falls  Description: Document Marina Johnson Fall Risk and appropriate interventions in the flowsheet.   Outcome: Progressing Towards Goal  Note: Fall Risk Interventions:            Medication Interventions: Bed/chair exit alarm, Evaluate medications/consider consulting pharmacy, Patient to call before getting OOB, Teach patient to arise slowly    Elimination Interventions: Bed/chair exit alarm, Call light in reach, Patient to call for help with toileting needs

## 2021-02-11 NOTE — PROGRESS NOTES
Physician Progress Note      Shabana Holden  CSN #:                  [id number]  :                       1955  ADMIT DATE:       2021 1:12 AM  DISCH DATE:  RESPONDING  PROVIDER #:        Dartha Koyanagi MD          QUERY TEXT:    Dear Hospitalist Team,  Pt admitted with NSTEMI and has Chronic moderate malnutrition documented within the medical record. Please further specify type of malnutrition with documentation in the medical record. The medical record reflects the following:    Risk Factors: 76 Yr F admitted with NSTEMI    Clinical Indicators: Patient arrived to the ED with c/o N/V/D and work up in the ED revealed a NSTEMI. A nutrition consultation was placed  BMI 16.97. SUNITA Mistry saw the patient on  and stated that the patient met ASPEN criteria for Chronic moderate malnutrition as evidenced by Energy Intake:  7 - 75% or less est energy requirements for 1 month or longer, Weight Loss:  7.0 - Greater than 7.5% over 3 months, Body Fat Loss:  1 - Mild body fat loss, Triceps and Muscle Mass Loss:  1 - Mild muscle mass loss, Calf (gastrocnemius), Clavicles (pectoralis &deltoids), Temples (temporalis), Scapula (trapezius). -Moderate malnutrition, In context of chronic illness related to altered GI function, inadequate protein-energy intake as evidenced by weight loss greater than or equal to 10% in 6 months, poor intake prior to admission, mild muscle loss, mild loss of subcutaneous fat, vomiting, diarrhea(pt reported decrease in strength). Pt reports 30lb weight loss as a result. EMR shows 15lb (12.5%) weight loss in the past 3 months. This is severe for the time frame. Treatment: BMP daily, nutrition consultation, Ensure clear, IBS education and frequent monitoring/vital signs.     Thank you,  Monica Duarte RN, [facility]  [phone]  Options provided:  -- Moderate Malnutrition  -- Moderate Protein calorie malnutrition  -- Other - I will add my own diagnosis  -- Disagree - Not applicable / Not valid  -- Disagree - Clinically unable to determine / Unknown  -- Refer to Clinical Documentation Reviewer    PROVIDER RESPONSE TEXT:    This patient has moderate protein calorie malnutrition. Query created by:  Khushi Schwartz on 2/10/2021 10:06 AM      Electronically signed by:  Srinivasa Ashraf MD 2/11/2021 9:25 AM

## 2021-02-11 NOTE — PROGRESS NOTES
MsLeonel Gisselle Cobos is s/p cardiac cath for a NSTEMI  Cardiac cath shows non obstructive disease, no interventional indicated. NSTEMI r/t takotsubo. LV gram shows cardiomyopathy improving    Ok for discharge once GI oks.

## 2021-02-11 NOTE — PROGRESS NOTES
End of Shift Note    Bedside shift change report given to [facility] (oncoming nurse) by Ayaz Seat (offgoing nurse). Report included the following information SBAR, Kardex, Procedure Summary, Intake/Output, MAR, Recent Results and Cardiac Rhythm NSR    Shift worked:  4pm-7pm     Shift summary and any significant changes:     [facility] clean. D/C fermin. Concerns for physician to address:  N/A     Zone phone for oncoming shift:   N/A       Activity:  Activity Level: Bed Rest  Number times ambulated in hallways past shift: 0  Number of times OOB to chair past shift: 0    Cardiac:   Cardiac Monitoring: Yes      Cardiac Rhythm: Normal sinus rhythm    Access:   Current line(s): PIV     Genitourinary:   Urinary status: voiding    Respiratory:   O2 Device: Room air  Chronic home O2 use?: N/A  Incentive spirometer at bedside: N/A     GI:  Last Bowel Movement Date: 02/11/21  Current diet:  DIET GI LITE (POST SURGICAL)  Passing flatus: YES  Tolerating current diet: YES  % Diet Eaten: 100 %    Pain Management:   Patient states pain is manageable on current regimen: YES    Skin:  Reji Score: 22  Interventions: increase time out of bed    Patient Safety:  Fall Score:  Total Score: 2  Interventions: gripper socks       Length of Stay:  Expected LOS: 2d 14h  Actual LOS: 3      Ayaz Seat

## 2021-02-11 NOTE — PROGRESS NOTES
Anny from [facility] called and I updated her with chart info. Phone [phone]. She can assist with d/c planning as needed.     YASHIRA Plan     *Disposition: Home with spouse  -pt is scheduled for cardiac cath today.  -no d/c needs assessed at present time  -pt has [employer]Medicare  *OP F/U: PCP  *Transport at d/c: Spouse to transport

## 2021-02-12 VITALS
SYSTOLIC BLOOD PRESSURE: 127 MMHG | OXYGEN SATURATION: 96 % | TEMPERATURE: 98.2 F | HEIGHT: 66 IN | RESPIRATION RATE: 19 BRPM | HEART RATE: 95 BPM | DIASTOLIC BLOOD PRESSURE: 94 MMHG | WEIGHT: 116.18 LBS | BODY MASS INDEX: 18.67 KG/M2

## 2021-02-12 LAB
ANION GAP SERPL CALC-SCNC: 5 MMOL/L (ref 5–15)
BASOPHILS # BLD: 0 K/UL (ref 0–0.1)
BASOPHILS NFR BLD: 0 % (ref 0–1)
BUN SERPL-MCNC: 9 MG/DL (ref 6–20)
BUN/CREAT SERPL: 10 (ref 12–20)
CALCIUM SERPL-MCNC: 8.4 MG/DL (ref 8.5–10.1)
CHLORIDE SERPL-SCNC: 112 MMOL/L (ref 97–108)
CO2 SERPL-SCNC: 24 MMOL/L (ref 21–32)
CREAT SERPL-MCNC: 0.93 MG/DL (ref 0.55–1.02)
DIFFERENTIAL METHOD BLD: ABNORMAL
EOSINOPHIL # BLD: 0.1 K/UL (ref 0–0.4)
EOSINOPHIL NFR BLD: 2 % (ref 0–7)
ERYTHROCYTE [DISTWIDTH] IN BLOOD BY AUTOMATED COUNT: 14.3 % (ref 11.5–14.5)
GLUCOSE SERPL-MCNC: 145 MG/DL (ref 65–100)
HCT VFR BLD AUTO: 30.3 % (ref 35–47)
HGB BLD-MCNC: 9.7 G/DL (ref 11.5–16)
IMM GRANULOCYTES # BLD AUTO: 0 K/UL (ref 0–0.04)
IMM GRANULOCYTES NFR BLD AUTO: 0 % (ref 0–0.5)
LYMPHOCYTES # BLD: 1.8 K/UL (ref 0.8–3.5)
LYMPHOCYTES NFR BLD: 21 % (ref 12–49)
MAGNESIUM SERPL-MCNC: 1.7 MG/DL (ref 1.6–2.4)
MCH RBC QN AUTO: 32.7 PG (ref 26–34)
MCHC RBC AUTO-ENTMCNC: 32 G/DL (ref 30–36.5)
MCV RBC AUTO: 102 FL (ref 80–99)
MONOCYTES # BLD: 0.8 K/UL (ref 0–1)
MONOCYTES NFR BLD: 9 % (ref 5–13)
NEUTS SEG # BLD: 6 K/UL (ref 1.8–8)
NEUTS SEG NFR BLD: 68 % (ref 32–75)
NRBC # BLD: 0 K/UL (ref 0–0.01)
NRBC BLD-RTO: 0 PER 100 WBC
PLATELET # BLD AUTO: 302 K/UL (ref 150–400)
PMV BLD AUTO: 10.1 FL (ref 8.9–12.9)
POTASSIUM SERPL-SCNC: 3.5 MMOL/L (ref 3.5–5.1)
RBC # BLD AUTO: 2.97 M/UL (ref 3.8–5.2)
SODIUM SERPL-SCNC: 141 MMOL/L (ref 136–145)
WBC # BLD AUTO: 8.8 K/UL (ref 3.6–11)

## 2021-02-12 PROCEDURE — 85025 COMPLETE CBC W/AUTO DIFF WBC: CPT

## 2021-02-12 PROCEDURE — 74011250637 HC RX REV CODE- 250/637: Performed by: STUDENT IN AN ORGANIZED HEALTH CARE EDUCATION/TRAINING PROGRAM

## 2021-02-12 PROCEDURE — 74011250637 HC RX REV CODE- 250/637: Performed by: NURSE PRACTITIONER

## 2021-02-12 PROCEDURE — 36415 COLL VENOUS BLD VENIPUNCTURE: CPT

## 2021-02-12 PROCEDURE — 83735 ASSAY OF MAGNESIUM: CPT

## 2021-02-12 PROCEDURE — 74011250637 HC RX REV CODE- 250/637: Performed by: INTERNAL MEDICINE

## 2021-02-12 PROCEDURE — 80048 BASIC METABOLIC PNL TOTAL CA: CPT

## 2021-02-12 RX ORDER — CARVEDILOL 12.5 MG/1
6.25 TABLET ORAL 2 TIMES DAILY WITH MEALS
Qty: 180 TAB | Refills: 3 | Status: SHIPPED
Start: 2021-02-12 | End: 2022-02-21

## 2021-02-12 RX ORDER — HYDROCODONE BITARTRATE AND ACETAMINOPHEN 10; 325 MG/1; MG/1
1 TABLET ORAL
Qty: 24 TAB | Refills: 0 | Status: SHIPPED | OUTPATIENT
Start: 2021-02-12 | End: 2021-02-17

## 2021-02-12 RX ADMIN — ATORVASTATIN CALCIUM 80 MG: 40 TABLET, FILM COATED ORAL at 09:31

## 2021-02-12 RX ADMIN — ASPIRIN 81 MG: 81 TABLET, CHEWABLE ORAL at 09:31

## 2021-02-12 RX ADMIN — THERA TABS 1 TABLET: TAB at 09:31

## 2021-02-12 RX ADMIN — Medication 20 ML: at 03:15

## 2021-02-12 RX ADMIN — HYDROXYCHLOROQUINE SULFATE 200 MG: 200 TABLET ORAL at 09:31

## 2021-02-12 RX ADMIN — DULOXETINE HYDROCHLORIDE 60 MG: 30 CAPSULE, DELAYED RELEASE ORAL at 09:47

## 2021-02-12 RX ADMIN — FOLIC ACID 1 MG: 1 TABLET ORAL at 09:31

## 2021-02-12 RX ADMIN — HYDROCODONE BITARTRATE AND ACETAMINOPHEN 1 TABLET: 10; 325 TABLET ORAL at 09:47

## 2021-02-12 RX ADMIN — DICYCLOMINE HYDROCHLORIDE 10 MG: 10 CAPSULE ORAL at 09:30

## 2021-02-12 RX ADMIN — THIAMINE HCL TAB 100 MG 100 MG: 100 TAB at 09:31

## 2021-02-12 RX ADMIN — RIFAXIMIN 550 MG: 550 TABLET ORAL at 09:30

## 2021-02-12 RX ADMIN — GABAPENTIN 300 MG: 300 CAPSULE ORAL at 09:31

## 2021-02-12 RX ADMIN — LOPERAMIDE HYDROCHLORIDE 2 MG: 2 CAPSULE ORAL at 09:56

## 2021-02-12 RX ADMIN — CARVEDILOL 6.25 MG: 6.25 TABLET, FILM COATED ORAL at 09:31

## 2021-02-12 RX ADMIN — BUSPIRONE HYDROCHLORIDE 5 MG: 5 TABLET ORAL at 09:31

## 2021-02-12 RX ADMIN — AMLODIPINE BESYLATE 5 MG: 5 TABLET ORAL at 09:31

## 2021-02-12 RX ADMIN — PANTOPRAZOLE SODIUM 40 MG: 40 TABLET, DELAYED RELEASE ORAL at 09:31

## 2021-02-12 RX ADMIN — HYDROCODONE BITARTRATE AND ACETAMINOPHEN 1 TABLET: 10; 325 TABLET ORAL at 03:14

## 2021-02-12 NOTE — DISCHARGE SUMMARY
Hospitalist Discharge Summary     Patient ID:  Kenya Morris  [id number]  76 y.o.  1955    PCP on record: Magalie Ryan MD    Admit date: 2/8/2021  Discharge date and time: 2/12/2021      DISCHARGE DIAGNOSIS:    NSTEMI (trop 2.29)  -s/p cath, non obstructive disease  Hypertensive emergency  Hx of Takosubo CM  BRIANNA, resolved  Volume depletion for GI fluid loses  Hypokalemia  Hyponatremia  Nausea/vomiting  Hx IBS-D  GERD  Anxiety  Smoker  Alcohol consumption    CONSULTATIONS:  IP CONSULT TO HOSPITALIST  IP CONSULT TO GASTROENTEROLOGY  IP CONSULT TO GASTROENTEROLOGY    Excerpted HPI from H&P of Radha Mendoza MD:  CHIEF COMPLAINT: Nausea and vomiting     HISTORY OF PRESENT ILLNESS:     Ayse Almanza is a 76 y.o. female with PMH of IBS, GERD, RA, Takosubo Cardiomyopathy was brought in by  for worsening nausea, vomiting and diarrhea. On my assessment, she is sleeping but arousable. History given by her . She has been having this nausea and vomiting for years now, which  attributes to her IBS, her symptoms has worsened over the last few days. She is also having on and off diarrhea. She denies any chest pain, cough, shortness of breath, lower ext edema. She had a upper GI series xray last week and was normal.   In the ED she was having uptrending troponin, and was given aspirin and started on heparin drip. History of COVID in Dec, was retested negative as per , no covid symptoms currently.     We were asked to admit for work up and evaluation of the above problems. ______________________________________________________________________  DISCHARGE SUMMARY/HOSPITAL COURSE:  for full details see H&P, daily progress notes, labs, consult notes. NSTEMI (trop 2.29)  Hypertensive emergency  Hx of Takosubo CM  -continue asa statin  -continue coreg, norvasc  -Cardiac cath shows non obstructive disease, no interventional indicated.   LV gram shows cardiomyopathy improving     BRIANNA, resolved  Volume depletion for GI fluid loses  Hypokalemia  Hyponatremia  -resolved with IVFs / k repletion     Nausea/vomiting  Hx IBS-D  -CT abdomen Diffuse colonic wall thickening could indicate an infectious/inflammatory colitis, though could also be due to underdistention.  -had EGD (2020) / Colonoscopy (2018) showing gastritis, candida and colonic adenoma  -GI input appreciated. Continue Xifaxin TID  -stopped empiric abx, clinically not acute colitis     GERD  Anxiety  -continue psych meds     Smoker  Alcohol consumption  -no issues with withdrawal.  Advised to quit or cut back.     _______________________________________________________________________  Patient seen and examined by me on discharge day. Pertinent Findings:  Gen:    Not in distress  Chest: Clear lungs  CVS:   Regular rhythm. No edema  Abd:  Soft, not distended, not tender  Neuro:  Alert, Oriented x 4, grossly non focal exam  _______________________________________________________________________  DISCHARGE MEDICATIONS:   Current Discharge Medication List      CONTINUE these medications which have CHANGED    Details   carvediloL (COREG) 12.5 mg tablet Take 0.5 Tabs by mouth two (2) times daily (with meals). TAKE 1 TABLET BY MOUTH TWICE DAILY WITH MEALS  Qty: 180 Tab, Refills: 3         CONTINUE these medications which have NOT CHANGED    Details   dicyclomine (BENTYL) 20 mg tablet Take 20 mg by mouth. Before meals and at bedtime as needed      cetirizine (ZYRTEC) 10 mg tablet Take 10 mg by mouth daily as needed for Allergies. gabapentin (NEURONTIN) 300 mg capsule Take 300 mg by mouth three (3) times daily. Take Morning, afternoon and at bedtime. omeprazole (PRILOSEC) 20 mg capsule Take 20 mg by mouth daily. On an empty stomach      promethazine (PHENERGAN) 12.5 mg suppository Insert 12.5 mg into rectum every six (6) hours as needed for Nausea.       clotrimazole (MYCELEX) 10 mg quang Take 10 mg by mouth five (5) times daily. Dissolve one quang in mouth for one week. famotidine (PEPCID) 20 mg tablet Take 20 mg by mouth two (2) times a day. For 10 days      hydrOXYzine HCL (ATARAX) 25 mg tablet Take 25 mg by mouth three (3) times daily as needed for Anxiety. Xifaxan 550 mg tablet TAKE 1 TABLET BY MOUTH THREE TIMES DAILY      hydroCHLOROthiazide (HYDRODIURIL) 25 mg tablet TAKE 1 TABLET BY MOUTH DAILY  Qty: 30 Tab, Refills: 3      busPIRone (BUSPAR) 5 mg tablet TAKE 1 TABLET BY MOUTH TWICE DAILY  Qty: 60 Tab, Refills: 5      promethazine (PHENERGAN) 12.5 mg tablet Take 2 Tabs by mouth every six (6) hours as needed for Nausea. Qty: 20 Tab, Refills: 0      Humira,CF, Pen 40 mg/0.4 mL injection pen INJECT ONE PEN (40MG) UNDER THE SKIN (SUBCUTANEOUS INJECTION) EVERY 2 WEEKS  Qty: 1 Kit, Refills: 0      DULoxetine (CYMBALTA) 60 mg capsule Take 1 Cap by mouth daily. Qty: 30 Cap, Refills: 5      hydroxychloroquine (PLAQUENIL) 200 mg tablet TAKE 1 TABLET BY MOUTH DAILY  Qty: 30 Tab, Refills: 0      atorvastatin (LIPITOR) 80 mg tablet Take 1 Tab by mouth daily. take 1 tablet by mouth at bedtime  Qty: 90 Tab, Refills: 3      pantoprazole (PROTONIX) 40 mg tablet Take 40 mg by mouth two (2) times a day. alclometasone (ACLOVATE) 0.05 % topical cream USE ON CORNERS OF MOUTH FOR RASH TWICE A DAY AS NEEDED  Refills: 3      fluticasone propionate (FLONASE) 50 mcg/actuation nasal spray fluticasone propionate 50 mcg/actuation nasal spray,suspension      albuterol (PROVENTIL HFA, VENTOLIN HFA, PROAIR HFA) 90 mcg/actuation inhaler Take 2 Puffs by inhalation every six (6) hours as needed for Wheezing. Qty: 1 Inhaler, Refills: 0      amLODIPine (NORVASC) 2.5 mg tablet take 1 tablet by mouth once daily  Qty: 30 Tab, Refills: 1      ondansetron (ZOFRAN ODT) 4 mg disintegrating tablet Take 1 Tab by mouth every eight (8) hours as needed for Nausea.   Qty: 30 Tab, Refills: 1    Associated Diagnoses: Nausea      HYDROcodone-acetaminophen (NORCO)  mg tablet TAKE 1 TABLET BY MOUTH 3 TIMES DAILY AS NEEDED FOR PAIN  Refills: 0      desoximetasone (TOPICORT) 0.25 % topical cream Apply  to affected area two (2) times daily as needed for Skin Irritation. Qty: 15 g, Refills: 0      aspirin 81 mg chewable tablet Take 1 Tab by mouth daily. Qty: 30 Tab, Refills: 0         STOP taking these medications       doxycycline (ADOXA) 100 mg tablet Comments:   Reason for Stopping:         dicyclomine (BENTYL) 10 mg capsule Comments:   Reason for Stopping:               My Recommended Diet, Activity, Wound Care, and follow-up labs are listed in the patient's Discharge Insturctions which I have personally completed and reviewed.   Risk of deterioration: Low    Condition at Discharge:  Stable  _____________________________________________________________________    Disposition  Home with family, no needs  ____________________________________________________________________    Care Plan discussed with:   Patient, Family, RN, Care Manager, Consultant    ____________________________________________________________________    Code Status: Full Code  ____________________________________________________________________      Condition at Discharge:  Stable  _____________________________________________________________________  Follow up with:   PCP : Antonette Marquez MD  Follow-up Information     Follow up With Specialties Details Why Jessie Amado MD Internal Medicine In 2 weeks  [address] [facility]  [address] [id number]                Total time in minutes spent coordinating this discharge (includes going over instructions, follow-up, prescriptions, and preparing report for sign off to her PCP) :  35 minutes    Signed:  Valere Landau, MD

## 2021-02-12 NOTE — CARDIO/PULMONARY
Cardiac Rehab Note: chart review/referral     Pt admitted with NSTEMI type II r/t Takotsubo CM, s/p cardiac cath with non-obstructing coronary artery disease, no intervention. EF 40%  on 2/8/21 per echo. Smoking history assessed. Patient is a current smoker. Smoking Cessation Program link has been added to the AVS.     Patient not seen at this time due to current condition as indicated in chart.

## 2021-02-12 NOTE — PROGRESS NOTES
[address]  [phone]      Cardiology Progress Note      2/12/2021 8:30AM    Admit Date: 2/8/2021    Admit Diagnosis:   NSTEMI (non-ST elevated myocardial infarction) ([facility] [I21.4]    Subjective:     Yon Felton denies complaint. S/p cardiac cath with nonobstructive disease, no intervention.    LV EF by LV gram est at 55-60%    Visit Vitals  BP (!) 157/75   Pulse 88   Temp 98.3 °F (36.8 °C)   Resp 17   Ht 5' 6\" (1.676 m)   Wt 116 lb 2.9 oz (52.7 kg)   SpO2 94%   BMI 18.75 kg/m²       Current Facility-Administered Medications   Medication Dose Route Frequency    loperamide (IMODIUM) capsule 2 mg  2 mg Oral Q4H PRN    rifAXIMin (XIFAXAN) tablet 550 mg  550 mg Oral TID    0.9% sodium chloride infusion  50 mL/hr IntraVENous CONTINUOUS    carvediloL (COREG) tablet 6.25 mg  6.25 mg Oral BID WITH MEALS    heparin 25,000 units in D5W 250 ml infusion  12-25 Units/kg/hr IntraVENous TITRATE    heparin (porcine) injection 1,450 Units  30 Units/kg IntraVENous PRN    Or    heparin (porcine) injection 2,850 Units  60 Units/kg IntraVENous PRN    sodium chloride (NS) flush 5-40 mL  5-40 mL IntraVENous Q8H    sodium chloride (NS) flush 5-40 mL  5-40 mL IntraVENous PRN    acetaminophen (TYLENOL) tablet 650 mg  650 mg Oral Q6H PRN    Or    acetaminophen (TYLENOL) suppository 650 mg  650 mg Rectal Q6H PRN    polyethylene glycol (MIRALAX) packet 17 g  17 g Oral DAILY PRN    promethazine (PHENERGAN) tablet 12.5 mg  12.5 mg Oral Q6H PRN    Or    ondansetron (ZOFRAN) injection 4 mg  4 mg IntraVENous Q6H PRN    albuterol-ipratropium (DUO-NEB) 2.5 MG-0.5 MG/3 ML  3 mL Nebulization Q6H PRN    amLODIPine (NORVASC) tablet 5 mg  5 mg Oral DAILY    aspirin chewable tablet 81 mg  81 mg Oral DAILY    atorvastatin (LIPITOR) tablet 80 mg  80 mg Oral DAILY    busPIRone (BUSPAR) tablet 5 mg  5 mg Oral DAILY    dicyclomine (BENTYL) capsule 10 mg  10 mg Oral TID    DULoxetine (CYMBALTA) capsule 60 mg  60 mg Oral DAILY    gabapentin (NEURONTIN) capsule 300 mg  300 mg Oral TID    [Held by provider] hydroCHLOROthiazide (HYDRODIURIL) tablet 25 mg  25 mg Oral DAILY    hydrOXYchloroQUINE (PLAQUENIL) tablet 200 mg  200 mg Oral DAILY WITH BREAKFAST    pantoprazole (PROTONIX) tablet 40 mg  40 mg Oral ACB    LORazepam (ATIVAN) injection 1 mg  1 mg IntraVENous Q6H PRN    HYDROcodone-acetaminophen (NORCO)  mg tablet 1 Tab  1 Tab Oral C3V PRN    folic acid (FOLVITE) tablet 1 mg  1 mg Oral DAILY    thiamine mononitrate (B-1) tablet 100 mg  100 mg Oral DAILY    therapeutic multivitamin (THERAGRAN) tablet 1 Tab  1 Tab Oral DAILY    nicotine (NICODERM CQ) 21 mg/24 hr patch 1 Patch  1 Patch TransDERmal DAILY       Objective:      Physical Exam:  General Appearance:   female in NAD  Chest:   Clear post  Cardiovascular:  Regular rate and rhythm, no murmur. Abdomen:   Soft, non-tender, bowel sounds are active. Extremities: No edema; right radial site D/I, no hematoma  Skin:  Warm and dry. Data Review:   Recent Labs     02/12/21  0326 02/10/21  0131   WBC 8.8 9.8   HGB 9.7* 11.1*   HCT 30.3* 34.3*    282     Recent Labs     02/12/21  0326 02/11/21  0516 02/10/21  0939 02/10/21  0131    139 132* 129*   K 3.5 3.7 2.7* 3.4*   * 110* 102 98   CO2 24 22 22 21   * 101* 196* 108*   BUN 9 15 24* 30*   CREA 0.93 0.95 1.50* 1.80*   CA 8.4* 8.7 8.5 8.5   MG 1.7  --  1.3* 1.5*       No results for input(s): TROIQ, CPK, CKMB in the last 72 hours.       Intake/Output Summary (Last 24 hours) at 2/12/2021 0838  Last data filed at 2/12/2021 0108  Gross per 24 hour   Intake 4888.75 ml   Output 900 ml   Net 3988.75 ml        Telemetry: NSR    Cardiac cath: LV gram 55-60%  Echo: 2/8: EF 35-40%    Assessment:     Active Problems:    HTN (hypertension) (10/14/2012)      Nausea & vomiting (11/14/2015)      NSTEMI (non-ST elevated myocardial infarction) ([facility] (11/14/2015) Tobacco abuse (11/16/2015)      IBS (irritable bowel syndrome) (2/8/2021)      Elevated troponin (2/8/2021)      Cardiomyopathy ([facility] (5/95/0885)      Systolic heart failure ([facility] (2/11/2021)        Plan:   Elevated troponin/NSTEMI Type 2:  Troponin peak 2.3  Previous admission in 11/2015 similar presentation:  N/v with elevated troponin 0.11-0.88  Cardiac cath 2/11/2021 with nonobstructive disease, likely NSTEMI type 2 r/t takotsubo   Continue on ASA, statin, BB   Monitor daily weights, labs, I/O.      HTN:  Stable  Continue  BB, Norvascc      Hypokalemia/renal dysfunction  Resolved.         Discharge to home today with f/u Dr. Ana Luisa Cano 2 weeks.      Gunnar Hammond ACNP  Cardiology

## 2021-02-12 NOTE — DISCHARGE INSTRUCTIONS
Smoking Cessation Program:   [facility] is now offering a proven, interactive, text-based smoking cessation program for FREE! To register, please text Joni Cash [phone] or visit [employer]/quit  For more information, please call: [phone]                            HOSPITALIST DISCHARGE INSTRUCTIONS    NAME: Dimitry Almonte   :  1955   MRN:  [id number]     Date/Time:  2021 7:27 AM    ADMIT DATE: 2021   DISCHARGE DATE: 2021         · It is important that you take the medication exactly as they are prescribed. · Keep your medication in the bottles provided by the pharmacist and keep a list of the medication names, dosages, and times to be taken in your wallet. · Do not take other medications without consulting your doctor. What to do at [address]:  Cardiac Diet    Recommended activity: Activity as tolerated      If you have questions regarding the hospital related prescriptions or hospital related issues please call [facility] at [phone]. You can always direct your questions to your primary care doctor if you are unable to reach your hospital physician; your PCP works as an extension of your hospital doctor just like your hospital doctor is an extension of your PCP for your time at the hospital [facility], [facility])    If you experience any of the following symptoms then please call your primary care physician or return to the emergency room if you cannot get hold of your doctor:    Fever, chills, nausea, vomiting, or persistent diarrhea  Worsening weakness or new problems with your speech or balance  Dark stools or visible blood in your stools  New Leg swelling or shortness of breath as these could be signs of a clot    Additional Instructions:      Bring these papers with you to your follow up appointments.  The papers will help your doctors be sure to continue the care plan from the hospital.              Information obtained by :  I understand that if any problems occur once I am at home I am to contact my physician. I understand and acknowledge receipt of the instructions indicated above. Physician's or R.N.'s Signature                                                                  Date/Time                                                                                                                                              Patient or [facility]  [address] [place] [address]  [phone]        Patient ID:  Spencer Kieran  [id number]  89 y.o.  1955    Admit Date: 2/8/2021    Discharge Date: 2/12/2021     Admission Diagnoses:   NSTEMI (non-ST elevated myocardial infarction) [facility]) [I21.4]    Discharge Diagnoses: Active Problems:    HTN (hypertension) (10/14/2012)      Nausea & vomiting (11/14/2015)      NSTEMI (non-ST elevated myocardial infarction) ([facility] (11/14/2015)      Tobacco abuse (11/16/2015)      IBS (irritable bowel syndrome) (2/8/2021)      Elevated troponin (2/8/2021)      Cardiomyopathy ([facility] (6/18/4338)      Systolic heart failure ([facility] (2/11/2021)        Discharge Condition: Good    Cardiology Procedures this Admission:  Diagnostic left heart catheterization  EchoCardiogram    Disposition: home    Reference discharge instructions provided by nursing for diet and activity. Signed:  Jamia Todd NP  2/12/2021  8:36 AM        Radial Cardiac Catheterization/Angiography Discharge Instructions    It is normal to feel tired the first couple days. Take it easy and follow the physicians instructions. CHECK THE CATHETER INSERTION SITE DAILY:    Remove the wrist dressing 24 hours after the procedure. You may shower 24 hours after the procedure.   Wash with soap and water and pat dry. Gentle cleaning of the site with soap and water is sufficient, cover with a dry clean dressing or bandage. Do not apply creams or powders to the area. No soaking the wrist for 3 days. Leave the puncture site open to air after 24 hours post-procedure. CALL THE PHYSICIANS:     If the site becomes red, swollen or feels warm to the touch  If there is bleeding or drainage or if there is unusual pain at the radial site. If there is any minor oozing, you may apply a band-aid and remove after 12 hours. If the bleeding continues, hold pressure with the middle finger against the puncture site and the thumb against the back of the wrist,call 911 to be transported to the hospital.  DO NOT DRIVE YOURSELF, [facility] ACTIVITY:   For the first 24 hours do not manipulate the wrist.  No lifting, pushing or pulling over 3-5 pounds with the affected wrist for 7 daysand no straining the insertion site. Do not life grocery bags or the garbage can, do not run the vacuum  or  for 7 days. Start with short walks as in the hospital and gradually increase as tolerated each day. It is recommended to walk 30 minutes 5-7 days per week. Follow your physicians instructions on activity. Avoid walking outside in extremes of heat or cold. Walk inside when it is cold and windy or hot and humid. Things to keep in mind:  No driving for at least 24 hours, or as designated by your physician. Limit the number of times you go up and down the stairs  Take rests and pace yourself with activity. Be careful and do not strain with bowel movements.     MEDICATIONS:    Take all medications as prescribed  Call your physician if you have any questions  Keep an updated list of your medications with you at all times and give a list to your physician and pharmacist    SIGNS AND SYMPTOMS:   Be cautious of symptoms of angina or recurrent symptoms such as chest discomfort, unusual shortness of breath or fatigue. These could be symptoms of restenosis, a new blockage or a heart attack. If your symptoms are relieved with rest it is still recommended that you notify your physician of recurrent chest pain or discomfort. For CHEST PAIN or symptoms of angina not relieved with rest:  If the discomfort is not relieved with rest, and you have been prescribed Nitroglycerin, take as directed (taken under the tongue, one at a time 5 minutes apart for a total of 3 doses). If the discomfort is not relieved after the 3rd nitroglycerin, call 911. If you have not been prescribed Nitroglycerin  and your chest discomfort is not relieved with rest, call 911. AFTER CARE:   Follow up with your physician as instructed. Follow a heart healthy diet with proper portion control, daily stress management, daily exercise, blood pressure and cholesterol control , and smoking cessation.

## 2021-02-12 NOTE — PROGRESS NOTES
Hospital follow-up Virtual PCP transitional care appointment has been scheduled with Dr. Jessie Rodney for Tuesday, 2/16/21 at 10:45 a.m. Pending patient discharge.   Kobe Brooks, Care Management Specialist.

## 2021-02-12 NOTE — PROGRESS NOTES
Bedside shift change report given to [facility] (oncoming nurse) by Iva Cruz (offgoing nurse). Report included the following information SBAR.      Patient assisted back to bed from the bathroom, patient tolerated this activity    2059  Patient resting in bed eyes closed    0130  Patient assisted to bathroom three times and patient tolerated these activities well without complaints  Patient requested advancing her diet as patient reported she's not had any diarrhea so far since been given imodium

## 2021-02-12 NOTE — PROGRESS NOTES
Spiritual Care Assessment/Progress Note  [facility]      NAME: Gisell Jackson      MRN: [id number]  AGE: 76 y.o.  SEX: female  Adventist Affiliation: Rastafari   Language: English     2/12/2021     Total Time (in minutes): 42     Spiritual Assessment begun in [facility] through conversation with:         [x]Patient        [] Family    [] Friend(s)        Reason for Consult: Initial/Spiritual assessment, patient floor     Spiritual beliefs: (Please include comment if needed)     [x] Identifies with a azael tradition:         [] Supported by a azael community:            [] Claims no spiritual orientation:           [] Seeking spiritual identity:                [] Adheres to an individual form of spirituality:           [] Not able to assess:                           Identified resources for coping:      [x] Prayer                               [] Music                  [] Guided Imagery     [x] Family/friends                 [] Pet visits     [] Devotional reading                         [] Unknown     [] Other:                                             Interventions offered during this visit: (See comments for more details)    Patient Interventions: Affirmation of emotions/emotional suffering, Affirmation of azael, Catharsis/review of pertinent events in supportive environment, Coping skills reviewed/reinforced, Initial/Spiritual assessment, patient floor, Life review/legacy, Prayer (actual), Prayer (assurance of)           Plan of Care:     [x] Support spiritual and/or cultural needs    [] Support AMD and/or advance care planning process      [] Support grieving process   [] Coordinate Rites and/or Rituals    [] Coordination with community clergy   [] No spiritual needs identified at this time   [] Detailed Plan of Care below (See Comments)  [] Make referral to Music Therapy  [] Make referral to Pet Therapy     [] Make referral to Addiction services  [] Make referral to [facility]  [] Make referral to Spiritual Care Partner  [] No future visits requested        [x] Follow up upon further referrals     Comments: Provided support for this pt in [facility]. Facilitated life review to assess potential support needs or coping strategies. Pt offered review of current situation. Provided emotional support through active listening as pt processed her story and engaged meaning making. Pt also extensively storied pt's familial and social history to bolster her \"doer\" attitude. Pt finds strength and motivation from her family and \"fur babies\" specifically her two grandchildren, which she helps with schooling during the day. Pt has \"enjoyed\" and appreciated the care and support received in the hospital but is eager to get to family and the familiar surroundings of home life which excited this pt every time she spoke of it. Pt is also emotionally supported by her . Offered pastoral support through prayer. Assured pt of prayers and affirmed ongoing availability of support. Keanu Herzog MDiv.  Staff   Request  Support/Spiritual Care Services via [facility]

## 2021-02-15 ENCOUNTER — TELEPHONE (OUTPATIENT)
Dept: INTERNAL MEDICINE CLINIC | Age: 66
End: 2021-02-15

## 2021-02-15 ENCOUNTER — PATIENT OUTREACH (OUTPATIENT)
Dept: CASE MANAGEMENT | Age: 66
End: 2021-02-15

## 2021-02-15 RX ORDER — HYDROXYCHLOROQUINE SULFATE 200 MG/1
TABLET, FILM COATED ORAL
Qty: 30 TAB | Refills: 0 | Status: SHIPPED | OUTPATIENT
Start: 2021-02-15 | End: 2021-03-17
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
## 2021-02-16 ENCOUNTER — VIRTUAL VISIT (OUTPATIENT)
Dept: INTERNAL MEDICINE CLINIC | Age: 66
End: 2021-02-16
Payer: COMMERCIAL

## 2021-02-16 DIAGNOSIS — I21.4 NSTEMI (NON-ST ELEVATED MYOCARDIAL INFARCTION) (HCC): ICD-10-CM

## 2021-02-16 DIAGNOSIS — I25.10 CORONARY ARTERY DISEASE DUE TO LIPID RICH PLAQUE: Primary | ICD-10-CM

## 2021-02-16 DIAGNOSIS — M96.1 POSTLAMINECTOMY SYNDROME OF LUMBAR REGION: ICD-10-CM

## 2021-02-16 DIAGNOSIS — I25.83 CORONARY ARTERY DISEASE DUE TO LIPID RICH PLAQUE: Primary | ICD-10-CM

## 2021-02-16 PROCEDURE — G8428 CUR MEDS NOT DOCUMENT: HCPCS | Performed by: FAMILY MEDICINE

## 2021-02-16 PROCEDURE — 99215 OFFICE O/P EST HI 40 MIN: CPT | Performed by: FAMILY MEDICINE

## 2021-02-16 NOTE — PROGRESS NOTES
Ruslan Kurtz is a 76 y.o. female who presents for hospital follow-up. Patient was admitted February 18-February 12 with NSTEMI. Cardiac cath showed nonobstructive CAD. History of Takosubo cardiomyopathy. She also had hypertensive urgency. New medication discharge: Coreg 12.5 mg half tablet twice a day. Doxycycline and Bentyl were discontinued. Patient feeling better today. She has a history of ongoing nausea and vomiting and intermittent diarrhea. She has IBS. Has been followed by GI. Had Covid in December. She is followed by pain management. She is taking hydrocodone one every 6 hours. (4 a day). Hospital RX hydrocodone 10mg #24 filled on 2/12. Has an appointment with pain management. This is an established visit conducted via telemedicine with video. The patient has been instructed that this meets HIPAA criteria and acknowledges and agrees to this method of visitation. Pursuant to the emergency declaration under the [facility], 1135 waiver authority and the [employer] Act, this Virtual Visit was conducted, with patient's consent, to reduce the patient's risk of exposure to COVID-19 and provide continuity of care for an established patient. Services were provided through a video synchronous discussion virtually to substitute for in-person clinic visit.         Past Medical History:   Diagnosis Date    Acid reflux     Acid reflux     Arthritis     Cardiomyopathy ([address] 2/10/2021    Chronic pain     Elevated troponin 2/8/2021    Heart attack ([address]     Heart failure (HCC)     Hypertension     IBS (irritable bowel syndrome)     IBS (irritable bowel syndrome) 2/8/2021    Squamous cell carcinoma of skin of right elbow 7/8152    Systolic heart failure ([address] 2/11/2021    Takotsubo cardiomyopathy 11/16/2015    Tobacco abuse 11/16/2015       Family History   Problem Relation Age of Onset    Cancer Father         prostate    Heart Disease Mother     Heart Disease Maternal Grandmother        Social History     Socioeconomic History    Marital status:      Spouse name: Not on file    Number of children: Not on file    Years of education: Not on file    Highest education level: Not on file   Occupational History    Not on file   Social Needs    Financial resource strain: Not on file    Food insecurity     Worry: Not on file     Inability: Not on file   [employer] needs     Medical: Not on file     Non-medical: Not on file   Tobacco Use    Smoking status: Current Every Day Smoker     Packs/day: 1.00    Smokeless tobacco: Never Used    Tobacco comment: trying to quit 4-5 cigarettes daily   Substance and Sexual Activity    Alcohol use:  Yes     Alcohol/week: 8.3 standard drinks     Types: 10 Glasses of wine per week     Comment: 4 times weekly    Drug use: No    Sexual activity: Yes     Partners: Male     Birth control/protection: None   Lifestyle    Physical activity     Days per week: Not on file     Minutes per session: Not on file    Stress: Not on file   Relationships    Social connections     Talks on phone: Not on file     Gets together: Not on file     Attends Mandaeism service: Not on file     Active member of club or organization: Not on file     Attends meetings of clubs or organizations: Not on file     Relationship status: Not on file    Intimate partner violence     Fear of current or ex partner: Not on file     Emotionally abused: Not on file     Physically abused: Not on file     Forced sexual activity: Not on file   Other Topics Concern    Not on file   Social History Narrative    Not on file       Current Outpatient Medications on File Prior to Visit   Medication Sig Dispense Refill    hydrOXYchloroQUINE (PLAQUENIL) 200 mg tablet TAKE 1 TABLET BY MOUTH DAILY 30 Tab 0    carvediloL (COREG) 12.5 mg tablet Take 0.5 Tabs by mouth two (2) times daily (with meals). TAKE 1 TABLET BY MOUTH TWICE DAILY WITH MEALS 180 Tab 3    HYDROcodone-acetaminophen (NORCO)  mg tablet Take 1 Tab by mouth every six (6) hours as needed for Pain for up to 5 days. Max Daily Amount: 4 Tabs. 24 Tab 0    dicyclomine (BENTYL) 20 mg tablet Take 20 mg by mouth. Before meals and at bedtime as needed      cetirizine (ZYRTEC) 10 mg tablet Take 10 mg by mouth daily as needed for Allergies.  gabapentin (NEURONTIN) 300 mg capsule Take 300 mg by mouth three (3) times daily. Take Morning, afternoon and at bedtime.  omeprazole (PRILOSEC) 20 mg capsule Take 20 mg by mouth daily. On an empty stomach      promethazine (PHENERGAN) 12.5 mg suppository Insert 12.5 mg into rectum every six (6) hours as needed for Nausea.  clotrimazole (MYCELEX) 10 mg quang Take 10 mg by mouth five (5) times daily. Dissolve one quang in mouth for one week.  hydrOXYzine HCL (ATARAX) 25 mg tablet Take 25 mg by mouth three (3) times daily as needed for Anxiety.  Xifaxan 550 mg tablet TAKE 1 TABLET BY MOUTH THREE TIMES DAILY      hydroCHLOROthiazide (HYDRODIURIL) 25 mg tablet TAKE 1 TABLET BY MOUTH DAILY 30 Tab 3    busPIRone (BUSPAR) 5 mg tablet TAKE 1 TABLET BY MOUTH TWICE DAILY 60 Tab 5    promethazine (PHENERGAN) 12.5 mg tablet Take 2 Tabs by mouth every six (6) hours as needed for Nausea. 20 Tab 0    Humira,CF, Pen 40 mg/0.4 mL injection pen INJECT ONE PEN (40MG) UNDER THE SKIN (SUBCUTANEOUS INJECTION) EVERY 2 WEEKS 1 Kit 0    DULoxetine (CYMBALTA) 60 mg capsule Take 1 Cap by mouth daily. 30 Cap 5    atorvastatin (LIPITOR) 80 mg tablet Take 1 Tab by mouth daily. take 1 tablet by mouth at bedtime 90 Tab 3    pantoprazole (PROTONIX) 40 mg tablet Take 40 mg by mouth two (2) times a day.       alclometasone (ACLOVATE) 0.05 % topical cream USE ON CORNERS OF MOUTH FOR RASH TWICE A DAY AS NEEDED  3    fluticasone propionate (FLONASE) 50 mcg/actuation nasal spray fluticasone propionate 50 mcg/actuation nasal spray,suspension      albuterol (PROVENTIL HFA, VENTOLIN HFA, PROAIR HFA) 90 mcg/actuation inhaler Take 2 Puffs by inhalation every six (6) hours as needed for Wheezing. 1 Inhaler 0    amLODIPine (NORVASC) 2.5 mg tablet take 1 tablet by mouth once daily 30 Tab 1    ondansetron (ZOFRAN ODT) 4 mg disintegrating tablet Take 1 Tab by mouth every eight (8) hours as needed for Nausea. 30 Tab 1    desoximetasone (TOPICORT) 0.25 % topical cream Apply  to affected area two (2) times daily as needed for Skin Irritation. 15 g 0    aspirin 81 mg chewable tablet Take 1 Tab by mouth daily. 30 Tab 0     No current facility-administered medications on file prior to visit. Review of Systems  Pertinent items are noted in HPI. Objective:     Gen: well appearing female  HEENT: normal conjunctiva, no audible congestion, patient does not see oral erythema, has MMM  Neck: patient does not feel enlarged or tender LAD or masses  Resp: normal respiratory effort, no audible wheezing. CV: patient does not feel palpitations or heart irregularity  Abd: patient does not feel abdominal tenderness or mass, patient does not notice distension  Extrem: patient does not see swelling in ankles or joints. Neuro: Alert and oriented, able to answer questions without difficulty, able to move all extremities and walk normally        Assessment/Plan:       ICD-10-CM ICD-9-CM    1. Coronary artery disease due to lipid rich plaque  I25.10 414.00     I25.83 414.3    2. NSTEM[facility] (non-ST elevated myocardial infarction) [facility]  I21.4 410.70    3. Postlaminectomy syndrome of lumbar region  M96.1 722.83    patient advised to follow up with pain management as scheduled. Advised to follow up with cardiology. Continue present medications. This was a telemedicine visit with video.         Van Castanon MD

## 2021-02-25 NOTE — TELEPHONE ENCOUNTER
#[phone]   Pt states she has thrush and her lips are starting to blister. Pt is asking for this medication yet today if possible.      Pt states the medication is glotrimazole

## 2021-02-26 RX ORDER — CLOTRIMAZOLE 10 MG/1
10 LOZENGE ORAL; TOPICAL
Qty: 35 TAB | Refills: 0 | Status: SHIPPED | OUTPATIENT
Start: 2021-02-26 | End: 2021-05-13 | Stop reason: SDUPTHER

## 2021-02-26 NOTE — ADT AUTH CERT NOTES
Utilization Reviews               Reconsideration/Additional Clinical for Partial Denial 2/10-2/12 by Naheed Valle             Review Entered    Review Status      2/26/2021 10:02 In Primary            Criteria Review          Reconsideration/Additional Clinical for Partial Denial 2/10-2/12              Cardiac cath was planned for 2/10 but pt had a raised in Cr so cath was postpone for 2/11    Cardiac cath 2/11/2021 with nonobstructive disease, likely NSTEMI type 2 r/t takotsubo    Cardiology MD Notes:         Cardiology MD Notes 2/10/2021:         Admit Date: 2/8/2021         Admit Diagnosis:     NSTEMI (non-ST elevated myocardial infarction) ([facility] [address] [I21.4]         Cath on hold due to elevated creatinine. Elevated troponin/NSTEMI Type 2:    Troponin peak 2.3    Continue on ASA, statin, BB. Cath on hold due to elevated creatinine after AM labs rechecked. Cr. 1.8 and 1.5. Most likely r/t dehydration from chronic N/V. Will continue to hydrate, recheck labs and attempt cath tomorrow if Cr improved. Hypokalemia:     K=2.7    Patient received 40meq orally. Will add 30meq IV and replete Mg. Hypomagnesemia:     Mg=1.3    Mag sulfate 2gm IV ordered. Cardiology MD Cardiac Cath Note 2/11/2021:         continue monitoring Renal function         s/p cardiac cath for a NSTEMI    Cardiac cath shows non-obstructive disease, no interventional indicated. NSTEMI r/t takotsubo.   LV gram shows cardiomyopathy improving         LABS:         2/9/2021 02:14    WBC: 16.8 (H)    NRBC: 0.0    RBC: 4.40    HGB: 14.3    HCT: 44.4    MCV: 100.9 (H)    MCH: 32.5    MCHC: 32.2    RDW: 14.3    PLATELET: 068    MPV: 9.5    ABSOLUTE NRBC: 0.00    aPTT: 43.7 (H)    Sodium: 126 (L)    Potassium: 4.3    Chloride: 96 (L)    CO2: 17 (L)    Anion gap: 13    Glucose: 91    BUN: 20    Creatinine: 1.16 (H)    BUN/Creatinine ratio: 17    Calcium: 8.8    GFR est non-AA: 47 (L)    GFR est AA: 57 (L) 2/9/2021 07:57    GLUCOSE,FAST - POC: 121 (H)         2/9/2021 10:26    aPTT: 60.1 (H)         2/9/2021 11:26    GLUCOSE,FAST - POC: 109 (H)         2/9/2021 16:25    GLUCOSE,FAST - POC: 112 (H)         2/9/2021 17:25    aPTT: 62.9 (H)         2/9/2021 20:43    GLUCOSE,FAST - POC: 189 (H)         2/10/2021 01:31    WBC: 9.8    NRBC: 0.0    RBC: 3.40 (L)    HGB: 11.1 (L)    HCT: 34.3 (L)    MCV: 100.9 (H)    MCH: 32.6    MCHC: 32.4    RDW: 14.1    PLATELET: 797    MPV: 9.9    NEUTROPHILS: 72    LYMPHOCYTES: 14    MONOCYTES: 13    EOSINOPHILS: 0    BASOPHILS: 0    IMMATURE GRANULOCYTES: 0    DF: AUTOMATED    ABSOLUTE NRBC: 0.00    ABS. NEUTROPHILS: 7.1    ABS. IMM. GRANS.: 0.0    ABS. LYMPHOCYTES: 1.4    ABS. MONOCYTES: 1.3 (H)    ABS. EOSINOPHILS: 0.0    ABS.  BASOPHILS: 0.0    Sodium: 129 (L)    Potassium: 3.4 (L)    Chloride: 98    CO2: 21    Anion gap: 10    Glucose: 108 (H)    BUN: 30 (H)    Creatinine: 1.80 (H)    BUN/Creatinine ratio: 17    Calcium: 8.5    Magnesium: 1.5 (L)    GFR est non-AA: 28 (L)    GFR est AA: 34 (L)         2/10/2021 06:55    GLUCOSE,FAST - POC: 111 (H)         2/10/2021 09:39    Sodium: 132 (L)    Potassium: 2.7 (LL)    Chloride: 102    CO2: 22    Anion gap: 8    Glucose: 196 (H)    BUN: 24 (H)    Creatinine: 1.50 (H)    BUN/Creatinine ratio: 16    Calcium: 8.5    Magnesium: 1.3 (L)    GFR est non-AA: 35 (L)    GFR est AA: 42 (L)         2/10/2021 11:29    GLUCOSE,FAST - POC: 121 (H)         2/10/2021 15:28    GLUCOSE,FAST - POC: 131 (H)         2/10/2021 17:38    aPTT: 50.1 (H)         2/11/2021 00:51    aPTT: 57.9 (H)         2/11/2021 05:16    Sodium: 139    Potassium: 3.7    Chloride: 110 (H)    CO2: 22    Anion gap: 7    Glucose: 101 (H)    BUN: 15    Creatinine: 0.95    BUN/Creatinine ratio: 16    Calcium: 8.7    GFR est non-AA: 59 (L)    GFR est AA: >60         2/11/2021 08:17    GLUCOSE,FAST - POC: 110 (H)         2/11/2021 08:32    aPTT: 73.2 (H)           Medications 02/10/21     02/11/21     02/12/21       Completed Medications         aspirin chewable tablet 324 mg   Dose: 324 mg  Freq: NOW Route: PO  Start: 02/08/21 0646 End: 02/08/21 0701       Order ID: [id number]                                ciprofloxacin (CIPRO) 400 mg in D5W IVPB (premix)   Dose: 400 mg  Freq: NOW Route: IV  Start: 02/08/21 0458 End: 02/08/21 0752       untitled image Order specific questions:       Antibiotic Indications Intra-Abdominal Infection       Order ID: [id number]                                diphenhydrAMINE (BENADRYL) injection 12.5 mg   Dose: 12.5 mg  Freq: NOW Route: IV  Start: 02/08/21 0204 End: 02/08/21 0232       Order ID: [id number]                                haloperidol lactate (HALDOL) injection 2 mg   Dose: 2 mg  Freq: ONCE PRN Route: IV  PRN Reasons: Agitation,Nausea or Vomiting  PRN Comment: Nausea not relieved by prior nausea mediation OR severe agitation  Start: 02/08/21 0757 End: 02/08/21 0814       Order ID: [id number]                                heparin (porcine) injection 2,850 Units   Dose: 60 Units/kg  Weight Dosing Info: 47.9 kg  Freq: ONCE Route: IV  Start: 02/08/21 0706 End: 02/08/21 0814       Order ID: [id number]                                heparinized saline 2 units/mL infusion   Freq: OPTIME CONTINUOUS PRN  Start: 02/11/21 1450 End: 02/11/21 1450       Order ID: [id number]                  05 (500 mL)                                   heparinized saline 2 units/mL infusion   Freq: OPTIME CONTINUOUS PRN  Start: 02/11/21 1450 End: 02/11/21 1450       Order ID: [id number]                  14 (500 mL)                                   heparinized saline 2 units/mL infusion   Freq: OPTIME CONTINUOUS PRN  Start: 02/11/21 1449 End: 02/11/21 1449       Order ID: [id number]                  14 (500 mL)                                   iopamidoL (ISOVUE-370) 76 % injection 100 mL   Dose: 100 mL  Freq: RAD ONCE Route: IV  Start: 02/08/21 0301 End: 02/08/21 0301 Order ID: [id number]                                LORazepam (ATIVAN) injection 0.5 mg   Dose: 0.5 mg  Freq: NOW Route: IV  Start: 02/08/21 0455 End: 02/08/21 0504       Order ID: [id number]                                magnesium sulfate 2 g/50 ml IVPB (premix or compounded)   Dose: 2 g  Freq: ONCE Route: IV  Start: 02/10/21 1200 End: 02/10/21 1356       Order ID: [id number]            12 (2 g)                                         metroNIDAZOLE (FLAGYL) IVPB premix 500 mg   Dose: 500 mg  Freq: NOW Route: IV  Start: 02/08/21 0458 End: 02/08/21 0629       untitled image Order specific questions:       Antibiotic Indications Intra-Abdominal Infection       Order ID: [id number]                                ondansetron (ZOFRAN) injection 4 mg   Dose: 4 mg  Freq: NOW Route: IV  Start: 02/08/21 0632 End: 02/08/21 0651       Order ID: [id number]                                ondansetron (ZOFRAN) injection 4 mg   Dose: 4 mg  Freq: NOW Route: IV  Start: 02/08/21 0131 End: 02/08/21 0146       Order ID: [id number]                                potassium chloride (K-DUR, KLOR-CON) SR tablet 40 mEq   Dose: 40 mEq  Freq: NOW Route: PO  Start: 02/10/21 1100 End: 02/10/21 1146       untitled image Admin Instructions:       Do not crush, break or chew. Swallow whole. Order ID: [id number]            45 (40 mEq)                                         potassium chloride 10 mEq in 100 ml IVPB   Dose: 10 mEq  Freq: EVERY 2 HOURS Route: IV  Start: 02/10/21 1300 End: 02/10/21 1800       untitled image Admin Instructions:       10 meq/100 ml is recommended concentration for Peripheral line use.        Order ID: [id number]            08 (10 mEq)        16 (10 mEq)        17 (10 mEq) [C]                             prochlorperazine (COMPAZINE) injection 5 mg   Dose: 5 mg  Freq: NOW Route: IV  Start: 02/08/21 0204 End: 02/08/21 0232       Order ID: [id number]                                sodium chloride 0.9 % bolus infusion 500 mL   Dose: 500 mL  Freq: ONCE Route: IV  Start: 02/08/21 0132 End: 02/08/21 0232       Order ID: [id number]                              Discontinued Medications       Medications     02/10/21     02/11/21     02/12/21         0.9% sodium chloride infusion   Rate: 50 mL/hr Dose: 50 mL/hr  Freq: CONTINUOUS Route: IV  Start: 02/09/21 1200 End: 02/12/21 1815       Order ID: [id number]            01 (75 mL/hr)        12 (50 mL/hr)                     01 (50 mL/hr)        19 (50 mL/hr)                             acetaminophen (TYLENOL) tablet 650 mg   Dose: 650 mg  Freq: EVERY 6 HOURS AS NEEDED Route: PO  PRN Reasons: Mild Pain,Fever  PRN Comment: For temp greater than 100.4 F (38 C)  Start: 02/08/21 8776 End: 02/12/21 1815       untitled image Admin Instructions:       . Order ID: [id number]                                Or  acetaminophen (TYLENOL) suppository 650 mg   Dose: 650 mg  Freq: EVERY 6 HOURS AS NEEDED Route: RE  PRN Reasons: Mild Pain,Fever  PRN Comment: For temp greater than 100.4 F (38 C)  Start: 02/08/21 4076 End: 02/12/21 1815       untitled image Admin Instructions:       . Order ID: [id number]                                albuterol-ipratropium (DUO-NEB) 2.5 MG-0.5 MG/3 ML   Dose: 3 mL  Freq: EVERY 6 HOURS AS NEEDED Route: NEBULIZATION  PRN Reason: Wheezing  Start: 02/08/21 0859 End: 02/12/21 1815       untitled image Order specific questions:       MODE OF DELIVERY Nebulizer       Order ID: [id number]                                amLODIPine (NORVASC) tablet 5 mg   Dose: 5 mg  Freq: DAILY Route: PO  Start: 02/08/21 0900 End: 02/12/21 1815       untitled image Admin Instructions:       .         Order ID: [id number]            33 (5 mg)                           09 (5 mg)                           09 (5 mg)                             aspirin chewable tablet 81 mg   Dose: 81 mg  Freq: DAILY Route: PO  Start: 02/08/21 0948 End: 02/12/21 1815       Order ID: [id number]            11 (81 mg) 08 (81 mg)                           09 (81 mg)                             atorvastatin (LIPITOR) tablet 80 mg   Dose: 80 mg  Freq: DAILY Route: PO  Start: 02/08/21 0948 End: 02/12/21 1815       Order ID: [id number]            11 (80 mg)                           09 (80 mg)                           09 (80 mg)                             busPIRone (BUSPAR) tablet 5 mg   Dose: 5 mg  Freq: DAILY Route: PO  Start: 02/08/21 0949 End: 02/12/21 1815       Order ID: [id number]            11 (5 mg)                           09 (5 mg)                           09 (5 mg)                             carvediloL (COREG) tablet 3.125 mg   Dose: 3.125 mg  Freq: 2 TIMES DAILY WITH MEALS Route: PO  Start: 02/08/21 0900 End: 02/08/21 1142       untitled image Admin Instructions:       GIVE WITH FOOD, MILK, OR ANTACID       Order ID: [id number]                                carvediloL (COREG) tablet 6.25 mg   Dose: 6.25 mg  Freq: 2 TIMES DAILY WITH MEALS Route: PO  Start: 02/09/21 1200 End: 02/12/21 1815       untitled image Admin Instructions:       GIVE WITH FOOD, MILK, OR ANTACID       Order ID: [id number]            11 (6.25 mg)        17 (6.25 mg)                     08 (6.25 mg)        16 (6.25 mg)                     09 (6.25 mg)                             ciprofloxacin (CIPRO) 400 mg in D5W IVPB (premix)   Dose: 400 mg  Freq: EVERY 12 HOURS Route: IV  Start: 02/08/21 0908 End: 02/08/21 0940       untitled image Order specific questions:       Antibiotic Indications Intra-Abdominal Infection       Order ID: [id number]                                dicyclomine (BENTYL) capsule 10 mg   Dose: 10 mg  Freq: 3 TIMES DAILY Route: PO  Start: 02/08/21 0949 End: 02/12/21 1815       Order ID: [id number]            11 (10 mg)        17 (10 mg)        21 (10 mg)               09 (10 mg)        16 (10 mg)        22 (10 mg)               09 (10 mg)                             DULoxetine (CYMBALTA) capsule 60 mg   Dose: 60 mg  Freq: DAILY Route: PO  Start: 02/08/21 0950 End: 02/12/21 1815       untitled image Admin Instructions:       Do not crush, break or chew. Swallow whole.        Order ID: [id number]            37 (60 mg)                           09 (60 mg)                           09 (60 mg)                             fentaNYL citrate (PF) injection   Freq: AS NEEDED  PRN Comment: Sedation  Start: 02/11/21 1504 End: 02/11/21 1531       Order ID: [id number]                  15 (25 mcg)        15 (50 mcg)        15 (25 mcg)                       folic acid (FOLVITE) tablet 1 mg   Dose: 1 mg  Freq: DAILY Route: PO  Start: 02/08/21 1147 End: 02/12/21 1815       Order ID: [id number]            11 (1 mg)                           09 (1 mg)                           09 (1 mg)                             gabapentin (NEURONTIN) capsule 300 mg   Dose: 300 mg  Freq: 3 TIMES DAILY Route: PO  Start: 02/08/21 0950 End: 02/12/21 1815       untitled image Admin Instructions:       avoid administration w/ aluminum or magnesium meds within 2 hr       Order ID: [id number]            11 (300 mg)        17 (300 mg)        21 (300 mg)               09 (300 mg)        16 (300 mg)        22 (300 mg)               09 (300 mg)                             heparin (porcine) 1,000 unit/mL injection   Freq: AS NEEDED  Start: 02/11/21 1509 End: 02/11/21 1531       Order ID: [id number]                  15 (2,500 Units)                                   heparin (porcine) injection 1,450 Units   Dose: 30 Units/kg  Weight Dosing Info: 47.9 kg  Freq: AS NEEDED Route: IV  PRN Reason: Per Protocol  PRN Comment: aPTT 41 - 49.9 seconds  Start: 02/08/21 0754 End: 02/12/21 1815       untitled image Admin Instructions:       30 units/kg bolus (MAXIMUM BOLUS 2,000 units) and increase infusion rate by 2 units/kg/hr       Order ID: [id number]                                Or  heparin (porcine) injection 2,850 Units   Dose: 60 Units/kg  Weight Dosing Info: 47.9 kg  Freq: AS NEEDED Route: IV  PRN Reason: Per Protocol  PRN Comment: aPTT less than or equal to 40.9 seconds  Start: 02/08/21 0754 End: 02/12/21 1815       untitled image Admin Instructions:       60 units/kg bolus (MAXIMUM BOLUS 4,000 units) and increase infusion rate by 4 units/kg/hr. Order ID: [id number]                                heparin 25,000 units in D5W 250 ml infusion   Rate: 5.7-12 mL/hr Dose: 12-25 Units/kg/hr  Weight Dosing Info: 47.9 kg  Freq: TITRATE Route: IV  Start: 02/08/21 0706 End: 02/12/21 1815       untitled image Admin Instructions:       1. Start the infusion at the LOWEST Dose within the ordered range       MAXIMUM OF 1,000 units/hour REGARDLESS OF PATIENT WEIGHT    2. aPTT to monitor Heparin      a. All aPTTs obtained while patient on Heparin are sent \"STAT\"        b. Check aPTT prior to INITIATING therapy; do NOT adjust the drip rate based on   this baseline aPTT , THEN         i.  Check aPTT every 6 hours after initial dose and 6 hours following ANY dosage change           ii. Check aPTT 6 hours after therapeutic aPTT reached        iii. Check aPTT every 24 hours after 2 consecutive therapeutic aPTT values achieved        c. Assess CBC daily         i.  Nurse to contact provider if platelets are <[phone] or decrease by 50% from   their baseline value    3. Titrate per protocol: Nurse to order additional boluses as needed per [facility]  protocol. For aPTT greater than 100, follow instructions below and resume heparin at 3 units/kg/hr from previous rate. a. aPTT less than or equal to 40.9 seconds              60 units/kg bolus (MAXIMUM BOLUS 4,000 units) and increase infusion rate by 4 units/kg/hr.        b. 41-49.9 seconds                   30 units/kg bolus (MAXIMUM BOLUS 2,000 units) and increase infusion rate by 2 units/kg/hr       c. 50-57.9 seconds                   Increase infusion rate by 1 unit/kg/hr       d. 58-77.9 seconds                   THERAPEUTIC- NO change in rate       e. 78-85.9 seconds                  Decrease infusion rate by 2 units/kg/hr from previous rate       f. .9  seconds               Hold infusion for 1 hour. Resume and decrease infusion rate by 3 units/kg/hr from previous rate      g. 101 seconds or greater                i.  Hold heparin infusion and Notify MD       ii. Check aPTT every 2 hours until less than 86 seconds      iii. Resume infusion when aPTT less than 86 seconds and decrease infusion rate by 3 units/kg/hr from previous rate  Concentration = 100 Units / 1mL       Order ID: [id number]            18 (18 Units/kg/hr)        18 (19 Units/kg/hr)        19 (19 Units/kg/hr)               02 (20 Units/kg/hr)        07 (20 Units/kg/hr)        14                       hydroCHLOROthiazide (HYDRODIURIL) tablet 25 mg   Dose: 25 mg  Freq: DAILY Route: PO  Start: 02/08/21 0950 End: 02/12/21 1815       Order ID: [id number]            09                           09                           09        18                       HYDROcodone-acetaminophen (NORCO)  mg tablet 1 Tab   Dose: 1 Tab  Freq: EVERY 6 HOURS AS NEEDED Route: PO  PRN Reason: Severe Pain  Start: 02/08/21 0941 End: 02/12/21 1815       untitled image Admin Instructions:       .        Order ID: [id number]            06 (1 Tab)        12 (1 Tab)        19 (1 Tab)               01 (1 Tab)        08 (1 Tab)        17 (1 Tab)          22 (1 Tab)                           03 (1 Tab)        09 (1 Tab)                       hydrOXYchloroQUINE (PLAQUENIL) tablet 200 mg   Dose: 200 mg  Freq: DAILY WITH BREAKFAST Route: PO  Start: 02/08/21 0900 End: 02/12/21 1815       untitled image Admin Instructions:       Give with food or milk       Order ID: [id number]            11 (200 mg)                           09 (200 mg)                           09 (200 mg)                             iopamidoL (ISOVUE-370) 76 % injection   Freq: AS NEEDED  Start: 02/11/21 1523 End: 02/11/21 1531       Order ID: [id number]                  15 (30 mL)        15 (80 mL)                             labetaloL (NORMODYNE;TRANDATE) injection 10 mg   Dose: 10 mg  Freq: ONCE Route: IV  Start: 02/08/21 0853 End: 02/08/21 0853       untitled image Admin Instructions:       Injected over 2 minutes       Order ID: [id number]                                levoFLOXacin (LEVAQUIN) 750 mg in D5W IVPB   Dose: 750 mg  Freq: EVERY 48 HOURS Route: IV  Start: 02/11/21 1253 End: 02/11/21 0708       untitled image Order specific questions:       Antibiotic Indications Intra-Abdominal Infection       Order ID: [id number]                                levoFLOXacin (LEVAQUIN) 750 mg in D5W IVPB   Dose: 750 mg  Freq: EVERY 24 HOURS Route: IV  Start: 02/08/21 1200 End: 02/10/21 1018       untitled image Order specific questions:       Antibiotic Indications Intra-Abdominal Infection       Order ID: [id number]                                lidocaine (XYLOCAINE) 10 mg/mL (1 %) injection   Freq: AS NEEDED  Start: 02/11/21 1505 End: 02/11/21 1531       Order ID: [id number]                  15 (1 mL)        15 (1 mL)                             loperamide (IMODIUM) capsule 2 mg   Dose: 2 mg  Freq: EVERY 4 HOURS AS NEEDED Route: PO  PRN Reason: Diarrhea  Start: 02/11/21 1348 End: 02/12/21 1815       Order ID: [id number]                  17 (2 mg)        22 (2 mg)                     09 (2 mg)                             LORazepam (ATIVAN) injection 1 mg   Dose: 1 mg  Freq: EVERY 6 HOURS AS NEEDED Route: IV  PRN Reason: Other  PRN Comment: Alcohol Withdrawal  Start: 02/08/21 0358 End: 02/12/21 1815       untitled image Admin Instructions:       Administer every hour as needed if CIWA-Ar score is 8 - 11.        Order ID: [id number]                                metoprolol (LOPRESSOR) injection 5 mg   Dose: 5 mg  Freq: EVERY 6 HOURS Route: IV  Start: 02/08/21 1200 End: 02/09/21 0931       untitled image Admin Instructions:       Inject over 1 to 2 minutes  Hold for SBP < 95, HR 55       Order ID: [id number]                                metroNIDAZOLE (FLAGYL) IVPB premix 500 mg   Dose: 500 mg  Freq: EVERY 12 HOURS Route: IV  Start: 02/08/21 1800 End: 02/11/21 0708       untitled image Order specific questions:       Antibiotic Indications Infectious Diarrhea       Order ID: [id number]            05 (500 mg)        22 (500 mg)                                   midazolam (VERSED) injection   Freq: AS NEEDED  Start: 02/11/21 1504 End: 02/11/21 1531       Order ID: [id number]                  15 (2 mg)        15 (2 mg)        15 (1 mg)                       morphine injection 2 mg   Dose: 2 mg  Freq: EVERY 4 HOURS AS NEEDED Route: IV  PRN Reason: Severe Pain  Start: 02/08/21 0911 End: 02/08/21 0941       Order ID: [id number]                                nicotine (NICODERM CQ) 21 mg/24 hr patch 1 Patch   Dose: 1 Patch  Freq: DAILY Route: TD  Start: 02/08/21 2000 End: 02/12/21 1815       Order ID: [id number]            08 (1 Patch)        11 (1 Patch)                     08 (1 Patch)        09 (1 Patch)                     08 (1 Patch)        09 (1 Patch)        14 [C]                 nitroglycerin 0.1 mg/mL in D5W injection   Freq: AS NEEDED  Start: 02/11/21 1509 End: 02/11/21 1531       Order ID: [id number]                  15 (200 mcg)                                   promethazine (PHENERGAN) tablet 12.5 mg   Dose: 12.5 mg  Freq: EVERY 6 HOURS AS NEEDED Route: PO  PRN Reason: Nausea or Vomiting  Start: 02/08/21 2805 End: 02/12/21 1815       Order ID: [id number]                                Or  ondansetron (ZOFRAN) injection 4 mg   Dose: 4 mg  Freq: EVERY 6 HOURS AS NEEDED Route: IV  PRN Reason: Nausea or Vomiting  Start: 02/08/21 8618 End: 02/12/21 1815       untitled image Admin Instructions:       Administer if oral route cannot be used       Order ID: [id number]                                ondansetron (ZOFRAN) injection 4 mg   Dose: 4 mg  Freq: EVERY 4 HOURS AS NEEDED Route: IV  PRN Reason: Nausea or Vomiting  Start: 02/08/21 0650 End: 02/09/21 0750       untitled image Admin Instructions:       Administer 4 to 7 mg IV over 30 seconds. Administer 8mg IV over 60 seconds. Administer doses greater than 8mg IV over at least 2 minutes. Order ID: [id number]                                pantoprazole (PROTONIX) tablet 40 mg   Dose: 40 mg  Freq: DAILY BEFORE BREAKFAST Route: PO  Start: 02/08/21 0900 End: 02/08/21 6565       untitled image Admin Instructions:       Do not crush, break or chew. Swallow whole. untitled image Order specific questions:       PPI INDICATION Symptomatic GERD       Order ID: [id number]                                pantoprazole (PROTONIX) tablet 40 mg   Dose: 40 mg  Freq: DAILY BEFORE BREAKFAST Route: PO  Start: 02/08/21 0900 End: 02/12/21 1815       untitled image Order specific questions:       PPI INDICATION Symptomatic GERD       Order ID: [id number]            99 (40 mg)                           09 (40 mg)                           09 (40 mg)                             perflutren lipid microspheres (DEFINITY) contrast injection 2 mL   Dose: 2 mL  Freq: ONCE Route: IV  Start: 02/08/21 2007 End: 02/09/21 0806       Order ID: [id number]                                polyethylene glycol (MIRALAX) packet 17 g   Dose: 17 g  Freq: DAILY PRN Route: PO  PRN Reason: Constipation  Start: 02/08/21 0124 End: 02/12/21 1815       untitled image Admin Instructions:       First line therapy for constipation       Order ID: [id number]                                potassium chloride 10 mEq in 100 ml IVPB   Dose: 10 mEq  Freq: EVERY 2 HOURS Route: IV  Start: 02/08/21 1000 End: 02/08/21 2159       untitled image Admin Instructions:       [employer] Use Only.        Order ID: [id number]                                rifAXIMin [facility]) tablet 550 mg   Dose: 550 mg  Freq: 3 TIMES DAILY Route: PO  Start: 02/09/21 0900 End: 02/12/21 1815       Order ID: [id number]            11 (550 mg)        17 (550 mg)        21 (550 mg)               09 (550 mg)        16 (550 mg)        22 (550 mg)               09 (550 mg)                             rifAXIMin (XIFAXAN) tablet 550 mg   Dose: 550 mg  Freq: 2 TIMES DAILY Route: PO  Start: 02/08/21 0951 End: 02/09/21 0852       Order ID: [id number]                                sodium chloride (NS) flush 5-40 mL   Dose: 5-40 mL  Freq: AS NEEDED Route: IV  PRN Reason: Line Patency  Start: 02/08/21 0314 End: 02/12/21 1815       untitled image Admin Instructions:       If following IV push medication, administer flush at the same rate as the IV push. Flush volume is determined by type of infusion therapy being given. For non-viscous solutions use Peripheral IV = 5 mL; Midline or Central Line = 10 mL/lumen. For viscous solutions (i.e. blood components, parenteral nutrition, contrast media, or after obtaining blood sample) use Peripheral IV= 10 mL; Midline or Central Line = 20 mL/lumen. Order ID: [id number]                                sodium chloride (NS) flush 5-40 mL   Dose: 5-40 mL  Freq: EVERY 8 HOURS Route: IV  Start: 02/08/21 0773 End: 02/12/21 1815       untitled image Admin Instructions: For Line Patency: Peripheral IV = 5 mL; Midline or Central Line = 10 mL/lumen. Order ID: [id number]            05 (10 mL)        13 (10 mL)        22 (10 mL)               05 (10 mL)        16 (10 mL)        22 (40 mL)               03 (20 mL)        06        14                 sodium chloride (NS) flush 5-40 mL   Dose: 5-40 mL  Freq: AS NEEDED Route: IV  PRN Reason: Line Patency  Start: 02/08/21 0130 End: 02/09/21 0750       untitled image Admin Instructions:       If following IV push medication, administer flush at the same rate as the IV push. Flush volume is determined by type of infusion therapy being given. For non-viscous solutions use Peripheral IV = 5 mL; Midline or Central Line = 10 mL/lumen.    For viscous solutions (i.e. blood components, parenteral nutrition, contrast media, or after obtaining blood sample) use Peripheral IV= 10 mL; Midline or Central Line = 20 mL/lumen. Order ID: [id number]                                sodium chloride (NS) flush 5-40 mL   Dose: 5-40 mL  Freq: EVERY 8 HOURS Route: IV  Start: 02/08/21 0132 End: 02/09/21 0750       untitled image Admin Instructions: For Line Patency: Peripheral IV = 5 mL; Midline or Central Line = 10 mL/lumen.        Order ID: [id number]                                sodium chloride 0.9 % bolus infusion 1,000 mL   Dose: 1,000 mL  Freq: NOW Route: IV  Start: 02/08/21 0203 End: 02/08/21 1402       Order ID: [id number]                                therapeutic multivitamin (THERAGRAN) tablet 1 Tab   Dose: 1 Tab  Freq: DAILY Route: PO  Start: 02/08/21 1147 End: 02/12/21 1815       Order ID: [id number]            11 (1 Tab)                           09 (1 Tab)                           09 (1 Tab)                             thiamine mononitrate (B-1) tablet 100 mg   Dose: 100 mg  Freq: DAILY Route: PO  Start: 02/08/21 1147 End: 02/12/21 1815       Order ID: [id number]            11 (100 mg)                           09 (100 mg)                           09 (100 mg)                             verapamiL (ISOPTIN) 2.5 mg/mL injection   Freq: AS NEEDED  Start: 02/11/21 1509 End: 02/11/21 1531       Order ID: [id number]                  15 (2.5 mg)                                 Medications     02/10/21     02/11/21     02/12/21                                          Myocardial Infarction - Care Day 4 (2/11/2021) by Tamara Puente             Review Entered    Review Status      2/18/2021 10:15 Completed            Criteria Review           Care Day: 4 Care Date: 2/11/2021 Level of Care: Telemetry      Guideline Day 3      Level Of Care      (X) Intermediate care or telemetry to discharge      2/18/2021 10:15:31 EST by Tamara Puente        telemetry Clinical Status      (X) * Hemodynamic stability      2/18/2021 10:15:31 EST by Dylan Nugyen        Temp 98.5, HR 95, /72, RR 18, 98% on RA      (X) * Chest pain, dyspnea, or anginal equivalent absent      2/18/2021 10:15:31 EST by Dylan Nguyen        none noted      (X) * No evidence of bleeding or recurrent myocardial ischemia      2/18/2021 10:15:31 EST by Dylan Nguyen        none noted      (X) * Dangerous arrhythmia absent      2/18/2021 10:15:31 EST by Dylan Nguyen        CV:                  Regular  rhythm      (X) * Vascular access site without evidence of infection, aneurysm, or growing hematoma      2/18/2021 10:15:31 EST by Dylan Nguyen        none noted      (X) * Renal function at baseline or acceptable for next level of care      2/18/2021 10:15:31 EST by Dylan Nguyen        Creatinine improved. ( ) * Discharge plans and education understood      Activity      (X) * Ambulatory or acceptable for next level of care      2/18/2021 10:15:31 EST by Dylan Nguyen        activity as tolerated with assist      Routes      (X) * Oral hydration, medications, and diet      2/18/2021 10:15:31 EST by Dylan Nguyen        GI lite diet   Amlodipine 5mg PO daily   Buspar 5mg PO daily   Bentyl 10mg PO TID   Cymbalta 64FW PO daily   Folic acid 1mg PO daily   Gabapentin 300mg PO TID   Hydrochlorothiazide 25mg PO daily   Norco 10-325mg tablet 1 tab PO q6 PRN x4      (X) Heart-healthy diet      Interventions      (X) Possible cardiac angiography, with PCI if indicated [M]      2/18/2021 10:15:31 EST by Dylan Nguyen        Ms. Iman Arreaga is s/p cardiac cath for a NSTEMI   Cardiac cath shows non obstructive disease, no interventional indicated. NSTEMI r/t takotsubo.   LV gram shows cardiomyopathy improving      Medications      ( ) * Anticoagulants absent      2/18/2021 10:15:31 EST by Dylan Nguyen        Heparin IV titrate      (X) Antiplatelet agents (eg, aspirin, clopidogrel, ticagrelor)      2/18/2021 10:15:31 EST by Susana Shrestha        Aspirin 81mg PO daily      (X) Beta-blocker      2/18/2021 10:15:31 EST by Susana Shrestha        Carvedilol 6.25mg PO BID with meals      (X) Statin      2/18/2021 10:15:31 EST by Susana Bonanza        Atorvastatin 80mg PO daily      * Milestone       Additional Notes      Date of care: 2/11/2021 Care day 4        IP - LOC- Telemetry        IM PN: Assessment / Plan:   NSTEMI (trop 2.29)   Hypertensive emergency   Hx of Takosubo CM   -continue asa statin   -continue coreg, norvasc   -heparin infusion   -for cardiac cath today. Cr has normalized       BRIANNA, resolved   Volume depletion for GI fluid loses   Hypokalemia   Hyponatremia   -hold HCTZ    -IV NS. Nausea/vomiting   Hx IBS-D   -CT abdomen Diffuse colonic wall thickening could indicate an infectious/inflammatory   colitis, though could also be due to underdistention.   -had EGD (2020) / Colonoscopy (2018) showing gastritis, candida and colonic adenoma   -GI input appreciated. Continue Xifaxin TID   -stop empiric abx       GERD   Anxiety   -PPI   -lorazepam prn   -continue psych meds       Smoker   Alcohol consumption   -nicoderm   -CIWA protocol       PC Malnutrition   Subjective:   Chief Complaint / Reason for Physician Visit   Follow up of  MI, IBS, HTN, smoking   Chart reviewed in detail. Discussed with RN events overnight. General:          WD, WN. Alert, cooperative, no acute distress     EENT:              EOMI. Anicteric sclerae. MMM   Resp:               CTA bilaterally, no wheezing or rales. No accessory muscle use   CV:                  Regular  rhythm,  No edema   GI:                   Soft, Non distended, Non tender. +Bowel sounds   Neurologic:       Alert and oriented X 3, normal speech,    Psych:   Good insight. Not anxious nor agitated   Skin:                No rashes.   No jaundice        Cardiology PN: Subjective:   Yasmine VÁSQUEZ Mina Howell denies complaint. Creatinine improved. For cath later today   Plan:   Elevated troponin/NSTEMI Type 2:   Troponin peak 2.3   Previous admission in 11/2015 similar presentation:  N/v with elevated troponin 0.11-0.88   At that time cardiac cath showed normal cors with takotsubo CM. Echo EF 35-40%   Continue on ASA, statin, BB, Heparin gtt. Creatinine improved today. For cath this afternoon. Weight down 3kg. 1.5L/24h. Monitor daily weights, labs, I/O.        HTN:   Stable   Continue  BB. Hold HCTZ for elevated Cr per hospitalist.       Hypokalemia:    Resolved. Follow labs. Dre Cedeño [facility] student   Cardiology        Ms. Mina Howell is s/p cardiac cath for a NSTEMI   Cardiac cath shows non obstructive disease, no interventional indicated. NSTEMI r/t takotsubo.   LV gram shows cardiomyopathy improving        Vitals: Temp 98.5, HR 95, /72, RR 18, 98% on RA        Labs:   2/11/2021 00:51   aPTT: 57.9 (H)   2/11/2021 05:16   Sodium: 139   Potassium: 3.7   Chloride: 110 (H)   CO2: 22   Anion gap: 7   Glucose: 101 (H)   BUN: 15   Creatinine: 0.95   BUN/Creatinine ratio: 16   Calcium: 8.7   GFR est non-AA: 59 (L)   GFR est AA: >60        Medications:   NS 50mL/hr IV   Heparin IV titrate   Hydroxychloroquine 200mg PO daily with breakfast   Imodium 2mg PO q4 PRN x2   Protonix 40mg PO daily before breakfast   Rifaximin 550mg PO TID   Theragran 1 tab PO daily   Thiamine 100mg PO daily        Plan: GI lite diet, daily weights, I&Os, seizure precautions, CIWA assessment, activity as tolerated with assist, cardiac/respiratory monitoring, PTT q6, bedrest with bathroom privileges                                  Myocardial Infarction - Care Day 3 (2/10/2021) by Rai Varela             Review Entered    Review Status      2/12/2021 13:25 Completed            Criteria Review           Care Day: 3 Care Date: 2/10/2021 Level of Care: Telemetry      Guideline Day 3      Level Of Care      (X) Intermediate care or telemetry to discharge      2/12/2021 13:25:09 EST by Brandon Johnson        telemetry unit      Clinical Status      (X) * Hemodynamic stability      2/12/2021 13:25:09 EST by Brandon Johnson        see additional notes, vitals      (X) * Chest pain, dyspnea, or anginal equivalent absent      2/12/2021 13:25:09 EST by Brandon Johnson        see additional notes      (X) * No evidence of bleeding or recurrent myocardial ischemia      2/12/2021 13:25:09 EST by Brandon Johnson        see additional notes      (X) * Dangerous arrhythmia absent      2/12/2021 13:25:09 EST by Brandon Johnson        see additional notes, cardiology      ( ) * Vascular access site without evidence of infection, aneurysm, or growing hematoma      ( ) * Renal function at baseline or acceptable for next level of care      2/12/2021 13:25:09 EST by Brandon Johnson        cr 1.80, likley d/t n/v per MD note, see additional notes      ( ) * Discharge plans and education understood      Activity      (X) * Ambulatory or acceptable for next level of care      Routes      (X) * Oral hydration, medications, and diet      (X) Heart-healthy diet      2/12/2021 13:25:09 EST by Brandon Johnson        cardiac diet      Interventions      (X) Possible noninvasive diagnostic testing (eg, stress test) [L]      2/12/2021 13:25:09 EST by Brandon Johnson        see additional notes      (X) Possible cardiac angiography, with PCI if indicated [M]      2/12/2021 13:25:09 EST by Brandon Johnson        cath delayed d/t increase in Cr. see additional notes      Medications      ( ) * Anticoagulants absent      (X) Antiplatelet agents (eg, aspirin, clopidogrel, ticagrelor)      2/12/2021 13:25:09 EST by Brandon Johnson        aspirin 81mg daily po x 1      (X) Beta-blocker      2/12/2021 13:25:09 EST by Brandon Johnson        carvediloL (COREG) tablet 6.25 mg    Dose: 6.25 mg   Freq: 2 TIMES DAILY WITH MEALS Route: PO x2      (X) Statin      2/12/2021 13:25:09 EST by Active-Semi, Stephy        atorvastatin (LIPITOR) tablet 80 mg    Dose: 80 mg   Freq: DAILY Route: PO x1      * Milestone       Additional Notes      2/10        INTERNAL MED         Assessment / Plan:       NSTEMI (trop 2.29)   Hypertensive emergency   Hx of Takosubo CM   -continue asa statin   -continue coreg, norvasc   -heparin infusion   -for cardiac cath when renal function improves. Discussed with cardiology. Rechecking labs       BRIANNA   Volume depletion for GI fluid loses   Hypokalemia   Hyponatremia   -hold HCTZ   -IV NS. Repeat labs later today and in AM       Nausea/vomiting   Hx IBS-D   -CT abdomen Diffuse colonic wall thickening could indicate an infectious/inflammatory   colitis, though could also be due to underdistention.   -had EGD (2020) / Colonoscopy (2018) showing gastritis, candida and colonic adenoma   -on IV levaquin / flagyl    -GI input appreciated. Continue Xifaxin TID       GERD   Anxiety   -PPI   -lorazepam prn   -continue psych meds       Smoker   Alcohol consumption   -nicoderm   -CIWA protocol       PC Malnutrition           less than 18.5 Underweight / Body mass index is 16.97 kg/m². Code status: Full   Prophylaxis: on heparin   Recommended Disposition: Home w/Family       GASTRO PROGRESS NOTE         Assessment:       Nausea and vomiting- resolved   Patient states she has a hx of IBS. Nausea and vomiting that started after eating salmon and tomato soup. No active vomiting witness while in ED. CT abd/pel shows mild hepatomegaly. Diffuse colonic wall thickening could indicate an infectious/inflammatory colitis, though could also be due to under distention   Abd XR with no acute process    Levaquin and Flagyl ordered by primary team        Plan:       No direct plans for repeat EGD at this time.   No plans for repeat GES during admission; results not accurate while having acute symptoms    Continue PPI    Continue Xifaxin 550 mg PO TID   Continue IV Levaquin and Flagyl   Anti-emetics PRN Symptomatic care per primary team    Nothing further to add from a GI standpoint. GI signing-off. Please call with any questions. Thank you for this consult. CARDIOLOGY PROGRESS NOTE     Admit Diagnosis:   NSTEMI (non-ST elevated myocardial infarction) ([facility] [address] [I21.4]       Subjective:       Yasmine Meléndez       No Sx   Plan:       Cr up to 1.8; prob from dehydration due to vomiting. Postpone cath and hydrate. CARDIOLOGY PROGRESS NOTE         Subjective:       Leona Roney feels much better today. Cath on hold due to elevated creatinine. Plan:   Elevated troponin/NSTEMI Type 2:   Troponin peak 2.3   Previous admission in 11/2015 similar presentation:  N/v with elevated troponin 0.11-0.88   At that time cardiac cath showed normal cors with takotsubo CM. Echo EF 35-40%   Continue on ASA, statin, BB. Cath on hold due to elevated creatinine after AM labs rechecked. Cr. 1.8 and 1.5. Most likely r/t dehydration from chronic N/V. Will continue to hydrate, recheck labs and attempt cath tomorrow if Cr improved. Discussed need to monitor for SOB, fluid overload with nurse and patient. Monitor daily weights, labs, I/O.       HTN:   Stable   Continue  BB. Hold HCTZ for elevated Cr per hospitalist.       Hypokalemia:    K=2.7   Patient received 40meq orally. Will add 30meq IV and replete Mg. Hypomagnesemia:    Mg=1.3   Mag sulfate 2gm IV ordered.        LABS/IMAGING             K 2.7    GLUCOSE 108    BUN 30    CR 1.80        MAG 1.5, 1.3        RBC 3.40    HGB 11.1    HCT 34.3    .9        NO IMAGING         MEDS         0.9% sodium chloride infusion    Rate: 50 mL/hr Dose: 50 mL/hr   Freq: CONTINUOUS Route: IV X2        amLODIPine (NORVASC) tablet 5 mg    Dose: 5 mg   Freq: DAILY Route: PO X1        dicyclomine (BENTYL) capsule 10 mg    Dose: 10 mg   Freq: 3 TIMES DAILY Route: PO X3        heparin 25,000 units in D5W 250 ml infusion    Rate: 5.7-12 mL/hr Dose: 12-25 Units/kg/hr   Freq: TITRATE Route: IV X 3       HYDROcodone-acetaminophen (NORCO)  mg tablet 1 Tab    Dose: 1 Tab   Freq: EVERY 6 HOURS AS NEEDED Route: PO X3        rifAXIMin (XIFAXAN) tablet 550 mg    Dose: 550 mg   Freq: 3 TIMES DAILY Route: PO X3        magnesium sulfate 2 g/50 ml IVPB (premix or compounded)    Dose: 2 g   Freq: ONCE Route: IV X1       potassium chloride (K-DUR, KLOR-CON) SR tablet 40 mEq    Dose: 40 mEq   Freq: NOW Route: PO X1        potassium chloride 10 mEq in 100 ml IVPB    Dose: 10 mEq   Freq: EVERY 2 HOURS Route: IV X3        metroNIDAZOLE (FLAGYL) IVPB premix 500 mg    Dose: 500 mg   Freq: EVERY 12 HOURS Route: IV X2        VITALS     97.8    HR 87    123/60    RESP 18    100% RA

## 2021-03-04 NOTE — PROGRESS NOTES
Subjective/HPI:     Ms. Hipolito Mejia is a 78 y.o. female is here for a hospital f/u. She has a PMHx of takotsubo cardiomyopathy, tobacco abuse, HTN and HLD. She was admitted 2/8/21 for n/v/diarrhea. Troponin 0.28 to 0.90, did report CP and SOB prior to arrival, however no further symptoms once hospitalized. Similar presentation in 2015 when she had Takotsubo CM and she had non obstructive cardiac cath. Troponin continued to trend up to 2.29, 2/8 EF 35-40% per echo and she was taken for cardiac cath 2/11 with no obstructive CAD, LV fully recovered. Recommend long term Coreg. She reports since d/c she has been overall feeling better. She is having random episodes of positional dizziness. She got out of bed one night and fell hitting her head. She had right leg swelling after d/c that has since resolved. Denies pain/tenderness in right leg. She denies CP, SOB/COOMBS, orthopnea, PND.              PCP Provider  Alyssa Harding MD  Past Medical History:   Diagnosis Date    Acid reflux     Acid reflux     Arthritis     Cardiomyopathy ([address] 2/10/2021    Chronic pain     Elevated troponin 2/8/2021    Heart attack ([address]     Heart failure ([address]     Hypertension     IBS (irritable bowel syndrome)     IBS (irritable bowel syndrome) 2/8/2021    Squamous cell carcinoma of skin of right elbow 8/1363    Systolic heart failure ([address] 2/11/2021    Takotsubo cardiomyopathy 11/16/2015    Tobacco abuse 11/16/2015      Past Surgical History:   Procedure Laterality Date    HX GYN      oophorectomy    HX ORTHOPAEDIC      right knee arthroscopy    HX ORTHOPAEDIC      right thumb    HX ORTHOPAEDIC      back surgeries    HX OTHER SURGICAL      8 route canals    CT ABDOMEN SURGERY PROC UNLISTED      adhesion removal    CT BREAST SURGERY PROCEDURE UNLISTED      lumpectomy    CT COLONOSCOPY FLX DX W/COLLJ SPEC WHEN PFRMD  10/15/2012          Family History   Problem Relation Age of Onset    Cancer Father         prostate    Heart Disease Mother     Heart Disease Maternal Grandmother      Social History     Socioeconomic History    Marital status:      Spouse name: Not on file    Number of children: Not on file    Years of education: Not on file    Highest education level: Not on file   Occupational History    Not on file   Social Needs    Financial resource strain: Not on file    Food insecurity     Worry: Not on file     Inability: Not on file    Transportation needs     Medical: Not on file     Non-medical: Not on file   Tobacco Use    Smoking status: Current Every Day Smoker     Packs/day: 1.00    Smokeless tobacco: Never Used    Tobacco comment: trying to quit 4-5 cigarettes daily   Substance and Sexual Activity    Alcohol use: Yes     Alcohol/week: 8.3 standard drinks     Types: 10 Glasses of wine per week     Comment: 4 times weekly    Drug use: No    Sexual activity: Yes     Partners: Male     Birth control/protection: None   Lifestyle    Physical activity     Days per week: Not on file     Minutes per session: Not on file    Stress: Not on file   Relationships    Social connections     Talks on phone: Not on file     Gets together: Not on file     Attends Denominational service: Not on file     Active member of club or organization: Not on file     Attends meetings of clubs or organizations: Not on file     Relationship status: Not on file    Intimate partner violence     Fear of current or ex partner: Not on file     Emotionally abused: Not on file     Physically abused: Not on file     Forced sexual activity: Not on file   Other Topics Concern    Not on file   Social History Narrative    Not on file       Allergies   Allergen Reactions    Ace Inhibitors Swelling    Arb-Angiotensin Receptor Antagonist Other (comments)     Prior ACE inhibitor angioedema, cross reaction risk    Codeine Nausea Only    Keflex [Cephalexin] Hives    Milk Other (comments)     Lactose intolerant.  Morphine Nausea and Vomiting        Current Outpatient Medications   Medication Sig    doxycycline (VIBRAMYCIN) 100 mg capsule TAKE 1 CAPSULE BY MOUTH TWICE DAILY WITH MEALS    HYDROcodone-acetaminophen (NORCO)  mg tablet TAKE 1 TABLET BY MOUTH EVERY 6 HOURS AS NEEDED FOR PAIN    mupirocin (BACTROBAN) 2 % ointment Apply  to affected area three (3) times daily.  clotrimazole (MYCELEX) 10 mg quang Take 1 Tab by mouth five (5) times daily. Dissolve one quang in mouth for one week.  hydrOXYchloroQUINE (PLAQUENIL) 200 mg tablet TAKE 1 TABLET BY MOUTH DAILY    carvediloL (COREG) 12.5 mg tablet Take 0.5 Tabs by mouth two (2) times daily (with meals). TAKE 1 TABLET BY MOUTH TWICE DAILY WITH MEALS (Patient taking differently: Take 6.25 mg by mouth two (2) times daily (with meals). )    dicyclomine (BENTYL) 20 mg tablet Take 10 mg by mouth. Before meals and at bedtime as needed     cetirizine (ZYRTEC) 10 mg tablet Take 10 mg by mouth daily as needed for Allergies.  gabapentin (NEURONTIN) 300 mg capsule Take 300 mg by mouth three (3) times daily. Take Morning, afternoon and at bedtime.  omeprazole (PRILOSEC) 20 mg capsule Take 40 mg by mouth daily. On an empty stomach     hydrOXYzine HCL (ATARAX) 25 mg tablet Take 25 mg by mouth three (3) times daily as needed for Anxiety.  hydroCHLOROthiazide (HYDRODIURIL) 25 mg tablet TAKE 1 TABLET BY MOUTH DAILY    promethazine (PHENERGAN) 12.5 mg tablet Take 2 Tabs by mouth every six (6) hours as needed for Nausea.  DULoxetine (CYMBALTA) 60 mg capsule Take 1 Cap by mouth daily.  atorvastatin (LIPITOR) 80 mg tablet Take 1 Tab by mouth daily. take 1 tablet by mouth at bedtime    pantoprazole (PROTONIX) 40 mg tablet Take 40 mg by mouth two (2) times a day.     alclometasone (ACLOVATE) 0.05 % topical cream USE ON CORNERS OF MOUTH FOR RASH TWICE A DAY AS NEEDED    fluticasone propionate (FLONASE) 50 mcg/actuation nasal spray fluticasone propionate 50 mcg/actuation nasal spray,suspension    albuterol (PROVENTIL HFA, VENTOLIN HFA, PROAIR HFA) 90 mcg/actuation inhaler Take 2 Puffs by inhalation every six (6) hours as needed for Wheezing.  amLODIPine (NORVASC) 2.5 mg tablet take 1 tablet by mouth once daily    ondansetron (ZOFRAN ODT) 4 mg disintegrating tablet Take 1 Tab by mouth every eight (8) hours as needed for Nausea.  desoximetasone (TOPICORT) 0.25 % topical cream Apply  to affected area two (2) times daily as needed for Skin Irritation.  aspirin 81 mg chewable tablet Take 1 Tab by mouth daily.  Humira,CF, Pen 40 mg/0.4 mL injection pen INJECT ONE PEN (40MG) UNDER THE SKIN (SUBCUTANEOUS INJECTION) EVERY 2 WEEKS     No current facility-administered medications for this visit. I have reviewed the problem list, allergy list, medical history, family, social history and medications. Review of Symptoms:    Review of Systems   Constitutional: Negative for chills, fever and weight loss. HENT: Negative for nosebleeds. Eyes: Negative for blurred vision and double vision. Respiratory: Negative for cough, shortness of breath and wheezing. Cardiovascular: Positive for leg swelling. Negative for chest pain, palpitations, orthopnea and PND. Gastrointestinal: Negative for abdominal pain, blood in stool, diarrhea, nausea and vomiting. Musculoskeletal: Negative for joint pain. Skin: Negative for rash. Neurological: Positive for dizziness. Negative for tingling and loss of consciousness. Endo/Heme/Allergies: Does not bruise/bleed easily. Physical Exam:      General: Well developed, in no acute distress, cooperative and alert  HEENT: No carotid bruits, no JVD, trach is midline. Neck Supple, PEERL, EOM intact. Heart:  reg rate and rhythm; normal S1/S2; no murmurs, gallops or rubs. Respiratory: Clear bilaterally x 4, no wheezing or rales  Abdomen:   Soft, non-tender, no distention, no masses. + BS.    Extremities:  Normal cap refill, no cyanosis, atraumatic. No edema. Neuro: A&Ox3, speech clear, gait stable. Skin: Skin color is normal. No rashes or lesions. Non diaphoretic  Vascular: 2+ pulses symmetric in all extremities    Vitals:    03/05/21 1044 03/05/21 1114 03/05/21 1115   BP: 132/68 130/68 122/66   Pulse: 60 66 70   Resp: 18     SpO2: 99%     Weight: 116 lb 3.2 oz (52.7 kg)     Height: 5' 6\" (1.676 m)         Cardiographics    ECG: Sinus  Rhythm   Left atrial enlargement. Results for orders placed or performed during the hospital encounter of 02/08/21   EKG, 12 LEAD, INITIAL   Result Value Ref Range    Ventricular Rate 88 BPM    Atrial Rate 88 BPM    P-R Interval 156 ms    QRS Duration 86 ms    Q-T Interval 406 ms    QTC Calculation (Bezet) 491 ms    Calculated P Axis 75 degrees    Calculated R Axis 27 degrees    Calculated T Axis 75 degrees    Diagnosis       Normal sinus rhythm  Possible Left atrial enlargement  Septal infarct (cited on or before 05-AUG-2015)  Nonspecific ST abnormality  Confirmed by Nile Schaefer M.D. ([id number]) on 2/9/2021 8:45:02 AM         Cardiology Labs:  Lab Results   Component Value Date/Time    Cholesterol, total 200 (H) 07/10/2019 11:29 AM    HDL Cholesterol 67 07/10/2019 11:29 AM    LDL, calculated 107 (H) 07/10/2019 11:29 AM    Triglyceride 131 07/10/2019 11:29 AM       Lab Results   Component Value Date/Time    Sodium 141 02/12/2021 03:26 AM    Potassium 3.5 02/12/2021 03:26 AM    Chloride 112 (H) 02/12/2021 03:26 AM    CO2 24 02/12/2021 03:26 AM    Anion gap 5 02/12/2021 03:26 AM    Glucose 145 (H) 02/12/2021 03:26 AM    BUN 9 02/12/2021 03:26 AM    Creatinine 0.93 02/12/2021 03:26 AM    BUN/Creatinine ratio 10 (L) 02/12/2021 03:26 AM    GFR est AA >60 02/12/2021 03:26 AM    GFR est non-AA >60 02/12/2021 03:26 AM    Calcium 8.4 (L) 02/12/2021 03:26 AM    Bilirubin, total 0.2 02/08/2021 01:56 AM    Alk.  phosphatase 113 02/08/2021 01:56 AM    Protein, total 5.5 (L) 02/08/2021 01:56 AM Albumin 3.0 (L) 02/08/2021 01:56 AM    Globulin 2.5 02/08/2021 01:56 AM    A-G Ratio 1.2 02/08/2021 01:56 AM    ALT (SGPT) 33 02/08/2021 01:56 AM           Assessment:     Assessment:       ICD-10-CM ICD-9-CM    1. Takotsubo cardiomyopathy  I51.81 429.83 AMB POC EKG ROUTINE W/ 12 LEADS, INTER & REP   2. Elevated troponin  R77.8 790.6    3. Essential hypertension  I10 401.9    4. Mixed hyperlipidemia  E78.2 272.2         Plan:     1. Takotsubo cardiomyopathy  Hx of Takotsubo with EF 25% per cardiac cath 11/14/15. Echo 11/16/15 EF had improved to 45%.; repeat 1/2016 EF 55-60%. Admitted to hospital 2/8 with n/v/d. Troponin elevated, echo EF 35-40%. No valvular disease of significance. Troponin continued to increased and she was taken for Cardiac cath 2/11 with full recovery of LV function. Recommend long term Coreg, d/c on 6.25mg bid. She is feeling better overall. Some inconsistent episodes of positional dizziness. Right leg swelling now resolved. Continue Coreg    2. troponin elevation  [place] to be related to her n/v/d stressor. However her troponin continued to increase. Peak trop 2.29. 2/11 Cardiac cath with non obstructive CAD. D/C on statin and ASA. Denies CP, SOB/COOMBS or fatigue. EKG SR without significant changes noted. Cont BB, ASA, statin    2. Essential hypertension  BP not orthostatic today, nor was she symptomatic. continue with anti-hypertensive therapy and encouraged low sodium diet    3. Mixed hyperlipidemia  7/2019 . Lipids and labs managed by PCP. Continue statin    4. Tobacco abuse  She is using a patch currently, not had cigarettes since d/c.  Congratulated her on this      F/u in 6 months      Jeffry Lees MD

## 2021-03-05 ENCOUNTER — OFFICE VISIT (OUTPATIENT)
Dept: CARDIOLOGY CLINIC | Age: 66
End: 2021-03-05
Payer: COMMERCIAL

## 2021-03-05 VITALS
DIASTOLIC BLOOD PRESSURE: 66 MMHG | BODY MASS INDEX: 18.68 KG/M2 | HEIGHT: 66 IN | WEIGHT: 116.2 LBS | HEART RATE: 70 BPM | RESPIRATION RATE: 18 BRPM | SYSTOLIC BLOOD PRESSURE: 122 MMHG | OXYGEN SATURATION: 99 %

## 2021-03-05 DIAGNOSIS — I51.81 TAKOTSUBO CARDIOMYOPATHY: Primary | ICD-10-CM

## 2021-03-05 DIAGNOSIS — E78.2 MIXED HYPERLIPIDEMIA: ICD-10-CM

## 2021-03-05 DIAGNOSIS — I10 ESSENTIAL HYPERTENSION: ICD-10-CM

## 2021-03-05 DIAGNOSIS — R77.8 ELEVATED TROPONIN: ICD-10-CM

## 2021-03-05 PROCEDURE — 1100F PTFALLS ASSESS-DOCD GE2>/YR: CPT | Performed by: INTERNAL MEDICINE

## 2021-03-05 PROCEDURE — 3288F FALL RISK ASSESSMENT DOCD: CPT | Performed by: INTERNAL MEDICINE

## 2021-03-05 PROCEDURE — 1111F DSCHRG MED/CURRENT MED MERGE: CPT | Performed by: INTERNAL MEDICINE

## 2021-03-05 PROCEDURE — G8399 PT W/DXA RESULTS DOCUMENT: HCPCS | Performed by: INTERNAL MEDICINE

## 2021-03-05 PROCEDURE — 93000 ELECTROCARDIOGRAM COMPLETE: CPT | Performed by: INTERNAL MEDICINE

## 2021-03-05 PROCEDURE — 3017F COLORECTAL CA SCREEN DOC REV: CPT | Performed by: INTERNAL MEDICINE

## 2021-03-05 PROCEDURE — G8427 DOCREV CUR MEDS BY ELIG CLIN: HCPCS | Performed by: INTERNAL MEDICINE

## 2021-03-05 PROCEDURE — 1090F PRES/ABSN URINE INCON ASSESS: CPT | Performed by: INTERNAL MEDICINE

## 2021-03-05 PROCEDURE — G8536 NO DOC ELDER MAL SCRN: HCPCS | Performed by: INTERNAL MEDICINE

## 2021-03-05 PROCEDURE — 99214 OFFICE O/P EST MOD 30 MIN: CPT | Performed by: INTERNAL MEDICINE

## 2021-03-05 PROCEDURE — G8754 DIAS BP LESS 90: HCPCS | Performed by: INTERNAL MEDICINE

## 2021-03-05 PROCEDURE — G8510 SCR DEP NEG, NO PLAN REQD: HCPCS | Performed by: INTERNAL MEDICINE

## 2021-03-05 PROCEDURE — G8420 CALC BMI NORM PARAMETERS: HCPCS | Performed by: INTERNAL MEDICINE

## 2021-03-05 PROCEDURE — G8752 SYS BP LESS 140: HCPCS | Performed by: INTERNAL MEDICINE

## 2021-03-05 RX ORDER — MUPIROCIN 20 MG/G
OINTMENT TOPICAL 3 TIMES DAILY
COMMUNITY

## 2021-03-05 RX ORDER — HYDROCODONE BITARTRATE AND ACETAMINOPHEN 10; 325 MG/1; MG/1
TABLET ORAL
COMMUNITY
Start: 2021-02-18 | End: 2022-08-02

## 2021-03-05 RX ORDER — DOXYCYCLINE 100 MG/1
CAPSULE ORAL
COMMUNITY
Start: 2021-01-09 | End: 2022-01-12

## 2021-03-05 NOTE — PROGRESS NOTES
1. Have you been to the ER, urgent care clinic since your last visit? Hospitalized since your last visit? [facility]    2. Have you seen or consulted any other health care providers outside of the [address] [place] [address] since your last visit? Include any pap smears or colon screening.  No           Chief Complaint   Patient presents with   [facility] follow up       C/O Dizziness, Right leg Swelling

## 2021-03-05 NOTE — LETTER
3/12/2021 Patient: Kamila Aguayo YOB: 1955 Date of Visit: 3/5/2021 Bishop North MD 
[address] [address] [address] [employer] Dear Bishop North MD, Thank you for referring Ms. Bernard Mckeon to [address] [place] [address] for evaluation. My notes for this consultation are attached. If you have questions, please do not hesitate to call me. I look forward to following your patient along with you.  
 
 
Sincerely, 
 
Colonel J Carlos MD

## 2021-03-16 ENCOUNTER — PATIENT OUTREACH (OUTPATIENT)
Dept: CASE MANAGEMENT | Age: 66
End: 2021-03-16

## 2021-03-16 ENCOUNTER — TELEPHONE (OUTPATIENT)
Dept: INTERNAL MEDICINE CLINIC | Age: 66
End: 2021-03-16

## 2021-03-16 NOTE — TELEPHONE ENCOUNTER
----- Message from Niki Figueroa sent at 3/16/2021  3:42 PM EDT -----  Regarding: Dr. Glen Collins Message/Vendor Calls    Caller's first and last name: N/A      Reason for call: Question about medical marijuana.       Callback required yes/no and why: yes      Best contact number(s): [phone]      Message from [facility]
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
## 2021-04-12 ENCOUNTER — IMMUNIZATION (OUTPATIENT)
Dept: INTERNAL MEDICINE CLINIC | Age: 66
End: 2021-04-12
Payer: COMMERCIAL

## 2021-04-12 DIAGNOSIS — Z23 ENCOUNTER FOR IMMUNIZATION: Primary | ICD-10-CM

## 2021-04-12 PROCEDURE — 91300 COVID-19, MRNA, LNP-S, PF, 30MCG/0.3ML DOSE(PFIZER): CPT | Performed by: FAMILY MEDICINE

## 2021-04-12 PROCEDURE — 0001A COVID-19, MRNA, LNP-S, PF, 30MCG/0.3ML DOSE(PFIZER): CPT | Performed by: FAMILY MEDICINE

## 2021-04-12 RX ORDER — HYDROXYCHLOROQUINE SULFATE 200 MG/1
TABLET, FILM COATED ORAL
Qty: 30 TAB | Refills: 0 | Status: SHIPPED | OUTPATIENT
Start: 2021-04-12 | End: 2021-05-14

## 2021-05-03 ENCOUNTER — IMMUNIZATION (OUTPATIENT)
Dept: INTERNAL MEDICINE CLINIC | Age: 66
End: 2021-05-03
Payer: COMMERCIAL

## 2021-05-03 DIAGNOSIS — Z23 ENCOUNTER FOR IMMUNIZATION: Primary | ICD-10-CM

## 2021-05-03 PROCEDURE — 91300 COVID-19, MRNA, LNP-S, PF, 30MCG/0.3ML DOSE(PFIZER): CPT | Performed by: FAMILY MEDICINE

## 2021-05-03 PROCEDURE — 0002A COVID-19, MRNA, LNP-S, PF, 30MCG/0.3ML DOSE(PFIZER): CPT | Performed by: FAMILY MEDICINE

## 2021-05-03 RX ORDER — ATORVASTATIN CALCIUM 80 MG/1
TABLET, FILM COATED ORAL
Qty: 90 TAB | Refills: 3 | Status: SHIPPED | OUTPATIENT
Start: 2021-05-03

## 2021-05-13 ENCOUNTER — TELEPHONE (OUTPATIENT)
Dept: INTERNAL MEDICINE CLINIC | Age: 66
End: 2021-05-13

## 2021-05-13 RX ORDER — CLOTRIMAZOLE 10 MG/1
10 LOZENGE ORAL; TOPICAL
Qty: 35 TAB | Refills: 0 | Status: SHIPPED | OUTPATIENT
Start: 2021-05-13 | End: 2021-06-08

## 2021-05-14 RX ORDER — HYDROXYCHLOROQUINE SULFATE 200 MG/1
TABLET, FILM COATED ORAL
Qty: 30 TAB | Refills: 0 | Status: SHIPPED | OUTPATIENT
Start: 2021-05-14 | End: 2021-07-12

## 2021-06-08 RX ORDER — CLOTRIMAZOLE 10 MG/1
LOZENGE ORAL; TOPICAL
Qty: 35 TABLET | Refills: 2 | Status: SHIPPED | OUTPATIENT
Start: 2021-06-08

## 2021-07-12 RX ORDER — HYDROXYCHLOROQUINE SULFATE 200 MG/1
TABLET, FILM COATED ORAL
Qty: 30 TABLET | Refills: 0 | Status: SHIPPED | OUTPATIENT
Start: 2021-07-12 | End: 2021-08-12

## 2021-08-12 RX ORDER — HYDROXYCHLOROQUINE SULFATE 200 MG/1
TABLET, FILM COATED ORAL
Qty: 30 TABLET | Refills: 0 | Status: SHIPPED | OUTPATIENT
Start: 2021-08-12 | End: 2021-09-19

## 2021-08-22 ENCOUNTER — HOSPITAL ENCOUNTER (EMERGENCY)
Age: 66
Discharge: HOME | End: 2021-08-22
Attending: STUDENT IN AN ORGANIZED HEALTH CARE EDUCATION/TRAINING PROGRAM | Admitting: STUDENT IN AN ORGANIZED HEALTH CARE EDUCATION/TRAINING PROGRAM
Payer: COMMERCIAL

## 2021-08-22 ENCOUNTER — APPOINTMENT (OUTPATIENT)
Dept: CT IMAGING | Age: 66
End: 2021-08-22
Attending: PHYSICIAN ASSISTANT
Payer: COMMERCIAL

## 2021-08-22 VITALS
DIASTOLIC BLOOD PRESSURE: 77 MMHG | SYSTOLIC BLOOD PRESSURE: 154 MMHG | OXYGEN SATURATION: 97 % | RESPIRATION RATE: 20 BRPM | HEART RATE: 58 BPM | TEMPERATURE: 97.2 F

## 2021-08-22 DIAGNOSIS — K59.00 CONSTIPATION, UNSPECIFIED CONSTIPATION TYPE: ICD-10-CM

## 2021-08-22 DIAGNOSIS — R10.31 RLQ ABDOMINAL PAIN: Primary | ICD-10-CM

## 2021-08-22 LAB
ALBUMIN SERPL-MCNC: 4.4 G/DL (ref 3.5–5)
ALBUMIN/GLOB SERPL: 1.4 {RATIO} (ref 1.1–2.2)
ALP SERPL-CCNC: 150 U/L (ref 45–117)
ALT SERPL-CCNC: 24 U/L (ref 12–78)
ANION GAP SERPL CALC-SCNC: 2 MMOL/L (ref 5–15)
APPEARANCE UR: CLEAR
AST SERPL-CCNC: 17 U/L (ref 15–37)
BACTERIA URNS QL MICRO: NEGATIVE /HPF
BASOPHILS # BLD: 0 K/UL (ref 0–0.1)
BASOPHILS NFR BLD: 0 % (ref 0–1)
BILIRUB SERPL-MCNC: 0.4 MG/DL (ref 0.2–1)
BILIRUB UR QL: NEGATIVE
BUN SERPL-MCNC: 16 MG/DL (ref 6–20)
BUN/CREAT SERPL: 13 (ref 12–20)
CALCIUM SERPL-MCNC: 9.4 MG/DL (ref 8.5–10.1)
CHLORIDE SERPL-SCNC: 100 MMOL/L (ref 97–108)
CO2 SERPL-SCNC: 31 MMOL/L (ref 21–32)
COLOR UR: ABNORMAL
COMMENT, HOLDF: NORMAL
CREAT SERPL-MCNC: 1.25 MG/DL (ref 0.55–1.02)
DIFFERENTIAL METHOD BLD: NORMAL
EOSINOPHIL # BLD: 0.2 K/UL (ref 0–0.4)
EOSINOPHIL NFR BLD: 3 % (ref 0–7)
EPITH CASTS URNS QL MICRO: ABNORMAL /LPF
ERYTHROCYTE [DISTWIDTH] IN BLOOD BY AUTOMATED COUNT: 13.2 % (ref 11.5–14.5)
GLOBULIN SER CALC-MCNC: 3.1 G/DL (ref 2–4)
GLUCOSE SERPL-MCNC: 99 MG/DL (ref 65–100)
GLUCOSE UR STRIP.AUTO-MCNC: NEGATIVE MG/DL
HCT VFR BLD AUTO: 37.3 % (ref 35–47)
HGB BLD-MCNC: 12.5 G/DL (ref 11.5–16)
HGB UR QL STRIP: NEGATIVE
IMM GRANULOCYTES # BLD AUTO: 0 K/UL (ref 0–0.04)
IMM GRANULOCYTES NFR BLD AUTO: 0 % (ref 0–0.5)
KETONES UR QL STRIP.AUTO: ABNORMAL MG/DL
LEUKOCYTE ESTERASE UR QL STRIP.AUTO: NEGATIVE
LIPASE SERPL-CCNC: 123 U/L (ref 73–393)
LYMPHOCYTES # BLD: 2 K/UL (ref 0.8–3.5)
LYMPHOCYTES NFR BLD: 30 % (ref 12–49)
MCH RBC QN AUTO: 32.6 PG (ref 26–34)
MCHC RBC AUTO-ENTMCNC: 33.5 G/DL (ref 30–36.5)
MCV RBC AUTO: 97.4 FL (ref 80–99)
MONOCYTES # BLD: 0.7 K/UL (ref 0–1)
MONOCYTES NFR BLD: 11 % (ref 5–13)
NEUTS SEG # BLD: 3.7 K/UL (ref 1.8–8)
NEUTS SEG NFR BLD: 56 % (ref 32–75)
NITRITE UR QL STRIP.AUTO: NEGATIVE
NRBC # BLD: 0 K/UL (ref 0–0.01)
NRBC BLD-RTO: 0 PER 100 WBC
PH UR STRIP: 5 [PH] (ref 5–8)
PLATELET # BLD AUTO: 272 K/UL (ref 150–400)
PMV BLD AUTO: 9.8 FL (ref 8.9–12.9)
POTASSIUM SERPL-SCNC: 3.4 MMOL/L (ref 3.5–5.1)
PROT SERPL-MCNC: 7.5 G/DL (ref 6.4–8.2)
PROT UR STRIP-MCNC: ABNORMAL MG/DL
RBC # BLD AUTO: 3.83 M/UL (ref 3.8–5.2)
RBC #/AREA URNS HPF: ABNORMAL /HPF (ref 0–5)
SAMPLES BEING HELD,HOLD: NORMAL
SODIUM SERPL-SCNC: 133 MMOL/L (ref 136–145)
SP GR UR REFRACTOMETRY: 1.02 (ref 1–1.03)
UR CULT HOLD, URHOLD: NORMAL
UROBILINOGEN UR QL STRIP.AUTO: 0.2 EU/DL (ref 0.2–1)
WBC # BLD AUTO: 6.5 K/UL (ref 3.6–11)
WBC URNS QL MICRO: ABNORMAL /HPF (ref 0–4)

## 2021-08-22 PROCEDURE — 83690 ASSAY OF LIPASE: CPT

## 2021-08-22 PROCEDURE — 36415 COLL VENOUS BLD VENIPUNCTURE: CPT

## 2021-08-22 PROCEDURE — 80053 COMPREHEN METABOLIC PANEL: CPT

## 2021-08-22 PROCEDURE — 74011250637 HC RX REV CODE- 250/637: Performed by: STUDENT IN AN ORGANIZED HEALTH CARE EDUCATION/TRAINING PROGRAM

## 2021-08-22 PROCEDURE — 99284 EMERGENCY DEPT VISIT MOD MDM: CPT

## 2021-08-22 PROCEDURE — 96374 THER/PROPH/DIAG INJ IV PUSH: CPT

## 2021-08-22 PROCEDURE — 85025 COMPLETE CBC W/AUTO DIFF WBC: CPT

## 2021-08-22 PROCEDURE — 74011000636 HC RX REV CODE- 636: Performed by: RADIOLOGY

## 2021-08-22 PROCEDURE — 81001 URINALYSIS AUTO W/SCOPE: CPT

## 2021-08-22 PROCEDURE — 74011250636 HC RX REV CODE- 250/636: Performed by: PHYSICIAN ASSISTANT

## 2021-08-22 PROCEDURE — 74011250637 HC RX REV CODE- 250/637: Performed by: PHYSICIAN ASSISTANT

## 2021-08-22 PROCEDURE — 74177 CT ABD & PELVIS W/CONTRAST: CPT

## 2021-08-22 PROCEDURE — 96375 TX/PRO/DX INJ NEW DRUG ADDON: CPT

## 2021-08-22 RX ORDER — DICYCLOMINE HYDROCHLORIDE 10 MG/1
20 CAPSULE ORAL
Status: COMPLETED | OUTPATIENT
Start: 2021-08-22 | End: 2021-08-22

## 2021-08-22 RX ORDER — HYDROMORPHONE HYDROCHLORIDE 1 MG/ML
0.5 INJECTION, SOLUTION INTRAMUSCULAR; INTRAVENOUS; SUBCUTANEOUS
Status: COMPLETED | OUTPATIENT
Start: 2021-08-22 | End: 2021-08-22

## 2021-08-22 RX ORDER — POLYETHYLENE GLYCOL 3350 17 G/17G
17 POWDER, FOR SOLUTION ORAL 2 TIMES DAILY
Qty: 238 G | Refills: 0 | Status: SHIPPED | OUTPATIENT
Start: 2021-08-22 | End: 2021-08-29

## 2021-08-22 RX ORDER — MORPHINE SULFATE 4 MG/ML
4 INJECTION INTRAVENOUS
Status: COMPLETED | OUTPATIENT
Start: 2021-08-22 | End: 2021-08-22

## 2021-08-22 RX ORDER — MAGNESIUM CITRATE
296 SOLUTION, ORAL ORAL ONCE
Qty: 1 BOTTLE | Refills: 0 | Status: SHIPPED | OUTPATIENT
Start: 2021-08-22 | End: 2021-08-22

## 2021-08-22 RX ORDER — OXYCODONE AND ACETAMINOPHEN 5; 325 MG/1; MG/1
2 TABLET ORAL
Status: COMPLETED | OUTPATIENT
Start: 2021-08-22 | End: 2021-08-22

## 2021-08-22 RX ADMIN — OXYCODONE HYDROCHLORIDE AND ACETAMINOPHEN 2 TABLET: 5; 325 TABLET ORAL at 20:52

## 2021-08-22 RX ADMIN — HYDROMORPHONE HYDROCHLORIDE 0.5 MG: 1 INJECTION, SOLUTION INTRAMUSCULAR; INTRAVENOUS; SUBCUTANEOUS at 18:51

## 2021-08-22 RX ADMIN — SODIUM CHLORIDE 1000 ML: 9 INJECTION, SOLUTION INTRAVENOUS at 19:55

## 2021-08-22 RX ADMIN — IOPAMIDOL 100 ML: 755 INJECTION, SOLUTION INTRAVENOUS at 19:18

## 2021-08-22 RX ADMIN — DICYCLOMINE HYDROCHLORIDE 20 MG: 10 CAPSULE ORAL at 19:56

## 2021-08-22 RX ADMIN — MORPHINE SULFATE 4 MG: 4 INJECTION INTRAVENOUS at 17:23

## 2021-08-22 NOTE — ED TRIAGE NOTES
TRIAGE NOTE:  Patient arrives by EMS with c/o right lower abdominal pain that started Friday afternoon. Patient denies N/V/D.

## 2021-08-22 NOTE — ED PROVIDER NOTES
77 y/o female presenting with complaint of RLQ abdominal pain. The patient states that 2 days ago she began to have RLQ abdominal pain which has been gradually worsening. The pain is 3/10 at rest, severe with ambulation and certain movements. Last BM was 2 days ago. No fevers, nausea, vomiting, diarrhea, dysuria or urgency/frequency. The history is provided by the patient. Past Medical History:   Diagnosis Date    Acid reflux     Acid reflux     Arthritis     Cardiomyopathy ([facility] 2/10/2021    Chronic pain     Elevated troponin 2/8/2021    Heart attack ([address]     Heart failure (HCC)     Hypertension     IBS (irritable bowel syndrome)     IBS (irritable bowel syndrome) 2/8/2021    Squamous cell carcinoma of skin of right elbow 0/7376    Systolic heart failure ([facility] 2/11/2021    Takotsubo cardiomyopathy 11/16/2015    Tobacco abuse 11/16/2015       Past Surgical History:   Procedure Laterality Date    HX GYN      oophorectomy    HX ORTHOPAEDIC      right knee arthroscopy    HX ORTHOPAEDIC      right thumb    HX ORTHOPAEDIC      back surgeries    HX OTHER SURGICAL      8 route canals    UT ABDOMEN SURGERY PROC UNLISTED      adhesion removal    UT BREAST SURGERY PROCEDURE UNLISTED      lumpectomy    UT COLONOSCOPY FLX DX W/COLLJ SPEC WHEN PFRMD  10/15/2012              Family History:   Problem Relation Age of Onset    Cancer Father         prostate    Heart Disease Mother     Heart Disease Maternal Grandmother        Social History     Socioeconomic History    Marital status:      Spouse name: Not on file    Number of children: Not on file    Years of education: Not on file    Highest education level: Not on file   Occupational History    Not on file   Tobacco Use    Smoking status: Current Every Day Smoker     Packs/day: 1.00    Smokeless tobacco: Never Used    Tobacco comment: trying to quit 4-5 cigarettes daily   Substance and Sexual Activity    Alcohol use:  Yes Alcohol/week: 8.3 standard drinks     Types: 10 Glasses of wine per week     Comment: 4 times weekly    Drug use: No    Sexual activity: Yes     Partners: Male     Birth control/protection: None   Other Topics Concern    Not on file   Social History Narrative    Not on file     Social Determinants of Health     Financial Resource Strain:     Difficulty of Paying Living Expenses:    Food Insecurity:     Worried About Running Out of Food in the Last Year:     [address] in the Last Year:    Transportation Needs:     Lack of Transportation (Medical):  Lack of Transportation (Non-Medical):    Physical Activity:     Days of Exercise per Week:     Minutes of Exercise per Session:    Stress:     Feeling of Stress :    Social Connections:     Frequency of Communication with Friends and Family:     Frequency of Social Gatherings with Friends and Family:     Attends Yarsanism Services:     Active Member of Clubs or Organizations:     Attends Club or Organization Meetings:     Marital Status:    Intimate Partner Violence:     Fear of Current or Ex-Partner:     Emotionally Abused:     Physically Abused:     Sexually Abused: ALLERGIES: Ace inhibitors, Arb-angiotensin receptor antagonist, Codeine, Keflex [cephalexin], Milk, and Morphine    Review of Systems   Constitutional: Negative for chills and fever. HENT: Negative for congestion. Respiratory: Negative for cough. Gastrointestinal: Positive for abdominal pain. Negative for diarrhea, nausea and vomiting. Genitourinary: Negative for dysuria, frequency and urgency. Musculoskeletal: Negative for myalgias. Neurological: Positive for light-headedness. Negative for syncope. All other systems reviewed and are negative. Vitals:    08/22/21 1537   BP: 129/73   Pulse: 76   Resp: 18   Temp: 97.8 °F (36.6 °C)   SpO2: 97%            Physical Exam  Vitals and nursing note reviewed.    Constitutional:       General: She is not in acute distress. Appearance: She is well-developed. She is not diaphoretic. HENT:      Head: Normocephalic and atraumatic. Eyes:      Conjunctiva/sclera: Conjunctivae normal.   Cardiovascular:      Rate and Rhythm: Normal rate and regular rhythm. Heart sounds: Normal heart sounds. Pulmonary:      Effort: Pulmonary effort is normal.      Breath sounds: Normal breath sounds. Abdominal:      General: There is no distension. Palpations: Abdomen is soft. Tenderness: There is abdominal tenderness (RLQ, some RUQ). There is no guarding or rebound. Musculoskeletal:      Cervical back: Normal range of motion and neck supple. Skin:     General: Skin is warm and dry. Neurological:      Mental Status: She is alert and oriented to person, place, and time. MDM       Procedures        77 y/o female presenting with complaint of RLQ abdominal pain. Patient is well-appearing in no acute distress, vitals within normal limits, abdominal exam benign. Labs reveal mild renal dysfunction but are otherwise unremarkable. UA is negative for infection. CT abd/pelvis w/ contrast, read by radiology and independently visualized and interpreted by myself, reveals fecal stasis but no evidence of appendicitis, bowel obstruction or other acute abnormalities. Plan is for discharge home with Rx for MiraLAX and mag citrate with instructions for PCP follow-up if pain continues. Strict ED return precautions discussed and provided in writing at time of discharge. The patient and her  verbalized understanding and agreement with this plan.

## 2021-10-07 RX ORDER — BUSPIRONE HYDROCHLORIDE 5 MG/1
TABLET ORAL
Qty: 60 TABLET | Refills: 5 | Status: SHIPPED | OUTPATIENT
Start: 2021-10-07 | End: 2022-07-04

## 2021-11-08 NOTE — ED NOTES
MD  reviewed discharge instructions and options with patient. Patient verbalized understanding. RN reviewed discharge instructions using teach back method. Patient ambulatory to exit without difficulty and no acute signs of distress. No complaints or needs expressed at this time. Patient counseled on medications prescribed at discharge. Vital signs stable. Patient to follow up with PCP in the morning for appointment. Subjective:  That Fluidotherapy machine feels great      Objective:     Current level of performance:  ADL: Independent  Work: Retired  Leisure: Not addressed    Measurements/Tests:  ROM:  Testing By: sindy        MP Small Left: 0/90  PIP Small Left: 0/90  DI

## 2021-11-23 ENCOUNTER — TELEPHONE (OUTPATIENT)
Dept: INTERNAL MEDICINE CLINIC | Age: 66
End: 2021-11-23

## 2021-11-23 NOTE — TELEPHONE ENCOUNTER
----- Message from Mylene Brown sent at 11/23/2021  1:07 PM EST -----  Subject: Message to Provider    QUESTIONS  Information for Provider? Pt called and she has Thrush on the corner of   her lips. Can doctor please call in a cream to help this to go away!  ---------------------------------------------------------------------------  --------------  [address]  What is the best way for the office to contact you? OK to leave message on   voicemail  Preferred Call Back Phone Number? [phone]  ---------------------------------------------------------------------------  --------------  SCRIPT ANSWERS  Relationship to Patient?  Self

## 2022-01-05 ENCOUNTER — APPOINTMENT (OUTPATIENT)
Dept: CT IMAGING | Age: 67
End: 2022-01-05
Attending: EMERGENCY MEDICINE
Payer: COMMERCIAL

## 2022-01-05 ENCOUNTER — HOSPITAL ENCOUNTER (EMERGENCY)
Age: 67
Discharge: HOME | End: 2022-01-05
Attending: EMERGENCY MEDICINE
Payer: COMMERCIAL

## 2022-01-05 VITALS
HEIGHT: 66 IN | RESPIRATION RATE: 19 BRPM | OXYGEN SATURATION: 100 % | DIASTOLIC BLOOD PRESSURE: 88 MMHG | TEMPERATURE: 97.5 F | BODY MASS INDEX: 18.64 KG/M2 | HEART RATE: 81 BPM | WEIGHT: 116 LBS | SYSTOLIC BLOOD PRESSURE: 196 MMHG

## 2022-01-05 DIAGNOSIS — I10 PRIMARY HYPERTENSION: ICD-10-CM

## 2022-01-05 DIAGNOSIS — R11.2 NON-INTRACTABLE VOMITING WITH NAUSEA, UNSPECIFIED VOMITING TYPE: ICD-10-CM

## 2022-01-05 DIAGNOSIS — K29.90 GASTRITIS AND DUODENITIS: Primary | ICD-10-CM

## 2022-01-05 LAB
ALBUMIN SERPL-MCNC: 4.2 G/DL (ref 3.5–5)
ALBUMIN/GLOB SERPL: 1.1 {RATIO} (ref 1.1–2.2)
ALP SERPL-CCNC: 159 U/L (ref 45–117)
ALT SERPL-CCNC: 26 U/L (ref 12–78)
ANION GAP SERPL CALC-SCNC: 11 MMOL/L (ref 5–15)
AST SERPL-CCNC: 27 U/L (ref 15–37)
BASOPHILS # BLD: 0 K/UL (ref 0–0.1)
BASOPHILS NFR BLD: 0 % (ref 0–1)
BILIRUB SERPL-MCNC: 0.6 MG/DL (ref 0.2–1)
BUN SERPL-MCNC: 11 MG/DL (ref 6–20)
BUN/CREAT SERPL: 10 (ref 12–20)
CALCIUM SERPL-MCNC: 10 MG/DL (ref 8.5–10.1)
CHLORIDE SERPL-SCNC: 97 MMOL/L (ref 97–108)
CO2 SERPL-SCNC: 25 MMOL/L (ref 21–32)
CREAT SERPL-MCNC: 1.08 MG/DL (ref 0.55–1.02)
DIFFERENTIAL METHOD BLD: ABNORMAL
EOSINOPHIL # BLD: 0.2 K/UL (ref 0–0.4)
EOSINOPHIL NFR BLD: 2 % (ref 0–7)
ERYTHROCYTE [DISTWIDTH] IN BLOOD BY AUTOMATED COUNT: 12.9 % (ref 11.5–14.5)
GLOBULIN SER CALC-MCNC: 3.8 G/DL (ref 2–4)
GLUCOSE SERPL-MCNC: 147 MG/DL (ref 65–100)
HCT VFR BLD AUTO: 29.7 % (ref 35–47)
HGB BLD-MCNC: 9.8 G/DL (ref 11.5–16)
IMM GRANULOCYTES # BLD AUTO: 0 K/UL (ref 0–0.04)
IMM GRANULOCYTES NFR BLD AUTO: 0 % (ref 0–0.5)
LIPASE SERPL-CCNC: 89 U/L (ref 73–393)
LYMPHOCYTES # BLD: 1.3 K/UL (ref 0.8–3.5)
LYMPHOCYTES NFR BLD: 14 % (ref 12–49)
MCH RBC QN AUTO: 29.8 PG (ref 26–34)
MCHC RBC AUTO-ENTMCNC: 33 G/DL (ref 30–36.5)
MCV RBC AUTO: 90.3 FL (ref 80–99)
MONOCYTES # BLD: 0.5 K/UL (ref 0–1)
MONOCYTES NFR BLD: 5 % (ref 5–13)
NEUTS SEG # BLD: 7.3 K/UL (ref 1.8–8)
NEUTS SEG NFR BLD: 79 % (ref 32–75)
NRBC # BLD: 0 K/UL (ref 0–0.01)
NRBC BLD-RTO: 0 PER 100 WBC
PLATELET # BLD AUTO: 418 K/UL (ref 150–400)
PMV BLD AUTO: 9.6 FL (ref 8.9–12.9)
POTASSIUM SERPL-SCNC: 3.6 MMOL/L (ref 3.5–5.1)
PROT SERPL-MCNC: 8 G/DL (ref 6.4–8.2)
RBC # BLD AUTO: 3.29 M/UL (ref 3.8–5.2)
SODIUM SERPL-SCNC: 133 MMOL/L (ref 136–145)
WBC # BLD AUTO: 9.4 K/UL (ref 3.6–11)

## 2022-01-05 PROCEDURE — 96372 THER/PROPH/DIAG INJ SC/IM: CPT

## 2022-01-05 PROCEDURE — 85025 COMPLETE CBC W/AUTO DIFF WBC: CPT

## 2022-01-05 PROCEDURE — 36415 COLL VENOUS BLD VENIPUNCTURE: CPT

## 2022-01-05 PROCEDURE — 70450 CT HEAD/BRAIN W/O DYE: CPT

## 2022-01-05 PROCEDURE — 93005 ELECTROCARDIOGRAM TRACING: CPT

## 2022-01-05 PROCEDURE — 74011250637 HC RX REV CODE- 250/637: Performed by: EMERGENCY MEDICINE

## 2022-01-05 PROCEDURE — 96376 TX/PRO/DX INJ SAME DRUG ADON: CPT

## 2022-01-05 PROCEDURE — 80053 COMPREHEN METABOLIC PANEL: CPT

## 2022-01-05 PROCEDURE — 74177 CT ABD & PELVIS W/CONTRAST: CPT

## 2022-01-05 PROCEDURE — 99285 EMERGENCY DEPT VISIT HI MDM: CPT

## 2022-01-05 PROCEDURE — 96374 THER/PROPH/DIAG INJ IV PUSH: CPT

## 2022-01-05 PROCEDURE — 74011000636 HC RX REV CODE- 636: Performed by: EMERGENCY MEDICINE

## 2022-01-05 PROCEDURE — 83690 ASSAY OF LIPASE: CPT

## 2022-01-05 PROCEDURE — 96375 TX/PRO/DX INJ NEW DRUG ADDON: CPT

## 2022-01-05 PROCEDURE — 74011250636 HC RX REV CODE- 250/636: Performed by: EMERGENCY MEDICINE

## 2022-01-05 PROCEDURE — 96361 HYDRATE IV INFUSION ADD-ON: CPT

## 2022-01-05 RX ORDER — SUCRALFATE 1 G/1
1 TABLET ORAL 4 TIMES DAILY
Qty: 28 TABLET | Refills: 0 | Status: SHIPPED | OUTPATIENT
Start: 2022-01-05 | End: 2022-01-12

## 2022-01-05 RX ORDER — HALOPERIDOL 5 MG/ML
5 INJECTION INTRAMUSCULAR
Status: COMPLETED | OUTPATIENT
Start: 2022-01-05 | End: 2022-01-05

## 2022-01-05 RX ORDER — FAMOTIDINE 20 MG/1
20 TABLET, FILM COATED ORAL 2 TIMES DAILY
Qty: 14 TABLET | Refills: 0 | Status: SHIPPED | OUTPATIENT
Start: 2022-01-05

## 2022-01-05 RX ORDER — HYDRALAZINE HYDROCHLORIDE 20 MG/ML
10 INJECTION INTRAMUSCULAR; INTRAVENOUS ONCE
Status: COMPLETED | OUTPATIENT
Start: 2022-01-05 | End: 2022-01-05

## 2022-01-05 RX ORDER — FAMOTIDINE 20 MG/1
20 TABLET, FILM COATED ORAL
Status: COMPLETED | OUTPATIENT
Start: 2022-01-05 | End: 2022-01-05

## 2022-01-05 RX ORDER — ONDANSETRON 2 MG/ML
4 INJECTION INTRAMUSCULAR; INTRAVENOUS ONCE
Status: COMPLETED | OUTPATIENT
Start: 2022-01-05 | End: 2022-01-05

## 2022-01-05 RX ORDER — LORAZEPAM 2 MG/ML
1 INJECTION INTRAMUSCULAR
Status: COMPLETED | OUTPATIENT
Start: 2022-01-05 | End: 2022-01-05

## 2022-01-05 RX ORDER — ONDANSETRON 4 MG/1
4 TABLET, FILM COATED ORAL
Qty: 20 TABLET | Refills: 0 | Status: SHIPPED | OUTPATIENT
Start: 2022-01-05 | End: 2022-01-12 | Stop reason: ALTCHOICE

## 2022-01-05 RX ADMIN — ONDANSETRON 4 MG: 2 INJECTION INTRAMUSCULAR; INTRAVENOUS at 22:57

## 2022-01-05 RX ADMIN — SODIUM CHLORIDE 1000 ML: 9 INJECTION, SOLUTION INTRAVENOUS at 20:20

## 2022-01-05 RX ADMIN — FAMOTIDINE 20 MG: 20 TABLET ORAL at 21:29

## 2022-01-05 RX ADMIN — LORAZEPAM 1 MG: 2 INJECTION INTRAMUSCULAR; INTRAVENOUS at 20:21

## 2022-01-05 RX ADMIN — HYDRALAZINE HYDROCHLORIDE 10 MG: 20 INJECTION INTRAMUSCULAR; INTRAVENOUS at 22:33

## 2022-01-05 RX ADMIN — HYDRALAZINE HYDROCHLORIDE 10 MG: 20 INJECTION INTRAMUSCULAR; INTRAVENOUS at 21:18

## 2022-01-05 RX ADMIN — IOPAMIDOL 100 ML: 755 INJECTION, SOLUTION INTRAVENOUS at 21:00

## 2022-01-05 RX ADMIN — HALOPERIDOL LACTATE 5 MG: 5 INJECTION, SOLUTION INTRAMUSCULAR at 19:46

## 2022-01-06 LAB
ATRIAL RATE: 51 BPM
CALCULATED P AXIS, ECG09: 61 DEGREES
CALCULATED R AXIS, ECG10: 37 DEGREES
CALCULATED T AXIS, ECG11: 56 DEGREES
DIAGNOSIS, 93000: NORMAL
P-R INTERVAL, ECG05: 172 MS
Q-T INTERVAL, ECG07: 536 MS
QRS DURATION, ECG06: 94 MS
QTC CALCULATION (BEZET), ECG08: 494 MS
VENTRICULAR RATE, ECG03: 51 BPM

## 2022-01-06 NOTE — ED PROVIDER NOTES
EMERGENCY DEPARTMENT HISTORY AND PHYSICAL EXAM      Date: 1/5/2022  Patient Name: Anayeli Subramanian    History of Presenting Illness     Chief Complaint   Patient presents with    Vomiting     NVD x 4 days with weakness, lethargy       History Provided By: Patient and Patient's     HPI: Anayeli Subramanian, 78 y.o. female presents to the ED with cc of vomiting and diarrhea. 58-year-old female with a history of GERD, cardiomyopathy, chronic pain, IBS presents emergency department with vomiting. Patient's  provides majority of the history. Patient to me just requesting \"a nausea medicine. \"     reports an episode in the past also 1 year ago with patient developed a \"IBS flare\" that was defined as vomiting. They are concerned she is dehydrated. Reports 2 days of vomiting, 1 day of diarrhea. Reports diffuse abdominal pain, most pronounced in the epigastric region. Patient does have a history of hypertension, she has not been able to tolerate her medications. Denies chest pain. There are no other complaints, changes, or physical findings at this time. PCP: Cl Borden MD    No current facility-administered medications on file prior to encounter.      Current Outpatient Medications on File Prior to Encounter   Medication Sig Dispense Refill    busPIRone (BUSPAR) 5 mg tablet TAKE 1 TABLET BY MOUTH TWICE DAILY 60 Tablet 5    hydrOXYchloroQUINE (PLAQUENIL) 200 mg tablet TAKE 1 TABLET BY MOUTH DAILY 30 Tablet 0    clotrimazole (MYCELEX) 10 mg jeremy DISSOLVE 1 JEREMY IN MOUTH FIVE TIMES DAILY FOR 1 WEEK 35 Tablet 2    atorvastatin (LIPITOR) 80 mg tablet TAKE 1 TABLET BY MOUTH DAILY AT BEDTIME 90 Tab 3    doxycycline (VIBRAMYCIN) 100 mg capsule TAKE 1 CAPSULE BY MOUTH TWICE DAILY WITH MEALS      HYDROcodone-acetaminophen (NORCO)  mg tablet TAKE 1 TABLET BY MOUTH EVERY 6 HOURS AS NEEDED FOR PAIN      mupirocin (BACTROBAN) 2 % ointment Apply  to affected area three (3) times daily.      carvediloL (COREG) 12.5 mg tablet Take 0.5 Tabs by mouth two (2) times daily (with meals). TAKE 1 TABLET BY MOUTH TWICE DAILY WITH MEALS (Patient taking differently: Take 6.25 mg by mouth two (2) times daily (with meals). ) 180 Tab 3    dicyclomine (BENTYL) 20 mg tablet Take 10 mg by mouth. Before meals and at bedtime as needed       cetirizine (ZYRTEC) 10 mg tablet Take 10 mg by mouth daily as needed for Allergies.  gabapentin (NEURONTIN) 300 mg capsule Take 300 mg by mouth three (3) times daily. Take Morning, afternoon and at bedtime.  omeprazole (PRILOSEC) 20 mg capsule Take 40 mg by mouth daily. On an empty stomach       hydrOXYzine HCL (ATARAX) 25 mg tablet Take 25 mg by mouth three (3) times daily as needed for Anxiety.  hydroCHLOROthiazide (HYDRODIURIL) 25 mg tablet TAKE 1 TABLET BY MOUTH DAILY 30 Tab 3    promethazine (PHENERGAN) 12.5 mg tablet Take 2 Tabs by mouth every six (6) hours as needed for Nausea. 20 Tab 0    Humira,CF, Pen 40 mg/0.4 mL injection pen INJECT ONE PEN (40MG) UNDER THE SKIN (SUBCUTANEOUS INJECTION) EVERY 2 WEEKS 1 Kit 0    DULoxetine (CYMBALTA) 60 mg capsule Take 1 Cap by mouth daily. 30 Cap 5    pantoprazole (PROTONIX) 40 mg tablet Take 40 mg by mouth two (2) times a day.  alclometasone (ACLOVATE) 0.05 % topical cream USE ON CORNERS OF MOUTH FOR RASH TWICE A DAY AS NEEDED  3    fluticasone propionate (FLONASE) 50 mcg/actuation nasal spray fluticasone propionate 50 mcg/actuation nasal spray,suspension      albuterol (PROVENTIL HFA, VENTOLIN HFA, PROAIR HFA) 90 mcg/actuation inhaler Take 2 Puffs by inhalation every six (6) hours as needed for Wheezing. 1 Inhaler 0    amLODIPine (NORVASC) 2.5 mg tablet take 1 tablet by mouth once daily 30 Tab 1    ondansetron (ZOFRAN ODT) 4 mg disintegrating tablet Take 1 Tab by mouth every eight (8) hours as needed for Nausea.  30 Tab 1    desoximetasone (TOPICORT) 0.25 % topical cream Apply  to affected area two (2) times daily as needed for Skin Irritation. 15 g 0    aspirin 81 mg chewable tablet Take 1 Tab by mouth daily. 27 Tab 0       Past History     Past Medical History:  Past Medical History:   Diagnosis Date    Acid reflux     Acid reflux     Arthritis     Cardiomyopathy ([facility] 2/10/2021    Chronic pain     Elevated troponin 2/8/2021    Heart attack ([facility]     Heart failure (HCC)     Hypertension     IBS (irritable bowel syndrome)     IBS (irritable bowel syndrome) 2/8/2021    Squamous cell carcinoma of skin of right elbow 5/9265    Systolic heart failure ([facility] 2/11/2021    Takotsubo cardiomyopathy 11/16/2015    Tobacco abuse 11/16/2015       Past Surgical History:  Past Surgical History:   Procedure Laterality Date    HX GYN      oophorectomy    HX ORTHOPAEDIC      right knee arthroscopy    HX ORTHOPAEDIC      right thumb    HX ORTHOPAEDIC      back surgeries    HX OTHER SURGICAL      8 route canals    VA ABDOMEN SURGERY PROC UNLISTED      adhesion removal    VA BREAST SURGERY PROCEDURE UNLISTED      lumpectomy    VA COLONOSCOPY FLX DX W/COLLJ SPEC WHEN PFRMD  10/15/2012            Family History:  Family History   Problem Relation Age of Onset    Cancer Father         prostate    Heart Disease Mother     Heart Disease Maternal Grandmother        Social History:  Social History     Tobacco Use    Smoking status: Current Every Day Smoker     Packs/day: 1.00    Smokeless tobacco: Never Used    Tobacco comment: trying to quit 4-5 cigarettes daily   Substance Use Topics    Alcohol use: Yes     Alcohol/week: 8.3 standard drinks     Types: 10 Glasses of wine per week     Comment: 4 times weekly    Drug use: No       Allergies:   Allergies   Allergen Reactions    Ace Inhibitors Swelling    Arb-Angiotensin Receptor Antagonist Other (comments)     Prior ACE inhibitor angioedema, cross reaction risk    Codeine Nausea Only    Keflex [Cephalexin] Hives    Milk Other (comments)     Lactose intolerant.  Morphine Nausea and Vomiting         Review of Systems   Review of Systems   Unable to perform ROS: Other   Gastrointestinal: Positive for abdominal pain, diarrhea, nausea and vomiting. Review of systems limited as patient is actively vomiting and only requesting nausea medicine. Physical Exam   Physical Exam  Vitals and nursing note reviewed. Constitutional:       Comments: 78 YOF, sitting upright in bed, spiting up, not cooperative with exam.   HENT:      Head: Normocephalic and atraumatic. Cardiovascular:      Rate and Rhythm: Normal rate and regular rhythm. Heart sounds: No murmur heard. No friction rub. No gallop. Pulmonary:      Effort: Pulmonary effort is normal.      Breath sounds: Normal breath sounds. Comments: Not hypoxic on RA  Abdominal:      Palpations: Abdomen is soft. Tenderness: There is abdominal tenderness. There is no guarding or rebound. Musculoskeletal:         General: No swelling. Normal range of motion. Cervical back: Normal range of motion. Skin:     General: Skin is warm. Capillary Refill: Capillary refill takes less than 2 seconds. Neurological:      General: No focal deficit present. Mental Status: She is alert.    Psychiatric:         Mood and Affect: Mood normal.         Diagnostic Study Results     Labs -     Recent Results (from the past 12 hour(s))   METABOLIC PANEL, COMPREHENSIVE    Collection Time: 01/05/22  6:59 PM   Result Value Ref Range    Sodium 133 (L) 136 - 145 mmol/L    Potassium 3.6 3.5 - 5.1 mmol/L    Chloride 97 97 - 108 mmol/L    CO2 25 21 - 32 mmol/L    Anion gap 11 5 - 15 mmol/L    Glucose 147 (H) 65 - 100 mg/dL    BUN 11 6 - 20 MG/DL    Creatinine 1.08 (H) 0.55 - 1.02 MG/DL    BUN/Creatinine ratio 10 (L) 12 - 20      GFR est AA >60 >60 ml/min/1.73m2    GFR est non-AA 51 (L) >60 ml/min/1.73m2    Calcium 10.0 8.5 - 10.1 MG/DL    Bilirubin, total 0.6 0.2 - 1.0 MG/DL    ALT (SGPT) 26 12 - 78 U/L    AST (SGOT) 27 15 - 37 U/L    Alk. phosphatase 159 (H) 45 - 117 U/L    Protein, total 8.0 6.4 - 8.2 g/dL    Albumin 4.2 3.5 - 5.0 g/dL    Globulin 3.8 2.0 - 4.0 g/dL    A-G Ratio 1.1 1.1 - 2.2     LIPASE    Collection Time: 01/05/22  6:59 PM   Result Value Ref Range    Lipase 89 73 - 393 U/L   CBC WITH AUTOMATED DIFF    Collection Time: 01/05/22  6:59 PM   Result Value Ref Range    WBC 9.4 3.6 - 11.0 K/uL    RBC 3.29 (L) 3.80 - 5.20 M/uL    HGB 9.8 (L) 11.5 - 16.0 g/dL    HCT 29.7 (L) 35.0 - 47.0 %    MCV 90.3 80.0 - 99.0 FL    MCH 29.8 26.0 - 34.0 PG    MCHC 33.0 30.0 - 36.5 g/dL    RDW 12.9 11.5 - 14.5 %    PLATELET 541 (H) 699 - 400 K/uL    MPV 9.6 8.9 - 12.9 FL    NRBC 0.0 0  WBC    ABSOLUTE NRBC 0.00 0.00 - 0.01 K/uL    NEUTROPHILS 79 (H) 32 - 75 %    LYMPHOCYTES 14 12 - 49 %    MONOCYTES 5 5 - 13 %    EOSINOPHILS 2 0 - 7 %    BASOPHILS 0 0 - 1 %    IMMATURE GRANULOCYTES 0 0.0 - 0.5 %    ABS. NEUTROPHILS 7.3 1.8 - 8.0 K/UL    ABS. LYMPHOCYTES 1.3 0.8 - 3.5 K/UL    ABS. MONOCYTES 0.5 0.0 - 1.0 K/UL    ABS. EOSINOPHILS 0.2 0.0 - 0.4 K/UL    ABS. BASOPHILS 0.0 0.0 - 0.1 K/UL    ABS. IMM. GRANS. 0.0 0.00 - 0.04 K/UL    DF AUTOMATED     EKG, 12 LEAD, INITIAL    Collection Time: 01/05/22  8:21 PM   Result Value Ref Range    Ventricular Rate 51 BPM    Atrial Rate 51 BPM    P-R Interval 172 ms    QRS Duration 94 ms    Q-T Interval 536 ms    QTC Calculation (Bezet) 494 ms    Calculated P Axis 61 degrees    Calculated R Axis 37 degrees    Calculated T Axis 56 degrees    Diagnosis       Sinus bradycardia  Possible Left atrial enlargement  Anteroseptal infarct (cited on or before 05-AUG-2015)  When compared with ECG of 08-FEB-2021 05:35,  Vent.  rate has decreased BY  50 BPM  Questionable change in initial forces of Lateral leads  Nonspecific T wave abnormality no longer evident in Inferior leads  T wave inversion now evident in Anterior leads  Nonspecific T wave abnormality no longer evident in Lateral leads Radiologic Studies -   CT ABD PELV W CONT   Final Result   Wall thickening and mucosal enhancement involving the stomach and duodenum,   correlate for gastroduodenitis         CT HEAD WO CONT   Final Result   No acute intracranial hemorrhage, mass or infarct. CT Results  (Last 48 hours)               01/05/22 2101  CT ABD PELV W CONT Final result    Impression:  Wall thickening and mucosal enhancement involving the stomach and duodenum,   correlate for gastroduodenitis           Narrative:  EXAM: CT abdomen/pelvis with contrast       INDICATION: Vomiting, epigastric pain       COMPARISON: CT abdomen/pelvis 8/22/2021. TECHNIQUE: Helical CT of the abdomen and pelvis following the uneventful   intravenous administration of 100 mL Isovue-370. Oral contrast was not utilized. Coronal and sagittal reformats were generated. CT dose reduction was achieved   through use of a standardized protocol tailored for this examination and   automatic exposure control for dose modulation. FINDINGS:       Included lower thorax: Within normal limits   Liver: Steatosis. No mass or biliary dilatation. Biliary tree: The gallbladder is within normal limits. The CBD is not dilated. Spleen: Within normal limits. Pancreas: No inflammation, mass or ductal dilatation. Adrenals: Unremarkable. Kidneys: No mass or hydronephrosis. Stomach: Diffuse wall thickening and mucosal enhancement. Small bowel: No dilatation. Wall thickening and mucosal enhancement involving   the duodenum. Colon: No dilatation or wall thickening. Appendix: Normal   Peritoneum: No ascites or pneumoperitoneum. Retroperitoneum: No lymphadenopathy or aortic aneurysm. Atherosclerosis   Reproductive organs: Uterus and adnexa are within normal limits   Urinary bladder: No mass or calculus. Bones: No destructive bone lesion. Degenerative changes in the spine. Dextro   scoliosis   Abdominal wall: No mass or hernia.    Additional comments: N/A           01/05/22 2058  CT HEAD WO CONT Final result    Impression:  No acute intracranial hemorrhage, mass or infarct. Narrative:  INDICATION: Vomiting, hypertension. Exam: Noncontrast CT of the brain is performed with 5 mm collimation. CT dose reduction was achieved with the use of the standardized protocol   tailored for this examination and automatic exposure control for dose   modulation. Direct comparison is made to prior CT dated November 2020. FINDINGS: There is mild diffuse cortical atrophy. There is no acute intracranial   hemorrhage, mass, mass effect or herniation. Ventricular system is normal.   Chronic bilateral basal ganglia lacunes are unchanged. There is no evidence of   acute territorial infarct. The mastoid air cells are well pneumatized. The   visualized paranasal sinuses are normal.               CXR Results  (Last 48 hours)    None          Medical Decision Making   I am the first provider for this patient. I reviewed the vital signs, available nursing notes, past medical history, past surgical history, family history and social history. Vital Signs-Reviewed the patient's vital signs.   Patient Vitals for the past 12 hrs:   Temp Pulse Resp BP SpO2   01/05/22 2308 -- 81 19 -- 100 %   01/05/22 2300 -- -- -- (!) 196/88 --   01/05/22 2245 -- 73 20 (!) 188/76 99 %   01/05/22 2233 -- 85 -- (!) 212/72 --   01/05/22 2149 -- 72 16 (!) 211/66 --   01/05/22 2134 -- 76 17 (!) 194/90 96 %   01/05/22 2119 -- 60 20 (!) 237/81 99 %   01/05/22 2118 -- 61 -- (!) 237/81 --   01/05/22 2034 -- (!) 56 17 (!) 229/79 --   01/05/22 2019 -- -- -- (!) 224/81 100 %   01/05/22 2004 -- -- -- (!) 234/68 100 %   01/05/22 1852 97.5 °F (36.4 °C) 62 24 (!) 233/73 100 %     Records Reviewed: Nursing Notes, Old Medical Records and Previous Laboratory Studies    Provider Notes (Medical Decision Making):     78 YOF with a PMH as above presents emergency department with a chief complaint of vomiting. Vitals are notable for hypertension. Patient has a history of hypertension, she is not taking her blood pressure medicines due to the vomiting. She also appears very uncomfortable and is in pain. She is still comfortable she is not to provide history. I suspect this is also contributing. Considered hypertensive emergency but patient has no focal deficits, no headache. Patient reports GI symptoms of vomiting and diarrhea. Regarding patient's GI symptoms, history of IBS. Multiple intra-abdominal surgeries. Differentials broad, consider gastroenteritis, gastritis, peptic ulcer disease, cholecystitis, small bowel obstruction. Will medicate with Haldol and hydrate with fluids as family is concerned that patient is dehydrated. Check screening EKG although low suspicion for atypical ACS given patient has epigastric tenderness. Patient does have diarrhea as well pointing to infectious cause. Will CT abdomen pelvis. ED Course:   Initial assessment performed. The patients presenting problems have been discussed, and they are in agreement with the care plan formulated and outlined with them. I have encouraged them to ask questions as they arise throughout their visit. ED Course as of 01/05/22 2316   Wed Jan 05, 2022 2030 Preliminary EKG interpreted by me. Shows sinus bradycardia with a HR of 51. No ST elevations or depressions concerning for ischemia. Normal intervals. [MB]   2039 Patient reassessed, given IM haldol, remains hypertensive. Will CT head, although given diarrhea, suspect primarily intraabdominal process. Given dose of ativan IV and fluid hydration. [MB]   2107 CT head negative. CT abdomen consistent with gastritis. [MB]   2217 Patient given dose of hydralazine with some improvement in BP, patient resting in bed, comfortable, no distress or additional vomiting, will repeat dose to bridge to discharge where patient can resume her home meds.  [MB]   1236 Rpt BP 188/76, will d/c to take home meds. [MB]      ED Course User Index  [MB] MD Sean Correa MD      Disposition:    Discharge    DISCHARGE PLAN:  1. Discharge Medication List as of 1/5/2022 10:42 PM      START taking these medications    Details   famotidine (PEPCID) 20 mg tablet Take 1 Tablet by mouth two (2) times a day., Normal, Disp-14 Tablet, R-0      sucralfate (Carafate) 1 gram tablet Take 1 Tablet by mouth four (4) times daily. , Normal, Disp-28 Tablet, R-0      ondansetron hcl (Zofran) 4 mg tablet Take 1 Tablet by mouth every eight (8) hours as needed for Nausea or Vomiting., Normal, Disp-20 Tablet, R-0         CONTINUE these medications which have NOT CHANGED    Details   busPIRone (BUSPAR) 5 mg tablet TAKE 1 TABLET BY MOUTH TWICE DAILY, Normal, Disp-60 Tablet, R-5      hydrOXYchloroQUINE (PLAQUENIL) 200 mg tablet TAKE 1 TABLET BY MOUTH DAILY, Normal, Disp-30 Tablet, R-0      clotrimazole (MYCELEX) 10 mg jeremy DISSOLVE 1 JEREMY IN MOUTH FIVE TIMES DAILY FOR 1 WEEK, Normal, Disp-35 Tablet, R-2      atorvastatin (LIPITOR) 80 mg tablet TAKE 1 TABLET BY MOUTH DAILY AT BEDTIME, Normal, Disp-90 Tab, R-3      doxycycline (VIBRAMYCIN) 100 mg capsule TAKE 1 CAPSULE BY MOUTH TWICE DAILY WITH MEALS, Historical Med      HYDROcodone-acetaminophen (NORCO)  mg tablet TAKE 1 TABLET BY MOUTH EVERY 6 HOURS AS NEEDED FOR PAIN, Historical Med      mupirocin (BACTROBAN) 2 % ointment Apply  to affected area three (3) times daily. , Historical Med      carvediloL (COREG) 12.5 mg tablet Take 0.5 Tabs by mouth two (2) times daily (with meals). TAKE 1 TABLET BY MOUTH TWICE DAILY WITH MEALS, No Print, Disp-180 Tab, R-3      dicyclomine (BENTYL) 20 mg tablet Take 10 mg by mouth. Before meals and at bedtime as needed , Historical Med      cetirizine (ZYRTEC) 10 mg tablet Take 10 mg by mouth daily as needed for Allergies. , Historical Med      gabapentin (NEURONTIN) 300 mg capsule Take 300 mg by mouth three (3) times daily. Take Morning, afternoon and at bedtime. , Historical Med      omeprazole (PRILOSEC) 20 mg capsule Take 40 mg by mouth daily. On an empty stomach , Historical Med      hydrOXYzine HCL (ATARAX) 25 mg tablet Take 25 mg by mouth three (3) times daily as needed for Anxiety. , Historical Med      hydroCHLOROthiazide (HYDRODIURIL) 25 mg tablet TAKE 1 TABLET BY MOUTH DAILY, Normal, Disp-30 Tab, R-3      promethazine (PHENERGAN) 12.5 mg tablet Take 2 Tabs by mouth every six (6) hours as needed for Nausea., Normal, Disp-20 Tab, R-0      Humira,CF, Pen 40 mg/0.4 mL injection pen INJECT ONE PEN (40MG) UNDER THE SKIN (SUBCUTANEOUS INJECTION) EVERY 2 WEEKS, Normal, Disp-1 Kit, R-0      DULoxetine (CYMBALTA) 60 mg capsule Take 1 Cap by mouth daily. , Normal, Disp-30 Cap, R-5      pantoprazole (PROTONIX) 40 mg tablet Take 40 mg by mouth two (2) times a day., Historical Med      alclometasone (ACLOVATE) 0.05 % topical cream USE ON CORNERS OF MOUTH FOR RASH TWICE A DAY AS NEEDED, Historical Med, R-3      fluticasone propionate (FLONASE) 50 mcg/actuation nasal spray fluticasone propionate 50 mcg/actuation nasal spray,suspension, Historical Med      albuterol (PROVENTIL HFA, VENTOLIN HFA, PROAIR HFA) 90 mcg/actuation inhaler Take 2 Puffs by inhalation every six (6) hours as needed for Wheezing., Normal, Disp-1 Inhaler, R-0      amLODIPine (NORVASC) 2.5 mg tablet take 1 tablet by mouth once daily, Normal, Disp-30 Tab, R-1      ondansetron (ZOFRAN ODT) 4 mg disintegrating tablet Take 1 Tab by mouth every eight (8) hours as needed for Nausea., Normal, Disp-30 Tab, R-1      desoximetasone (TOPICORT) 0.25 % topical cream Apply  to affected area two (2) times daily as needed for Skin Irritation. , Normal, Disp-15 g, R-0      aspirin 81 mg chewable tablet Take 1 Tab by mouth daily. , Print, Disp-30 Tab, R-0           2.    Follow-up Information     Follow up With Specialties Details Why Sidney Kimbrough MD Internal Medicine In 3 days  [address] [facility]  [phone]      [facility] EMERGENCY DEPT Emergency Medicine  If symptoms worsen [address] [facility] [address]    Khushi Ellis MD Gastroenterology In 1 week  [address]  [address] [facility]  [phone]          3. Return to ED if worse     Diagnosis     Clinical Impression:   1. Gastritis and duodenitis    2. Primary hypertension    3. Non-intractable vomiting with nausea, unspecified vomiting type        Attestations:    Mingo Estrada MD    Please note that this dictation was completed with [employer], the [employer] voice recognition software. Quite often unanticipated grammatical, syntax, homophones, and other interpretive errors are inadvertently transcribed by the computer software. Please disregard these errors. Please excuse any errors that have escaped final proofreading. Thank you.

## 2022-01-06 NOTE — ED NOTES
I have reviewed discharge instructions with the patient and spouse. The patient and spouse verbalized understanding. Pt given discharge instructions at this time and encouraged to follow up with GI and PCP. PT prescriptions sent to pharmacy. PT encouraged to return to the ED if she develops any new/worsening symptoms. Pt denies any additional needs or concerns at this time.

## 2022-01-06 NOTE — ED NOTES
Received report from Uri, [address] [place] [address]. PT ANOX4, respirations even and unlabored, skin warm dry and intact, NAD noted. PT advised regarding staff change at this time, pt verbalized understanding. PT denies any additional needs or concerns at this time.

## 2022-01-06 NOTE — DISCHARGE INSTRUCTIONS
Please add the famotidine and Carafate to help with your gastritis. Please eat bland foods, avoid spicy foods, alcohol noncarbonated beverages and greasy foods. Please use the Zofran for nausea. Please follow-up with your primary doctor and GI doctor.

## 2022-01-11 ENCOUNTER — TELEPHONE (OUTPATIENT)
Dept: INTERNAL MEDICINE CLINIC | Age: 67
End: 2022-01-11

## 2022-01-11 NOTE — TELEPHONE ENCOUNTER
[facility] f/up with Dr. Madilyn Brittle on 1/12/21. Per Dr. Madilyn Brittle, patient needs to reschedule with Dr. Evan Lawrence. Left message advising patient that she has been rescheduled for an appointment with Dr. Evan Lawrence on 1/12/21 at 3:15 PM. Advised patient to call back to confirm.

## 2022-01-12 ENCOUNTER — OFFICE VISIT (OUTPATIENT)
Dept: INTERNAL MEDICINE CLINIC | Age: 67
End: 2022-01-12

## 2022-01-12 VITALS
HEART RATE: 79 BPM | BODY MASS INDEX: 19.29 KG/M2 | SYSTOLIC BLOOD PRESSURE: 152 MMHG | DIASTOLIC BLOOD PRESSURE: 63 MMHG | WEIGHT: 120 LBS | TEMPERATURE: 97.9 F | RESPIRATION RATE: 20 BRPM | OXYGEN SATURATION: 99 % | HEIGHT: 66 IN

## 2022-01-12 DIAGNOSIS — E78.00 PURE HYPERCHOLESTEROLEMIA: ICD-10-CM

## 2022-01-12 DIAGNOSIS — M05.79 SEROPOSITIVE RHEUMATOID ARTHRITIS OF MULTIPLE SITES (HCC): ICD-10-CM

## 2022-01-12 DIAGNOSIS — I10 ESSENTIAL HYPERTENSION: ICD-10-CM

## 2022-01-12 DIAGNOSIS — I42.9 CARDIOMYOPATHY, UNSPECIFIED TYPE (HCC): ICD-10-CM

## 2022-01-12 DIAGNOSIS — J40 BRONCHITIS: Primary | ICD-10-CM

## 2022-01-12 PROCEDURE — 99214 OFFICE O/P EST MOD 30 MIN: CPT | Performed by: FAMILY MEDICINE

## 2022-01-12 RX ORDER — DOXYCYCLINE 100 MG/1
100 TABLET ORAL 2 TIMES DAILY
Qty: 14 TABLET | Refills: 0 | Status: SHIPPED | OUTPATIENT
Start: 2022-01-12

## 2022-01-12 RX ORDER — METRONIDAZOLE 7.5 MG/G
CREAM TOPICAL
COMMUNITY
Start: 2021-12-06

## 2022-01-12 NOTE — PATIENT INSTRUCTIONS
Daquan Santiago MD  Cardiology 08/12/2016 End  8/12/16   Phone: [phone]          Due for appointment with cardiology

## 2022-01-12 NOTE — PROGRESS NOTES
Chief Complaint   Patient presents with   [facility] Follow Up       1. Have you been to the ER, urgent care clinic since your last visit? Hospitalized since your last visit? ER IBS 1/6/22.    2. Have you seen or consulted any other health care providers outside of the [address] [place] [address] since your last visit? Include any pap smears or colon screening.  10/2021 colonoscopy. Scheduled for Endoscopy     B/p elevated, doctor notified. Pt c/o sinus and chest congestion.

## 2022-01-12 NOTE — PROGRESS NOTES
Johnathan Jackson is a 78 y.o. female who presents for follow up. Today reports sick for a month. Reports cough productive of green mucous. Chest congestion. No fever. Smoker. Report IBS acting up. Reports alternating diarrhea and constipation. In ED Jan 5 with gastritis, N/V. Given pepcid and carafate and zofran. Sees Dr Rose Boast, GI, today. Is to have an EGD. Was told to start an otc medication. Does not recall name. Followed by cardiology. History of Takosubo cardiomyopathy    Has chronic back pain. She is followed by pain management. She is taking hydrocodone one every 6 hours. (4 a day). Feels like function is better. Sees rheumatology, RA, on plaquenil. Treated for HTN, on HCTZ. BP elevated. Past Medical History:   Diagnosis Date    Acid reflux     Acid reflux     Arthritis     Cardiomyopathy ([facility] 2/10/2021    Chronic pain     Elevated troponin 2/8/2021    Heart attack ([facility]     Heart failure (HCC)     Hypertension     IBS (irritable bowel syndrome)     IBS (irritable bowel syndrome) 2/8/2021    Squamous cell carcinoma of skin of right elbow 2/1414    Systolic heart failure ([facility] 2/11/2021    Takotsubo cardiomyopathy 11/16/2015    Tobacco abuse 11/16/2015       Family History   Problem Relation Age of Onset    Cancer Father         prostate    Heart Disease Mother     Heart Disease Maternal Grandmother        Social History     Socioeconomic History    Marital status:      Spouse name: Not on file    Number of children: Not on file    Years of education: Not on file    Highest education level: Not on file   Occupational History    Not on file   Tobacco Use    Smoking status: Current Every Day Smoker     Packs/day: 1.00    Smokeless tobacco: Never Used    Tobacco comment: trying to quit 4-5 cigarettes daily   Substance and Sexual Activity    Alcohol use:  Yes     Alcohol/week: 8.3 standard drinks     Types: 10 Glasses of wine per week Comment: 4 times weekly    Drug use: No    Sexual activity: Yes     Partners: Male     Birth control/protection: None   Other Topics Concern    Not on file   Social History Narrative    Not on file     Social Determinants of Health     Financial Resource Strain:     Difficulty of Paying Living Expenses: Not on file   Food Insecurity:     Worried About Running Out of Food in the Last Year: Not on file    Yamilet of Food in the Last Year: Not on file   Transportation Needs:     Lack of Transportation (Medical): Not on file    Lack of Transportation (Non-Medical): Not on file   Physical Activity:     Days of Exercise per Week: Not on file    Minutes of Exercise per Session: Not on file   Stress:     Feeling of Stress : Not on file   Social Connections:     Frequency of Communication with Friends and Family: Not on file    Frequency of Social Gatherings with Friends and Family: Not on file    Attends Confucianism Services: Not on file    Active Member of [address] [place] [address] or Organizations: Not on file    Attends Club or Organization Meetings: Not on file    Marital Status: Not on file   Intimate Partner Violence:     Fear of Current or Ex-Partner: Not on file    Emotionally Abused: Not on file    Physically Abused: Not on file    Sexually Abused: Not on file   Housing Stability:     Unable to Pay for Housing in the Last Year: Not on file    Number of [place] in the Last Year: Not on file    Unstable Housing in the Last Year: Not on file       Current Outpatient Medications on File Prior to Visit   Medication Sig Dispense Refill    metroNIDAZOLE (METROCREAM) 0.75 % topical cream APPLY TOPICALLY TO THE AFFECTED AREA TWICE DAILY      famotidine (PEPCID) 20 mg tablet Take 1 Tablet by mouth two (2) times a day.  14 Tablet 0    busPIRone (BUSPAR) 5 mg tablet TAKE 1 TABLET BY MOUTH TWICE DAILY 60 Tablet 5    hydrOXYchloroQUINE (PLAQUENIL) 200 mg tablet TAKE 1 TABLET BY MOUTH DAILY 30 Tablet 0    atorvastatin (LIPITOR) 80 mg tablet TAKE 1 TABLET BY MOUTH DAILY AT BEDTIME 90 Tab 3    HYDROcodone-acetaminophen (NORCO)  mg tablet TAKE 1 TABLET BY MOUTH EVERY 6 HOURS AS NEEDED FOR PAIN      carvediloL (COREG) 12.5 mg tablet Take 0.5 Tabs by mouth two (2) times daily (with meals). TAKE 1 TABLET BY MOUTH TWICE DAILY WITH MEALS (Patient taking differently: Take 6.25 mg by mouth two (2) times daily (with meals). ) 180 Tab 3    dicyclomine (BENTYL) 20 mg tablet Take 10 mg by mouth. Before meals and at bedtime as needed       cetirizine (ZYRTEC) 10 mg tablet Take 10 mg by mouth daily as needed for Allergies.  gabapentin (NEURONTIN) 300 mg capsule Take 300 mg by mouth three (3) times daily. Take Morning, afternoon and at bedtime.  omeprazole (PRILOSEC) 20 mg capsule Take 40 mg by mouth daily. On an empty stomach       hydrOXYzine HCL (ATARAX) 25 mg tablet Take 25 mg by mouth three (3) times daily as needed for Anxiety.  hydroCHLOROthiazide (HYDRODIURIL) 25 mg tablet TAKE 1 TABLET BY MOUTH DAILY 30 Tab 3    promethazine (PHENERGAN) 12.5 mg tablet Take 2 Tabs by mouth every six (6) hours as needed for Nausea. 20 Tab 0    pantoprazole (PROTONIX) 40 mg tablet Take 40 mg by mouth two (2) times a day.  fluticasone propionate (FLONASE) 50 mcg/actuation nasal spray fluticasone propionate 50 mcg/actuation nasal spray,suspension      amLODIPine (NORVASC) 2.5 mg tablet take 1 tablet by mouth once daily 30 Tab 1    ondansetron (ZOFRAN ODT) 4 mg disintegrating tablet Take 1 Tab by mouth every eight (8) hours as needed for Nausea. 30 Tab 1    desoximetasone (TOPICORT) 0.25 % topical cream Apply  to affected area two (2) times daily as needed for Skin Irritation. 15 g 0    aspirin 81 mg chewable tablet Take 1 Tab by mouth daily. 30 Tab 0    [DISCONTINUED] sucralfate (Carafate) 1 gram tablet Take 1 Tablet by mouth four (4) times daily.  28 Tablet 0    [DISCONTINUED] ondansetron hcl (Zofran) 4 mg tablet Take 1 Tablet by mouth every eight (8) hours as needed for Nausea or Vomiting. 20 Tablet 0    clotrimazole (MYCELEX) 10 mg jeremy DISSOLVE 1 JEREMY IN MOUTH FIVE TIMES DAILY FOR 1 WEEK (Patient not taking: Reported on 1/12/2022) 35 Tablet 2    mupirocin (BACTROBAN) 2 % ointment Apply  to affected area three (3) times daily. (Patient not taking: Reported on 1/12/2022)      [DISCONTINUED] doxycycline (VIBRAMYCIN) 100 mg capsule TAKE 1 CAPSULE BY MOUTH TWICE DAILY WITH MEALS      Humira,CF, Pen 40 mg/0.4 mL injection pen INJECT ONE PEN (40MG) UNDER THE SKIN (SUBCUTANEOUS INJECTION) EVERY 2 WEEKS (Patient not taking: Reported on 1/12/2022) 1 Kit 0    DULoxetine (CYMBALTA) 60 mg capsule Take 1 Cap by mouth daily. (Patient not taking: Reported on 1/12/2022) 30 Cap 5    [DISCONTINUED] alclometasone (ACLOVATE) 0.05 % topical cream USE ON CORNERS OF MOUTH FOR RASH TWICE A DAY AS NEEDED (Patient not taking: Reported on 1/12/2022)  3    albuterol (PROVENTIL HFA, VENTOLIN HFA, PROAIR HFA) 90 mcg/actuation inhaler Take 2 Puffs by inhalation every six (6) hours as needed for Wheezing. (Patient not taking: Reported on 1/12/2022) 1 Inhaler 0     No current facility-administered medications on file prior to visit. Review of Systems  Pertinent items are noted in HPI. Objective:     Visit Vitals  BP (!) 152/63 (BP 1 Location: Right arm, BP Patient Position: Sitting, BP Cuff Size: Small adult)   Pulse 79   Temp 97.9 °F (36.6 °C) (Temporal)   Resp 20   Ht 5' 6\" (1.676 m)   Wt 120 lb (54.4 kg)   SpO2 99%   BMI 19.37 kg/m²     Gen: Mildly ill appearing female, active cough no shortness of breath at rest  HEENT:   PERRL,normal conjunctiva. External ear and canals normal, TMs no opacification or erythema,  OP no erythema, no exudates, MMM  Neck: No masses or LAD  Resp: Diffuse wheezing, coarse rhonchi  CV:  RRR, normal S1S2, no murmur. GI: soft, nontender, without masses. No hepatosplenomegaly.   Extrem:  +2 pulses, no edema, warm distally      Assessment/Plan:       ICD-10-CM ICD-9-CM    1. Essential hypertension  I10 401.9    2. Seropositive rheumatoid arthritis of multiple sites ([facility]  M05.79 714.0    3. Cardiomyopathy, unspecified type ([facility]  I42.9 425.4    4. Pure hypercholesterolemia  E78.00 272.0    5. Bronchitis  J40 490 doxycycline (ADOXA) 100 mg tablet     Check lipid and vitamin D. Begin mucinex and doxycycline. Increase fluids and rest.  Advised stop smoking    Follow-up with rheumatology and pain management schedule follow-up with cardiology    Follow-up and Dispositions    · Return for follow up pending labs and 6 months.   Justine Moncada MD

## 2022-01-24 ENCOUNTER — TELEPHONE (OUTPATIENT)
Dept: INTERNAL MEDICINE CLINIC | Age: 67
End: 2022-01-24

## 2022-01-24 NOTE — TELEPHONE ENCOUNTER
Patient called to advise the antibiotic that was prescribed for bronchitis on 01/12 has been finished. However, she is   still experiencing symptoms. Patient would like to know if another round   of treatment can be prescribe. Please contact patient with any additional   questions.

## 2022-02-05 LAB
25(OH)D3+25(OH)D2 SERPL-MCNC: 29.7 NG/ML (ref 30–100)
CHOLEST SERPL-MCNC: 132 MG/DL (ref 100–199)
HDLC SERPL-MCNC: 68 MG/DL
LDLC SERPL CALC-MCNC: 47 MG/DL (ref 0–99)
TRIGL SERPL-MCNC: 87 MG/DL (ref 0–149)
VLDLC SERPL CALC-MCNC: 17 MG/DL (ref 5–40)

## 2022-02-21 RX ORDER — CARVEDILOL 12.5 MG/1
6.25 TABLET ORAL 2 TIMES DAILY WITH MEALS
Qty: 90 TABLET | Refills: 1 | Status: SHIPPED | OUTPATIENT
Start: 2022-02-21

## 2022-02-27 ENCOUNTER — HOSPITAL ENCOUNTER (EMERGENCY)
Age: 67
Discharge: HOME | End: 2022-02-27
Attending: EMERGENCY MEDICINE
Payer: COMMERCIAL

## 2022-02-27 ENCOUNTER — APPOINTMENT (OUTPATIENT)
Dept: CT IMAGING | Age: 67
End: 2022-02-27
Attending: EMERGENCY MEDICINE
Payer: COMMERCIAL

## 2022-02-27 VITALS
HEIGHT: 66 IN | SYSTOLIC BLOOD PRESSURE: 197 MMHG | OXYGEN SATURATION: 100 % | WEIGHT: 100 LBS | TEMPERATURE: 98.2 F | BODY MASS INDEX: 16.07 KG/M2 | HEART RATE: 92 BPM | DIASTOLIC BLOOD PRESSURE: 78 MMHG | RESPIRATION RATE: 22 BRPM

## 2022-02-27 DIAGNOSIS — I10 PRIMARY HYPERTENSION: ICD-10-CM

## 2022-02-27 DIAGNOSIS — K29.90 GASTRODUODENITIS: Primary | ICD-10-CM

## 2022-02-27 DIAGNOSIS — R11.2 NON-INTRACTABLE VOMITING WITH NAUSEA, UNSPECIFIED VOMITING TYPE: ICD-10-CM

## 2022-02-27 LAB
ALBUMIN SERPL-MCNC: 4.6 G/DL (ref 3.5–5)
ALBUMIN/GLOB SERPL: 1.3 {RATIO} (ref 1.1–2.2)
ALP SERPL-CCNC: 170 U/L (ref 45–117)
ALT SERPL-CCNC: 31 U/L (ref 12–78)
ANION GAP SERPL CALC-SCNC: 10 MMOL/L (ref 5–15)
AST SERPL-CCNC: 28 U/L (ref 15–37)
BASOPHILS # BLD: 0 K/UL (ref 0–0.1)
BASOPHILS NFR BLD: 0 % (ref 0–1)
BILIRUB SERPL-MCNC: 0.5 MG/DL (ref 0.2–1)
BUN SERPL-MCNC: 10 MG/DL (ref 6–20)
BUN/CREAT SERPL: 10 (ref 12–20)
CALCIUM SERPL-MCNC: 9.8 MG/DL (ref 8.5–10.1)
CHLORIDE SERPL-SCNC: 100 MMOL/L (ref 97–108)
CO2 SERPL-SCNC: 25 MMOL/L (ref 21–32)
CREAT SERPL-MCNC: 1.03 MG/DL (ref 0.55–1.02)
DIFFERENTIAL METHOD BLD: ABNORMAL
EOSINOPHIL # BLD: 0 K/UL (ref 0–0.4)
EOSINOPHIL NFR BLD: 0 % (ref 0–7)
ERYTHROCYTE [DISTWIDTH] IN BLOOD BY AUTOMATED COUNT: 14.1 % (ref 11.5–14.5)
GLOBULIN SER CALC-MCNC: 3.6 G/DL (ref 2–4)
GLUCOSE BLD STRIP.AUTO-MCNC: 98 MG/DL (ref 65–117)
GLUCOSE SERPL-MCNC: 138 MG/DL (ref 65–100)
HCT VFR BLD AUTO: 35.8 % (ref 35–47)
HGB BLD-MCNC: 11.7 G/DL (ref 11.5–16)
IMM GRANULOCYTES # BLD AUTO: 0 K/UL (ref 0–0.04)
IMM GRANULOCYTES NFR BLD AUTO: 0 % (ref 0–0.5)
LIPASE SERPL-CCNC: 65 U/L (ref 73–393)
LYMPHOCYTES # BLD: 0.6 K/UL (ref 0.8–3.5)
LYMPHOCYTES NFR BLD: 5 % (ref 12–49)
MCH RBC QN AUTO: 27.9 PG (ref 26–34)
MCHC RBC AUTO-ENTMCNC: 32.7 G/DL (ref 30–36.5)
MCV RBC AUTO: 85.2 FL (ref 80–99)
MONOCYTES # BLD: 0.5 K/UL (ref 0–1)
MONOCYTES NFR BLD: 4 % (ref 5–13)
NEUTS SEG # BLD: 10.7 K/UL (ref 1.8–8)
NEUTS SEG NFR BLD: 91 % (ref 32–75)
NRBC # BLD: 0 K/UL (ref 0–0.01)
NRBC BLD-RTO: 0 PER 100 WBC
PLATELET # BLD AUTO: 335 K/UL (ref 150–400)
PMV BLD AUTO: 9.3 FL (ref 8.9–12.9)
POTASSIUM SERPL-SCNC: 3.1 MMOL/L (ref 3.5–5.1)
PROT SERPL-MCNC: 8.2 G/DL (ref 6.4–8.2)
RBC # BLD AUTO: 4.2 M/UL (ref 3.8–5.2)
RBC MORPH BLD: ABNORMAL
SERVICE CMNT-IMP: NORMAL
SODIUM SERPL-SCNC: 135 MMOL/L (ref 136–145)
TROPONIN-HIGH SENSITIVITY: 12 NG/L (ref 0–51)
WBC # BLD AUTO: 11.8 K/UL (ref 3.6–11)

## 2022-02-27 PROCEDURE — 74011000250 HC RX REV CODE- 250: Performed by: EMERGENCY MEDICINE

## 2022-02-27 PROCEDURE — 96374 THER/PROPH/DIAG INJ IV PUSH: CPT

## 2022-02-27 PROCEDURE — 74011250636 HC RX REV CODE- 250/636: Performed by: EMERGENCY MEDICINE

## 2022-02-27 PROCEDURE — 80053 COMPREHEN METABOLIC PANEL: CPT

## 2022-02-27 PROCEDURE — 74011000636 HC RX REV CODE- 636: Performed by: EMERGENCY MEDICINE

## 2022-02-27 PROCEDURE — 84484 ASSAY OF TROPONIN QUANT: CPT

## 2022-02-27 PROCEDURE — 74011250637 HC RX REV CODE- 250/637: Performed by: EMERGENCY MEDICINE

## 2022-02-27 PROCEDURE — 99285 EMERGENCY DEPT VISIT HI MDM: CPT

## 2022-02-27 PROCEDURE — 85025 COMPLETE CBC W/AUTO DIFF WBC: CPT

## 2022-02-27 PROCEDURE — 93005 ELECTROCARDIOGRAM TRACING: CPT

## 2022-02-27 PROCEDURE — 82962 GLUCOSE BLOOD TEST: CPT

## 2022-02-27 PROCEDURE — 36415 COLL VENOUS BLD VENIPUNCTURE: CPT

## 2022-02-27 PROCEDURE — 74177 CT ABD & PELVIS W/CONTRAST: CPT

## 2022-02-27 PROCEDURE — 83690 ASSAY OF LIPASE: CPT

## 2022-02-27 PROCEDURE — 96376 TX/PRO/DX INJ SAME DRUG ADON: CPT

## 2022-02-27 PROCEDURE — 96375 TX/PRO/DX INJ NEW DRUG ADDON: CPT

## 2022-02-27 RX ORDER — DICYCLOMINE HYDROCHLORIDE 20 MG/1
20 TABLET ORAL
Status: COMPLETED | OUTPATIENT
Start: 2022-02-27 | End: 2022-02-27

## 2022-02-27 RX ORDER — FAMOTIDINE 20 MG/1
20 TABLET, FILM COATED ORAL 2 TIMES DAILY
Qty: 20 TABLET | Refills: 0 | Status: SHIPPED | OUTPATIENT
Start: 2022-02-27 | End: 2022-03-09

## 2022-02-27 RX ORDER — HALOPERIDOL 5 MG/ML
3 INJECTION INTRAMUSCULAR
Status: COMPLETED | OUTPATIENT
Start: 2022-02-27 | End: 2022-02-27

## 2022-02-27 RX ORDER — ONDANSETRON 4 MG/1
4 TABLET, FILM COATED ORAL
Qty: 20 TABLET | Refills: 0 | Status: SHIPPED | OUTPATIENT
Start: 2022-02-27

## 2022-02-27 RX ORDER — ONDANSETRON 2 MG/ML
4 INJECTION INTRAMUSCULAR; INTRAVENOUS
Status: COMPLETED | OUTPATIENT
Start: 2022-02-27 | End: 2022-02-27

## 2022-02-27 RX ORDER — CARVEDILOL 3.12 MG/1
6.25 TABLET ORAL
Status: COMPLETED | OUTPATIENT
Start: 2022-02-27 | End: 2022-02-27

## 2022-02-27 RX ORDER — SUCRALFATE 1 G/1
1 TABLET ORAL 4 TIMES DAILY
Qty: 30 TABLET | Refills: 0 | Status: SHIPPED | OUTPATIENT
Start: 2022-02-27

## 2022-02-27 RX ORDER — HYDROCHLOROTHIAZIDE 25 MG/1
25 TABLET ORAL
Status: COMPLETED | OUTPATIENT
Start: 2022-02-27 | End: 2022-02-27

## 2022-02-27 RX ORDER — KETOROLAC TROMETHAMINE 30 MG/ML
15 INJECTION, SOLUTION INTRAMUSCULAR; INTRAVENOUS
Status: COMPLETED | OUTPATIENT
Start: 2022-02-27 | End: 2022-02-27

## 2022-02-27 RX ADMIN — DICYCLOMINE HYDROCHLORIDE 20 MG: 20 TABLET ORAL at 18:52

## 2022-02-27 RX ADMIN — ONDANSETRON 4 MG: 2 INJECTION INTRAMUSCULAR; INTRAVENOUS at 18:52

## 2022-02-27 RX ADMIN — HYDROCHLOROTHIAZIDE 25 MG: 25 TABLET ORAL at 18:52

## 2022-02-27 RX ADMIN — FAMOTIDINE 20 MG: 10 INJECTION, SOLUTION INTRAVENOUS at 17:09

## 2022-02-27 RX ADMIN — HALOPERIDOL LACTATE 3 MG: 5 INJECTION, SOLUTION INTRAMUSCULAR at 17:30

## 2022-02-27 RX ADMIN — SODIUM CHLORIDE 1000 ML: 9 INJECTION, SOLUTION INTRAVENOUS at 16:13

## 2022-02-27 RX ADMIN — CARVEDILOL 6.25 MG: 3.12 TABLET, FILM COATED ORAL at 18:56

## 2022-02-27 RX ADMIN — IOPAMIDOL 100 ML: 755 INJECTION, SOLUTION INTRAVENOUS at 16:51

## 2022-02-27 RX ADMIN — ONDANSETRON 4 MG: 2 INJECTION INTRAMUSCULAR; INTRAVENOUS at 16:13

## 2022-02-27 RX ADMIN — KETOROLAC TROMETHAMINE 15 MG: 30 INJECTION, SOLUTION INTRAMUSCULAR at 17:09

## 2022-02-27 NOTE — ED PROVIDER NOTES
EMERGENCY DEPARTMENT HISTORY AND PHYSICAL EXAM      Date: 2/27/2022  Patient Name: Teena Carrasquillo    History of Presenting Illness     Chief Complaint   Patient presents with    Nausea     c/o NVD x 2 days given zofran on rt no effect       History Provided By: Patient    HPI: Teena Carrasquillo, 75 y.o. female with PMHx significant for IBS, hypertension, cardiomyopathy, who presents with a chief complaint of nausea, vomiting, diarrhea, abdominal pain. Patient reports severe nausea for last several days as well as generalized abdominal pain and vomiting. Reports that her breathing has been \"rapid\" today secondary to discomfort. She is followed by Dr. Elina Gloria from GI.  reports she has been on some antibiotics recently per GI and is scheduled for endoscopy on 3/9. Patient provides little history just requesting \"nausea medication. \"  Symptoms are apparently consistent with prior \"IBS flares. \"        PCP: Amarilis Dodd MD    There are no other complaints, changes, or physical findings at this time. Current Outpatient Medications   Medication Sig Dispense Refill    ondansetron hcl (Zofran) 4 mg tablet Take 1 Tablet by mouth every eight (8) hours as needed for Nausea. 20 Tablet 0    famotidine (Pepcid) 20 mg tablet Take 1 Tablet by mouth two (2) times a day for 10 days. 20 Tablet 0    sucralfate (Carafate) 1 gram tablet Take 1 Tablet by mouth four (4) times daily. 30 Tablet 0    carvediloL (COREG) 12.5 mg tablet Take 0.5 Tablets by mouth two (2) times daily (with meals). 90 Tablet 1    metroNIDAZOLE (METROCREAM) 0.75 % topical cream APPLY TOPICALLY TO THE AFFECTED AREA TWICE DAILY      doxycycline (ADOXA) 100 mg tablet Take 1 Tablet by mouth two (2) times a day. 14 Tablet 0    famotidine (PEPCID) 20 mg tablet Take 1 Tablet by mouth two (2) times a day.  14 Tablet 0    busPIRone (BUSPAR) 5 mg tablet TAKE 1 TABLET BY MOUTH TWICE DAILY 60 Tablet 5    hydrOXYchloroQUINE (PLAQUENIL) 200 mg tablet TAKE 1 TABLET BY MOUTH DAILY 30 Tablet 0    clotrimazole (MYCELEX) 10 mg jeremy DISSOLVE 1 JEREMY IN MOUTH FIVE TIMES DAILY FOR 1 WEEK (Patient not taking: Reported on 1/12/2022) 35 Tablet 2    atorvastatin (LIPITOR) 80 mg tablet TAKE 1 TABLET BY MOUTH DAILY AT BEDTIME 90 Tab 3    HYDROcodone-acetaminophen (NORCO)  mg tablet TAKE 1 TABLET BY MOUTH EVERY 6 HOURS AS NEEDED FOR PAIN      mupirocin (BACTROBAN) 2 % ointment Apply  to affected area three (3) times daily. (Patient not taking: Reported on 1/12/2022)      dicyclomine (BENTYL) 20 mg tablet Take 10 mg by mouth. Before meals and at bedtime as needed       cetirizine (ZYRTEC) 10 mg tablet Take 10 mg by mouth daily as needed for Allergies.  gabapentin (NEURONTIN) 300 mg capsule Take 300 mg by mouth three (3) times daily. Take Morning, afternoon and at bedtime.  omeprazole (PRILOSEC) 20 mg capsule Take 40 mg by mouth daily. On an empty stomach       hydrOXYzine HCL (ATARAX) 25 mg tablet Take 25 mg by mouth three (3) times daily as needed for Anxiety.  hydroCHLOROthiazide (HYDRODIURIL) 25 mg tablet TAKE 1 TABLET BY MOUTH DAILY 30 Tab 3    promethazine (PHENERGAN) 12.5 mg tablet Take 2 Tabs by mouth every six (6) hours as needed for Nausea. 20 Tab 0    Humira,CF, Pen 40 mg/0.4 mL injection pen INJECT ONE PEN (40MG) UNDER THE SKIN (SUBCUTANEOUS INJECTION) EVERY 2 WEEKS (Patient not taking: Reported on 1/12/2022) 1 Kit 0    DULoxetine (CYMBALTA) 60 mg capsule Take 1 Cap by mouth daily. (Patient not taking: Reported on 1/12/2022) 30 Cap 5    pantoprazole (PROTONIX) 40 mg tablet Take 40 mg by mouth two (2) times a day.  fluticasone propionate (FLONASE) 50 mcg/actuation nasal spray fluticasone propionate 50 mcg/actuation nasal spray,suspension      albuterol (PROVENTIL HFA, VENTOLIN HFA, PROAIR HFA) 90 mcg/actuation inhaler Take 2 Puffs by inhalation every six (6) hours as needed for Wheezing.  (Patient not taking: Reported on 1/12/2022) 1 Inhaler 0    amLODIPine (NORVASC) 2.5 mg tablet take 1 tablet by mouth once daily 30 Tab 1    ondansetron (ZOFRAN ODT) 4 mg disintegrating tablet Take 1 Tab by mouth every eight (8) hours as needed for Nausea. 30 Tab 1    desoximetasone (TOPICORT) 0.25 % topical cream Apply  to affected area two (2) times daily as needed for Skin Irritation. 15 g 0    aspirin 81 mg chewable tablet Take 1 Tab by mouth daily. 27 Tab 0     Past History     Past Medical History:  Past Medical History:   Diagnosis Date    Acid reflux     Acid reflux     Arthritis     Cardiomyopathy ([facility] [address] 2/10/2021    Chronic pain     Elevated troponin 2/8/2021    Heart attack ([facility] [address]     Heart failure (HCC)     Hypertension     IBS (irritable bowel syndrome)     IBS (irritable bowel syndrome) 2/8/2021    Squamous cell carcinoma of skin of right elbow 8/1069    Systolic heart failure ([facility] [address] 2/11/2021    Takotsubo cardiomyopathy 11/16/2015    Tobacco abuse 11/16/2015     Past Surgical History:  Past Surgical History:   Procedure Laterality Date    HX GYN      oophorectomy    HX ORTHOPAEDIC      right knee arthroscopy    HX ORTHOPAEDIC      right thumb    HX ORTHOPAEDIC      back surgeries    HX OTHER SURGICAL      8 route canals    NV ABDOMEN SURGERY PROC UNLISTED      adhesion removal    NV BREAST SURGERY PROCEDURE UNLISTED      lumpectomy    NV COLONOSCOPY FLX DX W/COLLJ SPEC WHEN PFRMD  10/15/2012          Family History:  Family History   Problem Relation Age of Onset    Cancer Father         prostate    Heart Disease Mother     Heart Disease Maternal Grandmother      Social History:  Social History     Tobacco Use    Smoking status: Current Every Day Smoker     Packs/day: 1.00    Smokeless tobacco: Never Used    Tobacco comment: trying to quit 4-5 cigarettes daily   Substance Use Topics    Alcohol use:  Yes     Alcohol/week: 8.3 standard drinks     Types: 10 Glasses of wine per week     Comment: 4 times weekly    Drug use: No     Allergies: Allergies   Allergen Reactions    Ace Inhibitors Swelling    Arb-Angiotensin Receptor Antagonist Other (comments)     Prior ACE inhibitor angioedema, cross reaction risk    Cephalexin Hives     Other reaction(s): anaphylaxis    Codeine Nausea Only    Milk Other (comments)     Lactose intolerant.  Morphine Nausea and Vomiting     Review of Systems   Review of Systems   Constitutional: Negative for chills and fever. HENT: Negative for congestion, rhinorrhea and sore throat. Respiratory: Negative for cough and shortness of breath. Cardiovascular: Negative for chest pain. Gastrointestinal: Positive for abdominal pain, diarrhea, nausea and vomiting. Genitourinary: Negative for dysuria and urgency. Skin: Negative for rash. Neurological: Negative for dizziness, light-headedness and headaches. All other systems reviewed and are negative. Physical Exam   Physical Exam  Vitals and nursing note reviewed. Constitutional:       General: She is not in acute distress. Appearance: She is well-developed and underweight. Comments: Sitting up in bed, minimally compliant with exam   HENT:      Head: Normocephalic and atraumatic. Eyes:      Conjunctiva/sclera: Conjunctivae normal.      Pupils: Pupils are equal, round, and reactive to light. Cardiovascular:      Rate and Rhythm: Normal rate and regular rhythm. Pulmonary:      Effort: Pulmonary effort is normal. No respiratory distress. Breath sounds: Normal breath sounds. No stridor. Abdominal:      General: There is no distension. Palpations: Abdomen is soft. Tenderness: There is generalized abdominal tenderness. Musculoskeletal:         General: Normal range of motion. Cervical back: Normal range of motion. Skin:     General: Skin is warm and dry. Neurological:      Mental Status: She is alert and oriented to person, place, and time.        Diagnostic Study Results   Labs -     Recent Results (from the past 12 hour(s))   CBC WITH AUTOMATED DIFF    Collection Time: 02/27/22  4:09 PM   Result Value Ref Range    WBC 11.8 (H) 3.6 - 11.0 K/uL    RBC 4.20 3.80 - 5.20 M/uL    HGB 11.7 11.5 - 16.0 g/dL    HCT 35.8 35.0 - 47.0 %    MCV 85.2 80.0 - 99.0 FL    MCH 27.9 26.0 - 34.0 PG    MCHC 32.7 30.0 - 36.5 g/dL    RDW 14.1 11.5 - 14.5 %    PLATELET 837 221 - 220 K/uL    MPV 9.3 8.9 - 12.9 FL    NRBC 0.0 0  WBC    ABSOLUTE NRBC 0.00 0.00 - 0.01 K/uL    NEUTROPHILS 91 (H) 32 - 75 %    LYMPHOCYTES 5 (L) 12 - 49 %    MONOCYTES 4 (L) 5 - 13 %    EOSINOPHILS 0 0 - 7 %    BASOPHILS 0 0 - 1 %    IMMATURE GRANULOCYTES 0 0.0 - 0.5 %    ABS. NEUTROPHILS 10.7 (H) 1.8 - 8.0 K/UL    ABS. LYMPHOCYTES 0.6 (L) 0.8 - 3.5 K/UL    ABS. MONOCYTES 0.5 0.0 - 1.0 K/UL    ABS. EOSINOPHILS 0.0 0.0 - 0.4 K/UL    ABS. BASOPHILS 0.0 0.0 - 0.1 K/UL    ABS. IMM. GRANS. 0.0 0.00 - 0.04 K/UL    DF SMEAR SCANNED      RBC COMMENTS NORMOCYTIC, NORMOCHROMIC     METABOLIC PANEL, COMPREHENSIVE    Collection Time: 02/27/22  4:09 PM   Result Value Ref Range    Sodium 135 (L) 136 - 145 mmol/L    Potassium 3.1 (L) 3.5 - 5.1 mmol/L    Chloride 100 97 - 108 mmol/L    CO2 25 21 - 32 mmol/L    Anion gap 10 5 - 15 mmol/L    Glucose 138 (H) 65 - 100 mg/dL    BUN 10 6 - 20 MG/DL    Creatinine 1.03 (H) 0.55 - 1.02 MG/DL    BUN/Creatinine ratio 10 (L) 12 - 20      GFR est AA >60 >60 ml/min/1.73m2    GFR est non-AA 53 (L) >60 ml/min/1.73m2    Calcium 9.8 8.5 - 10.1 MG/DL    Bilirubin, total 0.5 0.2 - 1.0 MG/DL    ALT (SGPT) 31 12 - 78 U/L    AST (SGOT) 28 15 - 37 U/L    Alk.  phosphatase 170 (H) 45 - 117 U/L    Protein, total 8.2 6.4 - 8.2 g/dL    Albumin 4.6 3.5 - 5.0 g/dL    Globulin 3.6 2.0 - 4.0 g/dL    A-G Ratio 1.3 1.1 - 2.2     LIPASE    Collection Time: 02/27/22  4:09 PM   Result Value Ref Range    Lipase 65 (L) 73 - 393 U/L   TROPONIN-HIGH SENSITIVITY    Collection Time: 02/27/22  4:09 PM   Result Value Ref Range    Troponin-High Sensitivity 12 0 - 51 ng/L   GLUCOSE, POC    Collection Time: 02/27/22  4:09 PM   Result Value Ref Range    Glucose (POC) 98 65 - 117 mg/dL    Performed by Yovana STEEN    EKG, 12 LEAD, INITIAL    Collection Time: 02/27/22  4:16 PM   Result Value Ref Range    Ventricular Rate 84 BPM    Atrial Rate 84 BPM    P-R Interval 190 ms    QRS Duration 90 ms    Q-T Interval 524 ms    QTC Calculation (Bezet) 619 ms    Calculated P Axis 70 degrees    Calculated R Axis 3 degrees    Calculated T Axis 44 degrees    Diagnosis       Normal sinus rhythm  Possible Left atrial enlargement  Anteroseptal infarct (cited on or before 05-AUG-2015)  ST & T wave abnormality, consider inferior ischemia  When compared with ECG of 05-JAN-2022 20:21,  Vent. rate has increased BY  33 BPM  T wave inversion now evident in Inferior leads  Nonspecific T wave abnormality has replaced inverted T waves in Anterior   leads  QT has lengthened         Radiologic Studies -   CT ABD PELV W CONT   Final Result   Fluid and mucosal thickening of stomach and duodenum again shown suggesting   gastroduodenitis. Clinical correlation recommended. No other acute findings. CT ABD PELV W CONT    Result Date: 2/27/2022  Fluid and mucosal thickening of stomach and duodenum again shown suggesting gastroduodenitis. Clinical correlation recommended. No other acute findings. Medical Decision Making   I am the first provider for this patient. I reviewed the vital signs, available nursing notes, past medical history, past surgical history, family history and social history. Vital Signs-Reviewed the patient's vital signs.   Patient Vitals for the past 12 hrs:   Temp Pulse Resp BP SpO2   02/27/22 2002 -- 92 -- (!) 197/78 --   02/27/22 1829 -- 94 22 (!) 217/91 100 %   02/27/22 1729 -- -- -- (!) 227/93 --   02/27/22 1659 -- -- -- (!) 218/91 --   02/27/22 1629 -- 91 25 (!) 144/108 100 %   02/27/22 1529 98.2 °F (36.8 °C) 84 28 (!) 212/91 --       Pulse Oximetry Analysis - 100% on ra    Cardiac Monitor:   Rate: 91 bpm  Rhythm: Normal Sinus Rhythm      ED EKG interpretation:  Rhythm: normal sinus rhythm; and regular . Rate (approx.): 84; Axis: normal; P wave: normal; QRS interval: normal ; ST/T wave: non-specific changes; Other findings: borderline ekg. This EKG was interpreted by GENI Olivo MD,ED Provider. Records Reviewed: Nursing Notes and Old Medical Records    Provider Notes (Medical Decision Making):   Patient presents with a chief complaint of nausea, vomiting, and abdominal pain. She is minimally compliant with exam, complaining only of nausea and stating that she needs nausea medication. She is hypertensive but has not taken any of her medications at least 24 hours. She has a nonfocal abdominal exam but complains of pain all over. Differential includes gastritis, pancreatitis, peptic ulcer disease, colitis. Will check basic lab work, urinalysis, CT abdomen. Will medicate for pain/nausea and reevaluate    ED Course:   Initial assessment performed. The patients presenting problems have been discussed, and they are in agreement with the care plan formulated and outlined with them. I have encouraged them to ask questions as they arise throughout their visit. ED Course as of 02/27/22 2036   Domingo Blane Feb 27, 2022   1720 Patient has not taken any of her BP meds today [ALEX]   2002 Patient feeling improved. Tolerating PO. Requesting d/c. Advised close follow up with GI [ALEX]      ED Course User Index  Tenzin Gomez MD       Procedures:  Procedures    Critical Care:  none    Disposition:  Discharge Note:  The patient has been re-evaluated and is ready for discharge. Reviewed available results with patient. Counseled patient on diagnosis and care plan. Patient has expressed understanding, and all questions have been answered. Patient agrees with plan and agrees to follow up as recommended, or to return to the ED if their symptoms worsen.  Discharge instructions have been provided and explained to the patient, along with reasons to return to the ED. PLAN:  1. Discharge Medication List as of 2/27/2022  8:05 PM      START taking these medications    Details   ondansetron hcl (Zofran) 4 mg tablet Take 1 Tablet by mouth every eight (8) hours as needed for Nausea., Normal, Disp-20 Tablet, R-0      !! famotidine (Pepcid) 20 mg tablet Take 1 Tablet by mouth two (2) times a day for 10 days. , Normal, Disp-20 Tablet, R-0      sucralfate (Carafate) 1 gram tablet Take 1 Tablet by mouth four (4) times daily. , Normal, Disp-30 Tablet, R-0       !! - Potential duplicate medications found. Please discuss with provider. CONTINUE these medications which have NOT CHANGED    Details   carvediloL (COREG) 12.5 mg tablet Take 0.5 Tablets by mouth two (2) times daily (with meals). , Normal, Disp-90 Tablet, R-1      metroNIDAZOLE (METROCREAM) 0.75 % topical cream APPLY TOPICALLY TO THE AFFECTED AREA TWICE DAILY, Historical Med      doxycycline (ADOXA) 100 mg tablet Take 1 Tablet by mouth two (2) times a day., Normal, Disp-14 Tablet, R-0      !! famotidine (PEPCID) 20 mg tablet Take 1 Tablet by mouth two (2) times a day., Normal, Disp-14 Tablet, R-0      busPIRone (BUSPAR) 5 mg tablet TAKE 1 TABLET BY MOUTH TWICE DAILY, Normal, Disp-60 Tablet, R-5      hydrOXYchloroQUINE (PLAQUENIL) 200 mg tablet TAKE 1 TABLET BY MOUTH DAILY, Normal, Disp-30 Tablet, R-0      clotrimazole (MYCELEX) 10 mg jeremy DISSOLVE 1 JEREMY IN MOUTH FIVE TIMES DAILY FOR 1 WEEK, Normal, Disp-35 Tablet, R-2      atorvastatin (LIPITOR) 80 mg tablet TAKE 1 TABLET BY MOUTH DAILY AT BEDTIME, Normal, Disp-90 Tab, R-3      HYDROcodone-acetaminophen (NORCO)  mg tablet TAKE 1 TABLET BY MOUTH EVERY 6 HOURS AS NEEDED FOR PAIN, Historical Med      mupirocin (BACTROBAN) 2 % ointment Apply  to affected area three (3) times daily. , Historical Med      dicyclomine (BENTYL) 20 mg tablet Take 10 mg by mouth.  Before meals and at bedtime as needed , Historical Med      cetirizine (ZYRTEC) 10 mg tablet Take 10 mg by mouth daily as needed for Allergies. , Historical Med      gabapentin (NEURONTIN) 300 mg capsule Take 300 mg by mouth three (3) times daily. Take Morning, afternoon and at bedtime. , Historical Med      omeprazole (PRILOSEC) 20 mg capsule Take 40 mg by mouth daily. On an empty stomach , Historical Med      hydrOXYzine HCL (ATARAX) 25 mg tablet Take 25 mg by mouth three (3) times daily as needed for Anxiety. , Historical Med      hydroCHLOROthiazide (HYDRODIURIL) 25 mg tablet TAKE 1 TABLET BY MOUTH DAILY, Normal, Disp-30 Tab, R-3      promethazine (PHENERGAN) 12.5 mg tablet Take 2 Tabs by mouth every six (6) hours as needed for Nausea., Normal, Disp-20 Tab, R-0      Humira,CF, Pen 40 mg/0.4 mL injection pen INJECT ONE PEN (40MG) UNDER THE SKIN (SUBCUTANEOUS INJECTION) EVERY 2 WEEKS, Normal, Disp-1 Kit, R-0      DULoxetine (CYMBALTA) 60 mg capsule Take 1 Cap by mouth daily. , Normal, Disp-30 Cap, R-5      pantoprazole (PROTONIX) 40 mg tablet Take 40 mg by mouth two (2) times a day., Historical Med      fluticasone propionate (FLONASE) 50 mcg/actuation nasal spray fluticasone propionate 50 mcg/actuation nasal spray,suspension, Historical Med      albuterol (PROVENTIL HFA, VENTOLIN HFA, PROAIR HFA) 90 mcg/actuation inhaler Take 2 Puffs by inhalation every six (6) hours as needed for Wheezing., Normal, Disp-1 Inhaler, R-0      amLODIPine (NORVASC) 2.5 mg tablet take 1 tablet by mouth once daily, Normal, Disp-30 Tab, R-1      ondansetron (ZOFRAN ODT) 4 mg disintegrating tablet Take 1 Tab by mouth every eight (8) hours as needed for Nausea., Normal, Disp-30 Tab, R-1      desoximetasone (TOPICORT) 0.25 % topical cream Apply  to affected area two (2) times daily as needed for Skin Irritation. , Normal, Disp-15 g, R-0      aspirin 81 mg chewable tablet Take 1 Tab by mouth daily. , Print, Disp-30 Tab, R-0       !! - Potential duplicate medications found. Please discuss with provider. 2.   Follow-up Information     Follow up With Specialties Details Why Contact Info    Geraldine Avina MD Gastroenterology Schedule an appointment as soon as possible for a visit   [address] [facility]  [address] [facility]  [address] [facility] [address] [phone]      [facility] EMERGENCY DEPT Emergency Medicine  As needed, If symptoms worsen [address] [facility]  [address] [facility]  [phone]        Return to ED if worse     Diagnosis     Clinical Impression:   1. Gastroduodenitis    2. Non-intractable vomiting with nausea, unspecified vomiting type    3. Primary hypertension            Please note that this dictation was completed with [employer], the computer voice recognition software. Quite often unanticipated grammatical, syntax, homophones, and other interpretive errors are inadvertently transcribed by the computer software. Please disregard these errors.   Please excuse any errors that have escaped final proofreading

## 2022-02-28 LAB
ATRIAL RATE: 84 BPM
CALCULATED P AXIS, ECG09: 70 DEGREES
CALCULATED R AXIS, ECG10: 3 DEGREES
CALCULATED T AXIS, ECG11: 44 DEGREES
DIAGNOSIS, 93000: NORMAL
P-R INTERVAL, ECG05: 190 MS
Q-T INTERVAL, ECG07: 524 MS
QRS DURATION, ECG06: 90 MS
QTC CALCULATION (BEZET), ECG08: 619 MS
VENTRICULAR RATE, ECG03: 84 BPM

## 2022-02-28 NOTE — ED NOTES
Patient given printed discharge instructions reviewed by the MD. Patient understands instructions/follow up recommendations. Patient discharged out of ED via wheelchair into the care of spouse.

## 2022-03-14 ENCOUNTER — TRANSCRIBE ORDER (OUTPATIENT)
Dept: SCHEDULING | Age: 67
End: 2022-03-14

## 2022-03-14 ENCOUNTER — TELEPHONE (OUTPATIENT)
Dept: CARDIOLOGY CLINIC | Age: 67
End: 2022-03-14

## 2022-03-14 DIAGNOSIS — R11.0 NAUSEA: ICD-10-CM

## 2022-03-14 DIAGNOSIS — K29.70 GASTRITIS: Primary | ICD-10-CM

## 2022-03-16 NOTE — TELEPHONE ENCOUNTER
Spoke with patient. Verified patient with two patient identifiers. Verifed Coreg 12.5 mg half tab or 6.25 mg BID with meals is correct. Patient verbalized understanding. See in chart a refill for # 90 with 1 refill was sent to [place] however transmission failed. VM for pharmacist to fill and notify pt when ready for pickup.

## 2022-03-18 PROBLEM — R11.11 INTRACTABLE VOMITING WITHOUT NAUSEA: Status: ACTIVE | Noted: 2017-12-28

## 2022-03-18 PROBLEM — K58.9 IBS (IRRITABLE BOWEL SYNDROME): Status: ACTIVE | Noted: 2021-02-08

## 2022-03-19 PROBLEM — R77.8 ELEVATED TROPONIN: Status: ACTIVE | Noted: 2021-02-08

## 2022-03-19 PROBLEM — I50.20 SYSTOLIC HEART FAILURE (HCC): Status: ACTIVE | Noted: 2021-02-11

## 2022-03-19 PROBLEM — R79.89 ELEVATED TROPONIN: Status: ACTIVE | Noted: 2021-02-08

## 2022-03-20 PROBLEM — I42.9 CARDIOMYOPATHY (HCC): Status: ACTIVE | Noted: 2021-02-10

## 2022-03-29 ENCOUNTER — HOSPITAL ENCOUNTER (EMERGENCY)
Age: 67
Discharge: HOME | End: 2022-03-29
Attending: EMERGENCY MEDICINE
Payer: COMMERCIAL

## 2022-03-29 ENCOUNTER — APPOINTMENT (OUTPATIENT)
Dept: CT IMAGING | Age: 67
End: 2022-03-29
Attending: EMERGENCY MEDICINE
Payer: COMMERCIAL

## 2022-03-29 VITALS
HEIGHT: 66 IN | WEIGHT: 100 LBS | SYSTOLIC BLOOD PRESSURE: 158 MMHG | OXYGEN SATURATION: 95 % | TEMPERATURE: 98.2 F | HEART RATE: 84 BPM | BODY MASS INDEX: 16.07 KG/M2 | DIASTOLIC BLOOD PRESSURE: 76 MMHG | RESPIRATION RATE: 20 BRPM

## 2022-03-29 DIAGNOSIS — K31.84 GASTROPARESIS: ICD-10-CM

## 2022-03-29 DIAGNOSIS — G43.D0 ABDOMINAL MIGRAINE, NOT INTRACTABLE: Primary | ICD-10-CM

## 2022-03-29 LAB
ALBUMIN SERPL-MCNC: 4.4 G/DL (ref 3.5–5)
ALBUMIN/GLOB SERPL: 1.1 {RATIO} (ref 1.1–2.2)
ALP SERPL-CCNC: 196 U/L (ref 45–117)
ALT SERPL-CCNC: 30 U/L (ref 12–78)
ANION GAP SERPL CALC-SCNC: 11 MMOL/L (ref 5–15)
APPEARANCE UR: CLEAR
AST SERPL-CCNC: 20 U/L (ref 15–37)
BACTERIA URNS QL MICRO: NEGATIVE /HPF
BASOPHILS # BLD: 0 K/UL (ref 0–0.1)
BASOPHILS NFR BLD: 0 % (ref 0–1)
BILIRUB SERPL-MCNC: 0.4 MG/DL (ref 0.2–1)
BILIRUB UR QL: NEGATIVE
BUN SERPL-MCNC: 12 MG/DL (ref 6–20)
BUN/CREAT SERPL: 12 (ref 12–20)
CALCIUM SERPL-MCNC: 10.3 MG/DL (ref 8.5–10.1)
CHLORIDE SERPL-SCNC: 97 MMOL/L (ref 97–108)
CO2 SERPL-SCNC: 25 MMOL/L (ref 21–32)
COLOR UR: ABNORMAL
CREAT SERPL-MCNC: 0.98 MG/DL (ref 0.55–1.02)
DIFFERENTIAL METHOD BLD: ABNORMAL
EOSINOPHIL # BLD: 0 K/UL (ref 0–0.4)
EOSINOPHIL NFR BLD: 0 % (ref 0–7)
EPITH CASTS URNS QL MICRO: ABNORMAL /LPF
ERYTHROCYTE [DISTWIDTH] IN BLOOD BY AUTOMATED COUNT: 13.9 % (ref 11.5–14.5)
GLOBULIN SER CALC-MCNC: 4 G/DL (ref 2–4)
GLUCOSE SERPL-MCNC: 130 MG/DL (ref 65–100)
GLUCOSE UR STRIP.AUTO-MCNC: NEGATIVE MG/DL
HCT VFR BLD AUTO: 39.1 % (ref 35–47)
HGB BLD-MCNC: 12.6 G/DL (ref 11.5–16)
HGB UR QL STRIP: NEGATIVE
IMM GRANULOCYTES # BLD AUTO: 0 K/UL (ref 0–0.04)
IMM GRANULOCYTES NFR BLD AUTO: 0 % (ref 0–0.5)
KETONES UR QL STRIP.AUTO: NEGATIVE MG/DL
LEUKOCYTE ESTERASE UR QL STRIP.AUTO: NEGATIVE
LIPASE SERPL-CCNC: 77 U/L (ref 73–393)
LYMPHOCYTES # BLD: 0.8 K/UL (ref 0.8–3.5)
LYMPHOCYTES NFR BLD: 8 % (ref 12–49)
MCH RBC QN AUTO: 27.3 PG (ref 26–34)
MCHC RBC AUTO-ENTMCNC: 32.2 G/DL (ref 30–36.5)
MCV RBC AUTO: 84.8 FL (ref 80–99)
MONOCYTES # BLD: 0.7 K/UL (ref 0–1)
MONOCYTES NFR BLD: 7 % (ref 5–13)
NEUTS SEG # BLD: 8 K/UL (ref 1.8–8)
NEUTS SEG NFR BLD: 85 % (ref 32–75)
NITRITE UR QL STRIP.AUTO: NEGATIVE
NRBC # BLD: 0 K/UL (ref 0–0.01)
NRBC BLD-RTO: 0 PER 100 WBC
PH UR STRIP: 8.5 [PH] (ref 5–8)
PLATELET # BLD AUTO: 408 K/UL (ref 150–400)
PMV BLD AUTO: 9.4 FL (ref 8.9–12.9)
POTASSIUM SERPL-SCNC: 3.2 MMOL/L (ref 3.5–5.1)
PROT SERPL-MCNC: 8.4 G/DL (ref 6.4–8.2)
PROT UR STRIP-MCNC: 300 MG/DL
RBC # BLD AUTO: 4.61 M/UL (ref 3.8–5.2)
RBC #/AREA URNS HPF: ABNORMAL /HPF (ref 0–5)
RBC MORPH BLD: ABNORMAL
SODIUM SERPL-SCNC: 133 MMOL/L (ref 136–145)
SP GR UR REFRACTOMETRY: 1.02 (ref 1–1.03)
UA: UC IF INDICATED,UAUC: ABNORMAL
UROBILINOGEN UR QL STRIP.AUTO: 0.2 EU/DL (ref 0.2–1)
WBC # BLD AUTO: 9.5 K/UL (ref 3.6–11)
WBC URNS QL MICRO: ABNORMAL /HPF (ref 0–4)

## 2022-03-29 PROCEDURE — 36415 COLL VENOUS BLD VENIPUNCTURE: CPT

## 2022-03-29 PROCEDURE — 93005 ELECTROCARDIOGRAM TRACING: CPT

## 2022-03-29 PROCEDURE — 81001 URINALYSIS AUTO W/SCOPE: CPT

## 2022-03-29 PROCEDURE — 74011000636 HC RX REV CODE- 636: Performed by: EMERGENCY MEDICINE

## 2022-03-29 PROCEDURE — 99285 EMERGENCY DEPT VISIT HI MDM: CPT

## 2022-03-29 PROCEDURE — 83690 ASSAY OF LIPASE: CPT

## 2022-03-29 PROCEDURE — 74011250637 HC RX REV CODE- 250/637: Performed by: EMERGENCY MEDICINE

## 2022-03-29 PROCEDURE — 74177 CT ABD & PELVIS W/CONTRAST: CPT

## 2022-03-29 PROCEDURE — 85025 COMPLETE CBC W/AUTO DIFF WBC: CPT

## 2022-03-29 PROCEDURE — 80053 COMPREHEN METABOLIC PANEL: CPT

## 2022-03-29 PROCEDURE — 96374 THER/PROPH/DIAG INJ IV PUSH: CPT

## 2022-03-29 PROCEDURE — 74011250636 HC RX REV CODE- 250/636: Performed by: EMERGENCY MEDICINE

## 2022-03-29 RX ORDER — DROPERIDOL 2.5 MG/ML
1.25 INJECTION, SOLUTION INTRAMUSCULAR; INTRAVENOUS ONCE
Status: COMPLETED | OUTPATIENT
Start: 2022-03-29 | End: 2022-03-29

## 2022-03-29 RX ORDER — METOCLOPRAMIDE 10 MG/1
10 TABLET ORAL
Qty: 45 TABLET | Refills: 0 | Status: SHIPPED | OUTPATIENT
Start: 2022-03-29 | End: 2022-04-13

## 2022-03-29 RX ORDER — METOCLOPRAMIDE 10 MG/1
10 TABLET ORAL
Status: COMPLETED | OUTPATIENT
Start: 2022-03-29 | End: 2022-03-29

## 2022-03-29 RX ADMIN — DROPERIDOL 1.25 MG: 2.5 INJECTION, SOLUTION INTRAMUSCULAR; INTRAVENOUS at 17:32

## 2022-03-29 RX ADMIN — METOCLOPRAMIDE 10 MG: 10 TABLET ORAL at 19:45

## 2022-03-29 RX ADMIN — IOPAMIDOL 100 ML: 755 INJECTION, SOLUTION INTRAVENOUS at 20:02

## 2022-03-29 RX ADMIN — SODIUM CHLORIDE 1000 ML: 9 INJECTION, SOLUTION INTRAVENOUS at 17:32

## 2022-03-29 NOTE — ED NOTES
Bedside and Verbal shift change report given to Nevaeh (oncoming nurse) by this RN (offgoing nurse). Report included the following information SBAR.

## 2022-03-29 NOTE — ED PROVIDER NOTES
EMERGENCY DEPARTMENT HISTORY AND PHYSICAL EXAM      Date: 3/29/2022  Patient Name: Negrita Pendleton    Please note that this dictation was completed with [employer], the computer voice recognition software. Quite often unanticipated grammatical, syntax, homophones, and other interpretive errors are inadvertently transcribed by the computer software. Please disregard these errors. Please excuse any errors that have escaped final proofreading. History of Presenting Illness     Chief Complaint   Patient presents with    Vomiting     \"years of same sx meds not working\" this acute onset of vomiting x 3 days with chills  IBS       History Provided By: Patient     HPI: Negrita Pendleton, 75 y.o. female, past medical history significant for nausea and vomiting of uncertain etiology, current working diagnosis by GI as IBS. She is had multiple similar previous episodes. Takes Zofran with no relief. Rates her symptoms as severe. Vomiting and this bout started last night, states she was unable to sleep secondary to severe nausea and vomiting. Nothing makes the pain better, nothing makes it worse. Reports nonbloody nonbilious emesis. Unable to tolerate p.o. No urinary symptoms, no change in bowel movements. PCP: Gume Painter MD    No current facility-administered medications on file prior to encounter. Current Outpatient Medications on File Prior to Encounter   Medication Sig Dispense Refill    ondansetron hcl (Zofran) 4 mg tablet Take 1 Tablet by mouth every eight (8) hours as needed for Nausea. 20 Tablet 0    sucralfate (Carafate) 1 gram tablet Take 1 Tablet by mouth four (4) times daily. 30 Tablet 0    carvediloL (COREG) 12.5 mg tablet Take 0.5 Tablets by mouth two (2) times daily (with meals). 90 Tablet 1    metroNIDAZOLE (METROCREAM) 0.75 % topical cream APPLY TOPICALLY TO THE AFFECTED AREA TWICE DAILY      doxycycline (ADOXA) 100 mg tablet Take 1 Tablet by mouth two (2) times a day.  14 Tablet 0    famotidine (PEPCID) 20 mg tablet Take 1 Tablet by mouth two (2) times a day. 14 Tablet 0    busPIRone (BUSPAR) 5 mg tablet TAKE 1 TABLET BY MOUTH TWICE DAILY 60 Tablet 5    hydrOXYchloroQUINE (PLAQUENIL) 200 mg tablet TAKE 1 TABLET BY MOUTH DAILY 30 Tablet 0    clotrimazole (MYCELEX) 10 mg jeremy DISSOLVE 1 JEREMY IN MOUTH FIVE TIMES DAILY FOR 1 WEEK (Patient not taking: Reported on 1/12/2022) 35 Tablet 2    atorvastatin (LIPITOR) 80 mg tablet TAKE 1 TABLET BY MOUTH DAILY AT BEDTIME 90 Tab 3    HYDROcodone-acetaminophen (NORCO)  mg tablet TAKE 1 TABLET BY MOUTH EVERY 6 HOURS AS NEEDED FOR PAIN      mupirocin (BACTROBAN) 2 % ointment Apply  to affected area three (3) times daily. (Patient not taking: Reported on 1/12/2022)      dicyclomine (BENTYL) 20 mg tablet Take 10 mg by mouth. Before meals and at bedtime as needed       cetirizine (ZYRTEC) 10 mg tablet Take 10 mg by mouth daily as needed for Allergies.  gabapentin (NEURONTIN) 300 mg capsule Take 300 mg by mouth three (3) times daily. Take Morning, afternoon and at bedtime.  omeprazole (PRILOSEC) 20 mg capsule Take 40 mg by mouth daily. On an empty stomach       hydrOXYzine HCL (ATARAX) 25 mg tablet Take 25 mg by mouth three (3) times daily as needed for Anxiety.  hydroCHLOROthiazide (HYDRODIURIL) 25 mg tablet TAKE 1 TABLET BY MOUTH DAILY 30 Tab 3    promethazine (PHENERGAN) 12.5 mg tablet Take 2 Tabs by mouth every six (6) hours as needed for Nausea. 20 Tab 0    Humira,CF, Pen 40 mg/0.4 mL injection pen INJECT ONE PEN (40MG) UNDER THE SKIN (SUBCUTANEOUS INJECTION) EVERY 2 WEEKS (Patient not taking: Reported on 1/12/2022) 1 Kit 0    DULoxetine (CYMBALTA) 60 mg capsule Take 1 Cap by mouth daily. (Patient not taking: Reported on 1/12/2022) 30 Cap 5    pantoprazole (PROTONIX) 40 mg tablet Take 40 mg by mouth two (2) times a day.       fluticasone propionate (FLONASE) 50 mcg/actuation nasal spray fluticasone propionate 50 mcg/actuation nasal spray,suspension      albuterol (PROVENTIL HFA, VENTOLIN HFA, PROAIR HFA) 90 mcg/actuation inhaler Take 2 Puffs by inhalation every six (6) hours as needed for Wheezing. (Patient not taking: Reported on 1/12/2022) 1 Inhaler 0    amLODIPine (NORVASC) 2.5 mg tablet take 1 tablet by mouth once daily 30 Tab 1    ondansetron (ZOFRAN ODT) 4 mg disintegrating tablet Take 1 Tab by mouth every eight (8) hours as needed for Nausea. 30 Tab 1    desoximetasone (TOPICORT) 0.25 % topical cream Apply  to affected area two (2) times daily as needed for Skin Irritation. 15 g 0    aspirin 81 mg chewable tablet Take 1 Tab by mouth daily.  27 Tab 0       Past History     Past Medical History:  Past Medical History:   Diagnosis Date    Acid reflux     Acid reflux     Arthritis     Cardiomyopathy ([facility] [address] 2/10/2021    Chronic pain     Elevated troponin 2/8/2021    Heart attack ([facility] [address]     Heart failure (HCC)     Hypertension     IBS (irritable bowel syndrome)     IBS (irritable bowel syndrome) 2/8/2021    Squamous cell carcinoma of skin of right elbow 9/2265    Systolic heart failure ([address] 2/11/2021    Takotsubo cardiomyopathy 11/16/2015    Tobacco abuse 11/16/2015       Past Surgical History:  Past Surgical History:   Procedure Laterality Date    HX GYN      oophorectomy    HX ORTHOPAEDIC      right knee arthroscopy    HX ORTHOPAEDIC      right thumb    HX ORTHOPAEDIC      back surgeries    HX OTHER SURGICAL      8 route canals    NC ABDOMEN SURGERY PROC UNLISTED      adhesion removal    NC BREAST SURGERY PROCEDURE UNLISTED      lumpectomy    NC COLONOSCOPY FLX DX W/COLLJ SPEC WHEN PFRMD  10/15/2012            Family History:  Family History   Problem Relation Age of Onset    Cancer Father         prostate    Heart Disease Mother     Heart Disease Maternal Grandmother        Social History:  Social History     Tobacco Use    Smoking status: Current Every Day Smoker     Packs/day: 1.00    Smokeless tobacco: Never Used    Tobacco comment: trying to quit 4-5 cigarettes daily   Substance Use Topics    Alcohol use: Yes     Alcohol/week: 8.3 standard drinks     Types: 10 Glasses of wine per week     Comment: 4 times weekly    Drug use: No       Allergies: Allergies   Allergen Reactions    Ace Inhibitors Swelling    Arb-Angiotensin Receptor Antagonist Other (comments)     Prior ACE inhibitor angioedema, cross reaction risk    Cephalexin Hives     Other reaction(s): anaphylaxis    Codeine Nausea Only    Milk Other (comments)     Lactose intolerant.  Morphine Nausea and Vomiting         Review of Systems   Review of Systems   Constitutional: Negative for chills and fever. HENT: Negative for congestion and sore throat. Eyes: Negative for visual disturbance. Respiratory: Negative for cough and shortness of breath. Cardiovascular: Negative for chest pain and leg swelling. Gastrointestinal: Positive for abdominal pain, nausea and vomiting. Negative for blood in stool and diarrhea. Endocrine: Negative for polyuria. Genitourinary: Negative for dysuria, flank pain, vaginal bleeding and vaginal discharge. Musculoskeletal: Negative for myalgias. Skin: Negative for rash. Allergic/Immunologic: Negative for immunocompromised state. Neurological: Negative for weakness and headaches. Psychiatric/Behavioral: Negative for confusion. Physical Exam   Physical Exam  Vitals and nursing note reviewed. Constitutional:       Appearance: She is well-developed. Comments: Uncomfortable appearing female   HENT:      Head: Normocephalic and atraumatic. Eyes:      General:         Right eye: No discharge. Left eye: No discharge. Conjunctiva/sclera: Conjunctivae normal.      Pupils: Pupils are equal, round, and reactive to light. Neck:      Trachea: No tracheal deviation. Cardiovascular:      Rate and Rhythm: Normal rate and regular rhythm.       Heart sounds: Normal heart sounds. No murmur heard. Pulmonary:      Effort: Pulmonary effort is normal. No respiratory distress. Breath sounds: Normal breath sounds. No wheezing or rales. Abdominal:      General: Bowel sounds are normal.      Palpations: Abdomen is soft. Tenderness: There is abdominal tenderness. There is no guarding or rebound. Comments: Diffuse tenderness palpation   Musculoskeletal:         General: No tenderness or deformity. Normal range of motion. Cervical back: Normal range of motion and neck supple. Skin:     General: Skin is warm and dry. Findings: No erythema or rash. Neurological:      Mental Status: She is alert and oriented to person, place, and time. Psychiatric:         Behavior: Behavior normal.         Diagnostic Study Results     Labs -     Recent Results (from the past 12 hour(s))   CBC WITH AUTOMATED DIFF    Collection Time: 03/29/22  5:23 PM   Result Value Ref Range    WBC 9.5 3.6 - 11.0 K/uL    RBC 4.61 3.80 - 5.20 M/uL    HGB 12.6 11.5 - 16.0 g/dL    HCT 39.1 35.0 - 47.0 %    MCV 84.8 80.0 - 99.0 FL    MCH 27.3 26.0 - 34.0 PG    MCHC 32.2 30.0 - 36.5 g/dL    RDW 13.9 11.5 - 14.5 %    PLATELET 615 (H) 124 - 400 K/uL    MPV 9.4 8.9 - 12.9 FL    NRBC 0.0 0  WBC    ABSOLUTE NRBC 0.00 0.00 - 0.01 K/uL    NEUTROPHILS 85 (H) 32 - 75 %    LYMPHOCYTES 8 (L) 12 - 49 %    MONOCYTES 7 5 - 13 %    EOSINOPHILS 0 0 - 7 %    BASOPHILS 0 0 - 1 %    IMMATURE GRANULOCYTES 0 0.0 - 0.5 %    ABS. NEUTROPHILS 8.0 1.8 - 8.0 K/UL    ABS. LYMPHOCYTES 0.8 0.8 - 3.5 K/UL    ABS. MONOCYTES 0.7 0.0 - 1.0 K/UL    ABS. EOSINOPHILS 0.0 0.0 - 0.4 K/UL    ABS. BASOPHILS 0.0 0.0 - 0.1 K/UL    ABS. IMM.  GRANS. 0.0 0.00 - 0.04 K/UL    DF SMEAR SCANNED      RBC COMMENTS NORMOCYTIC, NORMOCHROMIC     METABOLIC PANEL, COMPREHENSIVE    Collection Time: 03/29/22  5:23 PM   Result Value Ref Range    Sodium 133 (L) 136 - 145 mmol/L    Potassium 3.2 (L) 3.5 - 5.1 mmol/L    Chloride 97 97 - 108 mmol/L    CO2 25 21 - 32 mmol/L    Anion gap 11 5 - 15 mmol/L    Glucose 130 (H) 65 - 100 mg/dL    BUN 12 6 - 20 MG/DL    Creatinine 0.98 0.55 - 1.02 MG/DL    BUN/Creatinine ratio 12 12 - 20      GFR est AA >60 >60 ml/min/1.73m2    GFR est non-AA 56 (L) >60 ml/min/1.73m2    Calcium 10.3 (H) 8.5 - 10.1 MG/DL    Bilirubin, total 0.4 0.2 - 1.0 MG/DL    ALT (SGPT) 30 12 - 78 U/L    AST (SGOT) 20 15 - 37 U/L    Alk.  phosphatase 196 (H) 45 - 117 U/L    Protein, total 8.4 (H) 6.4 - 8.2 g/dL    Albumin 4.4 3.5 - 5.0 g/dL    Globulin 4.0 2.0 - 4.0 g/dL    A-G Ratio 1.1 1.1 - 2.2     LIPASE    Collection Time: 03/29/22  5:23 PM   Result Value Ref Range    Lipase 77 73 - 393 U/L   URINALYSIS W/ REFLEX CULTURE    Collection Time: 03/29/22  5:23 PM    Specimen: Urine   Result Value Ref Range    Color YELLOW/STRAW      Appearance CLEAR CLEAR      Specific gravity 1.019 1.003 - 1.030      pH (UA) 8.5 (H) 5.0 - 8.0      Protein 300 (A) NEG mg/dL    Glucose Negative NEG mg/dL    Ketone Negative NEG mg/dL    Bilirubin Negative NEG      Blood Negative NEG      Urobilinogen 0.2 0.2 - 1.0 EU/dL    Nitrites Negative NEG      Leukocyte Esterase Negative NEG      WBC 0-4 0 - 4 /hpf    RBC 0-5 0 - 5 /hpf    Epithelial cells FEW FEW /lpf    Bacteria Negative NEG /hpf    UA:UC IF INDICATED CULTURE NOT INDICATED BY UA RESULT CNI     EKG, 12 LEAD, INITIAL    Collection Time: 03/29/22  5:41 PM   Result Value Ref Range    Ventricular Rate 99 BPM    Atrial Rate 99 BPM    P-R Interval 136 ms    QRS Duration 76 ms    Q-T Interval 386 ms    QTC Calculation (Bezet) 495 ms    Calculated P Axis 75 degrees    Calculated R Axis -5 degrees    Calculated T Axis 89 degrees    Diagnosis       ** Poor data quality, interpretation may be adversely affected  Normal sinus rhythm  Possible Left atrial enlargement  Septal infarct (cited on or before 05-AUG-2015)  ST & T wave abnormality, consider inferolateral ischemia  When compared with ECG of 27-FEB-2022 16:16,  ST more depressed in Inferior leads  T wave inversion no longer evident in Inferior leads  T wave inversion now evident in Lateral leads  QT has shortened         Radiologic Studies -   CT ABD PELV W CONT   Final Result   Mild fatty liver      Small pericardial effusion        CT Results  (Last 48 hours)               03/29/22 2002  CT ABD PELV W CONT Final result    Impression:  Mild fatty liver       Small pericardial effusion       Narrative:  EXAM: CT ABD PELV W CONT       INDICATION: abdominal pain, vomiting       COMPARISON: 2/27/2022        CONTRAST: 100 mL of Isovue-[id number]. ORAL CONTRAST: None       TECHNIQUE:    Following the uneventful intravenous administration of contrast, thin axial   images were obtained through the abdomen and pelvis. Coronal and sagittal   reconstructions were generated. CT dose reduction was achieved through use of a   standardized protocol tailored for this examination and automatic exposure   control for dose modulation. FINDINGS:    LOWER THORAX: Small pericardial effusion. No consolidation   LIVER: Mild fatty liver   BILIARY TREE: Gallbladder is within normal limits. CBD is not dilated. SPLEEN: within normal limits. PANCREAS: No mass or ductal dilatation. ADRENALS: Unremarkable. KIDNEYS: No mass, calculus, or hydronephrosis. STOMACH: Unremarkable. SMALL BOWEL: No dilatation or wall thickening. COLON: No dilatation or wall thickening. APPENDIX: not identified but there are no secondary signs of appendicitis. PERITONEUM: No ascites or pneumoperitoneum. RETROPERITONEUM: No lymphadenopathy or aortic aneurysm. There are vascular   calcifications   REPRODUCTIVE ORGANS: Not identified presumed surgically absent   URINARY BLADDER: Decompressed   BONES: Extensive multilevel degenerative change   ABDOMINAL WALL: No mass or hernia.    ADDITIONAL COMMENTS: N/A               CXR Results  (Last 48 hours)    None            Medical Decision Making   I am the first provider for this patient. I reviewed the vital signs, available nursing notes, past medical history, past surgical history, family history and social history. Vital Signs-Reviewed the patient's vital signs. Patient Vitals for the past 12 hrs:   Temp Pulse Resp BP SpO2   03/29/22 1830 -- 84 20 (!) 158/76 95 %   03/29/22 1800 -- 85 22 (!) 156/77 93 %   03/29/22 1730 -- 85 22 (!) 211/99 94 %   03/29/22 1646 98.2 °F (36.8 °C) 100 26 (!) 212/111 98 %       EKG interpretation: (Preliminary)  EKG shows sinus rhythm, rate 99. Normal axis. Normal intervals. Inferior ST changes, not significantly changed from prior ECG. Interpreted by me    Records Reviewed:   Nursing notes, Prior visits     Provider Notes (Medical Decision Making):   Patient presents with abdominal pain. DDx: Gastroenteritis, SBO, appendicitis, colitis, IBD, diverticulitis, mesenteric ischemia, AAA or descending dissection, ACS, ureteral  Stone,  pathology. Will get labs and serial abdominal exams to determine if a CT is warranted. ED Course:   Initial assessment performed. The patients presenting problems have been discussed, and they are in agreement with the care plan formulated and outlined with them. I have encouraged them to ask questions as they arise throughout their visit.           9:06 PM  Symptoms have improved, patient tolerating p.o., nausea is improved. Patient safe for discharge to home      Critical Care Time:   none    Disposition:    DISCHARGE NOTE  Patients results have been reviewed with them. Patient and/or family have verbally conveyed their understanding and agreement of the patient's signs, symptoms, diagnosis, treatment and prognosis and additionally agree to follow up as recommended or return to the Emergency Room should their condition change or have any new concerns prior to their follow-up appointment.  Patient verbally agrees with the care-plan and verbally conveys that all of their questions have been answered. Discharge instructions have also been provided to the patient with some educational information regarding their diagnosis as well a list of reasons why they would want to return to the ER prior to their follow-up appointment should their condition change. PLAN:  1. Current Discharge Medication List      START taking these medications    Details   metoclopramide HCl (Reglan) 10 mg tablet Take 1 Tablet by mouth Before breakfast, lunch, and dinner for 15 days. Qty: 45 Tablet, Refills: 0  Start date: 3/29/2022, End date: 4/13/2022           2. Follow-up Information     Follow up With Specialties Details Why Contact Info    Bernardo Srinivasan  [facility] Vascular Surgery, Cardiology, Interventional Cardiology Schedule an appointment as soon as possible for a visit  for an echo, regarding possible small pericardial effusion [address]  [address] [facility] EMERGENCY DEPT Emergency Medicine  If symptoms worsen [address] [place] [address]  [address] [facility]  [phone]          Return to ED if worse     Diagnosis     Clinical Impression:   1. Abdominal migraine, not intractable    2. Gastroparesis        Attestations:   This note was completed by Jacquelyn Munroe DO

## 2022-03-30 LAB
ATRIAL RATE: 99 BPM
CALCULATED P AXIS, ECG09: 75 DEGREES
CALCULATED R AXIS, ECG10: -5 DEGREES
CALCULATED T AXIS, ECG11: 89 DEGREES
DIAGNOSIS, 93000: NORMAL
P-R INTERVAL, ECG05: 136 MS
Q-T INTERVAL, ECG07: 386 MS
QRS DURATION, ECG06: 76 MS
QTC CALCULATION (BEZET), ECG08: 495 MS
VENTRICULAR RATE, ECG03: 99 BPM

## 2022-04-06 ENCOUNTER — HOSPITAL ENCOUNTER (OUTPATIENT)
Dept: NUCLEAR MEDICINE | Age: 67
Discharge: HOME | End: 2022-04-06
Attending: NURSE PRACTITIONER
Payer: COMMERCIAL

## 2022-04-06 DIAGNOSIS — K29.70 GASTRITIS: ICD-10-CM

## 2022-04-06 DIAGNOSIS — R11.0 NAUSEA: ICD-10-CM

## 2022-04-06 PROCEDURE — 78264 GASTRIC EMPTYING IMG STUDY: CPT

## 2022-04-06 RX ORDER — TECHNETIUM TC 99M SULFUR COLLOID 2 MG
0.53 KIT MISCELLANEOUS
Status: COMPLETED | OUTPATIENT
Start: 2022-04-06 | End: 2022-04-06

## 2022-04-06 RX ADMIN — TECHNETIUM TC 99M SULFUR COLLOID 0.53 MILLICURIE: KIT at 10:00

## 2022-07-04 RX ORDER — BUSPIRONE HYDROCHLORIDE 5 MG/1
TABLET ORAL
Qty: 60 TABLET | Refills: 5 | Status: SHIPPED | OUTPATIENT
Start: 2022-07-04

## 2022-08-02 ENCOUNTER — APPOINTMENT (OUTPATIENT)
Dept: ULTRASOUND IMAGING | Age: 67
End: 2022-08-02
Attending: PHYSICIAN ASSISTANT
Payer: COMMERCIAL

## 2022-08-02 ENCOUNTER — HOSPITAL ENCOUNTER (EMERGENCY)
Age: 67
Discharge: HOME | End: 2022-08-02
Attending: EMERGENCY MEDICINE
Payer: COMMERCIAL

## 2022-08-02 VITALS
TEMPERATURE: 97.7 F | BODY MASS INDEX: 21.19 KG/M2 | RESPIRATION RATE: 16 BRPM | HEART RATE: 78 BPM | OXYGEN SATURATION: 97 % | DIASTOLIC BLOOD PRESSURE: 77 MMHG | HEIGHT: 66 IN | WEIGHT: 131.84 LBS | SYSTOLIC BLOOD PRESSURE: 171 MMHG

## 2022-08-02 DIAGNOSIS — L03.115 CELLULITIS OF RIGHT LEG: Primary | ICD-10-CM

## 2022-08-02 DIAGNOSIS — N17.9 AKI (ACUTE KIDNEY INJURY) (HCC): ICD-10-CM

## 2022-08-02 DIAGNOSIS — S81.801A WOUND OF RIGHT LOWER EXTREMITY, INITIAL ENCOUNTER: ICD-10-CM

## 2022-08-02 LAB
ALBUMIN SERPL-MCNC: 3.2 G/DL (ref 3.5–5)
ALBUMIN/GLOB SERPL: 1.1 {RATIO} (ref 1.1–2.2)
ALP SERPL-CCNC: 168 U/L (ref 45–117)
ALT SERPL-CCNC: 30 U/L (ref 12–78)
ANION GAP SERPL CALC-SCNC: 4 MMOL/L (ref 5–15)
AST SERPL-CCNC: 36 U/L (ref 15–37)
BASOPHILS # BLD: 0 K/UL (ref 0–0.1)
BASOPHILS NFR BLD: 1 % (ref 0–1)
BILIRUB SERPL-MCNC: 0.2 MG/DL (ref 0.2–1)
BUN SERPL-MCNC: 16 MG/DL (ref 6–20)
BUN/CREAT SERPL: 10 (ref 12–20)
CALCIUM SERPL-MCNC: 8.6 MG/DL (ref 8.5–10.1)
CHLORIDE SERPL-SCNC: 107 MMOL/L (ref 97–108)
CO2 SERPL-SCNC: 29 MMOL/L (ref 21–32)
CREAT SERPL-MCNC: 1.53 MG/DL (ref 0.55–1.02)
DIFFERENTIAL METHOD BLD: ABNORMAL
EOSINOPHIL # BLD: 0.3 K/UL (ref 0–0.4)
EOSINOPHIL NFR BLD: 4 % (ref 0–7)
ERYTHROCYTE [DISTWIDTH] IN BLOOD BY AUTOMATED COUNT: 15.5 % (ref 11.5–14.5)
GLOBULIN SER CALC-MCNC: 3 G/DL (ref 2–4)
GLUCOSE SERPL-MCNC: 105 MG/DL (ref 65–100)
HCT VFR BLD AUTO: 30.2 % (ref 35–47)
HGB BLD-MCNC: 9.3 G/DL (ref 11.5–16)
IMM GRANULOCYTES # BLD AUTO: 0 K/UL (ref 0–0.04)
IMM GRANULOCYTES NFR BLD AUTO: 0 % (ref 0–0.5)
LYMPHOCYTES # BLD: 1.6 K/UL (ref 0.8–3.5)
LYMPHOCYTES NFR BLD: 24 % (ref 12–49)
MCH RBC QN AUTO: 29.2 PG (ref 26–34)
MCHC RBC AUTO-ENTMCNC: 30.8 G/DL (ref 30–36.5)
MCV RBC AUTO: 94.7 FL (ref 80–99)
MONOCYTES # BLD: 0.9 K/UL (ref 0–1)
MONOCYTES NFR BLD: 14 % (ref 5–13)
NEUTS SEG # BLD: 3.8 K/UL (ref 1.8–8)
NEUTS SEG NFR BLD: 57 % (ref 32–75)
NRBC # BLD: 0 K/UL (ref 0–0.01)
NRBC BLD-RTO: 0 PER 100 WBC
PLATELET # BLD AUTO: 293 K/UL (ref 150–400)
PMV BLD AUTO: 9.3 FL (ref 8.9–12.9)
POTASSIUM SERPL-SCNC: 4 MMOL/L (ref 3.5–5.1)
PROT SERPL-MCNC: 6.2 G/DL (ref 6.4–8.2)
RBC # BLD AUTO: 3.19 M/UL (ref 3.8–5.2)
SODIUM SERPL-SCNC: 140 MMOL/L (ref 136–145)
WBC # BLD AUTO: 6.5 K/UL (ref 3.6–11)

## 2022-08-02 PROCEDURE — 74011250636 HC RX REV CODE- 250/636: Performed by: PHYSICIAN ASSISTANT

## 2022-08-02 PROCEDURE — 74011250637 HC RX REV CODE- 250/637: Performed by: PHYSICIAN ASSISTANT

## 2022-08-02 PROCEDURE — 80053 COMPREHEN METABOLIC PANEL: CPT

## 2022-08-02 PROCEDURE — 93971 EXTREMITY STUDY: CPT

## 2022-08-02 PROCEDURE — 96360 HYDRATION IV INFUSION INIT: CPT

## 2022-08-02 PROCEDURE — 85025 COMPLETE CBC W/AUTO DIFF WBC: CPT

## 2022-08-02 PROCEDURE — 99284 EMERGENCY DEPT VISIT MOD MDM: CPT

## 2022-08-02 PROCEDURE — 36415 COLL VENOUS BLD VENIPUNCTURE: CPT

## 2022-08-02 PROCEDURE — 74011000250 HC RX REV CODE- 250: Performed by: PHYSICIAN ASSISTANT

## 2022-08-02 RX ORDER — AMOXICILLIN AND CLAVULANATE POTASSIUM 875; 125 MG/1; MG/1
1 TABLET, FILM COATED ORAL
Status: COMPLETED | OUTPATIENT
Start: 2022-08-02 | End: 2022-08-02

## 2022-08-02 RX ORDER — BACITRACIN 500 UNIT/G
2 PACKET (EA) TOPICAL ONCE
Status: COMPLETED | OUTPATIENT
Start: 2022-08-02 | End: 2022-08-02

## 2022-08-02 RX ORDER — DOXYCYCLINE HYCLATE 100 MG
100 TABLET ORAL
Status: COMPLETED | OUTPATIENT
Start: 2022-08-02 | End: 2022-08-02

## 2022-08-02 RX ORDER — AMOXICILLIN AND CLAVULANATE POTASSIUM 875; 125 MG/1; MG/1
1 TABLET, FILM COATED ORAL 2 TIMES DAILY
Qty: 20 TABLET | Refills: 0 | Status: SHIPPED | OUTPATIENT
Start: 2022-08-02 | End: 2022-08-12

## 2022-08-02 RX ORDER — BACITRACIN 500 [USP'U]/G
4 OINTMENT TOPICAL 3 TIMES DAILY
Qty: 50 G | Refills: 0 | Status: SHIPPED | OUTPATIENT
Start: 2022-08-02 | End: 2022-08-12

## 2022-08-02 RX ORDER — OXYCODONE AND ACETAMINOPHEN 5; 325 MG/1; MG/1
1 TABLET ORAL
Status: COMPLETED | OUTPATIENT
Start: 2022-08-02 | End: 2022-08-02

## 2022-08-02 RX ORDER — DOXYCYCLINE HYCLATE 100 MG
100 TABLET ORAL 2 TIMES DAILY
Qty: 20 TABLET | Refills: 0 | Status: SHIPPED | OUTPATIENT
Start: 2022-08-02 | End: 2022-08-02

## 2022-08-02 RX ORDER — BACITRACIN 500 [USP'U]/G
4 OINTMENT TOPICAL 3 TIMES DAILY
Qty: 50 G | Refills: 0 | Status: SHIPPED | OUTPATIENT
Start: 2022-08-02 | End: 2022-08-02

## 2022-08-02 RX ORDER — HYDROCODONE BITARTRATE AND ACETAMINOPHEN 5; 325 MG/1; MG/1
1 TABLET ORAL
Qty: 10 TABLET | Refills: 0 | Status: SHIPPED | OUTPATIENT
Start: 2022-08-02 | End: 2022-08-05

## 2022-08-02 RX ORDER — AMOXICILLIN AND CLAVULANATE POTASSIUM 875; 125 MG/1; MG/1
1 TABLET, FILM COATED ORAL 2 TIMES DAILY
Qty: 20 TABLET | Refills: 0 | Status: SHIPPED | OUTPATIENT
Start: 2022-08-02 | End: 2022-08-02

## 2022-08-02 RX ORDER — DOXYCYCLINE HYCLATE 100 MG
100 TABLET ORAL 2 TIMES DAILY
Qty: 20 TABLET | Refills: 0 | Status: SHIPPED | OUTPATIENT
Start: 2022-08-02 | End: 2022-08-12

## 2022-08-02 RX ADMIN — DOXYCYCLINE HYCLATE 100 MG: 100 TABLET, COATED ORAL at 21:31

## 2022-08-02 RX ADMIN — AMOXICILLIN AND CLAVULANATE POTASSIUM 1 TABLET: 875; 125 TABLET, FILM COATED ORAL at 21:31

## 2022-08-02 RX ADMIN — SODIUM CHLORIDE 1000 ML: 9 INJECTION, SOLUTION INTRAVENOUS at 21:31

## 2022-08-02 RX ADMIN — BACITRACIN 2 PACKET: 500 OINTMENT TOPICAL at 21:31

## 2022-08-02 RX ADMIN — OXYCODONE AND ACETAMINOPHEN 1 TABLET: 5; 325 TABLET ORAL at 21:31

## 2022-08-02 NOTE — Clinical Note
[address]  [facility] EMERGENCY DEPT  [address] [place] [address]  [facility] [address]  [phone]    Work/School Note    Date: 8/2/2022    To Whom It May concern:    Nilay Lyn was seen and treated today in the emergency room by the following provider(s):  Attending Provider: Adeline Carrion MD  Physician Assistant: ALEX Funk. Nilay Lyn is excused from work/school on 08/02/22 and 08/03/22. She is medically clear to return to work/school on 8/4/2022.        Sincerely,          ALEX Hoskins

## 2022-08-02 NOTE — Clinical Note
[address]  [facility] EMERGENCY DEPT  [address] [facility]  [place] [address]  [phone]    Work/School Note    Date: 8/2/2022    To Whom It May concern:    Chasidy Esposito was seen and treated today in the emergency room by the following provider(s):  Attending Provider: Asael Kothari MD  Physician Assistant: ALEX Moura. Chasidy Esposito is excused from work/school on 08/02/22 and 08/03/22. She is medically clear to return to work/school on 8/4/2022.        Sincerely,          ALEX Gomes

## 2022-08-03 ENCOUNTER — TELEPHONE (OUTPATIENT)
Dept: INTERNAL MEDICINE CLINIC | Age: 67
End: 2022-08-03

## 2022-08-03 NOTE — ED NOTES
MD Kee reviewed discharge instructions with the patient and spouse. The patient and spouse verbalized understanding. Patient discharged home with stable vitals. Patient out of ED ambulatory with discharge instructions in hand. Opportunity for questions and clarification provided. No further complaints noted at this time.

## 2022-08-03 NOTE — TELEPHONE ENCOUNTER
Pt needs an ED Follow up appt  ED recommended follow up with pcp within 2-3 days. Facility: [facility]  Dx: Leg Wound    No available appts with pcp to schedule for ED follow up. Please call patient to work into schedule.

## 2022-08-03 NOTE — ED PROVIDER NOTES
EMERGENCY DEPARTMENT HISTORY AND PHYSICAL EXAM      Date: 8/2/2022  Patient Name: Litzy Vega    History of Presenting Illness     Chief Complaint   Patient presents with    Leg Pain     Owen Alvarez on an escalator ten days ago in [place]; she was seen and treated but sent here today for wound check right lower leg wound as it is red. History Provided By: Patient    HPI: Litzy Vega, 75 y.o. female with a past medical history of Takotsubo's cardiomyopathy, gastroparesis, tobacco abuse who presents emergency department with concern for leg infection. She was traveling in [place] 10 days ago when she tripped falling on an escalator. She received multiple abrasions to her arm and right lower leg. She was seen by Dr. Collins where she was treated for wound and received a tetanus update. She comes back today because for the past 2 days she has had worsening redness, swelling and purulent drainage. She does not have any fevers, myalgias, chills, sore throat, cough, congestion, shortness of breath, chest pain, abdominal pain, nausea, vomiting, diarrhea. There are no other complaints, changes, or physical findings at this time. PCP: Antionette Gosselin, MD    No current facility-administered medications on file prior to encounter. Current Outpatient Medications on File Prior to Encounter   Medication Sig Dispense Refill    busPIRone (BUSPAR) 5 mg tablet TAKE 1 TABLET BY MOUTH TWICE DAILY 60 Tablet 5    ondansetron hcl (Zofran) 4 mg tablet Take 1 Tablet by mouth every eight (8) hours as needed for Nausea. 20 Tablet 0    sucralfate (Carafate) 1 gram tablet Take 1 Tablet by mouth four (4) times daily. 30 Tablet 0    carvediloL (COREG) 12.5 mg tablet Take 0.5 Tablets by mouth two (2) times daily (with meals). 90 Tablet 1    metroNIDAZOLE (METROCREAM) 0.75 % topical cream APPLY TOPICALLY TO THE AFFECTED AREA TWICE DAILY      doxycycline (ADOXA) 100 mg tablet Take 1 Tablet by mouth two (2) times a day.  15 Tablet 0    famotidine (PEPCID) 20 mg tablet Take 1 Tablet by mouth two (2) times a day. 14 Tablet 0    hydrOXYchloroQUINE (PLAQUENIL) 200 mg tablet TAKE 1 TABLET BY MOUTH DAILY 30 Tablet 0    clotrimazole (MYCELEX) 10 mg jeremy DISSOLVE 1 JEREMY IN MOUTH FIVE TIMES DAILY FOR 1 WEEK (Patient not taking: Reported on 1/12/2022) 35 Tablet 2    atorvastatin (LIPITOR) 80 mg tablet TAKE 1 TABLET BY MOUTH DAILY AT BEDTIME 90 Tab 3    mupirocin (BACTROBAN) 2 % ointment Apply  to affected area three (3) times daily. (Patient not taking: Reported on 1/12/2022)      dicyclomine (BENTYL) 20 mg tablet Take 10 mg by mouth. Before meals and at bedtime as needed       cetirizine (ZYRTEC) 10 mg tablet Take 10 mg by mouth daily as needed for Allergies. gabapentin (NEURONTIN) 300 mg capsule Take 300 mg by mouth three (3) times daily. Take Morning, afternoon and at bedtime. omeprazole (PRILOSEC) 20 mg capsule Take 40 mg by mouth daily. On an empty stomach       hydrOXYzine HCL (ATARAX) 25 mg tablet Take 25 mg by mouth three (3) times daily as needed for Anxiety. hydroCHLOROthiazide (HYDRODIURIL) 25 mg tablet TAKE 1 TABLET BY MOUTH DAILY 30 Tab 3    promethazine (PHENERGAN) 12.5 mg tablet Take 2 Tabs by mouth every six (6) hours as needed for Nausea. 20 Tab 0    Humira,CF, Pen 40 mg/0.4 mL injection pen INJECT ONE PEN (40MG) UNDER THE SKIN (SUBCUTANEOUS INJECTION) EVERY 2 WEEKS (Patient not taking: Reported on 1/12/2022) 1 Kit 0    DULoxetine (CYMBALTA) 60 mg capsule Take 1 Cap by mouth daily. (Patient not taking: Reported on 1/12/2022) 30 Cap 5    pantoprazole (PROTONIX) 40 mg tablet Take 40 mg by mouth two (2) times a day. fluticasone propionate (FLONASE) 50 mcg/actuation nasal spray fluticasone propionate 50 mcg/actuation nasal spray,suspension      albuterol (PROVENTIL HFA, VENTOLIN HFA, PROAIR HFA) 90 mcg/actuation inhaler Take 2 Puffs by inhalation every six (6) hours as needed for Wheezing.  (Patient not taking: Reported on 1/12/2022) 1 Inhaler 0    amLODIPine (NORVASC) 2.5 mg tablet take 1 tablet by mouth once daily 30 Tab 1    ondansetron (ZOFRAN ODT) 4 mg disintegrating tablet Take 1 Tab by mouth every eight (8) hours as needed for Nausea. 30 Tab 1    desoximetasone (TOPICORT) 0.25 % topical cream Apply  to affected area two (2) times daily as needed for Skin Irritation. 15 g 0    aspirin 81 mg chewable tablet Take 1 Tab by mouth daily. 27 Tab 0       Past History     Past Medical History:  Past Medical History:   Diagnosis Date    Acid reflux     Acid reflux     Arthritis     Cardiomyopathy ([facility] [address] 2/10/2021    Chronic pain     Elevated troponin 2/8/2021    Heart attack ([facility] [address]     Heart failure (HCC)     Hypertension     IBS (irritable bowel syndrome)     IBS (irritable bowel syndrome) 2/8/2021    Squamous cell carcinoma of skin of right elbow 5/2448    Systolic heart failure ([facility] [address] 2/11/2021    Takotsubo cardiomyopathy 11/16/2015    Tobacco abuse 11/16/2015       Past Surgical History:  Past Surgical History:   Procedure Laterality Date    HX GYN      oophorectomy    HX ORTHOPAEDIC      right knee arthroscopy    HX ORTHOPAEDIC      right thumb    HX ORTHOPAEDIC      back surgeries    HX OTHER SURGICAL      8 route canals    DC ABDOMEN SURGERY PROC UNLISTED      adhesion removal    DC BREAST SURGERY PROCEDURE UNLISTED      lumpectomy    DC COLONOSCOPY FLX DX W/COLLJ SPEC WHEN PFRMD  10/15/2012            Family History:  Family History   Problem Relation Age of Onset    Cancer Father         prostate    Heart Disease Mother     Heart Disease Maternal Grandmother        Social History:  Social History     Tobacco Use    Smoking status: Every Day     Packs/day: 1.00     Types: Cigarettes    Smokeless tobacco: Never    Tobacco comments:     trying to quit 4-5 cigarettes daily   Substance Use Topics    Alcohol use:  Yes     Alcohol/week: 8.3 standard drinks     Types: 10 Glasses of wine per week     Comment: 4 times weekly    Drug use: No       Allergies: Allergies   Allergen Reactions    Ace Inhibitors Swelling    Arb-Angiotensin Receptor Antagonist Other (comments)     Prior ACE inhibitor angioedema, cross reaction risk    Cephalexin Hives     Other reaction(s): anaphylaxis    Codeine Nausea Only    Milk Other (comments)     Lactose intolerant. Morphine Nausea and Vomiting         Review of Systems   Review of Systems   Constitutional:  Negative for chills and fever. HENT:  Negative for congestion and sore throat. Respiratory:  Negative for cough and shortness of breath. Cardiovascular:  Negative for chest pain. Gastrointestinal:  Negative for abdominal pain, diarrhea, nausea and vomiting. Genitourinary:  Negative for dysuria and hematuria. Musculoskeletal:  Negative for myalgias. Skin:  Positive for wound. Negative for rash. Neurological:  Negative for headaches. All other systems reviewed and are negative. Physical Exam   Physical Exam  Vitals and nursing note reviewed. Constitutional:       General: She is not in acute distress. Appearance: She is not toxic-appearing. Comments: Patient well-appearing, nontoxic   HENT:      Head: Atraumatic. Right Ear: External ear normal.      Left Ear: External ear normal.      Nose: Nose normal.      Mouth/Throat:      Mouth: Mucous membranes are moist.   Eyes:      Extraocular Movements: Extraocular movements intact. Conjunctiva/sclera: Conjunctivae normal.   Cardiovascular:      Rate and Rhythm: Normal rate and regular rhythm. Pulses: Normal pulses. Heart sounds: Normal heart sounds. No murmur heard. No friction rub. No gallop. Comments: 2+ dorsalis pedis and tibialis pulses. Patient neurovascular intact distally. Pulmonary:      Effort: Pulmonary effort is normal. No respiratory distress. Breath sounds: Normal breath sounds. No stridor. No wheezing, rhonchi or rales. Abdominal:      General: Abdomen is flat. There is no distension. Palpations: Abdomen is soft. Tenderness: There is no abdominal tenderness. There is no guarding or rebound. Musculoskeletal:         General: No swelling. Cervical back: Neck supple. Skin:     General: Skin is warm. Findings: Erythema and lesion present. Comments: 4 x 4 centimeter abrasion to right pretibial area with surrounding erythema and mild swelling. It has overlying granulation tissue and mild purulence. There is mild surrounding warmth of the skin with cellulitis. No crepitus or pain out of proportion. Neurological:      Mental Status: She is alert and oriented to person, place, and time. Psychiatric:         Mood and Affect: Mood normal.         Behavior: Behavior normal.         Thought Content: Thought content normal.         Judgment: Judgment normal.             Diagnostic Study Results     Labs -     Recent Results (from the past 12 hour(s))   METABOLIC PANEL, COMPREHENSIVE    Collection Time: 08/02/22  6:44 PM   Result Value Ref Range    Sodium 140 136 - 145 mmol/L    Potassium 4.0 3.5 - 5.1 mmol/L    Chloride 107 97 - 108 mmol/L    CO2 29 21 - 32 mmol/L    Anion gap 4 (L) 5 - 15 mmol/L    Glucose 105 (H) 65 - 100 mg/dL    BUN 16 6 - 20 MG/DL    Creatinine 1.53 (H) 0.55 - 1.02 MG/DL    BUN/Creatinine ratio 10 (L) 12 - 20      GFR est AA 41 (L) >60 ml/min/1.73m2    GFR est non-AA 34 (L) >60 ml/min/1.73m2    Calcium 8.6 8.5 - 10.1 MG/DL    Bilirubin, total 0.2 0.2 - 1.0 MG/DL    ALT (SGPT) 30 12 - 78 U/L    AST (SGOT) 36 15 - 37 U/L    Alk.  phosphatase 168 (H) 45 - 117 U/L    Protein, total 6.2 (L) 6.4 - 8.2 g/dL    Albumin 3.2 (L) 3.5 - 5.0 g/dL    Globulin 3.0 2.0 - 4.0 g/dL    A-G Ratio 1.1 1.1 - 2.2     CBC WITH AUTOMATED DIFF    Collection Time: 08/02/22  6:44 PM   Result Value Ref Range    WBC 6.5 3.6 - 11.0 K/uL    RBC 3.19 (L) 3.80 - 5.20 M/uL    HGB 9.3 (L) 11.5 - 16.0 g/dL    HCT 30.2 (L) 35.0 - 47.0 %    MCV 94.7 80.0 - 99.0 FL MCH 29.2 26.0 - 34.0 PG    MCHC 30.8 30.0 - 36.5 g/dL    RDW 15.5 (H) 11.5 - 14.5 %    PLATELET 143 888 - 553 K/uL    MPV 9.3 8.9 - 12.9 FL    NRBC 0.0 0  WBC    ABSOLUTE NRBC 0.00 0.00 - 0.01 K/uL    NEUTROPHILS 57 32 - 75 %    LYMPHOCYTES 24 12 - 49 %    MONOCYTES 14 (H) 5 - 13 %    EOSINOPHILS 4 0 - 7 %    BASOPHILS 1 0 - 1 %    IMMATURE GRANULOCYTES 0 0.0 - 0.5 %    ABS. NEUTROPHILS 3.8 1.8 - 8.0 K/UL    ABS. LYMPHOCYTES 1.6 0.8 - 3.5 K/UL    ABS. MONOCYTES 0.9 0.0 - 1.0 K/UL    ABS. EOSINOPHILS 0.3 0.0 - 0.4 K/UL    ABS. BASOPHILS 0.0 0.0 - 0.1 K/UL    ABS. IMM. GRANS. 0.0 0.00 - 0.04 K/UL    DF AUTOMATED         Radiologic Studies -   DUPLEX LOWER EXT VENOUS RIGHT   Final Result        CT Results  (Last 48 hours)      None          CXR Results  (Last 48 hours)      None              Medical Decision Making   I am the first provider for this patient. I reviewed the vital signs, available nursing notes, past medical history, past surgical history, family history and social history. Vital Signs-Reviewed the patient's vital signs. Patient Vitals for the past 12 hrs:   Temp Pulse Resp BP SpO2   08/02/22 2355 97.7 °F (36.5 °C) 78 16 (!) 171/77 97 %   08/02/22 2155 -- -- -- (!) 173/80 91 %   08/02/22 2130 97.9 °F (36.6 °C) 80 18 (!) 164/150 95 %   08/02/22 1835 97.7 °F (36.5 °C) 90 16 (!) 163/68 98 %       Records Reviewed: Nursing Notes and Old Medical Records    Provider Notes (Medical Decision Making):   Patient evaluated for a concern of infection in the setting of a wound she incurred to her right lower leg 10 days ago while traveling. Given the redness and swelling, she was evaluated for DVT, the ultrasound showed no evidence of this. She did have a superficial abrasion that is not it healed with signs of concerning early cellulitis. She was otherwise well-appearing with no systemic associated symptoms.   I have low concern for deep space infection, to include necrotizing fasciitis or osteomyelitis. Patient had a sufficient vascular exam with strong dorsalis pedis and posterior tibialis pulses. She has no history of PAD, diabetes, or vascular disease otherwise. At this time I feel the risks outweigh the benefits of hospitalization and that patient would better benefit from a trial of antibiotics as an outpatient. Patient was found to have a mild BRIANNA with a creatinine of 1.53. She does not have any signs concerning for renal failure, to include uremia or hyperkalemia. Patient states this has been present in the past.  This is most likely secondary to dehydration given her recent travel and patient was given a liter of fluids in the ED. Hemoglobin was 9.3, though she does report a history of anemia in the past which is confirmed on review of her chart. Patient was advised on the need for follow-up with her PCP to have her labs rechecked. Patient reported an allergy to cephalexin, however was given a trial of Augmentin in the ED which she tolerated well. She will be placed on Augmentin and Doxy as an outpatient and was instructed on a wound check in 48 hours. She was advised on wound care and strict return precautions to the ED for signs of worsening infection. ED Course:   Initial assessment performed. The patients presenting problems have been discussed, and they are in agreement with the care plan formulated and outlined with them. I have encouraged them to ask questions as they arise throughout their visit. Critical Care Time: None    Disposition:  discharged    PLAN:  1. Discharge Medication List as of 8/2/2022 11:46 PM        START taking these medications    Details   HYDROcodone-acetaminophen (Norco) 5-325 mg per tablet Take 1 Tablet by mouth every six (6) hours as needed for Pain for up to 3 days.  Max Daily Amount: 4 Tablets., Normal, Disp-10 Tablet, R-0           CONTINUE these medications which have CHANGED    Details   amoxicillin-clavulanate (Augmentin) 115-125 mg per tablet Take 1 Tablet by mouth two (2) times a day for 10 days. , Normal, Disp-20 Tablet, R-0      bacitracin (BACITRACIN) 500 unit/gram oint Apply 4 g to affected area three (3) times daily for 10 days. Apply to affected area, Normal, Disp-50 g, R-0      doxycycline (VIBRA-TABS) 100 mg tablet Take 1 Tablet by mouth two (2) times a day for 10 days. , Normal, Disp-20 Tablet, R-0           CONTINUE these medications which have NOT CHANGED    Details   busPIRone (BUSPAR) 5 mg tablet TAKE 1 TABLET BY MOUTH TWICE DAILY, Normal, Disp-60 Tablet, R-5      ondansetron hcl (Zofran) 4 mg tablet Take 1 Tablet by mouth every eight (8) hours as needed for Nausea., Normal, Disp-20 Tablet, R-0      sucralfate (Carafate) 1 gram tablet Take 1 Tablet by mouth four (4) times daily. , Normal, Disp-30 Tablet, R-0      carvediloL (COREG) 12.5 mg tablet Take 0.5 Tablets by mouth two (2) times daily (with meals). , Normal, Disp-90 Tablet, R-1      metroNIDAZOLE (METROCREAM) 0.75 % topical cream APPLY TOPICALLY TO THE AFFECTED AREA TWICE DAILY, Historical Med      doxycycline (ADOXA) 100 mg tablet Take 1 Tablet by mouth two (2) times a day., Normal, Disp-14 Tablet, R-0      famotidine (PEPCID) 20 mg tablet Take 1 Tablet by mouth two (2) times a day., Normal, Disp-14 Tablet, R-0      hydrOXYchloroQUINE (PLAQUENIL) 200 mg tablet TAKE 1 TABLET BY MOUTH DAILY, Normal, Disp-30 Tablet, R-0      clotrimazole (MYCELEX) 10 mg jeremy DISSOLVE 1 JEREMY IN MOUTH FIVE TIMES DAILY FOR 1 WEEK, Normal, Disp-35 Tablet, R-2      atorvastatin (LIPITOR) 80 mg tablet TAKE 1 TABLET BY MOUTH DAILY AT BEDTIME, Normal, Disp-90 Tab, R-3      mupirocin (BACTROBAN) 2 % ointment Apply  to affected area three (3) times daily. , Historical Med      dicyclomine (BENTYL) 20 mg tablet Take 10 mg by mouth. Before meals and at bedtime as needed , Historical Med      cetirizine (ZYRTEC) 10 mg tablet Take 10 mg by mouth daily as needed for Allergies. , Historical Med gabapentin (NEURONTIN) 300 mg capsule Take 300 mg by mouth three (3) times daily. Take Morning, afternoon and at bedtime. , Historical Med      omeprazole (PRILOSEC) 20 mg capsule Take 40 mg by mouth daily. On an empty stomach , Historical Med      hydrOXYzine HCL (ATARAX) 25 mg tablet Take 25 mg by mouth three (3) times daily as needed for Anxiety. , Historical Med      hydroCHLOROthiazide (HYDRODIURIL) 25 mg tablet TAKE 1 TABLET BY MOUTH DAILY, Normal, Disp-30 Tab, R-3      promethazine (PHENERGAN) 12.5 mg tablet Take 2 Tabs by mouth every six (6) hours as needed for Nausea., Normal, Disp-20 Tab, R-0      Humira,CF, Pen 40 mg/0.4 mL injection pen INJECT ONE PEN (40MG) UNDER THE SKIN (SUBCUTANEOUS INJECTION) EVERY 2 WEEKS, Normal, Disp-1 Kit, R-0      DULoxetine (CYMBALTA) 60 mg capsule Take 1 Cap by mouth daily. , Normal, Disp-30 Cap, R-5      pantoprazole (PROTONIX) 40 mg tablet Take 40 mg by mouth two (2) times a day., Historical Med      fluticasone propionate (FLONASE) 50 mcg/actuation nasal spray fluticasone propionate 50 mcg/actuation nasal spray,suspension, Historical Med      albuterol (PROVENTIL HFA, VENTOLIN HFA, PROAIR HFA) 90 mcg/actuation inhaler Take 2 Puffs by inhalation every six (6) hours as needed for Wheezing., Normal, Disp-1 Inhaler, R-0      amLODIPine (NORVASC) 2.5 mg tablet take 1 tablet by mouth once daily, Normal, Disp-30 Tab, R-1      ondansetron (ZOFRAN ODT) 4 mg disintegrating tablet Take 1 Tab by mouth every eight (8) hours as needed for Nausea., Normal, Disp-30 Tab, R-1      desoximetasone (TOPICORT) 0.25 % topical cream Apply  to affected area two (2) times daily as needed for Skin Irritation. , Normal, Disp-15 g, R-0      aspirin 81 mg chewable tablet Take 1 Tab by mouth daily. , Print, Disp-30 Tab, R-0           2.    Follow-up Information       Follow up With Specialties Details Why Jhony Cabrales MD Internal Medicine Physician In 2 days Follow up in 2 days for a wound check. You also need to follow up with your PCP to have your kidney function checked. [address] [facility]  [phone]      [facility]  In 2 days Follow up in 2 days for a wound check [address]  [facility] [id number] [phone]    [facility] EMERGENCY DEPT Emergency Medicine  If symptoms worsen [address]  [address] [facility]  [phone]          Return to ED if worse     Diagnosis     Clinical Impression:   1. Cellulitis of right leg    2. BRIANNA (acute kidney injury) ([facility] [address]    3. Wound of right lower extremity, initial encounter          Please note that this dictation was completed with [employer], the computer voice recognition software. Quite often unanticipated grammatical, syntax, homophones, and other interpretive errors are inadvertently transcribed by the computer software. Please disregards these errors. Please excuse any errors that have escaped final proofreading. I was personally available for consultation in the emergency department. I have reviewed the chart and agree with the documentation recorded by the [facility], including the assessment, treatment plan, and disposition.   Meghna Jerome MD

## 2022-08-03 NOTE — DISCHARGE INSTRUCTIONS
You need to follow up at your PCP, and urgent care or the ED for a wound check in 48 hours. You also need to follow up with your PCP to have your kidney function checked. Return to the ED immediately for any signs of worsening infection or new, concerning symptoms, including fevers, body aches, nausea/vomiting, or worsening pain/redness/swelling or purulent drainage.

## 2022-08-04 NOTE — TELEPHONE ENCOUNTER
Pt states she can not wait until 8-12-22 to be seen. She is going to [facility] today. She was told to follow up after ED in two - three days. She is asking why she can't be seen. I advised right now she has the soonest available. Appt was left on books in case pt needs a follow up.

## 2022-08-11 ENCOUNTER — HOSPITAL ENCOUNTER (OUTPATIENT)
Dept: WOUND CARE | Age: 67
Discharge: HOME | End: 2022-08-11
Payer: COMMERCIAL

## 2022-08-11 VITALS
DIASTOLIC BLOOD PRESSURE: 82 MMHG | TEMPERATURE: 98 F | RESPIRATION RATE: 16 BRPM | HEART RATE: 67 BPM | BODY MASS INDEX: 19.29 KG/M2 | WEIGHT: 120 LBS | HEIGHT: 66 IN | SYSTOLIC BLOOD PRESSURE: 157 MMHG

## 2022-08-11 DIAGNOSIS — L97.212 NON-PRESSURE CHRONIC ULCER OF RIGHT CALF WITH FAT LAYER EXPOSED (HCC): ICD-10-CM

## 2022-08-11 DIAGNOSIS — S80.811A ABRASION OF RIGHT LOWER EXTREMITY, INITIAL ENCOUNTER: Primary | ICD-10-CM

## 2022-08-11 PROCEDURE — 87186 SC STD MICRODIL/AGAR DIL: CPT

## 2022-08-11 PROCEDURE — 87077 CULTURE AEROBIC IDENTIFY: CPT

## 2022-08-11 PROCEDURE — 11042 DBRDMT SUBQ TIS 1ST 20SQCM/<: CPT

## 2022-08-11 PROCEDURE — 87205 SMEAR GRAM STAIN: CPT

## 2022-08-11 RX ORDER — SILVER SULFADIAZINE 10 G/1000G
CREAM TOPICAL ONCE
OUTPATIENT
Start: 2022-08-11 | End: 2022-08-11

## 2022-08-11 RX ORDER — MUPIROCIN 20 MG/G
OINTMENT TOPICAL ONCE
OUTPATIENT
Start: 2022-08-11 | End: 2022-08-11

## 2022-08-11 RX ORDER — SILVER SULFADIAZINE 10 G/1000G
CREAM TOPICAL ONCE
Status: CANCELLED | OUTPATIENT
Start: 2022-08-11 | End: 2022-08-11

## 2022-08-11 RX ORDER — BACITRACIN ZINC AND POLYMYXIN B SULFATE 500; 1000 [USP'U]/G; [USP'U]/G
OINTMENT TOPICAL ONCE
OUTPATIENT
Start: 2022-08-11 | End: 2022-08-11

## 2022-08-11 RX ORDER — BETAMETHASONE DIPROPIONATE 0.5 MG/G
OINTMENT TOPICAL ONCE
Status: CANCELLED | OUTPATIENT
Start: 2022-08-11 | End: 2022-08-11

## 2022-08-11 RX ORDER — LIDOCAINE 40 MG/G
CREAM TOPICAL ONCE
OUTPATIENT
Start: 2022-08-11 | End: 2022-08-11

## 2022-08-11 RX ORDER — CLOBETASOL PROPIONATE 0.5 MG/G
OINTMENT TOPICAL ONCE
OUTPATIENT
Start: 2022-08-11 | End: 2022-08-11

## 2022-08-11 RX ORDER — LIDOCAINE HYDROCHLORIDE 40 MG/ML
SOLUTION TOPICAL ONCE
Status: CANCELLED | OUTPATIENT
Start: 2022-08-11 | End: 2022-08-11

## 2022-08-11 RX ORDER — TRIAMCINOLONE ACETONIDE 1 MG/G
OINTMENT TOPICAL ONCE
Status: CANCELLED | OUTPATIENT
Start: 2022-08-11 | End: 2022-08-11

## 2022-08-11 RX ORDER — CLOBETASOL PROPIONATE 0.5 MG/G
OINTMENT TOPICAL ONCE
Status: CANCELLED | OUTPATIENT
Start: 2022-08-11 | End: 2022-08-11

## 2022-08-11 RX ORDER — BACITRACIN 500 [USP'U]/G
OINTMENT TOPICAL ONCE
OUTPATIENT
Start: 2022-08-11 | End: 2022-08-11

## 2022-08-11 RX ORDER — LIDOCAINE 50 MG/G
OINTMENT TOPICAL ONCE
Status: CANCELLED | OUTPATIENT
Start: 2022-08-11 | End: 2022-08-11

## 2022-08-11 RX ORDER — LIDOCAINE HYDROCHLORIDE 20 MG/ML
JELLY TOPICAL ONCE
Status: CANCELLED | OUTPATIENT
Start: 2022-08-11 | End: 2022-08-11

## 2022-08-11 RX ORDER — GENTAMICIN SULFATE 1 MG/G
OINTMENT TOPICAL ONCE
OUTPATIENT
Start: 2022-08-11 | End: 2022-08-11

## 2022-08-11 RX ORDER — LIDOCAINE HYDROCHLORIDE 40 MG/ML
SOLUTION TOPICAL ONCE
OUTPATIENT
Start: 2022-08-11 | End: 2022-08-11

## 2022-08-11 RX ORDER — BACITRACIN ZINC AND POLYMYXIN B SULFATE 500; 1000 [USP'U]/G; [USP'U]/G
OINTMENT TOPICAL ONCE
Status: CANCELLED | OUTPATIENT
Start: 2022-08-11 | End: 2022-08-11

## 2022-08-11 RX ORDER — LIDOCAINE HYDROCHLORIDE 20 MG/ML
15 SOLUTION OROPHARYNGEAL ONCE
OUTPATIENT
Start: 2022-08-11 | End: 2022-08-11

## 2022-08-11 RX ORDER — BETAMETHASONE DIPROPIONATE 0.5 MG/G
OINTMENT TOPICAL ONCE
OUTPATIENT
Start: 2022-08-11 | End: 2022-08-11

## 2022-08-11 RX ORDER — GENTAMICIN SULFATE 1 MG/G
OINTMENT TOPICAL ONCE
Status: CANCELLED | OUTPATIENT
Start: 2022-08-11 | End: 2022-08-11

## 2022-08-11 RX ORDER — LIDOCAINE HYDROCHLORIDE 20 MG/ML
JELLY TOPICAL ONCE
OUTPATIENT
Start: 2022-08-11 | End: 2022-08-11

## 2022-08-11 RX ORDER — MUPIROCIN 20 MG/G
OINTMENT TOPICAL ONCE
Status: CANCELLED | OUTPATIENT
Start: 2022-08-11 | End: 2022-08-11

## 2022-08-11 RX ORDER — TRIAMCINOLONE ACETONIDE 1 MG/G
OINTMENT TOPICAL ONCE
OUTPATIENT
Start: 2022-08-11 | End: 2022-08-11

## 2022-08-11 RX ORDER — BACITRACIN 500 [USP'U]/G
OINTMENT TOPICAL ONCE
Status: CANCELLED | OUTPATIENT
Start: 2022-08-11 | End: 2022-08-11

## 2022-08-11 RX ORDER — LIDOCAINE 40 MG/G
CREAM TOPICAL ONCE
Status: CANCELLED | OUTPATIENT
Start: 2022-08-11 | End: 2022-08-11

## 2022-08-11 RX ORDER — LIDOCAINE 50 MG/G
OINTMENT TOPICAL ONCE
OUTPATIENT
Start: 2022-08-11 | End: 2022-08-11

## 2022-08-11 RX ORDER — LIDOCAINE HYDROCHLORIDE 20 MG/ML
15 SOLUTION OROPHARYNGEAL ONCE
Status: CANCELLED | OUTPATIENT
Start: 2022-08-11 | End: 2022-08-11

## 2022-08-11 NOTE — DISCHARGE INSTRUCTIONS
Discharge Instructions/Wound Orders  [address], [address] [place] [address] Avenue  Telephone: [phone]  NAME:  Roque Calvin  YOB: 1955  MEDICAL RECORD NUMBER:  [id number]  DATE:  8/11/22  Wound Care Orders:  Right lower leg: Cleanse with mild antibacterial soap, rinse and pat dry. Apply betadine to open areas. Place adaptic over areas, cover with gauze. Secure with roll gauze and tape. Change dressing every other day. Single layer tubi  size E. Wear daily, may remove at night for sleeping. Elevate leg when sitting. Dietary:  [x] Diet as tolerated: [] Calorie Diabetic Diet:Low carb and no Sugar [] No Added Salt:[] Increase Protein: [] Other:Limit the amount of liquid you are drinking and avoid drinking in between meals   Activity:  [x] Activity as tolerated:  [] Patient has no activity restrictions     [] Strict Bedrest: [] Remain off Work:     [] May return to full duty work:                                   [] Return to work with restrictions:             Return Appointment:   [x] Return Appointment: With Dr Melody Wheatley in  [facility])  [] Ordered tests:    Electronically signed Keturah Randolph RN on 8/11/2022 at 11:50 R Quinton Mackey Information: Should you experience any significant changes in your wound(s) or have questions about your wound care, please contact the [facility] at [address] [place] [address] 8:00 am - 4:30. If you need help with your wound outside these hours and cannot wait until we are again available, contact your PCP or go to the hospital emergency room. PLEASE NOTE: IF YOU ARE UNABLE TO OBTAIN WOUND SUPPLIES, CONTINUE TO USE THE SUPPLIES YOU HAVE AVAILABLE UNTIL YOU ARE ABLE TO REACH US. IT IS MOST IMPORTANT TO KEEP THE WOUND COVERED AT ALL TIMES.      Physician Signature:_______________________    Date: ___________ Time:  ____________

## 2022-08-11 NOTE — WOUND CARE
[address]   Progress Note and Procedure Note     Freda Lazcano  MEDICAL RECORD NUMBER:  [id number]  AGE: 75 y.o. RACE WHITE/NON-  GENDER: female  : 1955  EPISODE DATE:  2022    Subjective:     Chief Complaint   Patient presents with    Follow-up         HISTORY of PRESENT ILLNESS HPI    Freda Lazcano is a 75 y.o. female who presents today for wound/ulcer evaluation. History of Wound Context: Ms. Claude Blamer is a 66yo female who fell on and escalator 2.5 weeks ago when [facility] in [facility] and scraped her right leg. The leg was treated in [facility]. When she returned a few days later she [facility] because would looked infected. She was prescribed Amoxicilin and Doxycyline which she has been taking.      Wound/Ulcer Pain Timing/Severity: mild  Quality of pain: aching  Severity:  2 / 10   Modifying Factors: Pain is relieved/improved with rest  Associated Signs/Symptoms: edema and erythema    Ulcer Identification:  Ulcer Type: traumatic    Contributing Factors: edema    Wound: right leg         PAST MEDICAL HISTORY    Past Medical History:   Diagnosis Date    Acid reflux     Acid reflux     Arthritis     Cardiomyopathy ([address] 2/10/2021    Chronic pain     Elevated troponin 2021    Heart attack ([address]     Heart failure (HCC)     Hypertension     IBS (irritable bowel syndrome)     IBS (irritable bowel syndrome) 2021    Squamous cell carcinoma of skin of right elbow 9699    Systolic heart failure ([address] 2021    Takotsubo cardiomyopathy 2015    Tobacco abuse 2015        PAST SURGICAL HISTORY    Past Surgical History:   Procedure Laterality Date    HX GYN      oophorectomy    HX ORTHOPAEDIC      right knee arthroscopy    HX ORTHOPAEDIC      right thumb    HX ORTHOPAEDIC      back surgeries    HX OTHER SURGICAL      8 route canals    SD ABDOMEN SURGERY PROC UNLISTED      adhesion removal    SD BREAST SURGERY PROCEDURE UNLISTED      lumpectomy OR COLONOSCOPY FLX DX W/COLLJ SPEC WHEN PFRMD  10/15/2012            FAMILY HISTORY    Family History   Problem Relation Age of Onset    Cancer Father         prostate    Heart Disease Mother     Heart Disease Maternal Grandmother        SOCIAL HISTORY    Social History     Tobacco Use    Smoking status: Every Day     Packs/day: 1.00     Types: Cigarettes    Smokeless tobacco: Never    Tobacco comments:     trying to quit 4-5 cigarettes daily   Substance Use Topics    Alcohol use: Yes     Alcohol/week: 8.3 standard drinks     Types: 10 Glasses of wine per week     Comment: 4 times weekly    Drug use: No       ALLERGIES    Allergies   Allergen Reactions    Ace Inhibitors Swelling    Arb-Angiotensin Receptor Antagonist Other (comments)     Prior ACE inhibitor angioedema, cross reaction risk    Cephalexin Hives     Other reaction(s): anaphylaxis    Codeine Nausea Only    Milk Other (comments)     Lactose intolerant. Morphine Nausea and Vomiting       MEDICATIONS    Current Outpatient Medications on File Prior to Encounter   Medication Sig Dispense Refill    amoxicillin-clavulanate (Augmentin) 875-125 mg per tablet Take 1 Tablet by mouth two (2) times a day for 10 days. 20 Tablet 0    bacitracin (BACITRACIN) 500 unit/gram oint Apply 4 g to affected area three (3) times daily for 10 days. Apply to affected area 50 g 0    doxycycline (VIBRA-TABS) 100 mg tablet Take 1 Tablet by mouth two (2) times a day for 10 days. 20 Tablet 0    busPIRone (BUSPAR) 5 mg tablet TAKE 1 TABLET BY MOUTH TWICE DAILY (Patient not taking: Reported on 8/11/2022) 60 Tablet 5    ondansetron hcl (Zofran) 4 mg tablet Take 1 Tablet by mouth every eight (8) hours as needed for Nausea. 20 Tablet 0    sucralfate (Carafate) 1 gram tablet Take 1 Tablet by mouth four (4) times daily. 30 Tablet 0    carvediloL (COREG) 12.5 mg tablet Take 0.5 Tablets by mouth two (2) times daily (with meals).  90 Tablet 1    metroNIDAZOLE (METROCREAM) 0.75 % topical cream APPLY TOPICALLY TO THE AFFECTED AREA TWICE DAILY      doxycycline (ADOXA) 100 mg tablet Take 1 Tablet by mouth two (2) times a day. 14 Tablet 0    famotidine (PEPCID) 20 mg tablet Take 1 Tablet by mouth two (2) times a day. 14 Tablet 0    hydrOXYchloroQUINE (PLAQUENIL) 200 mg tablet TAKE 1 TABLET BY MOUTH DAILY 30 Tablet 0    clotrimazole (MYCELEX) 10 mg jeremy DISSOLVE 1 JEREMY IN MOUTH FIVE TIMES DAILY FOR 1 WEEK (Patient not taking: Reported on 1/12/2022) 35 Tablet 2    atorvastatin (LIPITOR) 80 mg tablet TAKE 1 TABLET BY MOUTH DAILY AT BEDTIME 90 Tab 3    mupirocin (BACTROBAN) 2 % ointment Apply  to affected area three (3) times daily. (Patient not taking: Reported on 1/12/2022)      dicyclomine (BENTYL) 20 mg tablet Take 10 mg by mouth. Before meals and at bedtime as needed       cetirizine (ZYRTEC) 10 mg tablet Take 10 mg by mouth daily as needed for Allergies. gabapentin (NEURONTIN) 300 mg capsule Take 300 mg by mouth three (3) times daily. Take Morning, afternoon and at bedtime. omeprazole (PRILOSEC) 20 mg capsule Take 40 mg by mouth daily. On an empty stomach       hydrOXYzine HCL (ATARAX) 25 mg tablet Take 25 mg by mouth three (3) times daily as needed for Anxiety. hydroCHLOROthiazide (HYDRODIURIL) 25 mg tablet TAKE 1 TABLET BY MOUTH DAILY 30 Tab 3    promethazine (PHENERGAN) 12.5 mg tablet Take 2 Tabs by mouth every six (6) hours as needed for Nausea. 20 Tab 0    Humira,CF, Pen 40 mg/0.4 mL injection pen INJECT ONE PEN (40MG) UNDER THE SKIN (SUBCUTANEOUS INJECTION) EVERY 2 WEEKS (Patient not taking: Reported on 1/12/2022) 1 Kit 0    DULoxetine (CYMBALTA) 60 mg capsule Take 1 Cap by mouth daily. (Patient not taking: Reported on 1/12/2022) 30 Cap 5    pantoprazole (PROTONIX) 40 mg tablet Take 40 mg by mouth two (2) times a day.       fluticasone propionate (FLONASE) 50 mcg/actuation nasal spray fluticasone propionate 50 mcg/actuation nasal spray,suspension      albuterol (PROVENTIL HFA, VENTOLIN HFA, PROAIR HFA) 90 mcg/actuation inhaler Take 2 Puffs by inhalation every six (6) hours as needed for Wheezing. (Patient not taking: Reported on 1/12/2022) 1 Inhaler 0    amLODIPine (NORVASC) 2.5 mg tablet take 1 tablet by mouth once daily 30 Tab 1    ondansetron (ZOFRAN ODT) 4 mg disintegrating tablet Take 1 Tab by mouth every eight (8) hours as needed for Nausea. 30 Tab 1    desoximetasone (TOPICORT) 0.25 % topical cream Apply  to affected area two (2) times daily as needed for Skin Irritation. 15 g 0    aspirin 81 mg chewable tablet Take 1 Tab by mouth daily. 30 Tab 0     No current facility-administered medications on file prior to encounter. REVIEW OF SYSTEMS    no weight loss, night sweats, fatigue / malaise / lethargy. Musculoskeletal: no joint / extremity pain, misalignment, stiffness, decreased ROM, crepitus. Integument: No pruritis, rashes, lesions, right leg wound. Psychiatric: negative for anxiety and depression    Objective:     Visit Vitals  BP (!) 157/82 (BP 1 Location: Left upper arm, BP Patient Position: At rest)   Pulse 67   Temp 98 °F (36.7 °C)   Resp 16   Ht 5' 6\" (1.676 m)   Wt 54.4 kg (120 lb)   BMI 19.37 kg/m²       Wt Readings from Last 3 Encounters:   08/11/22 54.4 kg (120 lb)   08/02/22 59.8 kg (131 lb 13.4 oz)   03/29/22 45.4 kg (100 lb)       PHYSICAL EXAM    Vascular:  B/L LE  DP 1/4; PT 1/4  capillary fill time brisk, non-pitting edema is present, skin temperature is cool, varicosities are present. Dermatological:     Wound: 1  Location: Right leg  Measurements: per RN note  Margins: edematous  Drainage: serous  Odor: none  Wound base: 50% fibrotic, 50% granular  Lymphangitic streaking? No.  Undermining? No.  Sinus tracts? No.  Exposed bone? No.  Subcutaneous crepitation on palpation? No.     Nails WNL     There is no maceration of the interspaces of the feet b/l.        Neurological:  DTR are present, protective sensation per 5.07 Oden Carmen monofilament is intact, patient is AAOx3, mood is normal. Epicritic sensation is intact. Orthopedic:  B/L LE are symmetric, ROM of ankle, STJ, 1st MTPJ is limited, MMT 5 out of 5 for B/L LE. No pedal amputations. Constitutional: Well developed, pleasant female in good health     Lymphatics: negative tenderness to palpation of neck/axillary/inguinal nodes.      Imaging / Labs / Cx / Px:  N/a    Assessment:      Problem List Items Addressed This Visit    None      Wound Pretibial Proximal;Right (Active)   Wound Image   08/11/22 1106   Wound Etiology Traumatic 08/11/22 1106   Dressing Status Intact 08/11/22 1106   Cleansed Irrigated with saline 08/11/22 1106   Wound Length (cm) 4 cm 08/11/22 1106   Wound Width (cm) 54 cm 08/11/22 1106   Wound Depth (cm) 0.1 cm 08/11/22 1106   Wound Surface Area (cm^2) 216 cm^2 08/11/22 1106   Wound Volume (cm^3) 21.6 cm^3 08/11/22 1106   Wound Assessment Pink/red 08/11/22 1106   Drainage Amount Small 08/11/22 1106   Drainage Description Serosanguinous 08/11/22 1106   Wound Odor None 08/11/22 1106   Seema-Wound/Incision Assessment Blanchable erythema 08/11/22 1106   Edges Defined edges 08/11/22 1106   Wound Thickness Description Full thickness 08/11/22 1106   Number of days: 0       Wound Pretibial Right;Lateral (Active)   Wound Image   08/11/22 1106   Wound Etiology Traumatic 08/11/22 1106   Dressing Status Intact 08/11/22 1106   Cleansed Cleansed with saline 08/11/22 1106   Wound Length (cm) 2.5 cm 08/11/22 1106   Wound Width (cm) 2.5 cm 08/11/22 1106   Wound Depth (cm) 0.1 cm 08/11/22 1106   Wound Surface Area (cm^2) 6.25 cm^2 08/11/22 1106   Wound Volume (cm^3) 0.625 cm^3 08/11/22 1106   Wound Assessment Pink/red 08/11/22 1106   Drainage Amount Small 08/11/22 1106   Drainage Description Serosanguinous 08/11/22 1106   Wound Odor None 08/11/22 1106   Seema-Wound/Incision Assessment Blanchable erythema 08/11/22 1106   Edges Undefined edges 08/11/22 1106   Wound Thickness Description Full thickness 08/11/22 1106   Number of days: 0       Debridement Wound Care        Problem List Items Addressed This Visit    None      Procedure Note  Indications:  Based on my examination of this patient's wound(s)/ulcer(s) today, debridement is required to promote healing and evaluate the wound base.     Performed by: Sky Navas DPM    Consent obtained: Yes    Time out taken: Yes    Debridement: Excisional    Using curette the wound(s)/ulcer(s) was/were sharply debrided down through and including the removal of    subcutaneous tissue    Devitalized Tissue Debrided: slough    Pre Debridement Measurements:  Are located in the Wound/Ulcer Documentation Flow Sheet    Non-Pressure ulcer, fat layer exposed    Wound/Ulcer #: 1    Post Debridement Measurements:  Wound/Ulcer Descriptions are Pre Debridement except measurements:    Wound Pretibial Proximal;Right (Active)   Wound Image   08/11/22 1106   Wound Etiology Traumatic 08/11/22 1106   Dressing Status Intact 08/11/22 1106   Cleansed Irrigated with saline 08/11/22 1106   Wound Length (cm) 4 cm 08/11/22 1106   Wound Width (cm) 54 cm 08/11/22 1106   Wound Depth (cm) 0.1 cm 08/11/22 1106   Wound Surface Area (cm^2) 216 cm^2 08/11/22 1106   Wound Volume (cm^3) 21.6 cm^3 08/11/22 1106   Wound Assessment Pink/red 08/11/22 1106   Drainage Amount Small 08/11/22 1106   Drainage Description Serosanguinous 08/11/22 1106   Wound Odor None 08/11/22 1106   Seema-Wound/Incision Assessment Blanchable erythema 08/11/22 1106   Edges Defined edges 08/11/22 1106   Wound Thickness Description Full thickness 08/11/22 1106   Number of days: 0       Wound Pretibial Right;Lateral (Active)   Wound Image   08/11/22 1106   Wound Etiology Traumatic 08/11/22 1106   Dressing Status Intact 08/11/22 1106   Cleansed Cleansed with saline 08/11/22 1106   Wound Length (cm) 2.5 cm 08/11/22 1106   Wound Width (cm) 2.5 cm 08/11/22 1106   Wound Depth (cm) 0.1 cm 08/11/22 1106   Wound Surface Area (cm^2) 6.25 cm^2 08/11/22 1106   Wound Volume (cm^3) 0.625 cm^3 08/11/22 1106   Wound Assessment Pink/red 08/11/22 1106   Drainage Amount Small 08/11/22 1106   Drainage Description Serosanguinous 08/11/22 1106   Wound Odor None 08/11/22 1106   Seema-Wound/Incision Assessment Blanchable erythema 08/11/22 1106   Edges Undefined edges 08/11/22 1106   Wound Thickness Description Full thickness 08/11/22 1106   Number of days: 0        Total Surface Area Debrided:  <20 sq cm     Estimated Blood Loss:  Minimal     Hemostasis Achieved: Pressure    Procedural Pain: 2 / 10     Post Procedural Pain: 4 / 10     Response to treatment: Patient tolerated treatment with mild discomfort but relieved after procedure was completed    Plan:     Right leg abrasion (I93.580Y)    Right leg ulcer to fat (L97.212)    - Pt seen and evaluated  - Pt presents with right leg wound  - Wound debrided per procedure note above  - Wound care with betadine and adaptic  - Compression with tubigrip  - Pt to complete antibiotics  - Follow up in 1 week    Treatment Note please see attached Discharge Instructions    Written patient dismissal instructions given to patient or POA.          Electronically signed by Silvia Argueta DPM on 8/11/2022 at 11:56 AM

## 2022-08-11 NOTE — WOUND CARE
08/11/22 1211   Right Leg Edema Point of Measurement   Compression Therapy Tubular elastic support bandage   Tubular Elastic Support Bandage Compression Pressure Low   Wound Pretibial Proximal;Right   Date First Assessed/Time First Assessed: 08/11/22 1104   Present on Hospital Admission: Yes  Wound Approximate Age at First Assessment (Weeks): 2 weeks  Location: Pretibial  Wound Location Orientation: Proximal;Right   Dressing Status New dressing applied   Dressing/Treatment Betadine swabs/Povidone Iodine;Non-adherent;Gauze dressing/dressing sponge;Roll gauze;Tape/Soft cloth adhesive tape   Wound Pretibial Right;Lateral   Date First Assessed/Time First Assessed: 08/11/22 1105   Present on Hospital Admission: Yes  Wound Approximate Age at First Assessment (Weeks): 2 weeks  Location: Pretibial  Wound Location Orientation: Right;Lateral   Dressing Status New dressing applied   Dressing/Treatment Betadine swabs/Povidone Iodine;Non-adherent;Gauze dressing/dressing sponge;Roll gauze;Tape/Soft cloth adhesive tape

## 2022-08-11 NOTE — WOUND CARE
08/11/22 1106   Wound Pretibial Proximal;Right   Date First Assessed/Time First Assessed: 08/11/22 1104   Present on Hospital Admission: Yes  Wound Approximate Age at First Assessment (Weeks): 2 weeks  Location: Pretibial  Wound Location Orientation: Proximal;Right   Wound Image    Wound Etiology Traumatic   Dressing Status Intact   Cleansed Irrigated with saline   Wound Length (cm) 4 cm   Wound Width (cm) 54 cm   Wound Depth (cm) 0.1 cm   Wound Surface Area (cm^2) 216 cm^2   Wound Volume (cm^3) 21.6 cm^3   Wound Assessment Pink/red   Drainage Amount Small   Drainage Description Serosanguinous   Wound Odor None   Seema-Wound/Incision Assessment Blanchable erythema   Edges Defined edges   Wound Thickness Description Full thickness   Wound Pretibial Right;Lateral   Date First Assessed/Time First Assessed: 08/11/22 1105   Present on Hospital Admission: Yes  Wound Approximate Age at First Assessment (Weeks): 2 weeks  Location: Pretibial  Wound Location Orientation: Right;Lateral   Wound Image    Wound Etiology Traumatic   Dressing Status Intact   Cleansed Cleansed with saline   Wound Length (cm) 2.5 cm   Wound Width (cm) 2.5 cm   Wound Depth (cm) 0.1 cm   Wound Surface Area (cm^2) 6.25 cm^2   Wound Volume (cm^3) 0.625 cm^3   Wound Assessment Pink/red   Drainage Amount Small   Drainage Description Serosanguinous   Wound Odor None   Seema-Wound/Incision Assessment Blanchable erythema   Edges Undefined edges   Wound Thickness Description Full thickness   Pain 1   Pain Scale 1 Numeric (0 - 10)   Pain Intensity 1 6   Patient Stated Pain Goal 3   Pain Description 1 Burning;Aching; Olam Butler; Stabbing   Visit Vitals  BP (!) 157/82 (BP 1 Location: Left upper arm, BP Patient Position: At rest)   Pulse 67   Temp 98 °F (36.7 °C)   Resp 16   Ht 5' 6\" (1.676 m)   Wt 54.4 kg (120 lb)   BMI 19.37 kg/m²

## 2022-08-26 LAB
BACTERIA SPEC CULT: ABNORMAL
GRAM STN SPEC: ABNORMAL
SERVICE CMNT-IMP: ABNORMAL

## 2023-01-02 RX ORDER — BUSPIRONE HYDROCHLORIDE 5 MG/1
TABLET ORAL
Qty: 60 TABLET | Refills: 5 | Status: SHIPPED | OUTPATIENT
Start: 2023-01-02

## 2023-01-05 ENCOUNTER — TRANSCRIBE ORDER (OUTPATIENT)
Dept: SCHEDULING | Age: 68
End: 2023-01-05

## 2023-01-05 DIAGNOSIS — K30 DELAYED GASTRIC EMPTYING: Primary | ICD-10-CM

## 2023-01-05 DIAGNOSIS — R10.11 RUQ PAIN: ICD-10-CM

## 2023-03-09 ENCOUNTER — TRANSCRIBE ORDER (OUTPATIENT)
Dept: SCHEDULING | Age: 68
End: 2023-03-09

## 2023-03-09 DIAGNOSIS — R10.11 RUQ PAIN: ICD-10-CM

## 2023-03-09 DIAGNOSIS — K30 DELAYED GASTRIC EMPTYING: Primary | ICD-10-CM

## 2023-04-22 DIAGNOSIS — R10.11 RUQ PAIN: ICD-10-CM

## 2023-04-22 DIAGNOSIS — K30 DELAYED GASTRIC EMPTYING: Primary | ICD-10-CM

## 2023-06-22 RX ORDER — LORATADINE 10 MG/1
10 CAPSULE, LIQUID FILLED ORAL DAILY
COMMUNITY

## 2023-06-22 RX ORDER — HYDROCODONE BITARTRATE AND ACETAMINOPHEN 10; 325 MG/1; MG/1
1 TABLET ORAL AS NEEDED
COMMUNITY

## 2023-06-22 RX ORDER — NYSTATIN 100000 U/G
CREAM TOPICAL PRN
Status: ON HOLD | COMMUNITY
End: 2023-06-23

## 2023-06-23 ENCOUNTER — ANESTHESIA EVENT (OUTPATIENT)
Facility: HOSPITAL | Age: 68
End: 2023-06-23
Payer: MEDICARE

## 2023-06-23 ENCOUNTER — ANESTHESIA (OUTPATIENT)
Facility: HOSPITAL | Age: 68
End: 2023-06-23
Payer: MEDICARE

## 2023-06-23 ENCOUNTER — HOSPITAL ENCOUNTER (OUTPATIENT)
Facility: HOSPITAL | Age: 68
Setting detail: OUTPATIENT SURGERY
Discharge: HOME | End: 2023-06-23
Attending: INTERNAL MEDICINE | Admitting: INTERNAL MEDICINE
Payer: COMMERCIAL

## 2023-06-23 VITALS
WEIGHT: 133.3 LBS | HEART RATE: 66 BPM | RESPIRATION RATE: 19 BRPM | DIASTOLIC BLOOD PRESSURE: 81 MMHG | BODY MASS INDEX: 21.42 KG/M2 | OXYGEN SATURATION: 97 % | SYSTOLIC BLOOD PRESSURE: 220 MMHG | TEMPERATURE: 97.6 F | HEIGHT: 66 IN

## 2023-06-23 PROCEDURE — 6370000000 HC RX 637 (ALT 250 FOR IP): Performed by: INTERNAL MEDICINE

## 2023-06-23 PROCEDURE — 3600007512: Performed by: INTERNAL MEDICINE

## 2023-06-23 PROCEDURE — 6360000002 HC RX W HCPCS: Performed by: NURSE ANESTHETIST, CERTIFIED REGISTERED

## 2023-06-23 PROCEDURE — 2580000003 HC RX 258: Performed by: INTERNAL MEDICINE

## 2023-06-23 PROCEDURE — 3700000000 HC ANESTHESIA ATTENDED CARE: Performed by: INTERNAL MEDICINE

## 2023-06-23 PROCEDURE — 7100000010 HC PHASE II RECOVERY - FIRST 15 MIN: Performed by: INTERNAL MEDICINE

## 2023-06-23 PROCEDURE — 3600007502: Performed by: INTERNAL MEDICINE

## 2023-06-23 PROCEDURE — 2500000003 HC RX 250 WO HCPCS: Performed by: NURSE ANESTHETIST, CERTIFIED REGISTERED

## 2023-06-23 PROCEDURE — 88305 TISSUE EXAM BY PATHOLOGIST: CPT

## 2023-06-23 PROCEDURE — 2709999900 HC NON-CHARGEABLE SUPPLY: Performed by: INTERNAL MEDICINE

## 2023-06-23 PROCEDURE — 3700000001 HC ADD 15 MINUTES (ANESTHESIA): Performed by: INTERNAL MEDICINE

## 2023-06-23 RX ORDER — SODIUM CHLORIDE 0.9 % (FLUSH) 0.9 %
5-40 SYRINGE (ML) INJECTION EVERY 12 HOURS SCHEDULED
Status: DISCONTINUED | OUTPATIENT
Start: 2023-06-23 | End: 2023-06-23 | Stop reason: HOSPADM

## 2023-06-23 RX ORDER — LIDOCAINE HYDROCHLORIDE 20 MG/ML
INJECTION, SOLUTION EPIDURAL; INFILTRATION; INTRACAUDAL; PERINEURAL PRN
Status: DISCONTINUED | OUTPATIENT
Start: 2023-06-23 | End: 2023-06-23 | Stop reason: SDUPTHER

## 2023-06-23 RX ORDER — SODIUM CHLORIDE 9 MG/ML
25 INJECTION, SOLUTION INTRAVENOUS PRN
Status: DISCONTINUED | OUTPATIENT
Start: 2023-06-23 | End: 2023-06-23 | Stop reason: HOSPADM

## 2023-06-23 RX ORDER — SODIUM CHLORIDE 0.9 % (FLUSH) 0.9 %
5-40 SYRINGE (ML) INJECTION PRN
Status: DISCONTINUED | OUTPATIENT
Start: 2023-06-23 | End: 2023-06-23 | Stop reason: HOSPADM

## 2023-06-23 RX ORDER — SIMETHICONE 20 MG/.3ML
40 EMULSION ORAL EVERY 6 HOURS PRN
Status: DISCONTINUED | OUTPATIENT
Start: 2023-06-23 | End: 2023-06-23 | Stop reason: HOSPADM

## 2023-06-23 RX ADMIN — PROPOFOL 460 MG: 10 INJECTION, EMULSION INTRAVENOUS at 08:28

## 2023-06-23 RX ADMIN — SODIUM CHLORIDE: 9 INJECTION, SOLUTION INTRAVENOUS at 07:52

## 2023-06-23 RX ADMIN — SIMETHICONE 40 MG: 20 SUSPENSION/ DROPS ORAL at 08:30

## 2023-06-23 RX ADMIN — LIDOCAINE HYDROCHLORIDE 100 MG: 20 INJECTION, SOLUTION EPIDURAL; INFILTRATION; INTRACAUDAL; PERINEURAL at 07:57

## 2023-06-23 ASSESSMENT — PAIN - FUNCTIONAL ASSESSMENT: PAIN_FUNCTIONAL_ASSESSMENT: 0-10

## 2023-06-23 ASSESSMENT — PAIN DESCRIPTION - DESCRIPTORS: DESCRIPTORS: OTHER (COMMENT)

## 2023-06-23 NOTE — H&P
Gastroenterology Outpatient History and Physical    Patient: Alex Chahal    Physician: Hipolito Schilder, MD    Chief Complaint: RUQ AP  History of Present Illness: H/o colon polyps    History:  Past Medical History:   Diagnosis Date    Acid reflux     Arthritis     Bronchitis     Cardiomyopathy ([address] 2/10/2021    Chronic pain     COVID     Elevated troponin 2/8/2021    Gastroparesis     Heart attack ([address]     Heart failure ([address]     Hypertension     IBS (irritable bowel syndrome) 2/8/2021    Squamous cell carcinoma of skin of right elbow 1/7267    Systolic heart failure ([address] 2/11/2021    Takotsubo cardiomyopathy 11/16/2015    Tobacco abuse 11/16/2015      Past Surgical History:   Procedure Laterality Date    BREAST SURGERY      lumpectomy    CARDIAC CATHETERIZATION      MI hx    COLONOSCOPY FLX DX W/COLLJ SPEC WHEN PFRMD  10/15/2012         EYE SURGERY Bilateral     cataract extraction    GYN Bilateral     oophorectomy/ectopic hx    HYSTERECTOMY (CERVIX STATUS UNKNOWN)      JOINT REPLACEMENT Right     replacement    ORTHOPEDIC SURGERY      back surgeries/cervical spine also    ORTHOPEDIC SURGERY      right knee arthroscopy    ORTHOPEDIC SURGERY      right thumb    OTHER SURGICAL HISTORY      8 route canals    SC UNLISTED PROCEDURE ABDOMEN PERITONEUM & OMENTUM      adhesion removal    TONSILLECTOMY        Social History     Socioeconomic History    Marital status:      Spouse name: None    Number of children: None    Years of education: None    Highest education level: None   Tobacco Use    Smoking status: Every Day     Packs/day: 0.25     Years: 40.00     Pack years: 10.00     Types: Cigarettes    Smokeless tobacco: Never   Vaping Use    Vaping Use: Never used   Substance and Sexual Activity    Alcohol use:  Yes     Alcohol/week: 2.0 standard drinks     Types: 2 Glasses of wine per week    Drug use: Yes     Comment: couple times a month      Family History   Problem Relation Age of Onset    Cancer

## 2023-06-23 NOTE — OP NOTE
NAME:  Ulysses Abrahams   :   1955   MRN:   [id number]     Date/Time:  2023 8:29 AM    Colonoscopy Operative Report    Procedure Type:   Colonoscopy with polypectomy (cold snare)     Indications:     Personal history of colon polyps (screening only)  Pre-operative Diagnosis: see indication above  Post-operative Diagnosis:  See findings below  :  Malik Cid MD  Referring Provider: --Camilla Carmichael MD    Exam:  Airway: clear, no airway problems anticipated  Heart: RRR, without gallops or rubs  Lungs: clear bilaterally without wheezes, crackles, or rhonchi  Abdomen: soft, nontender, nondistended, bowel sounds present  Mental Status: awake, alert and oriented to person, place and time    Sedation:  MAC anesthesia Propofol  Procedure Details:  After informed consent was obtained with all risks and benefits of procedure explained and preoperative exam completed, the patient was taken to the endoscopy suite and placed in the left lateral decubitus position. Upon sequential sedation as per above, a digital rectal exam was performed demonstrating internal hemorrhoids. The Olympus videocolonoscope  was inserted in the rectum and carefully advanced to the cecum, which was identified by the ileocecal valve and appendiceal orifice. The quality of preparation was good. The colonoscope was slowly withdrawn with careful evaluation between folds. Retroflexion in the rectum was completed demonstrating internal hemorrhoids. Findings:   Five 3-8 mm sessile polyps in cecum. Removed by cold snare polypectomy  10 mm sessile polyp in ascending colon. Removed by cold snare polypectomy  Two 2-7 mm sessile polyps in sigmoid colon. Removed by cold snare polypectomy  Small internal hemorrhoids seen on retroflexion  Otherwise normal colonoscopy through to the cecum    Specimen Removed:  1. Ascending colon polyp 2. Cecal polyps 3. Sigmoid polyps  Complications: None. EBL:  None.     Impression:    Five

## 2023-06-23 NOTE — OP NOTE
NAME:  Romy Ackerman   :   1955   MRN:   [id number]     Date/Time:  2023 8:04 AM    Esophagogastroduodenoscopy (EGD) Procedure Note    Procedure: Esophagogastroduodenoscopy with biopsy    Indication: Abdominal pain, RUQ  Pre-operative Diagnosis: see indication above  Post-operative Diagnosis: see findings below  :  Piper Rodriguez MD  Referring Provider:   --Sintia Gale MD    Exam:  Airway: clear, no airway problems anticipated  Heart: RRR, without gallops or rubs  Lungs: clear bilaterally without wheezes, crackles, or rhonchi  Abdomen: soft, nontender, nondistended, bowel sounds present  Mental Status: awake, alert and oriented to person, place and time     Anethesia/Sedation:  MAC anesthesia Propofol  Procedure Details   After informed consent was obtained for the procedure, with all risks and benefits of procedure explained the patient was taken to the endoscopy suite and placed in the left lateral decubitus position. Following sequential administration of sedation as per above, the [id number] gastroscope was inserted into the mouth and advanced under direct vision to third portion of the duodenum. A careful inspection was made as the gastroscope was withdrawn, including a retroflexed view of the proximal stomach; findings and interventions are described below. Findings:   Normal proximal, mid, and distal esophagus. Biopsies taken of GE junction  Moderate, segmental, non-erosive gastropathy in body and antrum. Biopsied  Stomach otherwise normal, including retroflexion  Normal duodenal bulb and 2nd/3rd portion of the duodenum. Biopsied    Therapies:  1. Biopsies    Specimens: 1. Duodenum 2. Gastric 3. GE junction    EBL:  None. Complications:   None; patient tolerated the procedure well. Impression:    Normal proximal, mid, and distal esophagus. Biopsies taken of GE junction  Moderate, segmental, non-erosive gastropathy in body and antrum.

## 2023-06-23 NOTE — PROGRESS NOTES
The risks and benefits of the bite block have been explained to patient. Patient verbalizes understanding. ARRIVAL INFORMATION:  Verified patient name and date of birth, scheduled procedure, and informed consent. : Starla Munoz [phone] (spouse) contact number:  Physician and staff can share information with the . Belongings with patient include:  Clothing,Glasses, Dentures lower, Jewelry, three rings and earrings left on patient. Purse left with          GI FOCUSED ASSESSMENT:  Neuro: Awake, alert, oriented x4  Respiratory: even and unlabored   GI: soft and non-distended  EKG Rhythm: normal sinus rhythm    Education:Reviewed general discharge instructions and  information.

## 2023-06-23 NOTE — PROGRESS NOTES
Endoscope was pre-cleaned at bedside immediately following procedure by Louie Umana and glasses transported to recover with patient.

## 2023-06-23 NOTE — ANESTHESIA PRE PROCEDURE
Department of Anesthesiology  Preprocedure Note       Name:  Josef Villa   Age:  70 y.o.  :  1955                                          MRN:  [id number]         Date:  2023      Surgeon: Isauro Coker):  Willow Ramirez MD    Procedure: Procedure(s):  COLONOSCOPY WITH BIOPSY/POLYP  EGD ESOPHAGOGASTRODUODENOSCOPY WITH BIOPSY    Medications prior to admission:   Prior to Admission medications    Medication Sig Start Date End Date Taking? Authorizing Provider   loratadine (CLARITIN) 10 MG capsule Take 1 capsule by mouth daily   Yes Historical Provider, MD   METOCLOPRAMIDE HCL PO Take 5 mg by mouth 4 times daily   Yes Historical Provider, MD   nystatin (MYCOSTATIN) 046664 UNIT/GM cream Apply topically as needed for Dry Skin Apply topically 2 times daily. Yes Historical Provider, MD   HYDROcodone-acetaminophen (NORCO)  MG per tablet Take 1 tablet by mouth as needed for Pain. Yes Historical Provider, MD   Nicotine (NICODERM CQ TD) Place onto the skin daily   Yes Historical Provider, MD   amLODIPine (NORVASC) 2.5 MG tablet 2 tablets  in the morning and 2 tablets in the evening. 19   Ar Automatic Reconciliation   atorvastatin (LIPITOR) 80 MG tablet 1 tablet daily 5/3/21   Ar Automatic Reconciliation   busPIRone (BUSPAR) 5 MG tablet TAKE 1 TABLET BY MOUTH TWICE DAILY 23   Ar Automatic Reconciliation   carvedilol (COREG) 12.5 MG tablet Take by mouth 2 times daily (with meals) 22   Ar Automatic Reconciliation   dicyclomine (BENTYL) 20 MG tablet Take 1 tablet by mouth daily    Ar Automatic Reconciliation   DULoxetine (CYMBALTA) 60 MG extended release capsule Take by mouth daily 20   Ar Automatic Reconciliation   fluticasone (FLONASE) 50 MCG/ACT nasal spray as needed    Ar Automatic Reconciliation   gabapentin (NEURONTIN) 300 MG capsule Take by mouth 3 times daily.     Ar Automatic Reconciliation   ondansetron (ZOFRAN-ODT) 4 MG disintegrating tablet Take by mouth every 8 hours as

## 2023-06-23 NOTE — ANESTHESIA POSTPROCEDURE EVALUATION
Department of Anesthesiology  Postprocedure Note    Patient: Renato Portillo  MRN: [id number]  YOB: 1955  Date of evaluation: 6/23/2023      Procedure Summary     Date: 06/23/23 Room / Location: [facility] ENDO 03 / [facility] ENDOSCOPY    Anesthesia Start: 0757 Anesthesia Stop: 0833    Procedures:       COLONOSCOPY WITH BIOPSY/POLYP      EGD ESOPHAGOGASTRODUODENOSCOPY WITH BIOPSY Diagnosis:       Gastroparesis      RUQ pain      (Gastroparesis [K31.84])      (RUQ pain [R10.11])    Surgeons: Andre Haas MD Responsible Provider: Garth Levin MD    Anesthesia Type: MAC ASA Status: 3          Anesthesia Type: MAC    Pan Phase I: Pan Score: 10    Pan Phase II:        Anesthesia Post Evaluation    Patient location during evaluation: PACU  Patient participation: complete - patient participated  Level of consciousness: awake and alert  Airway patency: patent  Nausea & Vomiting: no nausea  Complications: no  Cardiovascular status: hemodynamically stable  Respiratory status: acceptable  Hydration status: euvolemic

## 2023-08-11 RX ORDER — BUSPIRONE HYDROCHLORIDE 5 MG/1
TABLET ORAL
Qty: 60 TABLET | Refills: 2 | Status: SHIPPED | OUTPATIENT
Start: 2023-08-11

## 2023-09-27 ENCOUNTER — HOSPITAL ENCOUNTER (OUTPATIENT)
Facility: HOSPITAL | Age: 68
Discharge: HOME | End: 2023-09-30
Attending: PAIN MEDICINE
Payer: COMMERCIAL

## 2023-09-27 DIAGNOSIS — M81.0 AGE-RELATED OSTEOPOROSIS WITHOUT CURRENT PATHOLOGICAL FRACTURE: ICD-10-CM

## 2023-09-27 PROCEDURE — 77080 DXA BONE DENSITY AXIAL: CPT

## 2023-10-13 NOTE — PERIOP NOTE
JAZZMINE APPT 11/21/23 @ 8731.  PATIENT WAS ASKED TO COME IN TWO WEEKS BEFORE HER SURGERY BECAUSE SHES TAKING SERUM

## 2023-10-17 NOTE — PROGRESS NOTES
Uyen Murphy is a 70 y.o. female who presents for follow up. Scheduled for left KAIT Dr Velasquez Gomez 12/5. Followed by cardiology, Dr Denis Lora, . History of Takosubo cardiomyopathy. No CP, palpitations or SOB. No change in exercise tolerance. Treated for HTN. On coreg. Should be on amlodipine 10mg. Changed dose last month. Got refill for 2.5mg, taking one a day. Has chronic back pain. She is followed by pain management. She is taking hydrocodone one every 6 hours. (4 a day). Feels like function is better. Has IBS  and gastroparesis, better with relgan. Sees Dr Maricruz Ricardo, GI. And NP at [facility]. Saw rheum for RA, prior plaquenil. He left group, not seeing rheum now, untreated. Smoker, none in 10 days, using nicoderm. Past Medical History:   Diagnosis Date    Acid reflux     Arthritis     Bronchitis     Cardiomyopathy ([address]) 2/10/2021    Chronic pain     COVID     Elevated troponin 2/8/2021    Gastroparesis     Heart attack ([address])     Heart failure (HCC)     Hypertension     IBS (irritable bowel syndrome) 2/8/2021    Squamous cell carcinoma of skin of right elbow 3/6930    Systolic heart failure ([address]) 2/11/2021    Takotsubo cardiomyopathy 11/16/2015    Tobacco abuse 11/16/2015       Family History   Problem Relation Age of Onset    Cancer Father         prostate    Heart Disease Mother     Heart Disease Maternal Grandmother         Social History     Socioeconomic History    Marital status:      Spouse name: Not on file    Number of children: Not on file    Years of education: Not on file    Highest education level: Not on file   Occupational History    Not on file   Tobacco Use    Smoking status: Every Day     Packs/day: 0.25     Years: 40.00     Additional pack years: 0.00     Total pack years: 10.00     Types: Cigarettes    Smokeless tobacco: Never   Vaping Use    Vaping Use: Never used   Substance and Sexual Activity    Alcohol use:  Yes     Alcohol/week: 2.0

## 2023-10-18 ENCOUNTER — OFFICE VISIT (OUTPATIENT)
Age: 68
End: 2023-10-18
Payer: COMMERCIAL

## 2023-10-18 VITALS
OXYGEN SATURATION: 95 % | RESPIRATION RATE: 16 BRPM | TEMPERATURE: 97.7 F | DIASTOLIC BLOOD PRESSURE: 93 MMHG | HEIGHT: 64 IN | BODY MASS INDEX: 22.36 KG/M2 | HEART RATE: 79 BPM | SYSTOLIC BLOOD PRESSURE: 174 MMHG | WEIGHT: 131 LBS

## 2023-10-18 DIAGNOSIS — E78.00 PURE HYPERCHOLESTEROLEMIA, UNSPECIFIED: ICD-10-CM

## 2023-10-18 DIAGNOSIS — I10 ESSENTIAL (PRIMARY) HYPERTENSION: ICD-10-CM

## 2023-10-18 DIAGNOSIS — M05.79 RHEUMATOID ARTHRITIS WITH RHEUMATOID FACTOR OF MULTIPLE SITES WITHOUT ORGAN OR SYSTEMS INVOLVEMENT (HCC): ICD-10-CM

## 2023-10-18 DIAGNOSIS — Z23 NEEDS FLU SHOT: ICD-10-CM

## 2023-10-18 DIAGNOSIS — E55.9 VITAMIN D DEFICIENCY: ICD-10-CM

## 2023-10-18 DIAGNOSIS — I42.9 CARDIOMYOPATHY, UNSPECIFIED TYPE (HCC): ICD-10-CM

## 2023-10-18 DIAGNOSIS — Z00.00 ROUTINE MEDICAL EXAM: Primary | ICD-10-CM

## 2023-10-18 PROCEDURE — 90471 IMMUNIZATION ADMIN: CPT | Performed by: FAMILY MEDICINE

## 2023-10-18 PROCEDURE — G8484 FLU IMMUNIZE NO ADMIN: HCPCS | Performed by: FAMILY MEDICINE

## 2023-10-18 PROCEDURE — 90694 VACC AIIV4 NO PRSRV 0.5ML IM: CPT | Performed by: FAMILY MEDICINE

## 2023-10-18 PROCEDURE — 99397 PER PM REEVAL EST PAT 65+ YR: CPT | Performed by: FAMILY MEDICINE

## 2023-10-18 PROCEDURE — 3078F DIAST BP <80 MM HG: CPT | Performed by: FAMILY MEDICINE

## 2023-10-18 PROCEDURE — 3074F SYST BP LT 130 MM HG: CPT | Performed by: FAMILY MEDICINE

## 2023-10-18 RX ORDER — BUSPIRONE HYDROCHLORIDE 5 MG/1
5 TABLET ORAL 2 TIMES DAILY
COMMUNITY

## 2023-10-18 RX ORDER — AMLODIPINE BESYLATE 10 MG/1
10 TABLET ORAL
Qty: 90 TABLET | Refills: 1 | Status: SHIPPED | OUTPATIENT
Start: 2023-10-18

## 2023-10-18 RX ORDER — METHOCARBAMOL 750 MG/1
TABLET, FILM COATED ORAL
COMMUNITY
Start: 2023-08-03

## 2023-10-18 NOTE — PATIENT INSTRUCTIONS
Dr. Maynard Gal    [facility] [employer]  [address] [place] [address], [address] [place] [address] [place]   [phone]    Monitor blood pressure at home. Start amlodipine 10mg one at bedtime, stop taking 2.5mg dose.

## 2023-10-19 PROBLEM — L97.212 NON-PRESSURE CHRONIC ULCER OF RIGHT CALF WITH FAT LAYER EXPOSED (HCC): Status: RESOLVED | Noted: 2022-08-11 | Resolved: 2023-10-19

## 2023-11-01 ENCOUNTER — NURSE ONLY (OUTPATIENT)
Age: 68
End: 2023-11-01

## 2023-11-01 DIAGNOSIS — I10 ESSENTIAL (PRIMARY) HYPERTENSION: ICD-10-CM

## 2023-11-01 DIAGNOSIS — Z00.00 ROUTINE MEDICAL EXAM: ICD-10-CM

## 2023-11-01 DIAGNOSIS — E55.9 VITAMIN D DEFICIENCY: ICD-10-CM

## 2023-11-01 DIAGNOSIS — E78.00 PURE HYPERCHOLESTEROLEMIA, UNSPECIFIED: ICD-10-CM

## 2023-11-02 LAB
25(OH)D3 SERPL-MCNC: 34.6 NG/ML (ref 30–100)
ALBUMIN SERPL-MCNC: 3.7 G/DL (ref 3.5–5)
ALBUMIN/GLOB SERPL: 1.4 (ref 1.1–2.2)
ALP SERPL-CCNC: 127 U/L (ref 45–117)
ALT SERPL-CCNC: 20 U/L (ref 12–78)
ANION GAP SERPL CALC-SCNC: 6 MMOL/L (ref 5–15)
APPEARANCE UR: CLEAR
AST SERPL-CCNC: 24 U/L (ref 15–37)
BACTERIA URNS QL MICRO: NEGATIVE /HPF
BASOPHILS # BLD: 0 K/UL (ref 0–0.1)
BASOPHILS NFR BLD: 0 % (ref 0–1)
BILIRUB SERPL-MCNC: 0.4 MG/DL (ref 0.2–1)
BILIRUB UR QL: NEGATIVE
BUN SERPL-MCNC: 13 MG/DL (ref 6–20)
BUN/CREAT SERPL: 11 (ref 12–20)
CALCIUM SERPL-MCNC: 9.1 MG/DL (ref 8.5–10.1)
CHLORIDE SERPL-SCNC: 105 MMOL/L (ref 97–108)
CHOLEST SERPL-MCNC: 152 MG/DL
CO2 SERPL-SCNC: 28 MMOL/L (ref 21–32)
COLOR UR: ABNORMAL
CREAT SERPL-MCNC: 1.18 MG/DL (ref 0.55–1.02)
DIFFERENTIAL METHOD BLD: ABNORMAL
EOSINOPHIL # BLD: 0.3 K/UL (ref 0–0.4)
EOSINOPHIL NFR BLD: 4 % (ref 0–7)
EPITH CASTS URNS QL MICRO: ABNORMAL /LPF
ERYTHROCYTE [DISTWIDTH] IN BLOOD BY AUTOMATED COUNT: 12.8 % (ref 11.5–14.5)
EST. AVERAGE GLUCOSE BLD GHB EST-MCNC: 108 MG/DL
GLOBULIN SER CALC-MCNC: 2.7 G/DL (ref 2–4)
GLUCOSE SERPL-MCNC: 106 MG/DL (ref 65–100)
GLUCOSE UR STRIP.AUTO-MCNC: NEGATIVE MG/DL
HBA1C MFR BLD: 5.4 % (ref 4–5.6)
HCT VFR BLD AUTO: 35.2 % (ref 35–47)
HDLC SERPL-MCNC: 75 MG/DL
HDLC SERPL: 2 (ref 0–5)
HGB BLD-MCNC: 11.1 G/DL (ref 11.5–16)
HGB UR QL STRIP: NEGATIVE
IMM GRANULOCYTES # BLD AUTO: 0 K/UL (ref 0–0.04)
IMM GRANULOCYTES NFR BLD AUTO: 0 % (ref 0–0.5)
KETONES UR QL STRIP.AUTO: NEGATIVE MG/DL
LDLC SERPL CALC-MCNC: 47.4 MG/DL (ref 0–100)
LEUKOCYTE ESTERASE UR QL STRIP.AUTO: ABNORMAL
LYMPHOCYTES # BLD: 1.2 K/UL (ref 0.8–3.5)
LYMPHOCYTES NFR BLD: 17 % (ref 12–49)
MCH RBC QN AUTO: 31.9 PG (ref 26–34)
MCHC RBC AUTO-ENTMCNC: 31.5 G/DL (ref 30–36.5)
MCV RBC AUTO: 101.1 FL (ref 80–99)
MONOCYTES # BLD: 0.6 K/UL (ref 0–1)
MONOCYTES NFR BLD: 9 % (ref 5–13)
NEUTS SEG # BLD: 4.8 K/UL (ref 1.8–8)
NEUTS SEG NFR BLD: 70 % (ref 32–75)
NITRITE UR QL STRIP.AUTO: NEGATIVE
NRBC # BLD: 0 K/UL (ref 0–0.01)
NRBC BLD-RTO: 0 PER 100 WBC
PH UR STRIP: 5 (ref 5–8)
PLATELET # BLD AUTO: 310 K/UL (ref 150–400)
PMV BLD AUTO: 10 FL (ref 8.9–12.9)
POTASSIUM SERPL-SCNC: 4.5 MMOL/L (ref 3.5–5.1)
PROT SERPL-MCNC: 6.4 G/DL (ref 6.4–8.2)
PROT UR STRIP-MCNC: NEGATIVE MG/DL
RBC # BLD AUTO: 3.48 M/UL (ref 3.8–5.2)
RBC #/AREA URNS HPF: ABNORMAL /HPF (ref 0–5)
SODIUM SERPL-SCNC: 139 MMOL/L (ref 136–145)
SP GR UR REFRACTOMETRY: 1.02 (ref 1–1.03)
SPECIMEN HOLD: NORMAL
TRIGL SERPL-MCNC: 148 MG/DL
TSH SERPL DL<=0.05 MIU/L-ACNC: 1.19 UIU/ML (ref 0.36–3.74)
UROBILINOGEN UR QL STRIP.AUTO: 0.2 EU/DL (ref 0.2–1)
VLDLC SERPL CALC-MCNC: 29.6 MG/DL
WBC # BLD AUTO: 6.8 K/UL (ref 3.6–11)
WBC URNS QL MICRO: ABNORMAL /HPF (ref 0–4)

## 2023-11-07 ENCOUNTER — TELEPHONE (OUTPATIENT)
Age: 68
End: 2023-11-07

## 2023-11-07 NOTE — TELEPHONE ENCOUNTER
Pt states she was seen a week ago. No appts until a VV on Friday with Dr. Diamond Farias. Pt can't wait that long. Pt's ankles (both) are very swollen and painful. Pt would like an appt as soon as possible. She would like something tomorrow if possible.      [facility] #[phone]

## 2023-11-10 ENCOUNTER — OFFICE VISIT (OUTPATIENT)
Age: 68
End: 2023-11-10
Payer: COMMERCIAL

## 2023-11-10 VITALS
OXYGEN SATURATION: 98 % | HEIGHT: 64 IN | RESPIRATION RATE: 12 BRPM | TEMPERATURE: 97.5 F | HEART RATE: 68 BPM | BODY MASS INDEX: 23.15 KG/M2 | SYSTOLIC BLOOD PRESSURE: 153 MMHG | DIASTOLIC BLOOD PRESSURE: 76 MMHG | WEIGHT: 135.6 LBS

## 2023-11-10 DIAGNOSIS — R60.9 EDEMA, UNSPECIFIED TYPE: Primary | ICD-10-CM

## 2023-11-10 DIAGNOSIS — I10 PRIMARY HYPERTENSION: ICD-10-CM

## 2023-11-10 PROCEDURE — 99213 OFFICE O/P EST LOW 20 MIN: CPT | Performed by: FAMILY MEDICINE

## 2023-11-10 PROCEDURE — G8420 CALC BMI NORM PARAMETERS: HCPCS | Performed by: FAMILY MEDICINE

## 2023-11-10 PROCEDURE — 4004F PT TOBACCO SCREEN RCVD TLK: CPT | Performed by: FAMILY MEDICINE

## 2023-11-10 PROCEDURE — 1123F ACP DISCUSS/DSCN MKR DOCD: CPT | Performed by: FAMILY MEDICINE

## 2023-11-10 PROCEDURE — G8484 FLU IMMUNIZE NO ADMIN: HCPCS | Performed by: FAMILY MEDICINE

## 2023-11-10 PROCEDURE — 3078F DIAST BP <80 MM HG: CPT | Performed by: FAMILY MEDICINE

## 2023-11-10 PROCEDURE — 3077F SYST BP >= 140 MM HG: CPT | Performed by: FAMILY MEDICINE

## 2023-11-10 PROCEDURE — G8399 PT W/DXA RESULTS DOCUMENT: HCPCS | Performed by: FAMILY MEDICINE

## 2023-11-10 PROCEDURE — G8427 DOCREV CUR MEDS BY ELIG CLIN: HCPCS | Performed by: FAMILY MEDICINE

## 2023-11-10 PROCEDURE — 3017F COLORECTAL CA SCREEN DOC REV: CPT | Performed by: FAMILY MEDICINE

## 2023-11-10 PROCEDURE — 1090F PRES/ABSN URINE INCON ASSESS: CPT | Performed by: FAMILY MEDICINE

## 2023-11-10 RX ORDER — FUROSEMIDE 20 MG/1
TABLET ORAL
Qty: 40 TABLET | Refills: 1 | Status: SHIPPED | OUTPATIENT
Start: 2023-11-10

## 2023-11-10 RX ORDER — AMLODIPINE BESYLATE 5 MG/1
5 TABLET ORAL DAILY
Qty: 90 TABLET | Refills: 0
Start: 2023-11-10

## 2023-11-10 SDOH — ECONOMIC STABILITY: FOOD INSECURITY: WITHIN THE PAST 12 MONTHS, YOU WORRIED THAT YOUR FOOD WOULD RUN OUT BEFORE YOU GOT MONEY TO BUY MORE.: NEVER TRUE

## 2023-11-10 SDOH — ECONOMIC STABILITY: FOOD INSECURITY: WITHIN THE PAST 12 MONTHS, THE FOOD YOU BOUGHT JUST DIDN'T LAST AND YOU DIDN'T HAVE MONEY TO GET MORE.: NEVER TRUE

## 2023-11-10 SDOH — ECONOMIC STABILITY: INCOME INSECURITY: HOW HARD IS IT FOR YOU TO PAY FOR THE VERY BASICS LIKE FOOD, HOUSING, MEDICAL CARE, AND HEATING?: NOT HARD AT ALL

## 2023-11-10 SDOH — ECONOMIC STABILITY: HOUSING INSECURITY
IN THE LAST 12 MONTHS, WAS THERE A TIME WHEN YOU DID NOT HAVE A STEADY PLACE TO SLEEP OR SLEPT IN A SHELTER (INCLUDING NOW)?: NO

## 2023-11-10 ASSESSMENT — PATIENT HEALTH QUESTIONNAIRE - PHQ9
SUM OF ALL RESPONSES TO PHQ QUESTIONS 1-9: 0
2. FEELING DOWN, DEPRESSED OR HOPELESS: 0
1. LITTLE INTEREST OR PLEASURE IN DOING THINGS: 0
SUM OF ALL RESPONSES TO PHQ9 QUESTIONS 1 & 2: 0
SUM OF ALL RESPONSES TO PHQ QUESTIONS 1-9: 0

## 2023-11-10 NOTE — PROGRESS NOTES
Yohannes Sumner is a 70 y.o. female who presents with swelling in ankles. Followed by cardiology, Dr Kaylah Andersen, . History of Takosubo cardiomyopathy and treated for HTN. No SOB, PND, orthopnea,  has ankle swelling. On amlodipine 10mg daily, coreg 12.5mg BID    Creatinine 1.18 GFR 50 11/1    Sees Dr Wilton Taylor, ortho. Taking ibuprofen 400mg TID. Ongoing back hip and ankle pain. Less active, walking with cane. Past Medical History:   Diagnosis Date    Acid reflux     Arthritis     Bronchitis     Cardiomyopathy ([address]) 2/10/2021    Chronic pain     COVID     Elevated troponin 2/8/2021    Gastroparesis     Heart attack ([address])     Heart failure (HCC)     Hypertension     IBS (irritable bowel syndrome) 2/8/2021    Squamous cell carcinoma of skin of right elbow 5/5395    Systolic heart failure ([address]) 2/11/2021    Takotsubo cardiomyopathy 11/16/2015    Tobacco abuse 11/16/2015       Family History   Problem Relation Age of Onset    Cancer Father         prostate    Heart Disease Mother     Heart Disease Maternal Grandmother         Social History     Socioeconomic History    Marital status:      Spouse name: Not on file    Number of children: Not on file    Years of education: Not on file    Highest education level: Not on file   Occupational History    Not on file   Tobacco Use    Smoking status: Every Day     Packs/day: 0.25     Years: 40.00     Additional pack years: 0.00     Total pack years: 10.00     Types: Cigarettes    Smokeless tobacco: Never   Vaping Use    Vaping Use: Never used   Substance and Sexual Activity    Alcohol use:  Yes     Alcohol/week: 2.0 standard drinks of alcohol     Types: 2 Glasses of wine per week    Drug use: Yes     Comment: couple times a month    Sexual activity: Not on file   Other Topics Concern    Not on file   Social History Narrative    Not on file     Social Determinants of Health     Financial Resource Strain: Low Risk  (11/10/2023)    Overall Financial

## 2023-11-10 NOTE — PATIENT INSTRUCTIONS
Recommend vitamin D 2000 IU daily over-the-counter. Reduce use of ibuprofen. Follow low salt diet. Reduce amlodipine to 5mg daily at bedtime,   1/2 of 10mg tablet until gone, then can send in a new RX for the 5mg tablet. Lasix (furosemide) 20mg  take 2 tablets in morning for 3-5 days. Reduce to one tablet, 20mg, daily until swelling is gone. Increase potassium in diet while on fluid pill. Monitor blood pressure at home.

## 2023-11-21 ENCOUNTER — HOSPITAL ENCOUNTER (OUTPATIENT)
Facility: HOSPITAL | Age: 68
Discharge: HOME | End: 2023-11-24
Payer: COMMERCIAL

## 2023-11-21 VITALS
HEART RATE: 71 BPM | HEIGHT: 64 IN | TEMPERATURE: 97.5 F | WEIGHT: 133 LBS | SYSTOLIC BLOOD PRESSURE: 133 MMHG | OXYGEN SATURATION: 97 % | DIASTOLIC BLOOD PRESSURE: 77 MMHG | BODY MASS INDEX: 22.71 KG/M2

## 2023-11-21 LAB
ABO + RH BLD: NORMAL
ANION GAP SERPL CALC-SCNC: 7 MMOL/L (ref 5–15)
APPEARANCE UR: CLEAR
BACTERIA URNS QL MICRO: NEGATIVE /HPF
BILIRUB UR QL: NEGATIVE
BLOOD GROUP ANTIBODIES SERPL: NORMAL
BUN SERPL-MCNC: 18 MG/DL (ref 6–20)
BUN/CREAT SERPL: 11 (ref 12–20)
CALCIUM SERPL-MCNC: 9.1 MG/DL (ref 8.5–10.1)
CHLORIDE SERPL-SCNC: 100 MMOL/L (ref 97–108)
CO2 SERPL-SCNC: 26 MMOL/L (ref 21–32)
COLOR UR: NORMAL
CREAT SERPL-MCNC: 1.68 MG/DL (ref 0.55–1.02)
EKG ATRIAL RATE: 63 BPM
EKG DIAGNOSIS: NORMAL
EKG P AXIS: 67 DEGREES
EKG P-R INTERVAL: 178 MS
EKG Q-T INTERVAL: 448 MS
EKG QRS DURATION: 84 MS
EKG QTC CALCULATION (BAZETT): 458 MS
EKG R AXIS: -12 DEGREES
EKG T AXIS: 34 DEGREES
EKG VENTRICULAR RATE: 63 BPM
EPITH CASTS URNS QL MICRO: NORMAL /LPF
ERYTHROCYTE [DISTWIDTH] IN BLOOD BY AUTOMATED COUNT: 12.5 % (ref 11.5–14.5)
EST. AVERAGE GLUCOSE BLD GHB EST-MCNC: 103 MG/DL
GLUCOSE SERPL-MCNC: 105 MG/DL (ref 65–100)
GLUCOSE UR STRIP.AUTO-MCNC: NEGATIVE MG/DL
HBA1C MFR BLD: 5.2 % (ref 4–5.6)
HCT VFR BLD AUTO: 34.5 % (ref 35–47)
HGB BLD-MCNC: 11.3 G/DL (ref 11.5–16)
HGB UR QL STRIP: NEGATIVE
HYALINE CASTS URNS QL MICRO: NORMAL /LPF (ref 0–5)
INR PPP: 1 (ref 0.9–1.1)
KETONES UR QL STRIP.AUTO: NEGATIVE MG/DL
LEUKOCYTE ESTERASE UR QL STRIP.AUTO: NEGATIVE
MCH RBC QN AUTO: 31.4 PG (ref 26–34)
MCHC RBC AUTO-ENTMCNC: 32.8 G/DL (ref 30–36.5)
MCV RBC AUTO: 95.8 FL (ref 80–99)
NITRITE UR QL STRIP.AUTO: NEGATIVE
NRBC # BLD: 0 K/UL (ref 0–0.01)
NRBC BLD-RTO: 0 PER 100 WBC
PH UR STRIP: 5.5 (ref 5–8)
PLATELET # BLD AUTO: 276 K/UL (ref 150–400)
PMV BLD AUTO: 9.6 FL (ref 8.9–12.9)
POTASSIUM SERPL-SCNC: 4.7 MMOL/L (ref 3.5–5.1)
PROT UR STRIP-MCNC: NEGATIVE MG/DL
PROTHROMBIN TIME: 10.7 SEC (ref 9–11.1)
RBC # BLD AUTO: 3.6 M/UL (ref 3.8–5.2)
RBC #/AREA URNS HPF: NORMAL /HPF (ref 0–5)
SODIUM SERPL-SCNC: 133 MMOL/L (ref 136–145)
SP GR UR REFRACTOMETRY: 1.02 (ref 1–1.03)
SPECIMEN EXP DATE BLD: NORMAL
URINE CULTURE IF INDICATED: NORMAL
UROBILINOGEN UR QL STRIP.AUTO: 0.2 EU/DL (ref 0.2–1)
WBC # BLD AUTO: 8.3 K/UL (ref 3.6–11)
WBC URNS QL MICRO: NORMAL /HPF (ref 0–4)

## 2023-11-21 PROCEDURE — 93005 ELECTROCARDIOGRAM TRACING: CPT | Performed by: ORTHOPAEDIC SURGERY

## 2023-11-21 PROCEDURE — 81001 URINALYSIS AUTO W/SCOPE: CPT

## 2023-11-21 PROCEDURE — 86901 BLOOD TYPING SEROLOGIC RH(D): CPT

## 2023-11-21 PROCEDURE — 83036 HEMOGLOBIN GLYCOSYLATED A1C: CPT

## 2023-11-21 PROCEDURE — 85610 PROTHROMBIN TIME: CPT

## 2023-11-21 PROCEDURE — 93010 ELECTROCARDIOGRAM REPORT: CPT | Performed by: INTERNAL MEDICINE

## 2023-11-21 PROCEDURE — 86850 RBC ANTIBODY SCREEN: CPT

## 2023-11-21 PROCEDURE — 80048 BASIC METABOLIC PNL TOTAL CA: CPT

## 2023-11-21 PROCEDURE — 36415 COLL VENOUS BLD VENIPUNCTURE: CPT

## 2023-11-21 PROCEDURE — 85027 COMPLETE CBC AUTOMATED: CPT

## 2023-11-21 PROCEDURE — 80307 DRUG TEST PRSMV CHEM ANLYZR: CPT

## 2023-11-21 PROCEDURE — 86900 BLOOD TYPING SEROLOGIC ABO: CPT

## 2023-11-21 ASSESSMENT — PAIN - FUNCTIONAL ASSESSMENT: PAIN_FUNCTIONAL_ASSESSMENT: PREVENTS OR INTERFERES SOME ACTIVE ACTIVITIES AND ADLS

## 2023-11-21 ASSESSMENT — PAIN DESCRIPTION - DESCRIPTORS: DESCRIPTORS: ACHING;SHARP;SHOOTING

## 2023-11-21 ASSESSMENT — PAIN SCALES - GENERAL: PAINLEVEL_OUTOF10: 9

## 2023-11-21 ASSESSMENT — PAIN DESCRIPTION - LOCATION: LOCATION: HIP

## 2023-11-21 ASSESSMENT — PAIN DESCRIPTION - ORIENTATION: ORIENTATION: LEFT

## 2023-11-22 LAB
BACTERIA SPEC CULT: NORMAL
BACTERIA SPEC CULT: NORMAL
COMMENT:: NORMAL
COTININE UR QL SCN: NEGATIVE NG/ML
SERVICE CMNT-IMP: NORMAL

## 2023-12-01 NOTE — PERIOP NOTE
CALLED [facility] AND SPOKE WITH NOÉ REQUESTING HER TO UN-FLAG PATIENT'S CHART FOR POSITIVE MRSA. PATIENT HAD NEGATIVE MRSA NASAL SWAB ON 11/21/23.

## 2023-12-02 ENCOUNTER — TELEPHONE (OUTPATIENT)
Age: 68
End: 2023-12-02

## 2023-12-02 NOTE — TELEPHONE ENCOUNTER
the patient called    Having hip surg on tuesday    Got card clearance from her cardiologist    Needs clearance from pcp as well     Will route to pcp to contact pt

## 2023-12-05 ENCOUNTER — ANESTHESIA EVENT (OUTPATIENT)
Facility: HOSPITAL | Age: 68
End: 2023-12-05
Payer: COMMERCIAL

## 2023-12-05 ENCOUNTER — APPOINTMENT (OUTPATIENT)
Facility: HOSPITAL | Age: 68
End: 2023-12-05
Attending: ORTHOPAEDIC SURGERY
Payer: COMMERCIAL

## 2023-12-05 ENCOUNTER — ANESTHESIA (OUTPATIENT)
Facility: HOSPITAL | Age: 68
End: 2023-12-05
Payer: COMMERCIAL

## 2023-12-05 ENCOUNTER — HOSPITAL ENCOUNTER (OUTPATIENT)
Facility: HOSPITAL | Age: 68
Setting detail: OBSERVATION
Discharge: HOME | End: 2023-12-06
Attending: ORTHOPAEDIC SURGERY | Admitting: ORTHOPAEDIC SURGERY
Payer: COMMERCIAL

## 2023-12-05 DIAGNOSIS — M16.12 PRIMARY OSTEOARTHRITIS OF LEFT HIP: Primary | ICD-10-CM

## 2023-12-05 LAB
GLUCOSE BLD STRIP.AUTO-MCNC: 147 MG/DL (ref 65–117)
SERVICE CMNT-IMP: ABNORMAL

## 2023-12-05 PROCEDURE — 6360000002 HC RX W HCPCS: Performed by: PHYSICIAN ASSISTANT

## 2023-12-05 PROCEDURE — 6370000000 HC RX 637 (ALT 250 FOR IP): Performed by: PHYSICIAN ASSISTANT

## 2023-12-05 PROCEDURE — 2580000003 HC RX 258: Performed by: STUDENT IN AN ORGANIZED HEALTH CARE EDUCATION/TRAINING PROGRAM

## 2023-12-05 PROCEDURE — 6360000002 HC RX W HCPCS: Performed by: NURSE ANESTHETIST, CERTIFIED REGISTERED

## 2023-12-05 PROCEDURE — 7100000000 HC PACU RECOVERY - FIRST 15 MIN: Performed by: ORTHOPAEDIC SURGERY

## 2023-12-05 PROCEDURE — 2709999900 HC NON-CHARGEABLE SUPPLY: Performed by: ORTHOPAEDIC SURGERY

## 2023-12-05 PROCEDURE — 82962 GLUCOSE BLOOD TEST: CPT

## 2023-12-05 PROCEDURE — 6360000002 HC RX W HCPCS: Performed by: ANESTHESIOLOGY

## 2023-12-05 PROCEDURE — 3600000015 HC SURGERY LEVEL 5 ADDTL 15MIN: Performed by: ORTHOPAEDIC SURGERY

## 2023-12-05 PROCEDURE — C1776 JOINT DEVICE (IMPLANTABLE): HCPCS | Performed by: ORTHOPAEDIC SURGERY

## 2023-12-05 PROCEDURE — 2500000003 HC RX 250 WO HCPCS: Performed by: NURSE ANESTHETIST, CERTIFIED REGISTERED

## 2023-12-05 PROCEDURE — 2580000003 HC RX 258: Performed by: PHYSICIAN ASSISTANT

## 2023-12-05 PROCEDURE — 6370000000 HC RX 637 (ALT 250 FOR IP): Performed by: ORTHOPAEDIC SURGERY

## 2023-12-05 PROCEDURE — 2580000003 HC RX 258: Performed by: NURSE ANESTHETIST, CERTIFIED REGISTERED

## 2023-12-05 PROCEDURE — G0378 HOSPITAL OBSERVATION PER HR: HCPCS

## 2023-12-05 PROCEDURE — 6360000002 HC RX W HCPCS

## 2023-12-05 PROCEDURE — 3700000000 HC ANESTHESIA ATTENDED CARE: Performed by: ORTHOPAEDIC SURGERY

## 2023-12-05 PROCEDURE — 3600000005 HC SURGERY LEVEL 5 BASE: Performed by: ORTHOPAEDIC SURGERY

## 2023-12-05 PROCEDURE — 3700000001 HC ADD 15 MINUTES (ANESTHESIA): Performed by: ORTHOPAEDIC SURGERY

## 2023-12-05 PROCEDURE — 7100000001 HC PACU RECOVERY - ADDTL 15 MIN: Performed by: ORTHOPAEDIC SURGERY

## 2023-12-05 DEVICE — EMPOWR ACETABULAR SYSTEM, LINER, NEUTRAL, HXE+, 36F
Type: IMPLANTABLE DEVICE | Site: HIP | Status: FUNCTIONAL
Brand: DJO SURGICAL

## 2023-12-05 DEVICE — EMPOWR ACETABULAR, BONE SCREW, 30MM
Type: IMPLANTABLE DEVICE | Site: HIP | Status: FUNCTIONAL
Brand: DJO SURGICAL

## 2023-12-05 DEVICE — HEAD, FEMORAL, CERAMIC, BILOX DELTA, 36MM -4.0
Type: IMPLANTABLE DEVICE | Site: HIP | Status: FUNCTIONAL
Brand: DJO SURGICAL

## 2023-12-05 DEVICE — TAPERFILL HIP STEM, LATERAL OFFSET, SIZE 11
Type: IMPLANTABLE DEVICE | Site: HIP | Status: FUNCTIONAL
Brand: DJO SURGICAL

## 2023-12-05 DEVICE — IMPL CAPPED HIP H2 TOTAL ADV OTHER HEAD DJO: Type: IMPLANTABLE DEVICE | Site: HIP | Status: FUNCTIONAL

## 2023-12-05 DEVICE — EMPOWR ACET SYSTEM, CUP, HEMISPHERICAL, CLUSTER HOLE, 52MM
Type: IMPLANTABLE DEVICE | Site: HIP | Status: FUNCTIONAL
Brand: DJO SURGICAL

## 2023-12-05 RX ORDER — LANOLIN ALCOHOL/MO/W.PET/CERES
3 CREAM (GRAM) TOPICAL NIGHTLY PRN
Status: DISCONTINUED | OUTPATIENT
Start: 2023-12-05 | End: 2023-12-06 | Stop reason: HOSPADM

## 2023-12-05 RX ORDER — CETIRIZINE HYDROCHLORIDE 10 MG/1
10 TABLET ORAL DAILY
Status: DISCONTINUED | OUTPATIENT
Start: 2023-12-05 | End: 2023-12-06 | Stop reason: HOSPADM

## 2023-12-05 RX ORDER — FENTANYL CITRATE 50 UG/ML
25 INJECTION, SOLUTION INTRAMUSCULAR; INTRAVENOUS EVERY 5 MIN PRN
Status: COMPLETED | OUTPATIENT
Start: 2023-12-05 | End: 2023-12-05

## 2023-12-05 RX ORDER — SODIUM CHLORIDE 9 MG/ML
INJECTION, SOLUTION INTRAVENOUS PRN
Status: DISCONTINUED | OUTPATIENT
Start: 2023-12-05 | End: 2023-12-05 | Stop reason: HOSPADM

## 2023-12-05 RX ORDER — ASPIRIN 81 MG/1
81 TABLET ORAL 2 TIMES DAILY
Status: DISCONTINUED | OUTPATIENT
Start: 2023-12-05 | End: 2023-12-06 | Stop reason: HOSPADM

## 2023-12-05 RX ORDER — GABAPENTIN 300 MG/1
300 CAPSULE ORAL 3 TIMES DAILY
Status: DISCONTINUED | OUTPATIENT
Start: 2023-12-05 | End: 2023-12-06 | Stop reason: HOSPADM

## 2023-12-05 RX ORDER — HYDROMORPHONE HYDROCHLORIDE 1 MG/ML
0.5 INJECTION, SOLUTION INTRAMUSCULAR; INTRAVENOUS; SUBCUTANEOUS EVERY 4 HOURS PRN
Status: DISCONTINUED | OUTPATIENT
Start: 2023-12-05 | End: 2023-12-06 | Stop reason: HOSPADM

## 2023-12-05 RX ORDER — DICYCLOMINE HCL 20 MG
20 TABLET ORAL DAILY
Status: DISCONTINUED | OUTPATIENT
Start: 2023-12-05 | End: 2023-12-06 | Stop reason: HOSPADM

## 2023-12-05 RX ORDER — FENTANYL CITRATE 50 UG/ML
INJECTION, SOLUTION INTRAMUSCULAR; INTRAVENOUS
Status: COMPLETED
Start: 2023-12-05 | End: 2023-12-05

## 2023-12-05 RX ORDER — SODIUM CHLORIDE 0.9 % (FLUSH) 0.9 %
5-40 SYRINGE (ML) INJECTION EVERY 12 HOURS SCHEDULED
Status: DISCONTINUED | OUTPATIENT
Start: 2023-12-05 | End: 2023-12-05 | Stop reason: HOSPADM

## 2023-12-05 RX ORDER — MIDAZOLAM HYDROCHLORIDE 1 MG/ML
INJECTION INTRAMUSCULAR; INTRAVENOUS PRN
Status: DISCONTINUED | OUTPATIENT
Start: 2023-12-05 | End: 2023-12-05 | Stop reason: SDUPTHER

## 2023-12-05 RX ORDER — DIPHENHYDRAMINE HYDROCHLORIDE 50 MG/ML
12.5 INJECTION INTRAMUSCULAR; INTRAVENOUS
Status: DISCONTINUED | OUTPATIENT
Start: 2023-12-05 | End: 2023-12-05 | Stop reason: HOSPADM

## 2023-12-05 RX ORDER — BUSPIRONE HYDROCHLORIDE 5 MG/1
5 TABLET ORAL 2 TIMES DAILY
Status: DISCONTINUED | OUTPATIENT
Start: 2023-12-05 | End: 2023-12-06 | Stop reason: HOSPADM

## 2023-12-05 RX ORDER — OXYCODONE HYDROCHLORIDE 5 MG/1
2.5 TABLET ORAL
Status: DISCONTINUED | OUTPATIENT
Start: 2023-12-05 | End: 2023-12-05

## 2023-12-05 RX ORDER — SODIUM CHLORIDE 0.9 % (FLUSH) 0.9 %
5-40 SYRINGE (ML) INJECTION PRN
Status: DISCONTINUED | OUTPATIENT
Start: 2023-12-05 | End: 2023-12-05 | Stop reason: HOSPADM

## 2023-12-05 RX ORDER — LIDOCAINE HYDROCHLORIDE 10 MG/ML
1 INJECTION, SOLUTION EPIDURAL; INFILTRATION; INTRACAUDAL; PERINEURAL
Status: DISCONTINUED | OUTPATIENT
Start: 2023-12-05 | End: 2023-12-05 | Stop reason: HOSPADM

## 2023-12-05 RX ORDER — SODIUM CHLORIDE 9 MG/ML
INJECTION, SOLUTION INTRAVENOUS CONTINUOUS
Status: DISCONTINUED | OUTPATIENT
Start: 2023-12-05 | End: 2023-12-06 | Stop reason: HOSPADM

## 2023-12-05 RX ORDER — SODIUM CHLORIDE, SODIUM LACTATE, POTASSIUM CHLORIDE, CALCIUM CHLORIDE 600; 310; 30; 20 MG/100ML; MG/100ML; MG/100ML; MG/100ML
INJECTION, SOLUTION INTRAVENOUS CONTINUOUS
Status: DISCONTINUED | OUTPATIENT
Start: 2023-12-05 | End: 2023-12-06 | Stop reason: HOSPADM

## 2023-12-05 RX ORDER — POLYETHYLENE GLYCOL 3350 17 G/17G
17 POWDER, FOR SOLUTION ORAL DAILY PRN
Status: DISCONTINUED | OUTPATIENT
Start: 2023-12-05 | End: 2023-12-06 | Stop reason: HOSPADM

## 2023-12-05 RX ORDER — SODIUM CHLORIDE, SODIUM LACTATE, POTASSIUM CHLORIDE, CALCIUM CHLORIDE 600; 310; 30; 20 MG/100ML; MG/100ML; MG/100ML; MG/100ML
INJECTION, SOLUTION INTRAVENOUS CONTINUOUS PRN
Status: DISCONTINUED | OUTPATIENT
Start: 2023-12-05 | End: 2023-12-05 | Stop reason: SDUPTHER

## 2023-12-05 RX ORDER — HYDROMORPHONE HYDROCHLORIDE 2 MG/ML
INJECTION, SOLUTION INTRAMUSCULAR; INTRAVENOUS; SUBCUTANEOUS PRN
Status: DISCONTINUED | OUTPATIENT
Start: 2023-12-05 | End: 2023-12-05 | Stop reason: SDUPTHER

## 2023-12-05 RX ORDER — ATORVASTATIN CALCIUM 40 MG/1
80 TABLET, FILM COATED ORAL DAILY
Status: DISCONTINUED | OUTPATIENT
Start: 2023-12-05 | End: 2023-12-06 | Stop reason: HOSPADM

## 2023-12-05 RX ORDER — ONDANSETRON 2 MG/ML
INJECTION INTRAMUSCULAR; INTRAVENOUS PRN
Status: DISCONTINUED | OUTPATIENT
Start: 2023-12-05 | End: 2023-12-05 | Stop reason: SDUPTHER

## 2023-12-05 RX ORDER — KETAMINE HCL IN NACL, ISO-OSM 100MG/10ML
SYRINGE (ML) INJECTION PRN
Status: DISCONTINUED | OUTPATIENT
Start: 2023-12-05 | End: 2023-12-05 | Stop reason: SDUPTHER

## 2023-12-05 RX ORDER — MEPERIDINE HYDROCHLORIDE 25 MG/ML
12.5 INJECTION INTRAMUSCULAR; INTRAVENOUS; SUBCUTANEOUS EVERY 5 MIN PRN
Status: DISCONTINUED | OUTPATIENT
Start: 2023-12-05 | End: 2023-12-05 | Stop reason: HOSPADM

## 2023-12-05 RX ORDER — PANTOPRAZOLE SODIUM 20 MG/1
20 TABLET, DELAYED RELEASE ORAL 2 TIMES DAILY
Status: DISCONTINUED | OUTPATIENT
Start: 2023-12-05 | End: 2023-12-06 | Stop reason: HOSPADM

## 2023-12-05 RX ORDER — ACETAMINOPHEN 325 MG/1
650 TABLET ORAL ONCE
Status: DISCONTINUED | OUTPATIENT
Start: 2023-12-05 | End: 2023-12-05 | Stop reason: HOSPADM

## 2023-12-05 RX ORDER — HYDROMORPHONE HYDROCHLORIDE 1 MG/ML
0.5 INJECTION, SOLUTION INTRAMUSCULAR; INTRAVENOUS; SUBCUTANEOUS EVERY 5 MIN PRN
Status: COMPLETED | OUTPATIENT
Start: 2023-12-05 | End: 2023-12-05

## 2023-12-05 RX ORDER — SODIUM CHLORIDE, SODIUM LACTATE, POTASSIUM CHLORIDE, CALCIUM CHLORIDE 600; 310; 30; 20 MG/100ML; MG/100ML; MG/100ML; MG/100ML
INJECTION, SOLUTION INTRAVENOUS CONTINUOUS
Status: DISCONTINUED | OUTPATIENT
Start: 2023-12-05 | End: 2023-12-05 | Stop reason: HOSPADM

## 2023-12-05 RX ORDER — SODIUM CHLORIDE 0.9 % (FLUSH) 0.9 %
5-40 SYRINGE (ML) INJECTION EVERY 12 HOURS SCHEDULED
Status: DISCONTINUED | OUTPATIENT
Start: 2023-12-05 | End: 2023-12-06 | Stop reason: HOSPADM

## 2023-12-05 RX ORDER — FENTANYL CITRATE 50 UG/ML
50 INJECTION, SOLUTION INTRAMUSCULAR; INTRAVENOUS
Status: DISCONTINUED | OUTPATIENT
Start: 2023-12-05 | End: 2023-12-05 | Stop reason: HOSPADM

## 2023-12-05 RX ORDER — ACETAMINOPHEN 500 MG
1000 TABLET ORAL ONCE
Status: COMPLETED | OUTPATIENT
Start: 2023-12-05 | End: 2023-12-05

## 2023-12-05 RX ORDER — 0.9 % SODIUM CHLORIDE 0.9 %
500 INTRAVENOUS SOLUTION INTRAVENOUS PRN
Status: DISCONTINUED | OUTPATIENT
Start: 2023-12-05 | End: 2023-12-06 | Stop reason: HOSPADM

## 2023-12-05 RX ORDER — ONDANSETRON 4 MG/1
4 TABLET, ORALLY DISINTEGRATING ORAL EVERY 8 HOURS PRN
Status: DISCONTINUED | OUTPATIENT
Start: 2023-12-05 | End: 2023-12-06 | Stop reason: HOSPADM

## 2023-12-05 RX ORDER — OXYCODONE HYDROCHLORIDE 5 MG/1
5 TABLET ORAL
Status: DISCONTINUED | OUTPATIENT
Start: 2023-12-05 | End: 2023-12-05

## 2023-12-05 RX ORDER — SODIUM CHLORIDE 9 MG/ML
INJECTION, SOLUTION INTRAVENOUS PRN
Status: DISCONTINUED | OUTPATIENT
Start: 2023-12-05 | End: 2023-12-06 | Stop reason: HOSPADM

## 2023-12-05 RX ORDER — AMLODIPINE BESYLATE 5 MG/1
5 TABLET ORAL DAILY
Status: DISCONTINUED | OUTPATIENT
Start: 2023-12-05 | End: 2023-12-06 | Stop reason: HOSPADM

## 2023-12-05 RX ORDER — FENTANYL CITRATE 50 UG/ML
25 INJECTION, SOLUTION INTRAMUSCULAR; INTRAVENOUS
Status: DISCONTINUED | OUTPATIENT
Start: 2023-12-05 | End: 2023-12-05 | Stop reason: HOSPADM

## 2023-12-05 RX ORDER — SENNA AND DOCUSATE SODIUM 50; 8.6 MG/1; MG/1
1 TABLET, FILM COATED ORAL 2 TIMES DAILY
Status: DISCONTINUED | OUTPATIENT
Start: 2023-12-05 | End: 2023-12-06 | Stop reason: HOSPADM

## 2023-12-05 RX ORDER — METHOCARBAMOL 500 MG/1
750 TABLET, FILM COATED ORAL 3 TIMES DAILY
Status: DISCONTINUED | OUTPATIENT
Start: 2023-12-05 | End: 2023-12-06 | Stop reason: HOSPADM

## 2023-12-05 RX ORDER — BISACODYL 10 MG
10 SUPPOSITORY, RECTAL RECTAL DAILY PRN
Status: DISCONTINUED | OUTPATIENT
Start: 2023-12-05 | End: 2023-12-06 | Stop reason: HOSPADM

## 2023-12-05 RX ORDER — SODIUM CHLORIDE 9 MG/ML
INJECTION, SOLUTION INTRAVENOUS CONTINUOUS
Status: DISCONTINUED | OUTPATIENT
Start: 2023-12-05 | End: 2023-12-05 | Stop reason: HOSPADM

## 2023-12-05 RX ORDER — HYDROXYZINE HYDROCHLORIDE 10 MG/1
10 TABLET, FILM COATED ORAL EVERY 8 HOURS PRN
Status: DISCONTINUED | OUTPATIENT
Start: 2023-12-05 | End: 2023-12-06 | Stop reason: HOSPADM

## 2023-12-05 RX ORDER — DULOXETIN HYDROCHLORIDE 60 MG/1
60 CAPSULE, DELAYED RELEASE ORAL DAILY
Status: DISCONTINUED | OUTPATIENT
Start: 2023-12-05 | End: 2023-12-06 | Stop reason: HOSPADM

## 2023-12-05 RX ORDER — CARVEDILOL 12.5 MG/1
12.5 TABLET ORAL 2 TIMES DAILY WITH MEALS
Status: DISCONTINUED | OUTPATIENT
Start: 2023-12-05 | End: 2023-12-06 | Stop reason: HOSPADM

## 2023-12-05 RX ORDER — SODIUM CHLORIDE 9 MG/ML
INJECTION, SOLUTION INTRAVENOUS CONTINUOUS PRN
Status: DISCONTINUED | OUTPATIENT
Start: 2023-12-05 | End: 2023-12-05 | Stop reason: SDUPTHER

## 2023-12-05 RX ORDER — ONDANSETRON 2 MG/ML
4 INJECTION INTRAMUSCULAR; INTRAVENOUS
Status: DISCONTINUED | OUTPATIENT
Start: 2023-12-05 | End: 2023-12-05 | Stop reason: HOSPADM

## 2023-12-05 RX ORDER — MIDAZOLAM HYDROCHLORIDE 2 MG/2ML
2 INJECTION, SOLUTION INTRAMUSCULAR; INTRAVENOUS
Status: COMPLETED | OUTPATIENT
Start: 2023-12-05 | End: 2023-12-05

## 2023-12-05 RX ORDER — SODIUM CHLORIDE 0.9 % (FLUSH) 0.9 %
5-40 SYRINGE (ML) INJECTION PRN
Status: DISCONTINUED | OUTPATIENT
Start: 2023-12-05 | End: 2023-12-06 | Stop reason: HOSPADM

## 2023-12-05 RX ORDER — OXYCODONE HYDROCHLORIDE 5 MG/1
5 TABLET ORAL
Status: DISCONTINUED | OUTPATIENT
Start: 2023-12-05 | End: 2023-12-05 | Stop reason: HOSPADM

## 2023-12-05 RX ORDER — ONDANSETRON 2 MG/ML
4 INJECTION INTRAMUSCULAR; INTRAVENOUS EVERY 6 HOURS PRN
Status: DISCONTINUED | OUTPATIENT
Start: 2023-12-05 | End: 2023-12-06 | Stop reason: HOSPADM

## 2023-12-05 RX ORDER — MIDAZOLAM HYDROCHLORIDE 2 MG/2ML
2 INJECTION, SOLUTION INTRAMUSCULAR; INTRAVENOUS
Status: DISCONTINUED | OUTPATIENT
Start: 2023-12-05 | End: 2023-12-05 | Stop reason: HOSPADM

## 2023-12-05 RX ORDER — ACETAMINOPHEN 500 MG
500 TABLET ORAL
Status: DISCONTINUED | OUTPATIENT
Start: 2023-12-05 | End: 2023-12-06 | Stop reason: HOSPADM

## 2023-12-05 RX ORDER — OXYCODONE HYDROCHLORIDE 5 MG/1
10 TABLET ORAL
Status: DISCONTINUED | OUTPATIENT
Start: 2023-12-05 | End: 2023-12-06 | Stop reason: HOSPADM

## 2023-12-05 RX ORDER — OXYCODONE HYDROCHLORIDE 5 MG/1
5 TABLET ORAL
Status: DISCONTINUED | OUTPATIENT
Start: 2023-12-05 | End: 2023-12-06 | Stop reason: HOSPADM

## 2023-12-05 RX ORDER — FENTANYL CITRATE 50 UG/ML
INJECTION, SOLUTION INTRAMUSCULAR; INTRAVENOUS PRN
Status: DISCONTINUED | OUTPATIENT
Start: 2023-12-05 | End: 2023-12-05 | Stop reason: SDUPTHER

## 2023-12-05 RX ORDER — PROCHLORPERAZINE EDISYLATE 5 MG/ML
5 INJECTION INTRAMUSCULAR; INTRAVENOUS
Status: DISCONTINUED | OUTPATIENT
Start: 2023-12-05 | End: 2023-12-05 | Stop reason: HOSPADM

## 2023-12-05 RX ADMIN — SODIUM CHLORIDE, PRESERVATIVE FREE 10 ML: 5 INJECTION INTRAVENOUS at 21:51

## 2023-12-05 RX ADMIN — ACETAMINOPHEN 500 MG: 500 TABLET ORAL at 23:23

## 2023-12-05 RX ADMIN — ONDANSETRON 4 MG: 2 INJECTION INTRAMUSCULAR; INTRAVENOUS at 13:47

## 2023-12-05 RX ADMIN — MEPIVACAINE HYDROCHLORIDE 52 MG: 15 INJECTION, SOLUTION EPIDURAL; INFILTRATION at 13:26

## 2023-12-05 RX ADMIN — HYDROMORPHONE HYDROCHLORIDE 0.5 MG: 1 INJECTION, SOLUTION INTRAMUSCULAR; INTRAVENOUS; SUBCUTANEOUS at 21:42

## 2023-12-05 RX ADMIN — Medication 3 MG: at 23:23

## 2023-12-05 RX ADMIN — SODIUM CHLORIDE: 9 INJECTION, SOLUTION INTRAVENOUS at 15:20

## 2023-12-05 RX ADMIN — FENTANYL CITRATE 50 MCG: 50 INJECTION, SOLUTION INTRAMUSCULAR; INTRAVENOUS at 13:08

## 2023-12-05 RX ADMIN — ACETAMINOPHEN 500 MG: 500 TABLET ORAL at 19:33

## 2023-12-05 RX ADMIN — OXYCODONE 5 MG: 5 TABLET ORAL at 19:30

## 2023-12-05 RX ADMIN — HYDROMORPHONE HYDROCHLORIDE 0.5 MG: 1 INJECTION, SOLUTION INTRAMUSCULAR; INTRAVENOUS; SUBCUTANEOUS at 16:22

## 2023-12-05 RX ADMIN — SODIUM CHLORIDE, POTASSIUM CHLORIDE, SODIUM LACTATE AND CALCIUM CHLORIDE: 600; 310; 30; 20 INJECTION, SOLUTION INTRAVENOUS at 12:17

## 2023-12-05 RX ADMIN — FENTANYL CITRATE 50 MCG: 50 INJECTION, SOLUTION INTRAMUSCULAR; INTRAVENOUS at 12:54

## 2023-12-05 RX ADMIN — MIDAZOLAM 1 MG: 1 INJECTION INTRAMUSCULAR; INTRAVENOUS at 14:57

## 2023-12-05 RX ADMIN — PANTOPRAZOLE SODIUM 20 MG: 20 TABLET, DELAYED RELEASE ORAL at 21:48

## 2023-12-05 RX ADMIN — SODIUM CHLORIDE: 9 INJECTION, SOLUTION INTRAVENOUS at 23:27

## 2023-12-05 RX ADMIN — MIDAZOLAM 2 MG: 1 INJECTION INTRAMUSCULAR; INTRAVENOUS at 12:54

## 2023-12-05 RX ADMIN — BUSPIRONE HYDROCHLORIDE 5 MG: 5 TABLET ORAL at 21:49

## 2023-12-05 RX ADMIN — GABAPENTIN 300 MG: 300 CAPSULE ORAL at 21:50

## 2023-12-05 RX ADMIN — METHOCARBAMOL 750 MG: 500 TABLET ORAL at 21:49

## 2023-12-05 RX ADMIN — TRANEXAMIC ACID 1 G: 100 INJECTION, SOLUTION INTRAVENOUS at 13:37

## 2023-12-05 RX ADMIN — HYDROMORPHONE HYDROCHLORIDE 0.5 MG: 2 INJECTION, SOLUTION INTRAMUSCULAR; INTRAVENOUS; SUBCUTANEOUS at 15:14

## 2023-12-05 RX ADMIN — FENTANYL CITRATE 50 MCG: 50 INJECTION, SOLUTION INTRAMUSCULAR; INTRAVENOUS at 14:55

## 2023-12-05 RX ADMIN — CARVEDILOL 12.5 MG: 12.5 TABLET, FILM COATED ORAL at 19:30

## 2023-12-05 RX ADMIN — MIDAZOLAM 2 MG: 1 INJECTION INTRAMUSCULAR; INTRAVENOUS at 12:32

## 2023-12-05 RX ADMIN — AMLODIPINE BESYLATE 5 MG: 5 TABLET ORAL at 19:30

## 2023-12-05 RX ADMIN — DULOXETINE HYDROCHLORIDE 60 MG: 60 CAPSULE, DELAYED RELEASE ORAL at 19:30

## 2023-12-05 RX ADMIN — ACETAMINOPHEN 1000 MG: 500 TABLET ORAL at 11:33

## 2023-12-05 RX ADMIN — HYDROMORPHONE HYDROCHLORIDE 0.5 MG: 1 INJECTION, SOLUTION INTRAMUSCULAR; INTRAVENOUS; SUBCUTANEOUS at 16:30

## 2023-12-05 RX ADMIN — FENTANYL CITRATE 50 MCG: 50 INJECTION INTRAMUSCULAR; INTRAVENOUS at 16:22

## 2023-12-05 RX ADMIN — MEPERIDINE HYDROCHLORIDE 12.5 MG: 25 INJECTION INTRAMUSCULAR; INTRAVENOUS; SUBCUTANEOUS at 17:48

## 2023-12-05 RX ADMIN — ATORVASTATIN CALCIUM 80 MG: 40 TABLET, FILM COATED ORAL at 19:30

## 2023-12-05 RX ADMIN — FENTANYL CITRATE 50 MCG: 50 INJECTION INTRAMUSCULAR; INTRAVENOUS at 16:45

## 2023-12-05 RX ADMIN — OXYCODONE 10 MG: 5 TABLET ORAL at 23:23

## 2023-12-05 RX ADMIN — HYDROMORPHONE HYDROCHLORIDE 0.5 MG: 2 INJECTION, SOLUTION INTRAMUSCULAR; INTRAVENOUS; SUBCUTANEOUS at 15:08

## 2023-12-05 RX ADMIN — MIDAZOLAM 1 MG: 1 INJECTION INTRAMUSCULAR; INTRAVENOUS at 15:00

## 2023-12-05 RX ADMIN — FENTANYL CITRATE 50 MCG: 50 INJECTION, SOLUTION INTRAMUSCULAR; INTRAVENOUS at 14:54

## 2023-12-05 RX ADMIN — Medication 20 MG: at 15:06

## 2023-12-05 RX ADMIN — VANCOMYCIN HYDROCHLORIDE 1000 MG: 1 INJECTION, POWDER, LYOPHILIZED, FOR SOLUTION INTRAVENOUS at 12:17

## 2023-12-05 RX ADMIN — PROPOFOL 75 MCG/KG/MIN: 10 INJECTION, EMULSION INTRAVENOUS at 13:03

## 2023-12-05 RX ADMIN — DOCUSATE SODIUM 50MG AND SENNOSIDES 8.6MG 1 TABLET: 8.6; 5 TABLET, FILM COATED ORAL at 21:48

## 2023-12-05 RX ADMIN — SODIUM CHLORIDE, POTASSIUM CHLORIDE, SODIUM LACTATE AND CALCIUM CHLORIDE: 600; 310; 30; 20 INJECTION, SOLUTION INTRAVENOUS at 12:54

## 2023-12-05 RX ADMIN — DICYCLOMINE HYDROCHLORIDE 20 MG: 20 TABLET ORAL at 19:35

## 2023-12-05 RX ADMIN — ASPIRIN 81 MG: 81 TABLET, COATED ORAL at 21:48

## 2023-12-05 RX ADMIN — PHENYLEPHRINE HYDROCHLORIDE 20 MCG/MIN: 10 INJECTION INTRAVENOUS at 14:05

## 2023-12-05 ASSESSMENT — PAIN DESCRIPTION - LOCATION
LOCATION: HIP

## 2023-12-05 ASSESSMENT — PAIN SCALES - GENERAL
PAINLEVEL_OUTOF10: 10
PAINLEVEL_OUTOF10: 0
PAINLEVEL_OUTOF10: 6
PAINLEVEL_OUTOF10: 10
PAINLEVEL_OUTOF10: 9
PAINLEVEL_OUTOF10: 9
PAINLEVEL_OUTOF10: 10
PAINLEVEL_OUTOF10: 7
PAINLEVEL_OUTOF10: 10
PAINLEVEL_OUTOF10: 0
PAINLEVEL_OUTOF10: 7

## 2023-12-05 ASSESSMENT — PAIN DESCRIPTION - FREQUENCY: FREQUENCY: CONTINUOUS

## 2023-12-05 ASSESSMENT — PAIN DESCRIPTION - ORIENTATION
ORIENTATION: LEFT
ORIENTATION: RIGHT
ORIENTATION: LEFT

## 2023-12-05 ASSESSMENT — PAIN DESCRIPTION - DESCRIPTORS
DESCRIPTORS: STABBING
DESCRIPTORS: ACHING
DESCRIPTORS: ACHING;SHOOTING;SHARP
DESCRIPTORS: THROBBING
DESCRIPTORS: STABBING
DESCRIPTORS: STABBING
DESCRIPTORS: ACHING
DESCRIPTORS: ACHING

## 2023-12-05 ASSESSMENT — PAIN DESCRIPTION - PAIN TYPE: TYPE: SURGICAL PAIN

## 2023-12-05 ASSESSMENT — LIFESTYLE VARIABLES: SMOKING_STATUS: 1

## 2023-12-05 ASSESSMENT — PAIN DESCRIPTION - ONSET: ONSET: ON-GOING

## 2023-12-05 NOTE — DISCHARGE SUMMARY
[address] [address] SUMMARY     Name: Adri Moss       MR#: [id number]    : 1955  ADMIT DATE: 2023  DISCHARGE DATE: 2023     ADMISSION DIAGNOSIS: Primary osteoarthritis of left hip [M16.12]     DISCHARGE DIAGNOSIS: same as admission     PROCEDURE PERFORMED: Procedure(s):  LEFT ANTERIOR TOTAL HIP ARTHROPLASTY     CONSULTATIONS:  None. HISTORY OF PRESENT ILLNESS: The patient is a 69-year-old female with progressive left hip pain due to severe osteoarthritis of left hip. Symptoms have progressed despite comprehensive conservative treatment. She presents for left total hip replacement. Risks, benefits, and alternatives of procedure were reviewed with her in detail and she desires to proceed. She was able to completely quit smoking preoperatively, and she understands this, significantly improves her risk for wound healing complications, but could not guarantee it. HOSPITAL COURSE:  The patient underwent the aforementioned procedure on date of admission under spinal anesthesia. There were no immediate postoperative complications. She was started on a multimodal pain regimen and DVT prophylaxis. DISPOSITION: The patient made slow, steady progress with physical therapy and was appropriate for discharge to Home in stable condition on postoperative day 1. DISCHARGE MEDICATIONS:  Reinitiate preadmission medications. In addition, the patient will be on ASA for DVT prophylaxis and low dose oxycodone and Tylenol for pain. DISCHARGE INSTRUCTIONS:  Detailed printed instructions were provided to the patient. Follow up with Dr. Humble Gardner in approximately 3 weeks. The patient will receive home health physical therapy in the meantime.     Signed by: Frandy De Jesus PA-C  2023

## 2023-12-05 NOTE — ANESTHESIA PRE PROCEDURE
Department of Anesthesiology  Preprocedure Note       Name:  oDrian Patten   Age:  70 y.o.  :  1955                                          MRN:  [id number]         Date:  2023      Surgeon: Bebo Araujo):  Zainab Nava MD    Procedure: Procedure(s):  LEFT ANTERIOR TOTAL HIP ARTHROPLASTY    Medications prior to admission:   Prior to Admission medications    Medication Sig Start Date End Date Taking? Authorizing Provider   furosemide (LASIX) 20 MG tablet Take 1-2 tablets by mouth daily as directed for swelling. Patient taking differently: Take 1 tablet by mouth as needed Take 1-2 tablets by mouth daily as directed for swelling. 11/10/23   Lior Gaona MD   amLODIPine (NORVASC) 5 MG tablet Take 1 tablet by mouth daily 11/10/23   Lior Gaona MD   methocarbamol (ROBAXIN) 750 MG tablet TAKE 1 TABLET BY MOUTH THREE TIMES DAILY FOR MUSCLE SPASM 8/3/23   Mark Horton MD   busPIRone (BUSPAR) 5 MG tablet Take 1 tablet by mouth 2 times daily    Mark Horton MD   metoclopramide (REGLAN) 10 MG/10ML SOLN Take 10 mLs by mouth 4 times daily 23   Mark Horton MD   loratadine (CLARITIN) 10 MG capsule Take 1 capsule by mouth daily    Mark Horton MD   HYDROcodone-acetaminophen (NORCO)  MG per tablet Take 1 tablet by mouth as needed for Pain. Mark Horton MD   atorvastatin (LIPITOR) 80 MG tablet 1 tablet daily 5/3/21   Automatic Reconciliation, Ar   carvedilol (COREG) 12.5 MG tablet Take by mouth 2 times daily (with meals) 22   Automatic Reconciliation, Ar   dicyclomine (BENTYL) 20 MG tablet Take 1 tablet by mouth daily    Automatic Reconciliation, Ar   DULoxetine (CYMBALTA) 60 MG extended release capsule Take by mouth daily 20   Automatic Reconciliation, Ar   fluticasone (FLONASE) 50 MCG/ACT nasal spray 2 sprays by Nasal route as needed    Automatic Reconciliation, Ar   gabapentin (NEURONTIN) 300 MG capsule Take by mouth 3 times daily.

## 2023-12-05 NOTE — DISCHARGE INSTRUCTIONS
Post-op Discharge Instructions Following Total Joint Replacement  Pablo Sierra MD  [address] [facility]  [phone]  Follow-up Office Visit  See Dr. Anette Gosselin approximately 3-4 weeks from date of surgery. Call [phone] to make an appointment. If you have any postop questions for Dr. Derek Toney clinical team, please call [phone]. Activity  Use your walker for ambulation. Weight bearing as tolerated unless instructed otherwise by the physical therapist. Get up every hour you are awake and take a brief walk. Lengthen walking distance daily as your strength improves. Continue using your walker until seen in the office for your first follow up visit. Practice your exercises 3 times daily as instructed by the physical therapist. Nubia Barker for 20 minutes after exercising. No driving until seen in the office for your first follow up visit. Incision Care  The light brown Aquacel surgical dressing is waterproof and is to remain on your incision for 7 days. On the 7th day, carefully lift the edge of the dressing to break the adhesive seal and gently peel it off. If your Aquacel dressings comes loose or falls off before the 7th day, replace it with a dry sterile gauze dressing and change this dressing daily. Once there is no drainage on the bandage, you mean leave the incision open to air. You may take a shower with the Aquacel dressing in place. After you remove the Aquacel dressing on day 7, you may continue to shower and get your incision wet in the shower. Do not submerge your incision under water in a bathtub, hot tub, swimming pool, etc. until after you have been evaluated at your first office visit. Medications  Blood Clot Prevention: Take medication as prescribed by your physician for 4 weeks postop. Pain Management: Take pain medication as prescribed; wean yourself off of pain medication as your pain lessens. Take with food. You make also take Tylenol every 4-6 hours as needed for pain.   Do not exceed 3

## 2023-12-05 NOTE — ANESTHESIA PROCEDURE NOTES
Spinal Block    Patient location during procedure: OR  Reason for block: primary anesthetic  Staffing  Performed: anesthesiologist   Anesthesiologist: Geni Johnson MD  Performed by: Geni Johnson MD  Authorized by:  Ishaan Mccullough MD    Spinal Block  Patient position: sitting  Prep: DuraPrep  Patient monitoring: cardiac monitor, continuous pulse ox, frequent blood pressure checks and oxygen  Approach: left paramedian  Location: L3/L4  Provider prep: mask and sterile gloves  Needle  Needle type: Quincke   Needle gauge: 22 G  Needle length: 4 in  Assessment  Sensory level: T4  Events: None  Swirl obtained: Yes  CSF: clear  Attempts: 3+  Hemodynamics: stable  Additional Notes  First attempt Cuba Saldana, second [facility] BRUNO PATRICIO, 3rd Debby Caruso MD  Preanesthetic Checklist  Completed: patient identified, IV checked, site marked, risks and benefits discussed, surgical/procedural consents, equipment checked, pre-op evaluation, timeout performed, anesthesia consent given, oxygen available, monitors applied/VS acknowledged and fire risk safety assessment completed and verbalized

## 2023-12-05 NOTE — BRIEF OP NOTE
Brief Postoperative Note      Patient: Lois Sandy  YOB: 1955  MRN: [id number]    Date of Procedure: 12/5/2023    Pre-Op Diagnosis Codes:     * Primary osteoarthritis of left hip [M16.12]    Post-Op Diagnosis: Same       Procedure(s):  LEFT ANTERIOR TOTAL HIP ARTHROPLASTY    Surgeon(s):  Tulio Hernandez MD    Assistant:  Surgical Assistant: James Almonte; Jesika Hunter; Denice Vieira; Patricia, [place]    Anesthesia: Spinal    Estimated Blood Loss (mL): 536    Complications: None    Specimens:   * No specimens in log *    Implants:  Implant Name Type Inv.  Item Serial No.  Lot No. LRB No. Used Action   CUP ACET CLUS HOLE F 52 MM W/ DOME HOLE PLUG P2 COAT EMPOWR - SN/A  CUP ACET CLUS HOLE F 52 MM W/ DOME HOLE PLUG P2 COAT EMPOWR N/A [facility] [id number] Left 1 Implanted   LINER ACET NEUT F 36X52 MM HIP HXE+ VIT E POLYETH EMPOWR - SN/A  LINER ACET NEUT F 36X52 MM HIP HXE+ VIT E POLYETH EMPOWR N/A [facility] [id number] Left 1 Implanted   SCREW BNE 30 MM ACET EMPOWR - SN/A  SCREW BNE 30 MM ACET EMPOWR N/A [facility] - [facility] [id number] Left 1 Implanted   STEM FEM 11 HIP LAT OFFSET TAPERFILL - SN/A  STEM FEM 11 HIP LAT OFFSET TAPERFILL N/A [facility] MED[facility] - [facility] [id number] Left 1 Implanted   HEAD FEM 4- MM 36 MM HIP OFFSET FOR FMP SYS BIOLOX DELT CERM - SN/A  HEAD FEM 4- MM 36 MM HIP OFFSET FOR FMP SYS BIOLOX DELT CERM N/A [facility] [employer] [id number] Left 1 Implanted         Drains: * No LDAs found *    Findings: severe OA      Electronically signed by Tulio Hernandez MD on 12/5/2023 at 3:49 PM

## 2023-12-06 VITALS
RESPIRATION RATE: 15 BRPM | WEIGHT: 132.94 LBS | TEMPERATURE: 98 F | SYSTOLIC BLOOD PRESSURE: 128 MMHG | DIASTOLIC BLOOD PRESSURE: 73 MMHG | HEART RATE: 78 BPM | BODY MASS INDEX: 22.82 KG/M2 | OXYGEN SATURATION: 95 %

## 2023-12-06 LAB
ANION GAP SERPL CALC-SCNC: 5 MMOL/L (ref 5–15)
BUN SERPL-MCNC: 10 MG/DL (ref 6–20)
BUN/CREAT SERPL: 10 (ref 12–20)
CALCIUM SERPL-MCNC: 8.4 MG/DL (ref 8.5–10.1)
CHLORIDE SERPL-SCNC: 107 MMOL/L (ref 97–108)
CO2 SERPL-SCNC: 26 MMOL/L (ref 21–32)
CREAT SERPL-MCNC: 1.02 MG/DL (ref 0.55–1.02)
GLUCOSE BLD STRIP.AUTO-MCNC: 108 MG/DL (ref 65–117)
GLUCOSE SERPL-MCNC: 113 MG/DL (ref 65–100)
HCT VFR BLD AUTO: 27.8 % (ref 35–47)
HGB BLD-MCNC: 9.1 G/DL (ref 11.5–16)
POTASSIUM SERPL-SCNC: 3.7 MMOL/L (ref 3.5–5.1)
SERVICE CMNT-IMP: NORMAL
SODIUM SERPL-SCNC: 138 MMOL/L (ref 136–145)

## 2023-12-06 PROCEDURE — 96365 THER/PROPH/DIAG IV INF INIT: CPT

## 2023-12-06 PROCEDURE — 97165 OT EVAL LOW COMPLEX 30 MIN: CPT

## 2023-12-06 PROCEDURE — 97116 GAIT TRAINING THERAPY: CPT

## 2023-12-06 PROCEDURE — 82962 GLUCOSE BLOOD TEST: CPT

## 2023-12-06 PROCEDURE — 96366 THER/PROPH/DIAG IV INF ADDON: CPT

## 2023-12-06 PROCEDURE — 6360000002 HC RX W HCPCS: Performed by: PHYSICIAN ASSISTANT

## 2023-12-06 PROCEDURE — 85014 HEMATOCRIT: CPT

## 2023-12-06 PROCEDURE — G0378 HOSPITAL OBSERVATION PER HR: HCPCS

## 2023-12-06 PROCEDURE — 97535 SELF CARE MNGMENT TRAINING: CPT

## 2023-12-06 PROCEDURE — 97161 PT EVAL LOW COMPLEX 20 MIN: CPT

## 2023-12-06 PROCEDURE — 85018 HEMOGLOBIN: CPT

## 2023-12-06 PROCEDURE — 36415 COLL VENOUS BLD VENIPUNCTURE: CPT

## 2023-12-06 PROCEDURE — 96374 THER/PROPH/DIAG INJ IV PUSH: CPT

## 2023-12-06 PROCEDURE — 96375 TX/PRO/DX INJ NEW DRUG ADDON: CPT

## 2023-12-06 PROCEDURE — 6370000000 HC RX 637 (ALT 250 FOR IP): Performed by: PHYSICIAN ASSISTANT

## 2023-12-06 PROCEDURE — 97530 THERAPEUTIC ACTIVITIES: CPT

## 2023-12-06 PROCEDURE — 80048 BASIC METABOLIC PNL TOTAL CA: CPT

## 2023-12-06 PROCEDURE — 96368 THER/DIAG CONCURRENT INF: CPT

## 2023-12-06 PROCEDURE — 6370000000 HC RX 637 (ALT 250 FOR IP): Performed by: ORTHOPAEDIC SURGERY

## 2023-12-06 RX ORDER — ASPIRIN 81 MG/1
81 TABLET ORAL 2 TIMES DAILY
Qty: 60 TABLET | Refills: 0 | Status: SHIPPED | OUTPATIENT
Start: 2023-12-06

## 2023-12-06 RX ORDER — ACETAMINOPHEN 500 MG
500 TABLET ORAL EVERY 4 HOURS
Qty: 100 TABLET | Refills: 0 | Status: SHIPPED | OUTPATIENT
Start: 2023-12-06

## 2023-12-06 RX ORDER — AMOXICILLIN 250 MG
1 CAPSULE ORAL 2 TIMES DAILY PRN
Qty: 60 TABLET | Refills: 0 | Status: SHIPPED | OUTPATIENT
Start: 2023-12-06

## 2023-12-06 RX ORDER — OXYCODONE HYDROCHLORIDE 5 MG/1
5-10 TABLET ORAL EVERY 4 HOURS PRN
Qty: 60 TABLET | Refills: 0 | Status: SHIPPED | OUTPATIENT
Start: 2023-12-06 | End: 2023-12-13

## 2023-12-06 RX ADMIN — ACETAMINOPHEN 500 MG: 500 TABLET ORAL at 11:39

## 2023-12-06 RX ADMIN — HYDROMORPHONE HYDROCHLORIDE 0.5 MG: 1 INJECTION, SOLUTION INTRAMUSCULAR; INTRAVENOUS; SUBCUTANEOUS at 11:39

## 2023-12-06 RX ADMIN — OXYCODONE 10 MG: 5 TABLET ORAL at 03:23

## 2023-12-06 RX ADMIN — BUSPIRONE HYDROCHLORIDE 5 MG: 5 TABLET ORAL at 10:05

## 2023-12-06 RX ADMIN — DULOXETINE HYDROCHLORIDE 60 MG: 60 CAPSULE, DELAYED RELEASE ORAL at 10:05

## 2023-12-06 RX ADMIN — OXYCODONE 10 MG: 5 TABLET ORAL at 07:05

## 2023-12-06 RX ADMIN — GABAPENTIN 300 MG: 300 CAPSULE ORAL at 13:20

## 2023-12-06 RX ADMIN — AMLODIPINE BESYLATE 5 MG: 5 TABLET ORAL at 10:05

## 2023-12-06 RX ADMIN — ASPIRIN 81 MG: 81 TABLET, COATED ORAL at 10:05

## 2023-12-06 RX ADMIN — CARVEDILOL 12.5 MG: 12.5 TABLET, FILM COATED ORAL at 10:05

## 2023-12-06 RX ADMIN — PANTOPRAZOLE SODIUM 20 MG: 20 TABLET, DELAYED RELEASE ORAL at 10:04

## 2023-12-06 RX ADMIN — METHOCARBAMOL 750 MG: 500 TABLET ORAL at 13:20

## 2023-12-06 RX ADMIN — OXYCODONE 10 MG: 5 TABLET ORAL at 10:04

## 2023-12-06 RX ADMIN — DICYCLOMINE HYDROCHLORIDE 20 MG: 20 TABLET ORAL at 10:05

## 2023-12-06 RX ADMIN — ACETAMINOPHEN 500 MG: 500 TABLET ORAL at 07:08

## 2023-12-06 RX ADMIN — DOCUSATE SODIUM 50MG AND SENNOSIDES 8.6MG 1 TABLET: 8.6; 5 TABLET, FILM COATED ORAL at 10:04

## 2023-12-06 RX ADMIN — METHOCARBAMOL 750 MG: 500 TABLET ORAL at 10:05

## 2023-12-06 RX ADMIN — OXYCODONE 10 MG: 5 TABLET ORAL at 13:20

## 2023-12-06 RX ADMIN — CETIRIZINE HYDROCHLORIDE 10 MG: 10 TABLET, FILM COATED ORAL at 10:05

## 2023-12-06 RX ADMIN — GABAPENTIN 300 MG: 300 CAPSULE ORAL at 10:05

## 2023-12-06 RX ADMIN — ATORVASTATIN CALCIUM 80 MG: 40 TABLET, FILM COATED ORAL at 10:05

## 2023-12-06 ASSESSMENT — PAIN SCALES - GENERAL
PAINLEVEL_OUTOF10: 9

## 2023-12-06 ASSESSMENT — PAIN DESCRIPTION - ORIENTATION
ORIENTATION: LEFT

## 2023-12-06 ASSESSMENT — PAIN DESCRIPTION - LOCATION
LOCATION: HIP

## 2023-12-06 ASSESSMENT — PAIN DESCRIPTION - DESCRIPTORS
DESCRIPTORS: DISCOMFORT
DESCRIPTORS: SHARP
DESCRIPTORS: THROBBING

## 2023-12-06 NOTE — OP NOTE
[facility]  OPERATIVE REPORT    Name:  Moraima Whipple  MR#:  [id number]  :  1955  ACCOUNT #:  [de-identified]  DATE OF SERVICE:  2023    PREOPERATIVE DIAGNOSIS:  Osteoarthritis, left hip. POSTOPERATIVE DIAGNOSIS:  Osteoarthritis, left hip. PROCEDURE PERFORMED:  Left total hip replacement, direct anterior approach. SURGEON:  Omid Novoa MD    FIRST ASSISTANT:  Eric Lopez SA    ANESTHESIA:  Spinal with sedation. COMPLICATIONS:  None. SPECIMENS REMOVED:  None. IMPLANTS:  DonJoy 52-mm Empowr acetabular shell with one cancellous screw and 36-mm inside diameter neutral polyethylene liner, size 11 lateral offset TaperFill femoral stem with 36 mm -4 ceramic femoral head. ESTIMATED BLOOD LOSS:  250 mL. INDICATIONS:  The patient is a 78-year-old female with progressive left hip pain due to severe osteoarthritis of left hip. Symptoms have progressed despite comprehensive conservative treatment. She presents for left total hip replacement. Risks, benefits, and alternatives of procedure were reviewed with her in detail and she desires to proceed. She was able to completely quit smoking preoperatively, and she understands this, significantly improves her risk for wound healing complications, but could not guarantee it. PROCEDURE:  The patient was taken to the operating room where Anesthesia team placed a spinal.  Preoperative IV antibiotics were administered. She was positioned supine on the [place] fracture table with both lower extremities in boot traction, hips in neutral position. Left hip and thigh were prepped and draped in the usual sterile fashion. Through a longitudinal incision centered over the tensor fascia yumi muscle, tensor fascia was incised in line with muscle fibers. Fascia was retracted medially and the muscle belly laterally. The ascending branches of the lateral circumflex vessels were identified and coagulated.   Pericapsular fat was

## 2023-12-06 NOTE — CARE COORDINATION
Care Management Initial Assessment       RUR: N/A  Readmission? No  1st IM letter given? No  1st  letter given: No    CM met with patient at bedside to introduce self and explain role. Patient states she is independent with ADLs prior to admission. Patient lives in a 2 level house with her spouse, 3 steps to enter. Patient owns a RW, canes, and a toilet raiser. Patient confirms family will transport her home at discharge. CM offered [address] for [address] agency, referrals pending. AT [address] [place] [address] [facility] accepted patient for [address], CM added to AVS.        12/06/23 1233   Service Assessment   Patient Orientation Alert and Oriented;Person;Place;Situation;Self   Cognition Alert   History Provided By Patient   Primary Caregiver Self   Support Systems Spouse/Significant Other   PCP Verified by CM Yes  (Alfredo Guidry MD)   Last Visit to PCP Within last 3 months   Prior Functional Level Independent in ADLs/IADLs   Can patient return to prior living arrangement Yes   Ability to make needs known: Good   Family able to assist with home care needs: Yes   Financial Resources Medicare   Social/Functional History   Lives With Spouse   Type of [address] [place] [address]  Two level   Entrance Stairs - Number of Steps 3   Services At/After Discharge   Services At/After Discharge Home Health;DME   Condition of Participation: Discharge Planning   The Plan for Transition of Care is related to the following treatment goals: Home health   The Patient and/or Patient Representative was provided with a Choice of Provider? Patient   The Patient and/Or Patient Representative agree with the Discharge Plan? Yes   Freedom of Choice list was provided with basic dialogue that supports the patient's individualized plan of care/goals, treatment preferences, and shares the quality data associated with the providers?   Yes       Bárbara Contreras RN/CRM

## 2023-12-06 NOTE — PROGRESS NOTES
1930: Patient admitted to unit.
TRANSFER - OUT REPORT:    Verbal report given to MATT Nugent on Amber Koenig  being transferred to  for routine post-op       Report consisted of patient's Situation, Background, Assessment and   Recommendations(SBAR). Information from the following report(s) Nurse Handoff Report, Index, Adult Overview, Surgery Report, Intake/Output, and MAR was reviewed with the receiving nurse. Lines:   Peripheral IV 12/05/23 Posterior;Right Hand (Active)   Site Assessment Clean, dry & intact 12/05/23 1705   Line Status Infusing 12/05/23 1705   Phlebitis Assessment No symptoms 12/05/23 1705   Infiltration Assessment 0 12/05/23 1705   Dressing Status Clean, dry & intact 12/05/23 1705   Dressing Type Transparent 12/05/23 1705        Opportunity for questions and clarification was provided.       Patient transported with:  Gamar
asymptomatic. Continue to monitor. Wound Monitor postop dressing; no postop dressing changes necessary. Reinforce PRN. Post Operative Pain Pain Control: stable, mild-to-moderate joint symptoms intermittently, reasonably well controlled by current meds. Has been on chronic hydrocodone 10/325 QID for years     DVT Prophylaxis Continue with SCD'S, Ankle Pump Exercises. ASA 81mg BID     Discharge Disposition Discharge plan: Home with HHPT pending PT clearance.        Signed By: Jose Pompa PA-C  December 6, 2023 10:16 AM
Independent, Able to apply any necessary device. Feeds in reasonable time  Bathing: Performs without assistance  Grooming: Washes face, fuentes hair, brushes teeth, shaves (manages plug if electric razor)  Dressing: Independent, Ties shoes, fasteners, applies braces  Bowel Control: No accidents. Able to use enema or suppository if needed  Bladder Control: No accidents. Able to care for collecting device, if used  Toilet Transfers: Needs help for balance, handling clothes or toilet paper  Chair/Bed Trannsfers: Minimum assistance or supervision required  Ambulation: With help for 50 yards  Stairs: Needs help or supervision  Total Barthel Index Score: 80       The Barthel ADL Index: Guidelines  1. The index should be used as a record of what a patient does, not as a record of what a patient could do. 2. The main aim is to establish degree of independence from any help, physical or verbal, however minor and for whatever reason. 3. The need for supervision renders the patient not independent. 4. A patient's performance should be established using the best available evidence. Asking the patient, friends/relatives and nurses are the usual sources, but direct observation and common sense are also important. However direct testing is not needed. 5. Usually the patient's performance over the preceding 24-48 hours is important, but occasionally longer periods will be relevant. 6. Middle categories imply that the patient supplies over 50 per cent of the effort. 7. Use of aids to be independent is allowed. Score Interpretation (from [address] [place] [address])    Independent   60-79 Minimally independent   40-59 Partially dependent   20-39 Very dependent   <20 Totally dependent     -Lisa Dahl, Barthel, D.W. (1965). Functional evaluation: the Barthel Index. [address] ([address] (1997). The Barthel activities of daily living index: self-reporting versus actual performance in the old (> or = 75 years).

## 2023-12-11 ENCOUNTER — HOSPITAL ENCOUNTER (OUTPATIENT)
Facility: HOSPITAL | Age: 68
Discharge: HOME | End: 2023-12-14
Attending: ORTHOPAEDIC SURGERY
Payer: COMMERCIAL

## 2023-12-11 DIAGNOSIS — Z96.642 STATUS POST LEFT HIP REPLACEMENT: ICD-10-CM

## 2023-12-11 DIAGNOSIS — M79.662 PAIN OF LEFT CALF: ICD-10-CM

## 2023-12-11 PROCEDURE — 93971 EXTREMITY STUDY: CPT

## 2024-02-14 ENCOUNTER — HOSPITAL ENCOUNTER (OUTPATIENT)
Facility: HOSPITAL | Age: 69
Discharge: HOME | End: 2024-02-17
Attending: FAMILY MEDICINE
Payer: COMMERCIAL

## 2024-02-14 DIAGNOSIS — Z12.31 SCREENING MAMMOGRAM FOR HIGH-RISK PATIENT: ICD-10-CM

## 2024-02-14 PROCEDURE — 77067 SCR MAMMO BI INCL CAD: CPT

## 2024-02-21 ENCOUNTER — HOSPITAL ENCOUNTER (OUTPATIENT)
Facility: HOSPITAL | Age: 69
Discharge: HOME | End: 2024-02-24
Attending: FAMILY MEDICINE
Payer: COMMERCIAL

## 2024-02-21 VITALS — BODY MASS INDEX: 41.95 KG/M2 | WEIGHT: 293 LBS | HEIGHT: 70 IN

## 2024-02-21 DIAGNOSIS — R92.8 ABNORMAL MAMMOGRAM OF RIGHT BREAST: ICD-10-CM

## 2024-02-21 PROCEDURE — G0279 TOMOSYNTHESIS, MAMMO: HCPCS

## 2024-02-21 PROCEDURE — 76642 ULTRASOUND BREAST LIMITED: CPT

## 2024-02-22 ENCOUNTER — TELEPHONE (OUTPATIENT)
Age: 69
End: 2024-02-22

## 2024-02-22 ENCOUNTER — TELEPHONE (OUTPATIENT)
Facility: HOSPITAL | Age: 69
End: 2024-02-22

## 2024-02-22 DIAGNOSIS — N63.15 BREAST LUMP ON RIGHT SIDE AT 12 O'CLOCK POSITION: Primary | ICD-10-CM

## 2024-02-22 NOTE — TELEPHONE ENCOUNTER
Patient came in for ultrasound and mammo today, based on the results the patient was recommended for an Ultrasound guided RT breast biopsy by the radiologist. Results were discussed with patient from the radiologist, pt was with understanding. Patient Is tentatively scheduled to come back this coming week for her biopsy, if this is okay to proceed, if you could please place the following orders since we are both [facility].     Epic codes;      IMG 67298 ( US guided RT breast biopsy with Clip insertion)   Dx code ; N63.15 -- mass at 12 oclock RT breast    Also, if this is something that needs to be seen by a surgeon once results come back, if you could please put name of preferred surgeon in comments and we will coordinate that for this patient.       If any questions please reach out,     Eli AIKEN  [facility] Breast Biopsy Coordinator  [phone] (F)

## 2024-02-29 ENCOUNTER — HOSPITAL ENCOUNTER (OUTPATIENT)
Facility: HOSPITAL | Age: 69
End: 2024-02-29
Attending: FAMILY MEDICINE
Payer: COMMERCIAL

## 2024-02-29 ENCOUNTER — HOSPITAL ENCOUNTER (OUTPATIENT)
Facility: HOSPITAL | Age: 69
Discharge: HOME | End: 2024-02-29
Payer: COMMERCIAL

## 2024-02-29 VITALS — WEIGHT: 135 LBS | BODY MASS INDEX: 19.37 KG/M2

## 2024-02-29 DIAGNOSIS — N64.59 ABNORMAL BREAST EXAM: ICD-10-CM

## 2024-02-29 DIAGNOSIS — N63.15 BREAST LUMP ON RIGHT SIDE AT 12 O'CLOCK POSITION: ICD-10-CM

## 2024-02-29 PROCEDURE — A4648 IMPLANTABLE TISSUE MARKER: HCPCS

## 2024-02-29 PROCEDURE — 88305 TISSUE EXAM BY PATHOLOGIST: CPT

## 2024-02-29 PROCEDURE — 19083 BX BREAST 1ST LESION US IMAG: CPT

## 2024-02-29 PROCEDURE — 77065 DX MAMMO INCL CAD UNI: CPT

## 2024-02-29 RX ORDER — LIDOCAINE HYDROCHLORIDE AND EPINEPHRINE 10; 10 MG/ML; UG/ML
20 INJECTION, SOLUTION INFILTRATION; PERINEURAL ONCE
Status: DISCONTINUED | OUTPATIENT
Start: 2024-02-29 | End: 2024-03-04 | Stop reason: HOSPADM

## 2024-02-29 RX ORDER — LIDOCAINE HYDROCHLORIDE 10 MG/ML
15 INJECTION, SOLUTION INFILTRATION; PERINEURAL ONCE
Status: DISCONTINUED | OUTPATIENT
Start: 2024-02-29 | End: 2024-03-04 | Stop reason: HOSPADM

## 2024-02-29 NOTE — DISCHARGE INSTRUCTIONS
[facility]  [address] [place] [address]  [phone]      Breast Biopsy Discharge Instructions      1. After the biopsy, we will place a clean covered ice pack over the biopsy site, within the bra - you should leave the ice pack on 30 minutes and then remove the ice pack for 1-2 hours until bedtime.  If needed you can continue applying ice the following day. It is a good idea to wear your bra for support, both day and night unless this causes you discomfort.     2. You may take Extra-Strength Tylenol (two tablets) every 4 to 6 hours as needed for pain.  Do not take aspirin or aspirin products (e.g. ibuprofen, Advil, Motrin) as these may cause more bleeding.     3. You may expect some bruising and skin discoloration in the biopsy area.  This is normal and generally should resolve in 5 to 7 days.     4. Most women do not find it necessary to restrict their activities after the procedure. You should rest as needed on the day of your biopsy.  The next day, if you are feeling okay, you may resume your regular work/activity schedule.  Avoid strenuous activity and heavy lifting, jogging, aerobics, or vacuuming for 48 hours after the procedure.     5. 48 hours after your biopsy, remove the large outer dressing and leave the steri-strips (tiny pieces of tape) in place.  The steri-strips will usually fall off in a few days.  You may shower 48 hours after your biopsy and you may get the steri-strips wet.  If still present after 4 days, you may gently peel the strips off.  Keep the area clean and dry and shower daily.     6. If you have bleeding from the incision area, hold firm pressure on the area for 20 minutes.  This should control any slight oozing that might occur.  If you develop persistent bleeding or pain which does not respond to the above measures or if you develop a fever, excessive swelling, redness, heat or drainage, please call the Stereotactic Breast Health Navigator at  [phone] (if you had a stereotactic breast biopsy) or the Ultrasound Breast Health Navigator at [phone] (if you had an Ultrasound breast biopsy) during normal business hours (7 a.m. - 4 p.m.).  After business hours, call [phone] and ask for the on-call Radiology Nurse to be paged, or your referring physician.     7. You will receive your biopsy results (pathology report) in 3-5 business days - the radiologist will review your results and you will receive a call from the radiology department and/or from your doctor.      Physician :  Dr. Jhoan Brown                  Nurse:  Alexandria Mari, RN       Tech:  Eli U/S mariely                       Mammo: ____________________

## 2024-02-29 NOTE — PROGRESS NOTES
Called outpatient registration and spoke with Evita to inquire if pt. Was present - Evita states yes and she is currently being registered.

## 2024-02-29 NOTE — PROGRESS NOTES
In with pt. And noted pt. Sitting on side of U/S table with feet noted on flat on floor.  Pt talking with U/S tech, Eli, and nurse without difficulty. Pt states she came in for annual mammogram and found this.

## 2024-02-29 NOTE — PROGRESS NOTES
1012 - pt. To mammo dept without difficulty accomp. By Eli U/S tech, for her post right breast biopsy mammogram.  Right breast biopsy site noted clean/dry/intact without bleeding, swelling or pain.  Dr. Brown informed pt. May be discharged post  mammogram.    1014 - right breast biopsy sample to gross room via nurse.    1016 - right breast sample signed into lab log via nurse.    1028 - ice pack applied to right breast biopsy site with instructions on use.    1029 - I have reviewed discharge instructions with the patient.  The patient verbalized understanding.   Copy of discharge instructions per AVS copied, reviewed with pt. And copy to pt. Prior to discharge.  Pt stable without c/o pain noted at this time.  Dressing right breast noted clean/dry/intact with ice pack intact right breast.  Pt. Dressed self and ambulated to waiting room without difficulty accomp. By nurse.  Pt discharged to home via private vehicle with her  driving her home.

## 2024-03-01 ENCOUNTER — TELEPHONE (OUTPATIENT)
Facility: HOSPITAL | Age: 69
End: 2024-03-01

## 2024-03-01 NOTE — TELEPHONE ENCOUNTER
Dr. Jhoan Brown reviewed pathology result and recommended her to continue her yearly mammograms.  The pt. was notified of the benign results and recommendations for a follow up mammogram in 1 year.  Advised pt to follow up with her doctor for any changes. Pt with understanding.

## 2024-03-03 RX ORDER — BUSPIRONE HYDROCHLORIDE 5 MG/1
5 TABLET ORAL 2 TIMES DAILY
Qty: 60 TABLET | Refills: 1 | Status: SHIPPED | OUTPATIENT
Start: 2024-03-03

## 2024-03-14 RX ORDER — ALBUTEROL SULFATE 90 UG/1
AEROSOL, METERED RESPIRATORY (INHALATION)
Qty: 18 G | Refills: 1 | Status: SHIPPED | OUTPATIENT
Start: 2024-03-14

## 2024-03-14 RX ORDER — ALBUTEROL SULFATE 90 UG/1
AEROSOL, METERED RESPIRATORY (INHALATION)
COMMUNITY
Start: 2024-03-04 | End: 2024-03-14 | Stop reason: SDUPTHER

## 2024-03-14 NOTE — TELEPHONE ENCOUNTER
PCP: Latosha Gaona MD    Last appt:   11/10/2023    No future appointments.    Requested Prescriptions     Pending Prescriptions Disp Refills    albuterol sulfate HFA (PROVENTIL;VENTOLIN;PROAIR) 108 (90 Base) MCG/ACT inhaler 18 g 1     Sig: INHALE 1 PUFF BY MOUTH EVERY 6 HOURS AS NEEDED FOR SHORTNESS OF BREATH OR WHEEZING

## 2024-03-14 NOTE — TELEPHONE ENCOUNTER
Pt got this from [facility] and got this inhaler as she is very sick.  She has had this about ten days now.  Pt is feeling a little better today.        She will need a refill on this as soon as possible.      Albuterol Sulfate inhaler called into [facility] #[phone]    Please call with any questions.  Pt needs as soon as possible.

## 2024-03-25 ENCOUNTER — TELEPHONE (OUTPATIENT)
Age: 69
End: 2024-03-25

## 2024-03-25 NOTE — TELEPHONE ENCOUNTER
----- Message from Nika Kruger sent at 3/25/2024 11:54 AM EDT -----  Subject: Medication Problem     Medication: unsure  Dosage: Unsure  Ordering Provider:     Question/Problem: Pt requesting medication for diarrhea that she has had   for 5 days. She has been taking imodium but that is not working. Pt is   afraid of getting dehydrated.      Pharmacy: [id number] [address]   [phone] - F [phone]    ---------------------------------------------------------------------------  --------------  CALL BACK INFO  [phone]; OK to leave message on voicemail  ---------------------------------------------------------------------------  --------------    SCRIPT ANSWERS  Relationship to Patient: Self

## 2024-03-26 NOTE — TELEPHONE ENCOUNTER
Filiberto calling to state that pt can not even get out of the house with the diarrhea.  [facility] is a [facility] by their house and she can't even get there without going in her pants.    Please call Filiberto to advise. Pt is hopefully sleeping.

## 2024-03-26 NOTE — TELEPHONE ENCOUNTER
Attempted to call no answer.   Patient will need to go to Urgent care . She should follow up with GI per Dr. Gaona

## 2024-04-05 RX ORDER — AMLODIPINE BESYLATE 10 MG/1
10 TABLET ORAL NIGHTLY
Qty: 90 TABLET | Refills: 1 | Status: SHIPPED | OUTPATIENT
Start: 2024-04-05

## 2024-06-19 ENCOUNTER — HOSPITAL ENCOUNTER (OUTPATIENT)
Facility: HOSPITAL | Age: 69
Discharge: HOME | End: 2024-06-22
Payer: COMMERCIAL

## 2024-06-19 DIAGNOSIS — Z00.00 ENCOUNTER FOR GENERAL MEDICAL EXAMINATION: ICD-10-CM

## 2024-06-19 DIAGNOSIS — F17.200 SMOKER: ICD-10-CM

## 2024-06-19 DIAGNOSIS — R42 DIZZINESS: ICD-10-CM

## 2024-06-19 DIAGNOSIS — H90.A31 MIXED CONDUCTIVE AND SENSORINEURAL HEARING LOSS, UNILATERAL, RIGHT EAR WITH RESTRICTED HEARING ON THE CONTRALATERAL SIDE: ICD-10-CM

## 2024-06-19 DIAGNOSIS — H91.20 SUDDEN-ONSET SENSORINEURAL HEARING LOSS: ICD-10-CM

## 2024-06-19 PROCEDURE — 6360000004 HC RX CONTRAST MEDICATION

## 2024-06-19 PROCEDURE — A9579 GAD-BASE MR CONTRAST NOS,1ML: HCPCS

## 2024-06-19 PROCEDURE — 70553 MRI BRAIN STEM W/O & W/DYE: CPT

## 2024-06-19 RX ADMIN — GADOTERIDOL 12 ML: 279.3 INJECTION, SOLUTION INTRAVENOUS at 16:07

## 2024-07-01 RX ORDER — BUSPIRONE HYDROCHLORIDE 5 MG/1
5 TABLET ORAL 2 TIMES DAILY
Qty: 60 TABLET | Refills: 3 | Status: SHIPPED | OUTPATIENT
Start: 2024-07-01

## 2024-07-09 ENCOUNTER — TELEPHONE (OUTPATIENT)
Age: 69
End: 2024-07-09

## 2024-07-09 DIAGNOSIS — D32.9 MENINGIOMA (HCC): Primary | ICD-10-CM

## 2024-07-09 NOTE — TELEPHONE ENCOUNTER
Please notify patient's  it will take months to get into neurology and there is really nothing they can do about a meningioma.  Instead I added a referral to Dr. Neville, neurosurgery who is an expert in treatment of meningioma.  He is in [place] but he is worth the trip.      Referred To:                DARLENE Neville  [address] [facility]  [address]  [facility] [address] Loc/POS:                Phone:   [phone]

## 2024-07-09 NOTE — TELEPHONE ENCOUNTER
Patient's Spouse, Filiberto, states he needs a call back to get a Referral for [facility] as patient was seen for hearing Loss @ [facility] where MRI was ordered & results show patient needs to see a Neurologist But order has to come from PCP. Filiberto States MRI results are in patient's chart. Please call to discuss & advise. Thank you

## 2024-09-25 DIAGNOSIS — R60.9 EDEMA, UNSPECIFIED TYPE: ICD-10-CM

## 2024-09-25 RX ORDER — FUROSEMIDE 20 MG
TABLET ORAL
Qty: 40 TABLET | Refills: 1 | Status: SHIPPED | OUTPATIENT
Start: 2024-09-25

## 2024-10-14 RX ORDER — BUSPIRONE HYDROCHLORIDE 5 MG/1
5 TABLET ORAL 2 TIMES DAILY
Qty: 60 TABLET | Refills: 3 | OUTPATIENT
Start: 2024-10-14

## 2024-10-14 RX ORDER — BUSPIRONE HYDROCHLORIDE 5 MG/1
5 TABLET ORAL 2 TIMES DAILY
Qty: 60 TABLET | Refills: 3 | Status: SHIPPED | OUTPATIENT
Start: 2024-10-14

## 2024-10-31 ENCOUNTER — TELEPHONE (OUTPATIENT)
Age: 69
End: 2024-10-31

## 2024-10-31 NOTE — TELEPHONE ENCOUNTER
----- Message from Cassia MCCANN sent at 10/31/2024 12:12 PM EDT -----  Regarding: [facility] Appointment Request  [facility] Appointment Request    Patient needs appointment for [facility] Appointment Type: Annual Visit.    Patient Requested Dates(s):Soonest possible   Patient Requested Time:Around noon time   Provider Name:Latosha Gaona, MDPCP       Reason for Appointment Request: Established Patient - Available appointments did not meet patient need  last awv 10/18/2023  --------------------------------------------------------------------------------------------------------------------------    Relationship to Patient: Self     Call Back Information: OK to leave message on voicemail  Preferred Call Back Number: Phone [phone]

## 2025-03-02 NOTE — PROGRESS NOTES
Gisele Huitron is a 70 y.o. female who presents for follow up. Last seen Nov 2023.     Reports sore throat for 1 1/2 weeks. Grandson had strep 2 weeks ago.  No fever,    Followed by cardiology, Dr Small,  every 6 months.  Prior Takosubo cardiomyopathy and CAD.  No SOB, PND, orthopnea, no edema.  Continues to smoke.    Treated for HTN. On amlodipine 10mg daily, coreg 12.5mg BID    Treated for HLP.  On statin. No myalgias.      Prior left [facility], DR Scruggs. Dec 2023.  Prior right TKA.      Has chronic back pain. She is followed by pain management, DR Cordero. Taking hydrocodone 10mg #120 per month and gabapentin 300mg #90 per month.  Reports the pain medication allows her to be more active, still has pain with walking in parking lot.  No side effects.      Reports alternating BM.  Has IBS and gastroparesis, better with relgan.   Sees Dr Enrique, GI up to date on colonoscopy June 2023. Has to follow restricted diet.      Saw rheum for RA, prior plaquenil.  Was referred and reports could not get in.  Has bilateral hand pain.      Mammogram Feb 2024. Ordered.      Dexa 2023, osteopenia    Up to date on eye exam.        Past Medical History:   Diagnosis Date    Acid reflux     Arthritis     Bronchitis     Cardiomyopathy (HCC) 2/10/2021    Chronic pain     LOWER BACK    COVID     Depression     Elevated troponin 2/8/2021    Gastroparesis     Heart attack (HCC)     Heart failure (HCC)     History of blood transfusion     15 YEARS AGO    Hyperlipidemia     Hypertension     IBS (irritable bowel syndrome) 2/8/2021    Squamous cell carcinoma of skin of right elbow 5/2016    SKIN    Systolic heart failure (HCC) 2/11/2021    Takotsubo cardiomyopathy 11/16/2015    Tobacco abuse 11/16/2015       Family History   Problem Relation Age of Onset    Heart Disease Mother     Cancer Father         prostate    Heart Disease Maternal Grandmother     Breast Cancer Paternal Grandmother         Social History     Socioeconomic History    Marital

## 2025-03-03 ENCOUNTER — OFFICE VISIT (OUTPATIENT)
Age: 70
End: 2025-03-03
Payer: MEDICARE

## 2025-03-03 VITALS
RESPIRATION RATE: 18 BRPM | HEART RATE: 86 BPM | BODY MASS INDEX: 17.75 KG/M2 | WEIGHT: 124 LBS | DIASTOLIC BLOOD PRESSURE: 86 MMHG | TEMPERATURE: 97.4 F | OXYGEN SATURATION: 96 % | HEIGHT: 70 IN | SYSTOLIC BLOOD PRESSURE: 141 MMHG

## 2025-03-03 DIAGNOSIS — E78.00 PURE HYPERCHOLESTEROLEMIA, UNSPECIFIED: ICD-10-CM

## 2025-03-03 DIAGNOSIS — Z12.31 ENCOUNTER FOR SCREENING MAMMOGRAM FOR BREAST CANCER: ICD-10-CM

## 2025-03-03 DIAGNOSIS — M05.79 RHEUMATOID ARTHRITIS WITH RHEUMATOID FACTOR OF MULTIPLE SITES WITHOUT ORGAN OR SYSTEMS INVOLVEMENT (HCC): ICD-10-CM

## 2025-03-03 DIAGNOSIS — M54.50 CHRONIC BILATERAL LOW BACK PAIN, UNSPECIFIED WHETHER SCIATICA PRESENT: ICD-10-CM

## 2025-03-03 DIAGNOSIS — K58.2 IRRITABLE BOWEL SYNDROME WITH BOTH CONSTIPATION AND DIARRHEA: ICD-10-CM

## 2025-03-03 DIAGNOSIS — G89.29 CHRONIC BILATERAL LOW BACK PAIN, UNSPECIFIED WHETHER SCIATICA PRESENT: ICD-10-CM

## 2025-03-03 DIAGNOSIS — J02.9 PHARYNGITIS, UNSPECIFIED ETIOLOGY: ICD-10-CM

## 2025-03-03 DIAGNOSIS — E55.9 VITAMIN D DEFICIENCY: ICD-10-CM

## 2025-03-03 DIAGNOSIS — I10 ESSENTIAL (PRIMARY) HYPERTENSION: Primary | ICD-10-CM

## 2025-03-03 DIAGNOSIS — I42.9 CARDIOMYOPATHY, UNSPECIFIED TYPE (HCC): ICD-10-CM

## 2025-03-03 DIAGNOSIS — Z78.0 POSTMENOPAUSAL: ICD-10-CM

## 2025-03-03 LAB
GROUP A STREP ANTIGEN, POC: NEGATIVE
VALID INTERNAL CONTROL, POC: NORMAL

## 2025-03-03 PROCEDURE — 87880 STREP A ASSAY W/OPTIC: CPT | Performed by: FAMILY MEDICINE

## 2025-03-03 PROCEDURE — G8399 PT W/DXA RESULTS DOCUMENT: HCPCS | Performed by: FAMILY MEDICINE

## 2025-03-03 PROCEDURE — 99214 OFFICE O/P EST MOD 30 MIN: CPT | Performed by: FAMILY MEDICINE

## 2025-03-03 PROCEDURE — 1160F RVW MEDS BY RX/DR IN RCRD: CPT | Performed by: FAMILY MEDICINE

## 2025-03-03 PROCEDURE — 1036F TOBACCO NON-USER: CPT | Performed by: FAMILY MEDICINE

## 2025-03-03 PROCEDURE — 3017F COLORECTAL CA SCREEN DOC REV: CPT | Performed by: FAMILY MEDICINE

## 2025-03-03 PROCEDURE — G8427 DOCREV CUR MEDS BY ELIG CLIN: HCPCS | Performed by: FAMILY MEDICINE

## 2025-03-03 PROCEDURE — 1123F ACP DISCUSS/DSCN MKR DOCD: CPT | Performed by: FAMILY MEDICINE

## 2025-03-03 PROCEDURE — G8419 CALC BMI OUT NRM PARAM NOF/U: HCPCS | Performed by: FAMILY MEDICINE

## 2025-03-03 PROCEDURE — 1090F PRES/ABSN URINE INCON ASSESS: CPT | Performed by: FAMILY MEDICINE

## 2025-03-03 PROCEDURE — PBSHW AMB POC RAPID STREP A: Performed by: FAMILY MEDICINE

## 2025-03-03 PROCEDURE — 1159F MED LIST DOCD IN RCRD: CPT | Performed by: FAMILY MEDICINE

## 2025-03-03 PROCEDURE — 3079F DIAST BP 80-89 MM HG: CPT | Performed by: FAMILY MEDICINE

## 2025-03-03 PROCEDURE — 3077F SYST BP >= 140 MM HG: CPT | Performed by: FAMILY MEDICINE

## 2025-03-03 SDOH — ECONOMIC STABILITY: FOOD INSECURITY: WITHIN THE PAST 12 MONTHS, YOU WORRIED THAT YOUR FOOD WOULD RUN OUT BEFORE YOU GOT MONEY TO BUY MORE.: NEVER TRUE

## 2025-03-03 SDOH — ECONOMIC STABILITY: FOOD INSECURITY: WITHIN THE PAST 12 MONTHS, THE FOOD YOU BOUGHT JUST DIDN'T LAST AND YOU DIDN'T HAVE MONEY TO GET MORE.: NEVER TRUE

## 2025-03-03 ASSESSMENT — PATIENT HEALTH QUESTIONNAIRE - PHQ9
SUM OF ALL RESPONSES TO PHQ QUESTIONS 1-9: 0
SUM OF ALL RESPONSES TO PHQ QUESTIONS 1-9: 0
2. FEELING DOWN, DEPRESSED OR HOPELESS: NOT AT ALL
1. LITTLE INTEREST OR PLEASURE IN DOING THINGS: NOT AT ALL
SUM OF ALL RESPONSES TO PHQ QUESTIONS 1-9: 0
SUM OF ALL RESPONSES TO PHQ QUESTIONS 1-9: 0

## 2025-03-04 PROBLEM — I50.20 SYSTOLIC HEART FAILURE (HCC): Status: RESOLVED | Noted: 2021-02-11 | Resolved: 2025-03-04

## 2025-03-10 DIAGNOSIS — E55.9 VITAMIN D DEFICIENCY: ICD-10-CM

## 2025-03-10 DIAGNOSIS — I10 ESSENTIAL (PRIMARY) HYPERTENSION: ICD-10-CM

## 2025-03-10 DIAGNOSIS — E78.00 PURE HYPERCHOLESTEROLEMIA, UNSPECIFIED: ICD-10-CM

## 2025-03-11 ENCOUNTER — TELEPHONE (OUTPATIENT)
Age: 70
End: 2025-03-11

## 2025-03-11 DIAGNOSIS — N18.32 STAGE 3B CHRONIC KIDNEY DISEASE (HCC): Primary | ICD-10-CM

## 2025-03-11 LAB
25(OH)D3 SERPL-MCNC: 42.7 NG/ML (ref 30–100)
ALBUMIN SERPL-MCNC: 4 G/DL (ref 3.5–5)
ALBUMIN/GLOB SERPL: 1.5 (ref 1.1–2.2)
ALP SERPL-CCNC: 175 U/L (ref 45–117)
ALT SERPL-CCNC: 21 U/L (ref 12–78)
ANION GAP SERPL CALC-SCNC: 5 MMOL/L (ref 2–12)
AST SERPL-CCNC: 24 U/L (ref 15–37)
BASOPHILS # BLD: 0.05 K/UL (ref 0–0.1)
BASOPHILS NFR BLD: 0.6 % (ref 0–1)
BILIRUB SERPL-MCNC: 0.4 MG/DL (ref 0.2–1)
BUN SERPL-MCNC: 12 MG/DL (ref 6–20)
BUN/CREAT SERPL: 8 (ref 12–20)
CALCIUM SERPL-MCNC: 9.2 MG/DL (ref 8.5–10.1)
CHLORIDE SERPL-SCNC: 107 MMOL/L (ref 97–108)
CHOLEST SERPL-MCNC: 172 MG/DL
CO2 SERPL-SCNC: 25 MMOL/L (ref 21–32)
CREAT SERPL-MCNC: 1.43 MG/DL (ref 0.55–1.02)
DIFFERENTIAL METHOD BLD: ABNORMAL
EOSINOPHIL # BLD: 0.22 K/UL (ref 0–0.4)
EOSINOPHIL NFR BLD: 2.7 % (ref 0–7)
ERYTHROCYTE [DISTWIDTH] IN BLOOD BY AUTOMATED COUNT: 13 % (ref 11.5–14.5)
GLOBULIN SER CALC-MCNC: 2.7 G/DL (ref 2–4)
GLUCOSE SERPL-MCNC: 112 MG/DL (ref 65–100)
HCT VFR BLD AUTO: 37.1 % (ref 35–47)
HDLC SERPL-MCNC: 71 MG/DL
HDLC SERPL: 2.4 (ref 0–5)
HGB BLD-MCNC: 11.9 G/DL (ref 11.5–16)
IMM GRANULOCYTES # BLD AUTO: 0.03 K/UL (ref 0–0.04)
IMM GRANULOCYTES NFR BLD AUTO: 0.4 % (ref 0–0.5)
LDLC SERPL CALC-MCNC: 71.8 MG/DL (ref 0–100)
LYMPHOCYTES # BLD: 1.31 K/UL (ref 0.8–3.5)
LYMPHOCYTES NFR BLD: 16.3 % (ref 12–49)
MCH RBC QN AUTO: 32.2 PG (ref 26–34)
MCHC RBC AUTO-ENTMCNC: 32.1 G/DL (ref 30–36.5)
MCV RBC AUTO: 100.3 FL (ref 80–99)
MONOCYTES # BLD: 0.58 K/UL (ref 0–1)
MONOCYTES NFR BLD: 7.2 % (ref 5–13)
NEUTS SEG # BLD: 5.84 K/UL (ref 1.8–8)
NEUTS SEG NFR BLD: 72.8 % (ref 32–75)
NRBC # BLD: 0 K/UL (ref 0–0.01)
NRBC BLD-RTO: 0 PER 100 WBC
PLATELET # BLD AUTO: 272 K/UL (ref 150–400)
PMV BLD AUTO: 9.6 FL (ref 8.9–12.9)
POTASSIUM SERPL-SCNC: 5 MMOL/L (ref 3.5–5.1)
PROT SERPL-MCNC: 6.7 G/DL (ref 6.4–8.2)
RBC # BLD AUTO: 3.7 M/UL (ref 3.8–5.2)
SODIUM SERPL-SCNC: 137 MMOL/L (ref 136–145)
TRIGL SERPL-MCNC: 146 MG/DL
VLDLC SERPL CALC-MCNC: 29.2 MG/DL
WBC # BLD AUTO: 8 K/UL (ref 3.6–11)

## 2025-03-18 RX ORDER — BUSPIRONE HYDROCHLORIDE 5 MG/1
5 TABLET ORAL 2 TIMES DAILY
Qty: 60 TABLET | Refills: 3 | Status: SHIPPED | OUTPATIENT
Start: 2025-03-18

## 2025-04-07 ENCOUNTER — TELEPHONE (OUTPATIENT)
Age: 70
End: 2025-04-07

## 2025-04-07 NOTE — TELEPHONE ENCOUNTER
Pt states would like a referral with rheumatology, states Dr. García does not take her insurance.    Advised pcp can give another referral but can't guarantee that provider will be in network, pt states understanding.    Please call to discuss.

## 2025-04-15 RX ORDER — BUSPIRONE HYDROCHLORIDE 5 MG/1
5 TABLET ORAL 2 TIMES DAILY
Qty: 60 TABLET | Refills: 3 | Status: SHIPPED | OUTPATIENT
Start: 2025-04-15

## 2025-05-07 ENCOUNTER — HOSPITAL ENCOUNTER (OUTPATIENT)
Facility: HOSPITAL | Age: 70
Discharge: HOME | End: 2025-05-10
Attending: FAMILY MEDICINE
Payer: MEDICARE

## 2025-05-07 DIAGNOSIS — Z12.31 ENCOUNTER FOR SCREENING MAMMOGRAM FOR BREAST CANCER: ICD-10-CM

## 2025-05-07 PROCEDURE — 77063 BREAST TOMOSYNTHESIS BI: CPT

## (undated) DEVICE — TRAP ENDOSCP POLYP 2 CHMBR DRAWER TYP

## (undated) DEVICE — 4-PORT MANIFOLD: Brand: NEPTUNE 2

## (undated) DEVICE — HYPODERMIC SAFETY NEEDLE: Brand: MAGELLAN

## (undated) DEVICE — STRYKER PERFORMANCE SERIES SAGITTAL BLADE: Brand: STRYKER PERFORMANCE SERIES

## (undated) DEVICE — IV START KIT: Brand: MEDLINE

## (undated) DEVICE — LIQUIBAND RAPID ADHESIVE 36/CS 0.8ML: Brand: MEDLINE

## (undated) DEVICE — SUTURE VCRL SZ 2 L54IN ABSRB UD L65MM TP-1 1/2 CIR J880T

## (undated) DEVICE — YANKAUER,FLEXIBLE HANDLE,REGLR CAPACITY: Brand: MEDLINE INDUSTRIES, INC.

## (undated) DEVICE — SYR ART 700 CLEAR MARK 7 -- ARTERION

## (undated) DEVICE — HI-TORQUE VERSACORE FLOPPY GUIDE WIRE SYSTEM 145 CM: Brand: HI-TORQUE VERSACORE

## (undated) DEVICE — DECANTER BAG 9": Brand: MEDLINE INDUSTRIES, INC.

## (undated) DEVICE — STERILE POLYISOPRENE POWDER-FREE SURGICAL GLOVES: Brand: PROTEXIS

## (undated) DEVICE — SYRINGE ANGIO 10 CC BRL STD PRNT POLYCARB LT BLU MEDALLION

## (undated) DEVICE — ELECTRODE PT RET AD L9FT HI MOIST COND ADH HYDRGEL CORDED

## (undated) DEVICE — STERILE POLYISOPRENE POWDER-FREE SURGICAL GLOVES WITH EMOLLIENT COATING: Brand: PROTEXIS

## (undated) DEVICE — CONTAINER,SPECIMEN,3OZ,OR STRL: Brand: MEDLINE

## (undated) DEVICE — MARKER,SKIN,WI/RULER AND LABELS: Brand: MEDLINE

## (undated) DEVICE — SPLINT WR POS F/ARTERIAL ACC -- BX/10

## (undated) DEVICE — DRAPE,U/ SHT,SPLIT,PLAS,STERIL: Brand: MEDLINE

## (undated) DEVICE — CATHETER ANGIO JR4 AD 5 FRX100 CM 25 CM PERFORMA

## (undated) DEVICE — CATHETER IV 18GA L1.16IN OD1.27-1.3462MM ID0.9398-1.016MM

## (undated) DEVICE — SNARE ENDOSCP POLYP MED STD AD 2.4X27X240 CM 2.8 MM OVL SENS

## (undated) DEVICE — SUTURE STRATAFIX SPRL SZ 1 L14IN ABSRB VLT L48CM CTX 1/2 SXPD2B405

## (undated) DEVICE — ALCOHOL RUBBING ISO 16OZ 70%

## (undated) DEVICE — TOTAL JOINT - SMH: Brand: MEDLINE INDUSTRIES, INC.

## (undated) DEVICE — TUBING, SUCTION, 9/32" X 12', STRAIGHT: Brand: MEDLINE INDUSTRIES, INC.

## (undated) DEVICE — ENDOSCOPIC KIT COMPLIANCE ENDOKIT

## (undated) DEVICE — HANDPIECE SET WITH BONE CLEANING TIP AND SUCTION TUBE: Brand: INTERPULSE

## (undated) DEVICE — SUTURE MCRYL SZ 3-0 L27IN ABSRB UD L19MM PS-2 3/8 CIR PRIM Y427H

## (undated) DEVICE — DRAPE PRB US TRNSDCR 6X96IN --

## (undated) DEVICE — PADDING CAST W6INXL4YD NONSTERILE COT RAYON MICROPLEATED

## (undated) DEVICE — 3M™ TEGADERM™ TRANSPARENT FILM DRESSING FRAME STYLE, 1626W, 4 IN X 4-3/4 IN (10 CM X 12 CM), 50/CT 4CT/CASE: Brand: 3M™ TEGADERM™

## (undated) DEVICE — CUFF BLD PRSS AD CLTH SGL TB W/ BAYNT CONN ROUNDED CORNER

## (undated) DEVICE — RADIFOCUS OPTITORQUE ANGIOGRAPHIC CATHETER: Brand: OPTITORQUE

## (undated) DEVICE — TUBING PRSS MON L6IN PVC M FEM CONN

## (undated) DEVICE — GLIDESHEATH SLENDER ACCESS KIT: Brand: GLIDESHEATH SLENDER

## (undated) DEVICE — AIRLIFE™  ADULT CUSHION NASAL CANNULA WITH 7 FOOT (2.1 M) CRUSH-RESISTANT OXYGEN TUBING, AND U/CONNECT-IT ADAPTER: Brand: AIRLIFE™

## (undated) DEVICE — GLOVE SURG SZ 65 L12IN FNGR THK79MIL GRN LTX FREE

## (undated) DEVICE — CUFF,BP,DISP,1 TUBE,SM ADLT,H: Brand: MEDLINE

## (undated) DEVICE — GUIDEWIRE VASC L260CM 0.035IN J TIP L3MM PTFE FIX COR NAMIC

## (undated) DEVICE — SPONGE GZ W4XL4IN COT 12 PLY TYP VII WVN C FLD DSGN STERILE

## (undated) DEVICE — SUTURE ABSORBABLE BRAIDED 2-0 CT-1 27 IN UD VICRYL J259H

## (undated) DEVICE — 6619 2 PTNT ISO SYS INCISE AREA&LT;(&GT;&&LT;)&GT;P: Brand: STERI-DRAPE™ IOBAN™ 2

## (undated) DEVICE — ELECTRODE BLDE L4IN NONINSULATED EDGE

## (undated) DEVICE — GLOVE SURG SZ 65 L12IN FNGR THK94MIL STD WHT LTX FREE

## (undated) DEVICE — YANKAUER,OPEN TIP,W/O VENT,STERILE: Brand: MEDLINE INDUSTRIES, INC.

## (undated) DEVICE — PACK PROCEDURE SURG HRT CATH

## (undated) DEVICE — SINGLE-USE BIOPSY FORCEPS: Brand: RADIAL JAW 4

## (undated) DEVICE — CATHETER IV 20GA L1.16IN OD1.0414-1.1176MM ID0.762-0.8382MM

## (undated) DEVICE — KIT ANGIOGRAPHY CUST MRMC

## (undated) DEVICE — CONTAINER SPEC 20 ML LID NEUT BUFF FORMALIN 10 % POLYPR STS

## (undated) DEVICE — SOLUTION SURG PREP 26 CC PURPREP

## (undated) DEVICE — ZIPPERED TOGA, LARGE: Brand: FLYTE

## (undated) DEVICE — SCRUB DRY SURG EZ SCRUB BRUSH PREOPERATIVE GRN

## (undated) DEVICE — C-ARM: Brand: UNBRANDED

## (undated) DEVICE — TR BAND RADIAL ARTERY COMPRESSION DEVICE: Brand: TR BAND

## (undated) DEVICE — SUTURE VCRL 1 L27IN ABSRB CT BRAID COAT UD J281H

## (undated) DEVICE — ERASECAUTI INTERMIT TRAY: Brand: MEDLINE INDUSTRIES, INC.

## (undated) DEVICE — ZIPPERED TOGA, 2X LARGE: Brand: FLYTE, SURGICOOL

## (undated) DEVICE — CATHETER ETER ANGIO L110CM OD5FR ID046IN L75CM 038IN 145DEG CARD

## (undated) DEVICE — SYSTEM REPROC CBL 3 LD DISPOSABLE

## (undated) DEVICE — SOLUTION IRRIG 3000ML 0.9% SOD CHL USP UROMATIC PLAS CONT

## (undated) DEVICE — SYRINGE 20ML LL S/C 50